# Patient Record
Sex: MALE | Race: WHITE | NOT HISPANIC OR LATINO | Employment: OTHER | URBAN - METROPOLITAN AREA
[De-identification: names, ages, dates, MRNs, and addresses within clinical notes are randomized per-mention and may not be internally consistent; named-entity substitution may affect disease eponyms.]

---

## 2017-11-17 RX ORDER — GABAPENTIN 600 MG/1
600 TABLET ORAL DAILY
Status: ON HOLD | COMMUNITY
End: 2017-11-20

## 2017-11-17 RX ORDER — DIGOXIN 125 MCG
125 TABLET ORAL
COMMUNITY
End: 2023-01-23 | Stop reason: SDUPTHER

## 2017-11-17 RX ORDER — ATORVASTATIN CALCIUM 20 MG/1
20 TABLET, FILM COATED ORAL DAILY
COMMUNITY
End: 2023-01-23 | Stop reason: SDUPTHER

## 2017-11-17 RX ORDER — CARVEDILOL 3.12 MG/1
3.12 TABLET ORAL 2 TIMES DAILY WITH MEALS
COMMUNITY

## 2017-11-17 RX ORDER — CHOLECALCIFEROL (VITAMIN D3) 125 MCG
1 CAPSULE ORAL DAILY
COMMUNITY

## 2017-11-17 RX ORDER — OMEPRAZOLE 20 MG/1
20 CAPSULE, DELAYED RELEASE ORAL DAILY
COMMUNITY

## 2017-11-17 RX ORDER — LEVOTHYROXINE SODIUM 0.07 MG/1
75 TABLET ORAL DAILY
COMMUNITY

## 2017-11-20 ENCOUNTER — HOSPITAL ENCOUNTER (OUTPATIENT)
Facility: HOSPITAL | Age: 67
Discharge: HOME | End: 2017-11-21
Attending: INTERNAL MEDICINE | Admitting: INTERNAL MEDICINE

## 2017-11-20 ENCOUNTER — APPOINTMENT (OUTPATIENT)
Dept: GENERAL RADIOLOGY | Facility: HOSPITAL | Age: 67
End: 2017-11-20
Attending: INTERNAL MEDICINE

## 2017-11-20 DIAGNOSIS — I42.8 NON-ISCHEMIC CARDIOMYOPATHY (HCC): ICD-10-CM

## 2017-11-20 DIAGNOSIS — I44.7 LBBB (LEFT BUNDLE BRANCH BLOCK): ICD-10-CM

## 2017-11-20 LAB
ANION GAP SERPL CALCULATED.3IONS-SCNC: 8.8 MMOL/L
BUN BLD-MCNC: 14 MG/DL (ref 8–23)
BUN/CREAT SERPL: 16.5 (ref 7–25)
CALCIUM SPEC-SCNC: 9.3 MG/DL (ref 8.6–10.5)
CHLORIDE SERPL-SCNC: 103 MMOL/L (ref 98–107)
CO2 SERPL-SCNC: 27.2 MMOL/L (ref 22–29)
CREAT BLD-MCNC: 0.85 MG/DL (ref 0.76–1.27)
DEPRECATED RDW RBC AUTO: 49.5 FL (ref 37–54)
ERYTHROCYTE [DISTWIDTH] IN BLOOD BY AUTOMATED COUNT: 13.9 % (ref 11.5–14.5)
GFR SERPL CREATININE-BSD FRML MDRD: 90 ML/MIN/1.73
GLUCOSE BLD-MCNC: 140 MG/DL (ref 65–99)
GLUCOSE BLDC GLUCOMTR-MCNC: 137 MG/DL (ref 70–130)
HCT VFR BLD AUTO: 42.3 % (ref 40.4–52.2)
HGB BLD-MCNC: 14 G/DL (ref 13.7–17.6)
MCH RBC QN AUTO: 32.6 PG (ref 27–32.7)
MCHC RBC AUTO-ENTMCNC: 33.1 G/DL (ref 32.6–36.4)
MCV RBC AUTO: 98.6 FL (ref 79.8–96.2)
PLATELET # BLD AUTO: 134 10*3/MM3 (ref 140–500)
PMV BLD AUTO: 10.9 FL (ref 6–12)
POTASSIUM BLD-SCNC: 4.7 MMOL/L (ref 3.5–5.2)
RBC # BLD AUTO: 4.29 10*6/MM3 (ref 4.6–6)
SODIUM BLD-SCNC: 139 MMOL/L (ref 136–145)
WBC NRBC COR # BLD: 8.75 10*3/MM3 (ref 4.5–10.7)

## 2017-11-20 PROCEDURE — 0 IOPAMIDOL PER 1 ML: Performed by: INTERNAL MEDICINE

## 2017-11-20 PROCEDURE — 71010 HC CHEST PA OR AP: CPT

## 2017-11-20 PROCEDURE — C1769 GUIDE WIRE: HCPCS | Performed by: INTERNAL MEDICINE

## 2017-11-20 PROCEDURE — C1894 INTRO/SHEATH, NON-LASER: HCPCS | Performed by: INTERNAL MEDICINE

## 2017-11-20 PROCEDURE — 82962 GLUCOSE BLOOD TEST: CPT

## 2017-11-20 PROCEDURE — C1882 AICD, OTHER THAN SING/DUAL: HCPCS | Performed by: INTERNAL MEDICINE

## 2017-11-20 PROCEDURE — 99153 MOD SED SAME PHYS/QHP EA: CPT | Performed by: INTERNAL MEDICINE

## 2017-11-20 PROCEDURE — 33241 REMOVE PULSE GENERATOR: CPT

## 2017-11-20 PROCEDURE — G0378 HOSPITAL OBSERVATION PER HR: HCPCS

## 2017-11-20 PROCEDURE — 99152 MOD SED SAME PHYS/QHP 5/>YRS: CPT | Performed by: INTERNAL MEDICINE

## 2017-11-20 PROCEDURE — 80048 BASIC METABOLIC PNL TOTAL CA: CPT | Performed by: INTERNAL MEDICINE

## 2017-11-20 PROCEDURE — C1900 LEAD, CORONARY VENOUS: HCPCS | Performed by: INTERNAL MEDICINE

## 2017-11-20 PROCEDURE — 33264 RMVL & RPLCMT DFB GEN MLT LD: CPT | Performed by: INTERNAL MEDICINE

## 2017-11-20 PROCEDURE — 85027 COMPLETE CBC AUTOMATED: CPT | Performed by: INTERNAL MEDICINE

## 2017-11-20 PROCEDURE — C1898 LEAD, PMKR, OTHER THAN TRANS: HCPCS | Performed by: INTERNAL MEDICINE

## 2017-11-20 PROCEDURE — 33249 INSJ/RPLCMT DEFIB W/LEAD(S): CPT

## 2017-11-20 PROCEDURE — C1730 CATH, EP, 19 OR FEW ELECT: HCPCS | Performed by: INTERNAL MEDICINE

## 2017-11-20 PROCEDURE — C1887 CATHETER, GUIDING: HCPCS | Performed by: INTERNAL MEDICINE

## 2017-11-20 PROCEDURE — 25010000002 CEFAZOLIN PER 500 MG: Performed by: INTERNAL MEDICINE

## 2017-11-20 PROCEDURE — C1892 INTRO/SHEATH,FIXED,PEEL-AWAY: HCPCS

## 2017-11-20 PROCEDURE — 33225 L VENTRIC PACING LEAD ADD-ON: CPT | Performed by: INTERNAL MEDICINE

## 2017-11-20 PROCEDURE — 25010000002 FENTANYL CITRATE (PF) 100 MCG/2ML SOLUTION: Performed by: INTERNAL MEDICINE

## 2017-11-20 PROCEDURE — 25010000002 MIDAZOLAM PER 1 MG: Performed by: INTERNAL MEDICINE

## 2017-11-20 DEVICE — IMPLANTABLE DEVICE: Type: IMPLANTABLE DEVICE | Status: FUNCTIONAL

## 2017-11-20 DEVICE — LD PM CAPSUREFIX NOVUS5076 52CM: Type: IMPLANTABLE DEVICE | Status: FUNCTIONAL

## 2017-11-20 DEVICE — LD ATTAIN PERFORMA S SHAPE LV 4598 88CM: Type: IMPLANTABLE DEVICE | Status: FUNCTIONAL

## 2017-11-20 RX ORDER — PANTOPRAZOLE SODIUM 40 MG/1
40 TABLET, DELAYED RELEASE ORAL EVERY MORNING
Status: DISCONTINUED | OUTPATIENT
Start: 2017-11-21 | End: 2017-11-21 | Stop reason: HOSPADM

## 2017-11-20 RX ORDER — ZOLPIDEM TARTRATE 5 MG/1
5 TABLET ORAL NIGHTLY PRN
Status: DISCONTINUED | OUTPATIENT
Start: 2017-11-20 | End: 2017-11-21 | Stop reason: HOSPADM

## 2017-11-20 RX ORDER — HYDROCODONE BITARTRATE AND ACETAMINOPHEN 5; 325 MG/1; MG/1
1 TABLET ORAL EVERY 4 HOURS PRN
Status: DISCONTINUED | OUTPATIENT
Start: 2017-11-20 | End: 2017-11-21 | Stop reason: HOSPADM

## 2017-11-20 RX ORDER — MIDAZOLAM HYDROCHLORIDE 1 MG/ML
INJECTION INTRAMUSCULAR; INTRAVENOUS AS NEEDED
Status: DISCONTINUED | OUTPATIENT
Start: 2017-11-20 | End: 2017-11-20 | Stop reason: HOSPADM

## 2017-11-20 RX ORDER — FENTANYL CITRATE 50 UG/ML
INJECTION, SOLUTION INTRAMUSCULAR; INTRAVENOUS AS NEEDED
Status: DISCONTINUED | OUTPATIENT
Start: 2017-11-20 | End: 2017-11-20 | Stop reason: HOSPADM

## 2017-11-20 RX ORDER — SODIUM CHLORIDE 9 MG/ML
75 INJECTION, SOLUTION INTRAVENOUS CONTINUOUS
Status: DISCONTINUED | OUTPATIENT
Start: 2017-11-20 | End: 2017-11-20

## 2017-11-20 RX ORDER — LIDOCAINE HYDROCHLORIDE 10 MG/ML
0.1 INJECTION, SOLUTION EPIDURAL; INFILTRATION; INTRACAUDAL; PERINEURAL ONCE AS NEEDED
Status: DISCONTINUED | OUTPATIENT
Start: 2017-11-20 | End: 2017-11-20 | Stop reason: HOSPADM

## 2017-11-20 RX ORDER — SODIUM CHLORIDE 0.9 % (FLUSH) 0.9 %
1-10 SYRINGE (ML) INJECTION AS NEEDED
Status: DISCONTINUED | OUTPATIENT
Start: 2017-11-20 | End: 2017-11-21 | Stop reason: HOSPADM

## 2017-11-20 RX ORDER — HYDROCODONE BITARTRATE AND ACETAMINOPHEN 7.5; 325 MG/1; MG/1
2 TABLET ORAL EVERY 4 HOURS PRN
Status: DISCONTINUED | OUTPATIENT
Start: 2017-11-20 | End: 2017-11-21 | Stop reason: HOSPADM

## 2017-11-20 RX ORDER — ONDANSETRON 2 MG/ML
4 INJECTION INTRAMUSCULAR; INTRAVENOUS EVERY 6 HOURS PRN
Status: DISCONTINUED | OUTPATIENT
Start: 2017-11-20 | End: 2017-11-21 | Stop reason: HOSPADM

## 2017-11-20 RX ORDER — DIGOXIN 125 MCG
125 TABLET ORAL
Status: DISCONTINUED | OUTPATIENT
Start: 2017-11-21 | End: 2017-11-21 | Stop reason: HOSPADM

## 2017-11-20 RX ORDER — LEVOTHYROXINE SODIUM 0.07 MG/1
75 TABLET ORAL EVERY MORNING
Status: DISCONTINUED | OUTPATIENT
Start: 2017-11-21 | End: 2017-11-21 | Stop reason: HOSPADM

## 2017-11-20 RX ORDER — ACETAMINOPHEN 325 MG/1
650 TABLET ORAL EVERY 4 HOURS PRN
Status: DISCONTINUED | OUTPATIENT
Start: 2017-11-20 | End: 2017-11-21 | Stop reason: HOSPADM

## 2017-11-20 RX ORDER — LIDOCAINE HYDROCHLORIDE 10 MG/ML
INJECTION, SOLUTION INFILTRATION; PERINEURAL AS NEEDED
Status: DISCONTINUED | OUTPATIENT
Start: 2017-11-20 | End: 2017-11-20 | Stop reason: HOSPADM

## 2017-11-20 RX ORDER — SODIUM CHLORIDE 0.9 % (FLUSH) 0.9 %
1-10 SYRINGE (ML) INJECTION AS NEEDED
Status: DISCONTINUED | OUTPATIENT
Start: 2017-11-20 | End: 2017-11-20 | Stop reason: HOSPADM

## 2017-11-20 RX ORDER — CARVEDILOL 3.12 MG/1
3.12 TABLET ORAL 2 TIMES DAILY WITH MEALS
Status: DISCONTINUED | OUTPATIENT
Start: 2017-11-20 | End: 2017-11-21 | Stop reason: HOSPADM

## 2017-11-20 RX ORDER — ATORVASTATIN CALCIUM 20 MG/1
20 TABLET, FILM COATED ORAL DAILY
Status: DISCONTINUED | OUTPATIENT
Start: 2017-11-20 | End: 2017-11-21 | Stop reason: HOSPADM

## 2017-11-20 RX ORDER — FAMOTIDINE 20 MG/1
20 TABLET, FILM COATED ORAL 2 TIMES DAILY
Status: DISCONTINUED | OUTPATIENT
Start: 2017-11-20 | End: 2017-11-21 | Stop reason: HOSPADM

## 2017-11-20 RX ADMIN — CARVEDILOL 3.12 MG: 3.12 TABLET, FILM COATED ORAL at 11:39

## 2017-11-20 RX ADMIN — FAMOTIDINE 20 MG: 20 TABLET, FILM COATED ORAL at 20:43

## 2017-11-20 RX ADMIN — CEFAZOLIN 2 G: 10 INJECTION, POWDER, FOR SOLUTION INTRAVENOUS at 17:44

## 2017-11-20 RX ADMIN — CARVEDILOL 3.12 MG: 3.12 TABLET, FILM COATED ORAL at 20:43

## 2017-11-20 RX ADMIN — SODIUM CHLORIDE 75 ML/HR: 9 INJECTION, SOLUTION INTRAVENOUS at 06:53

## 2017-11-20 RX ADMIN — FAMOTIDINE 20 MG: 20 TABLET, FILM COATED ORAL at 11:39

## 2017-11-20 RX ADMIN — ATORVASTATIN CALCIUM 20 MG: 20 TABLET, FILM COATED ORAL at 20:43

## 2017-11-20 RX ADMIN — METFORMIN HYDROCHLORIDE 1000 MG: 1000 TABLET ORAL at 11:39

## 2017-11-20 NOTE — PLAN OF CARE
Problem: Patient Care Overview (Adult)  Goal: Plan of Care Review  Outcome: Ongoing (interventions implemented as appropriate)    11/20/17 2544   Outcome Evaluation   Outcome Summary/Follow up Plan VSS. Denies pain. Dressing to left chest CDI. Sling to left arm. OOB without complications. Plan to discharge tomorrow.        Goal: Adult Individualization and Mutuality  Outcome: Ongoing (interventions implemented as appropriate)  Goal: Discharge Needs Assessment  Outcome: Ongoing (interventions implemented as appropriate)    Problem: Cardiac Rhythm Management Device (Adult)  Goal: Signs and Symptoms of Listed Potential Problems Will be Absent or Manageable (Cardiac Rhythm Management Device)  Outcome: Ongoing (interventions implemented as appropriate)

## 2017-11-20 NOTE — PROGRESS NOTES
Discharge Planning Assessment  [facility]     Patient Name: Roger D Snellen  MRN: [id number]  Today's Date: 11/20/2017    Admit Date: 11/20/2017          Discharge Needs Assessment       11/20/17 1510    Living Environment    Lives With spouse    Living Arrangements house    Home Accessibility stairs to enter home;stairs within home    Number of Stairs to Enter Home 3    Number of Stairs Within Home 12    Type of Financial/Environmental Concern none    Transportation Available car;family or friend will provide    Living Environment    Provides Primary Care For no one    Quality Of Family Relationships supportive;helpful;involved    Able to Return to Prior Living Arrangements yes    Discharge Needs Assessment    Concerns To Be Addressed no discharge needs identified;denies needs/concerns at this time    Equipment Currently Used at Home none    Discharge Disposition home or self-care    Discharge Planning Comments Home            Discharge Plan       11/20/17 1510    Case Management/Social Work Plan    Plan Home    Patient/Family In Agreement With Plan yes    Additional Comments CCP met with pt and wife to discuss d/c planning. Facesheet verified. CCP role explained. Pt resides with his wife in a two level home with three exterior steps and steps to access basement, which pt denies using. Pt denies DME use, and reports past home health but cannot recall agency name. Pt denies past sub-acute rehab. Pt's pharmacy is [facility] in [place], [place]. Pt denies known d/c needs at this time. CCP to follow to assist should d/c needs arise. Raegan Chacko LCSW        Discharge Placement     No information found                Demographic Summary       11/20/17 1505    Referral Information    Admission Type observation    Referral Source nursing;physician    Reason For Consult discharge planning    Record Reviewed medical record    Primary Care Physician Information    Name John Phelps MD            Functional Status        11/20/17 1509    Functional Status Current    Ambulation 0-->independent    Transferring 0-->independent    Toileting 0-->independent    Bathing 0-->independent    Dressing 0-->independent    Eating 0-->independent    Communication 0-->understands/communicates without difficulty    Swallowing (if score 2 or more for any item, consult Rehab Services) 0-->swallows foods/liquids without difficulty    Functional Status Prior    Ambulation 0-->independent    Transferring 0-->independent    Toileting 0-->independent    Bathing 0-->independent    Dressing 0-->independent    Eating 0-->independent    Communication 0-->understands/communicates without difficulty    Swallowing 0-->swallows foods/liquids without difficulty    Cognitive/Perceptual/Developmental    Current Mental Status/Cognitive Functioning no deficits noted            Psychosocial     None            Abuse/Neglect     None            Legal     None            Substance Abuse     None            Patient Forms     None          Kristina Chacko LCSW

## 2017-11-21 ENCOUNTER — APPOINTMENT (OUTPATIENT)
Dept: GENERAL RADIOLOGY | Facility: HOSPITAL | Age: 67
End: 2017-11-21
Attending: INTERNAL MEDICINE

## 2017-11-21 VITALS
DIASTOLIC BLOOD PRESSURE: 86 MMHG | RESPIRATION RATE: 16 BRPM | SYSTOLIC BLOOD PRESSURE: 141 MMHG | HEIGHT: 78 IN | TEMPERATURE: 97.5 F | HEART RATE: 60 BPM | BODY MASS INDEX: 26.96 KG/M2 | WEIGHT: 233 LBS | OXYGEN SATURATION: 96 %

## 2017-11-21 LAB
ANION GAP SERPL CALCULATED.3IONS-SCNC: 12.6 MMOL/L
BASOPHILS # BLD AUTO: 0.03 10*3/MM3 (ref 0–0.2)
BASOPHILS NFR BLD AUTO: 0.4 % (ref 0–1.5)
BUN BLD-MCNC: 12 MG/DL (ref 8–23)
BUN/CREAT SERPL: 14.5 (ref 7–25)
CALCIUM SPEC-SCNC: 9.4 MG/DL (ref 8.6–10.5)
CHLORIDE SERPL-SCNC: 101 MMOL/L (ref 98–107)
CO2 SERPL-SCNC: 26.4 MMOL/L (ref 22–29)
CREAT BLD-MCNC: 0.83 MG/DL (ref 0.76–1.27)
DEPRECATED RDW RBC AUTO: 50.1 FL (ref 37–54)
EOSINOPHIL # BLD AUTO: 0.23 10*3/MM3 (ref 0–0.7)
EOSINOPHIL NFR BLD AUTO: 2.7 % (ref 0.3–6.2)
ERYTHROCYTE [DISTWIDTH] IN BLOOD BY AUTOMATED COUNT: 13.7 % (ref 11.5–14.5)
GFR SERPL CREATININE-BSD FRML MDRD: 92 ML/MIN/1.73
GLUCOSE BLD-MCNC: 115 MG/DL (ref 65–99)
HCT VFR BLD AUTO: 43.4 % (ref 40.4–52.2)
HGB BLD-MCNC: 13.9 G/DL (ref 13.7–17.6)
IMM GRANULOCYTES # BLD: 0 10*3/MM3 (ref 0–0.03)
IMM GRANULOCYTES NFR BLD: 0 % (ref 0–0.5)
INR PPP: 1.16 (ref 0.9–1.1)
LYMPHOCYTES # BLD AUTO: 1.84 10*3/MM3 (ref 0.9–4.8)
LYMPHOCYTES NFR BLD AUTO: 21.6 % (ref 19.6–45.3)
MCH RBC QN AUTO: 32 PG (ref 27–32.7)
MCHC RBC AUTO-ENTMCNC: 32 G/DL (ref 32.6–36.4)
MCV RBC AUTO: 100 FL (ref 79.8–96.2)
MONOCYTES # BLD AUTO: 0.62 10*3/MM3 (ref 0.2–1.2)
MONOCYTES NFR BLD AUTO: 7.3 % (ref 5–12)
NEUTROPHILS # BLD AUTO: 5.81 10*3/MM3 (ref 1.9–8.1)
NEUTROPHILS NFR BLD AUTO: 68 % (ref 42.7–76)
PLATELET # BLD AUTO: 131 10*3/MM3 (ref 140–500)
PMV BLD AUTO: 11 FL (ref 6–12)
POTASSIUM BLD-SCNC: 4.4 MMOL/L (ref 3.5–5.2)
PROTHROMBIN TIME: 14.4 SECONDS (ref 11.7–14.2)
RBC # BLD AUTO: 4.34 10*6/MM3 (ref 4.6–6)
SODIUM BLD-SCNC: 140 MMOL/L (ref 136–145)
WBC NRBC COR # BLD: 8.53 10*3/MM3 (ref 4.5–10.7)

## 2017-11-21 PROCEDURE — 25010000002 CEFAZOLIN PER 500 MG: Performed by: INTERNAL MEDICINE

## 2017-11-21 PROCEDURE — G0378 HOSPITAL OBSERVATION PER HR: HCPCS

## 2017-11-21 PROCEDURE — 93005 ELECTROCARDIOGRAM TRACING: CPT | Performed by: INTERNAL MEDICINE

## 2017-11-21 PROCEDURE — 93010 ELECTROCARDIOGRAM REPORT: CPT | Performed by: INTERNAL MEDICINE

## 2017-11-21 PROCEDURE — 71020 HC CHEST PA AND LATERAL: CPT

## 2017-11-21 PROCEDURE — 80048 BASIC METABOLIC PNL TOTAL CA: CPT | Performed by: INTERNAL MEDICINE

## 2017-11-21 PROCEDURE — 85025 COMPLETE CBC W/AUTO DIFF WBC: CPT | Performed by: INTERNAL MEDICINE

## 2017-11-21 PROCEDURE — 85610 PROTHROMBIN TIME: CPT | Performed by: INTERNAL MEDICINE

## 2017-11-21 RX ORDER — HYDROCODONE BITARTRATE AND ACETAMINOPHEN 7.5; 325 MG/1; MG/1
2 TABLET ORAL EVERY 4 HOURS PRN
Qty: 30 TABLET | Refills: 0 | Status: SHIPPED | OUTPATIENT
Start: 2017-11-21 | End: 2017-11-30

## 2017-11-21 RX ADMIN — CARVEDILOL 3.12 MG: 3.12 TABLET, FILM COATED ORAL at 08:22

## 2017-11-21 RX ADMIN — HYDROCODONE BITARTRATE AND ACETAMINOPHEN 1 TABLET: 5; 325 TABLET ORAL at 01:13

## 2017-11-21 RX ADMIN — FAMOTIDINE 20 MG: 20 TABLET, FILM COATED ORAL at 08:22

## 2017-11-21 RX ADMIN — CEFAZOLIN 2 G: 10 INJECTION, POWDER, FOR SOLUTION INTRAVENOUS at 01:13

## 2017-11-21 RX ADMIN — ZOLPIDEM TARTRATE 5 MG: 5 TABLET ORAL at 01:13

## 2017-11-21 RX ADMIN — PANTOPRAZOLE SODIUM 40 MG: 40 TABLET, DELAYED RELEASE ORAL at 08:22

## 2017-11-21 RX ADMIN — LEVOTHYROXINE SODIUM 75 MCG: 75 TABLET ORAL at 08:21

## 2017-11-21 RX ADMIN — METFORMIN HYDROCHLORIDE 1000 MG: 1000 TABLET ORAL at 08:22

## 2017-11-21 NOTE — DISCHARGE INSTR - ACTIVITY
No heavy lifting with left arm until follow up appointment. Do not raise arm above elbow level for 1 week. Gradually resume normal activity. You may shower but do not submurge site in tub water until follow up appointment. Do not drive until cleared by MD. Do not drive if taking pain medication.

## 2017-11-21 NOTE — DISCHARGE SUMMARY
Date of Discharge:  11/21/2017    Discharge Diagnosis: esenting Problem/History of Present Illness  Non-ischemic cardiomyopathy [I42.8]  LBBB (left bundle branch block) [I44.7]  Non-ischemic cardiomyopathy [I42.8]     Patient Active Problem List   Diagnosis   • Non-ischemic cardiomyopathy            Hospital Course  Patient is a 67 y.o. male presented with Severe nonischemic cardiomyopathy and left bundle branch block and chronic atrial fibrillation     Procedures Performed  Procedure(s):  Device Upgrade  ICD TO A BIV ICD   medtronic  venogram     Pertinent Test Results:  Consults:   Consults     No orders found for last 30 day(s).              Ejection Fraction  No results found for: EF    Condition on Discharge:  Stable    Physical Exam at Discharge    Vital Signs  Temp:  [97.3 °F (36.3 °C)-98.3 °F (36.8 °C)] 97.5 °F (36.4 °C)  Heart Rate:  [59-90] 60  Resp:  [15-18] 16  BP: (111-150)/(73-94) 141/86  Wt Readings from Last 4 Encounters:   11/20/17 233 lb (106 kg)      General Appearance:    Alert, cooperative, in no acute distress   Head:    Normocephalic, without obvious abnormality, atraumatic   Eyes:            Conjunctivae normal, no icterus   Neck:   No adenopathy, supple, trachea midline, no thyromegaly, no   carotid bruit, no JVD   Lungs:     Clear to auscultation,respirations regular, even and                  unlabored    Heart:    Regular rhythm and normal rate, normal S1 and S2,            No murmur, no gallop, no rub, no click   Chest Wall:    No abnormalities observed   Abdomen:     Normal bowel sounds, no masses, no organomegaly, soft        non-tender, non-distended, no guarding, no rebound                tenderness   Rectal:     Deferred   Extremities:   No edema. Moves all extremities well, no cyanosis, no erythema   Pulses:   Pulses palpable and equal bilaterally   Skin:   No bleeding, bruising or rash   Neurologic:   Speech clear and appropriate, no facial drooping          Discharge  Disposition  Home or Self Care    Discharge Medications   Snellen, Roger D   Home Medication Instructions AUBRIE:[id number]    Printed on:11/21/17 2063   Medication Information                      apixaban (ELIQUIS) 5 MG tablet tablet  Take 5 mg by mouth 2 (Two) Times a Day.             atorvastatin (LIPITOR) 20 MG tablet  Take 20 mg by mouth Daily.             carvedilol (COREG) 3.125 MG tablet  Take 3.125 mg by mouth 2 (Two) Times a Day With Meals.             Cholecalciferol (VITAMIN D3) 2000 units tablet  Take 1 tablet by mouth Daily.             digoxin (LANOXIN) 125 MCG tablet  Take 125 mcg by mouth Daily.             HYDROcodone-acetaminophen (NORCO) 7.5-325 MG per tablet  Take 2 tablets by mouth Every 4 (Four) Hours As Needed for Severe Pain  for up to 9 days.             levothyroxine (SYNTHROID, LEVOTHROID) 75 MCG tablet  Take 75 mcg by mouth Daily.             metFORMIN (GLUCOPHAGE) 1000 MG tablet  Take 1,000 mg by mouth Daily With Breakfast.             omeprazole (priLOSEC) 20 MG capsule  Take 20 mg by mouth Daily.                 Discharge Diet:  regular diet    Activity at Discharge:  is tolerated Follow-up Appointments   with  in one week    Test Results at Discharge  Lab Results   Component Value Date    GLUCOSE 115 (H) 11/21/2017    CALCIUM 9.4 11/21/2017     11/21/2017    K 4.4 11/21/2017    CO2 26.4 11/21/2017     11/21/2017    BUN 12 11/21/2017    CREATININE 0.83 11/21/2017    EGFRIFNONA 92 11/21/2017    BCR 14.5 11/21/2017    ANIONGAP 12.6 11/21/2017        Lab Results   Component Value Date    GLUCOSE 115 (H) 11/21/2017    BUN 12 11/21/2017    CREATININE 0.83 11/21/2017    EGFRIFNONA 92 11/21/2017    BCR 14.5 11/21/2017    CO2 26.4 11/21/2017    CALCIUM 9.4 11/21/2017        No results found for: TROPONINT, MG    Lab Results   Component Value Date    WBC 8.53 11/21/2017    HGB 13.9 11/21/2017    HCT 43.4 11/21/2017    .0 (H) 11/21/2017     (L) 11/21/2017       No results found for: CHOL  No results found for: HDL  No results found for: LDL  No results found for: TRIG  No components found for: CHOLHDL   No results found for: HGBA1C   No results found for: TSH   No results found for: PTT  No components found for: PT/INR     Tashi Walters MD  11/21/17  9:23 AM    Time: 35 minutes

## 2017-11-21 NOTE — PLAN OF CARE
Problem: Patient Care Overview (Adult)  Goal: Plan of Care Review  Outcome: Ongoing (interventions implemented as appropriate)    11/21/17 0458   Outcome Evaluation   Outcome Summary/Follow up Plan Pt c/o pain to incision site, relieved with pain med, drsg CDI, sling to left arm, VSS, plan to d/c today   Coping/Psychosocial Response Interventions   Plan Of Care Reviewed With patient;spouse   Patient Care Overview   Progress progress toward functional goals as expected       Goal: Discharge Needs Assessment  Outcome: Ongoing (interventions implemented as appropriate)    Problem: Cardiac Rhythm Management Device (Adult)  Goal: Signs and Symptoms of Listed Potential Problems Will be Absent or Manageable (Cardiac Rhythm Management Device)  Outcome: Ongoing (interventions implemented as appropriate)    Problem: Pain, Acute (Adult)  Goal: Identify Related Risk Factors and Signs and Symptoms  Outcome: Ongoing (interventions implemented as appropriate)  Goal: Acceptable Pain Control/Comfort Level  Outcome: Ongoing (interventions implemented as appropriate)

## 2022-10-25 ENCOUNTER — HOSPITAL ENCOUNTER (OUTPATIENT)
Dept: GENERAL RADIOLOGY | Facility: HOSPITAL | Age: 72
Discharge: HOME | End: 2022-10-25
Admitting: FAMILY MEDICINE

## 2022-10-25 ENCOUNTER — TRANSCRIBE ORDERS (OUTPATIENT)
Dept: ADMINISTRATIVE | Facility: HOSPITAL | Age: 72
End: 2022-10-25

## 2022-10-25 DIAGNOSIS — R05.9 COUGH, UNSPECIFIED TYPE: Primary | ICD-10-CM

## 2022-10-25 DIAGNOSIS — R05.9 COUGH, UNSPECIFIED TYPE: ICD-10-CM

## 2022-10-25 PROCEDURE — 71046 X-RAY EXAM CHEST 2 VIEWS: CPT

## 2022-10-26 ENCOUNTER — TRANSCRIBE ORDERS (OUTPATIENT)
Dept: ADMINISTRATIVE | Facility: HOSPITAL | Age: 72
End: 2022-10-26

## 2022-10-26 DIAGNOSIS — R91.1 LUNG NODULE: Primary | ICD-10-CM

## 2022-11-02 ENCOUNTER — HOSPITAL ENCOUNTER (OUTPATIENT)
Dept: CT IMAGING | Facility: HOSPITAL | Age: 72
Discharge: HOME | End: 2022-11-02
Admitting: FAMILY MEDICINE

## 2022-11-02 DIAGNOSIS — R91.1 LUNG NODULE: ICD-10-CM

## 2022-11-02 PROCEDURE — 71250 CT THORAX DX C-: CPT

## 2023-01-23 ENCOUNTER — OFFICE VISIT (OUTPATIENT)
Dept: PULMONOLOGY | Facility: CLINIC | Age: 73
End: 2023-01-23
Payer: MEDICARE

## 2023-01-23 VITALS
BODY MASS INDEX: 28 KG/M2 | DIASTOLIC BLOOD PRESSURE: 70 MMHG | HEART RATE: 81 BPM | OXYGEN SATURATION: 95 % | TEMPERATURE: 98 F | HEIGHT: 78 IN | RESPIRATION RATE: 18 BRPM | WEIGHT: 242 LBS | SYSTOLIC BLOOD PRESSURE: 110 MMHG

## 2023-01-23 DIAGNOSIS — I50.9 CHRONIC CONGESTIVE HEART FAILURE, UNSPECIFIED HEART FAILURE TYPE: ICD-10-CM

## 2023-01-23 DIAGNOSIS — R06.09 DYSPNEA ON EXERTION: ICD-10-CM

## 2023-01-23 DIAGNOSIS — J84.10 PULMONARY FIBROSIS: ICD-10-CM

## 2023-01-23 DIAGNOSIS — R91.1 LUNG NODULE: Primary | ICD-10-CM

## 2023-01-23 PROCEDURE — 99203 OFFICE O/P NEW LOW 30 MIN: CPT | Performed by: INTERNAL MEDICINE

## 2023-01-23 RX ORDER — ALBUTEROL SULFATE 90 UG/1
2 AEROSOL, METERED RESPIRATORY (INHALATION) EVERY 4 HOURS PRN
COMMUNITY
Start: 2022-11-28

## 2023-01-23 RX ORDER — ONDANSETRON 8 MG/1
TABLET, ORALLY DISINTEGRATING ORAL
COMMUNITY
Start: 2023-01-09

## 2023-01-23 RX ORDER — METHYLPREDNISOLONE 4 MG/1
TABLET ORAL
COMMUNITY
Start: 2022-11-28 | End: 2023-01-23

## 2023-01-23 RX ORDER — ATORVASTATIN CALCIUM 20 MG/1
1 TABLET, FILM COATED ORAL DAILY
COMMUNITY

## 2023-01-23 RX ORDER — PROMETHAZINE HYDROCHLORIDE AND CODEINE PHOSPHATE 6.25; 1 MG/5ML; MG/5ML
SYRUP ORAL
COMMUNITY
Start: 2022-11-28

## 2023-01-23 RX ORDER — LEVOFLOXACIN 500 MG/1
TABLET, FILM COATED ORAL
COMMUNITY
Start: 2022-11-28 | End: 2023-01-23

## 2023-01-23 RX ORDER — IPRATROPIUM BROMIDE 42 UG/1
SPRAY, METERED NASAL
COMMUNITY
Start: 2023-01-14

## 2023-01-23 RX ORDER — SPIRONOLACTONE 25 MG/1
TABLET ORAL
COMMUNITY
Start: 2023-01-08

## 2023-01-23 RX ORDER — OMEPRAZOLE 40 MG/1
CAPSULE, DELAYED RELEASE ORAL
COMMUNITY
Start: 2022-12-08

## 2023-01-23 RX ORDER — DIGOXIN 125 MCG
1 TABLET ORAL EVERY OTHER DAY
COMMUNITY
Start: 2022-07-29

## 2023-01-23 RX ORDER — CETIRIZINE HYDROCHLORIDE 10 MG/1
TABLET ORAL
COMMUNITY
Start: 2022-12-12 | End: 2023-02-21 | Stop reason: SDUPTHER

## 2023-01-23 RX ORDER — TADALAFIL 5 MG/1
5 TABLET ORAL
COMMUNITY

## 2023-01-23 RX ORDER — CEFDINIR 300 MG/1
CAPSULE ORAL
COMMUNITY
Start: 2022-10-26 | End: 2023-01-23

## 2023-01-23 RX ORDER — AZITHROMYCIN 250 MG/1
TABLET, FILM COATED ORAL
COMMUNITY
Start: 2022-10-25 | End: 2023-01-23

## 2023-01-23 NOTE — PROGRESS NOTES
Pulmonary Consultation    Sourav Nair MD,    Thank you for asking me to see Roger D Snellen for   Chief Complaint   Patient presents with   • Establish Care     New Patient- Interstitial Lung Disease   • Shortness of Breath   • Cough   .      History of Present Illness  Roger D Snellen is a 72 y.o. male with a PMH significant for cardiomyopathy CHF and atrial fibrillation presents for evaluation of abnormal CT chest along with dyspnea on exertion over the past 3 to 4 years patient denies any significant cough or expectoration and has quit smoking in the remote past he denies any weight loss skin rash joint swelling or discoloration of the fingers  Patient has a smoking history of about 15 to 20 years but quit about 20 years earlier he used to work as a  and also in the railroad industry      Tobacco use history:  Former smoker      Review of Systems: History obtained from chart review and the patient.  Review of Systems   Respiratory: Positive for shortness of breath.    All other systems reviewed and are negative.    As described in the HPI. Otherwise, remainder of ROS (14 systems) were negative.    Patient Active Problem List   Diagnosis   • Non-ischemic cardiomyopathy (HCC)         Current Outpatient Medications:   •  albuterol sulfate  (90 Base) MCG/ACT inhaler, 2 puffs Every 4 (Four) Hours As Needed., Disp: , Rfl:   •  apixaban (ELIQUIS) 5 MG tablet tablet, Take 5 mg by mouth 2 (Two) Times a Day., Disp: , Rfl:   •  atorvastatin (LIPITOR) 20 MG tablet, Take 1 tablet by mouth Daily., Disp: , Rfl:   •  carvedilol (COREG) 3.125 MG tablet, Take 3.125 mg by mouth 2 (Two) Times a Day With Meals., Disp: , Rfl:   •  cetirizine (zyrTEC) 10 MG tablet, TAKE 1 TABLET BY MOUTH AT BEDTIME FOR ALLERGIES, Disp: , Rfl:   •  Cholecalciferol (VITAMIN D3) 2000 units tablet, Take 1 tablet by mouth Daily., Disp: , Rfl:   •  digoxin (LANOXIN) 125 MCG tablet, Take 1 tablet by mouth Every Other Day., Disp: , Rfl:    •  ipratropium (ATROVENT) 0.06 % nasal spray, , Disp: , Rfl:   •  levothyroxine (SYNTHROID, LEVOTHROID) 75 MCG tablet, Take 75 mcg by mouth Daily., Disp: , Rfl:   •  metFORMIN (GLUCOPHAGE) 1000 MG tablet, Take 1 tablet by mouth Daily., Disp: , Rfl:   •  omeprazole (priLOSEC) 20 MG capsule, Take 20 mg by mouth Daily., Disp: , Rfl:   •  ondansetron ODT (ZOFRAN-ODT) 8 MG disintegrating tablet, , Disp: , Rfl:   •  spironolactone (ALDACTONE) 25 MG tablet, , Disp: , Rfl:   •  methylPREDNISolone (MEDROL) 4 MG dose pack, Take as instructed on inner packaging. Take with food., Disp: , Rfl:   •  omeprazole (priLOSEC) 40 MG capsule, , Disp: , Rfl:   •  promethazine-codeine (PHENERGAN with CODEINE) 6.25-10 MG/5ML syrup, take 1/2 to 1 teaspoonful (2.5-5ml) BY MOUTH EVERY 4 hours AS NEEDED FOR cough, pain, nausea, and/or diarrhea, Disp: , Rfl:   •  tadalafil (CIALIS) 5 MG tablet, Take 5 mg by mouth., Disp: , Rfl:     Allergies   Allergen Reactions   • Celecoxib Itching   • Lisinopril Cough   • Rofecoxib Unknown - Low Severity   • Valdecoxib Unknown - Low Severity   • Xarelto [Rivaroxaban]        Past Medical History:   Diagnosis Date   • Arrhythmia     ATRIAL FIB, PACE TERMINATED VT   • Atrial fibrillation (HCC)    • BBB (bundle branch block)     L BBB   • CHF (congestive heart failure) (HCC)    • Diabetes mellitus (HCC)    • Disease of thyroid gland    • GERD (gastroesophageal reflux disease)    • Hyperlipidemia    • Hypertension    • Non-ischemic cardiomyopathy (HCC)     DILATED CM EF 20-25% 11/2017   • Osteoarthritis      Past Surgical History:   Procedure Laterality Date   • CARDIAC ELECTROPHYSIOLOGY PROCEDURE N/A 11/20/2017    Procedure: Device Upgrade  ICD TO A BIV ICD   medtronic;  Surgeon: Chris Phillips MD;  Location: [facility] INVASIVE LOCATION;  Service:    • CHOLECYSTECTOMY     • INSERT / REPLACE / REMOVE PACEMAKER     • INTERNAL CARDIAC DEFIBRILLATOR INSERTION      MEDTRONIC   • SHOULDER ARTHROSCOPY     •  "TOTAL KNEE ARTHROPLASTY       Social History     Socioeconomic History   • Marital status:    Tobacco Use   • Smoking status: Former     Packs/day: 0.50     Years: 15.00     Pack years: 7.50     Types: Cigarettes   • Smokeless tobacco: Never   • Tobacco comments:     QUIT 2014/ QUIT SMOKING CIGARETTES 1980s   Vaping Use   • Vaping Use: Never used   Substance and Sexual Activity   • Alcohol use: No   • Drug use: No   • Sexual activity: Defer     History reviewed. No pertinent family history.    CT Chest Without Contrast Diagnostic    Result Date: 11/2/2022    CT scan of the chest without IV contrast demonstrating peripheral reticulation with honeycomb change at the lung bases consistent with UIP.  1 cm noncalcified nodule in the posterior right lower lobe.  Short-term follow-up CT scan of the chest is recommended in 3 months per Fleischner guidelines.     ESEQUIEL KIMBROUGH MD       Electronically Signed and Approved By: ESEQUIEL KIMBROUGH MD on 11/02/2022 at 15:34             XR Chest PA & Lateral    Result Date: 10/25/2022     1. Cardiomegaly with mild pulmonary vascular congestion.  There is perihilar interstitial and alveolar haziness as well as in the bases which may be due to pulmonary edema or atypical infection pattern.  Correlate clinically.  2. 1.6 cm right basilar nodular density on the frontal view.  Recommend correlation with chest CT to rule out neoplasm.  3. Question trace right pleural effusion.      RAH THOMAS MD       Electronically Signed and Approved By: RAH THOMAS MD on 10/25/2022 at 12:08                   Objective     Blood pressure 110/70, pulse 81, temperature 98 °F (36.7 °C), temperature source Tympanic, resp. rate 18, height 200.7 cm (79\"), weight 110 kg (242 lb), SpO2 95 %.  Physical Exam  Vitals and nursing note reviewed.   Constitutional:       Appearance: Normal appearance.   HENT:      Head: Normocephalic and atraumatic.      Nose: Nose normal.      Mouth/Throat:      Mouth: Mucous " membranes are moist.      Pharynx: Oropharynx is clear.   Eyes:      Conjunctiva/sclera: Conjunctivae normal.      Pupils: Pupils are equal, round, and reactive to light.   Cardiovascular:      Rate and Rhythm: Normal rate and regular rhythm.      Pulses: Normal pulses.      Heart sounds: Normal heart sounds.   Pulmonary:      Effort: Pulmonary effort is normal.      Breath sounds: Rales present.      Comments: Bibasal crackles  Abdominal:      General: Abdomen is flat. Bowel sounds are normal.      Palpations: Abdomen is soft.   Musculoskeletal:         General: Normal range of motion.      Cervical back: Normal range of motion and neck supple.   Skin:     General: Skin is warm.      Capillary Refill: Capillary refill takes less than 2 seconds.   Neurological:      General: No focal deficit present.      Mental Status: He is alert and oriented to person, place, and time.   Psychiatric:         Mood and Affect: Mood normal.         Behavior: Behavior normal.       Immunization History   Administered Date(s) Administered   • Fluad Quad 65+ 11/08/2022   • Fluzone High-Dose 65+yrs 10/21/2021            Assessment & Plan     Diagnoses and all orders for this visit:    1. Lung nodule (Primary)  -     Full Pulmonary Function Test With Bronchodilator & ABG; Future  -     6 Minute Walk Test; Future  -     CT Chest Hi Resolution; Future    2. Pulmonary fibrosis (HCC)  -     Full Pulmonary Function Test With Bronchodilator & ABG; Future  -     6 Minute Walk Test; Future  -     CT Chest Hi Resolution; Future    3. Chronic congestive heart failure, unspecified heart failure type (HCC)    4. Dyspnea on exertion  -     Full Pulmonary Function Test With Bronchodilator & ABG; Future  -     6 Minute Walk Test; Future  -     CT Chest Hi Resolution; Future         Discussion/ Recommendations:   Chest x-ray and CT scan were reviewed  Will order high-resolution CT chest along with PFT blood gas and 6-minute walk  Patient has been  prescribed albuterol inhaler along with Trelegy by another physician  Patient is advised to continue his Lasix and Aldactone for his CHF as he was noted also to have some pulmonary congestion  Discussed vaccination and recommended    BMI is >= 25 and <30. (Overweight) The following options were offered after discussion;: exercise counseling/recommendations           Return in about 4 weeks (around 2/20/2023).      Thank you for allowing me to participate in the care of Roger D Snellen. Please do not hesitate to contact me with any questions.         This document has been electronically signed by Danny Galvan MD on January 23, 2023 10:34 EST

## 2023-02-03 ENCOUNTER — HOSPITAL ENCOUNTER (OUTPATIENT)
Dept: CT IMAGING | Facility: HOSPITAL | Age: 73
Discharge: HOME | End: 2023-02-03
Payer: MEDICARE

## 2023-02-03 ENCOUNTER — APPOINTMENT (OUTPATIENT)
Dept: CARDIAC REHAB | Facility: HOSPITAL | Age: 73
End: 2023-02-03
Payer: MEDICARE

## 2023-02-03 ENCOUNTER — HOSPITAL ENCOUNTER (OUTPATIENT)
Dept: RESPIRATORY THERAPY | Facility: HOSPITAL | Age: 73
Discharge: HOME | End: 2023-02-03
Payer: MEDICARE

## 2023-02-03 DIAGNOSIS — J84.10 PULMONARY FIBROSIS: ICD-10-CM

## 2023-02-03 DIAGNOSIS — R91.1 LUNG NODULE: ICD-10-CM

## 2023-02-03 DIAGNOSIS — R06.09 DYSPNEA ON EXERTION: ICD-10-CM

## 2023-02-03 LAB
ARTERIAL PATENCY WRIST A: POSITIVE
BASE EXCESS BLDA CALC-SCNC: -0.4 MMOL/L (ref -2–2)
BDY SITE: NORMAL
COHGB MFR BLD: 0.3 % (ref 0–1.5)
FHHB: 3.6 % (ref 0–5)
HCO3 BLDA-SCNC: 24.4 MMOL/L (ref 22–26)
HGB BLDA-MCNC: 15.2 G/DL (ref 13.8–16.4)
LACTATE BLDA-SCNC: NORMAL MMOL/L
METHGB BLD QL: 0.2 % (ref 0–1.5)
MODALITY: NORMAL
OXYHGB MFR BLDV: 95.9 % (ref 94–99)
PCO2 BLDA: 40.7 MM HG (ref 35–45)
PH BLDA: 7.4 PH UNITS (ref 7.35–7.45)
PO2 BLDA: 93.6 MM HG (ref 80–100)
SAO2 % BLDCOA: 96.4 % (ref 95–99)

## 2023-02-03 PROCEDURE — 94010 BREATHING CAPACITY TEST: CPT

## 2023-02-03 PROCEDURE — 94729 DIFFUSING CAPACITY: CPT | Performed by: INTERNAL MEDICINE

## 2023-02-03 PROCEDURE — 94010 BREATHING CAPACITY TEST: CPT | Performed by: INTERNAL MEDICINE

## 2023-02-03 PROCEDURE — 82375 ASSAY CARBOXYHB QUANT: CPT | Performed by: INTERNAL MEDICINE

## 2023-02-03 PROCEDURE — 94729 DIFFUSING CAPACITY: CPT

## 2023-02-03 PROCEDURE — 94726 PLETHYSMOGRAPHY LUNG VOLUMES: CPT | Performed by: INTERNAL MEDICINE

## 2023-02-03 PROCEDURE — 71250 CT THORAX DX C-: CPT

## 2023-02-03 PROCEDURE — 94726 PLETHYSMOGRAPHY LUNG VOLUMES: CPT

## 2023-02-03 PROCEDURE — 82805 BLOOD GASES W/O2 SATURATION: CPT | Performed by: INTERNAL MEDICINE

## 2023-02-03 PROCEDURE — 83050 HGB METHEMOGLOBIN QUAN: CPT | Performed by: INTERNAL MEDICINE

## 2023-02-03 PROCEDURE — 36600 WITHDRAWAL OF ARTERIAL BLOOD: CPT | Performed by: INTERNAL MEDICINE

## 2023-02-03 RX ORDER — ALBUTEROL SULFATE 2.5 MG/3ML
2.5 SOLUTION RESPIRATORY (INHALATION) ONCE
Status: COMPLETED | OUTPATIENT
Start: 2023-02-03 | End: 2023-02-03

## 2023-02-03 RX ADMIN — ALBUTEROL SULFATE 2.5 MG: 2.5 SOLUTION RESPIRATORY (INHALATION) at 11:00

## 2023-02-21 ENCOUNTER — OFFICE VISIT (OUTPATIENT)
Dept: PULMONOLOGY | Facility: CLINIC | Age: 73
End: 2023-02-21
Payer: MEDICARE

## 2023-02-21 VITALS
HEART RATE: 76 BPM | BODY MASS INDEX: 28.51 KG/M2 | RESPIRATION RATE: 18 BRPM | TEMPERATURE: 98.2 F | HEIGHT: 78 IN | OXYGEN SATURATION: 95 % | WEIGHT: 246.4 LBS | DIASTOLIC BLOOD PRESSURE: 85 MMHG | SYSTOLIC BLOOD PRESSURE: 137 MMHG

## 2023-02-21 DIAGNOSIS — I42.8 NON-ISCHEMIC CARDIOMYOPATHY: ICD-10-CM

## 2023-02-21 DIAGNOSIS — M05.10 RHEUMATOID LUNG DISEASE WITH RHEUMATOID ARTHRITIS: ICD-10-CM

## 2023-02-21 DIAGNOSIS — J84.10 PULMONARY FIBROSIS: Primary | ICD-10-CM

## 2023-02-21 PROCEDURE — 99214 OFFICE O/P EST MOD 30 MIN: CPT | Performed by: INTERNAL MEDICINE

## 2023-02-21 RX ORDER — FLUTICASONE FUROATE, UMECLIDINIUM BROMIDE AND VILANTEROL TRIFENATATE 100; 62.5; 25 UG/1; UG/1; UG/1
POWDER RESPIRATORY (INHALATION)
COMMUNITY

## 2023-02-21 RX ORDER — CETIRIZINE HYDROCHLORIDE 10 MG/1
10 TABLET ORAL DAILY
COMMUNITY

## 2023-02-21 NOTE — PROGRESS NOTES
Pulmonary Office Follow-up    Subjective     Roger D Snellen is seen today at the office for   Chief Complaint   Patient presents with   • Results     PFT, ABGs, and CT   • Follow-up   • Shortness of Breath         HPI  Roger D Snellen is a 73 y.o. male with a PMH significant for cardiomyopathy atrial fibrillation COPD and pulmonary fibrosis with advanced rheumatoid arthritis presents for follow-up patient complains of dyspnea on exertion but denies any significant cough or expectoration patient denies any chest pain fever or hemoptysis      Tobacco use history:  Former smoker      Patient Active Problem List   Diagnosis   • Non-ischemic cardiomyopathy (HCC)       Review of Systems  Review of Systems   Respiratory: Positive for shortness of breath.    Musculoskeletal: Positive for arthralgias and joint swelling.   All other systems reviewed and are negative.    As described in the HPI. Otherwise, remainder of ROS (14 systems) were negative.    Medications, Allergies, Social, and Family Histories reviewed as per EMR.    Objective     Vitals:    02/21/23 1057   BP: 137/85   Pulse: 76   Resp: 18   Temp: 98.2 °F (36.8 °C)   SpO2: 95%         02/21/23  1057   Weight: 112 kg (246 lb 6.4 oz)       Physical Exam  Vitals and nursing note reviewed.   Constitutional:       Appearance: Normal appearance.   HENT:      Head: Normocephalic and atraumatic.      Nose: Nose normal.      Mouth/Throat:      Mouth: Mucous membranes are moist.   Eyes:      Conjunctiva/sclera: Conjunctivae normal.      Pupils: Pupils are equal, round, and reactive to light.   Cardiovascular:      Rate and Rhythm: Normal rate and regular rhythm.      Pulses: Normal pulses.      Heart sounds: Normal heart sounds.   Pulmonary:      Effort: Pulmonary effort is normal.      Breath sounds: Rales present.   Abdominal:      General: Abdomen is flat. Bowel sounds are normal.      Palpations: Abdomen is soft.   Musculoskeletal:         General: Swelling,  tenderness and deformity present.      Cervical back: Normal range of motion and neck supple.   Skin:     General: Skin is warm.      Capillary Refill: Capillary refill takes less than 2 seconds.   Neurological:      General: No focal deficit present.      Mental Status: He is alert and oriented to person, place, and time.   Psychiatric:         Mood and Affect: Mood normal.         Behavior: Behavior normal.         CT Chest Hi Resolution Diagnostic    Result Date: 2/3/2023    1. Imaging findings likely representing UIP with basilar honeycombing, bronchiectasis, and peripheral reticulation. 2. Cardiomegaly with mild dilation of the ascending thoracic aorta measuring up to 4.4 cm.     RAYMOND WOLF MD       Electronically Signed and Approved By: RAYMOND WOLF MD on 2/03/2023 at 14:56                Assessment & Plan     Diagnoses and all orders for this visit:    1. Pulmonary fibrosis (HCC) (Primary)    2. 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 Non-ischemic cardiomyopathy (HCC)         Discussion/ Recommendations:   CT scan reviewed shows evidence of usual interstitial pneumonitis with peripheral reticulation and honeycombing in the lung bases  Blood gas shows preserved oxygenation  PFTs show restrictive ventilatory defect moderate with reduced diffusion capacity  Findings were reviewed and discussed with the patient presently because of his coexisting morbidities he has elected to withhold antifibrotic's for his pulmonary fibrosis  We will keep a close watch and follow-up in 2 months  Continue his Trelegy  Patient has already quit smoking  Vaccinations discussed and recommended    BMI is >= 25 and <30. (Overweight) The following options were offered after discussion;: exercise counseling/recommendations        Return in about 2 months (around 4/21/2023).          This document has been electronically signed by Danny Galvan MD on February 21, 2023 11:13 EST

## 2023-04-25 ENCOUNTER — OFFICE VISIT (OUTPATIENT)
Dept: PULMONOLOGY | Facility: CLINIC | Age: 73
End: 2023-04-25
Payer: MEDICARE

## 2023-04-25 VITALS
DIASTOLIC BLOOD PRESSURE: 85 MMHG | RESPIRATION RATE: 18 BRPM | WEIGHT: 239.8 LBS | HEIGHT: 78 IN | HEART RATE: 72 BPM | TEMPERATURE: 98 F | SYSTOLIC BLOOD PRESSURE: 127 MMHG | OXYGEN SATURATION: 96 % | BODY MASS INDEX: 27.74 KG/M2

## 2023-04-25 DIAGNOSIS — M05.10 RHEUMATOID LUNG DISEASE WITH RHEUMATOID ARTHRITIS: ICD-10-CM

## 2023-04-25 DIAGNOSIS — J84.10 PULMONARY FIBROSIS: Primary | ICD-10-CM

## 2023-04-25 DIAGNOSIS — I42.8 NON-ISCHEMIC CARDIOMYOPATHY: ICD-10-CM

## 2023-04-25 PROCEDURE — 1159F MED LIST DOCD IN RCRD: CPT | Performed by: INTERNAL MEDICINE

## 2023-04-25 PROCEDURE — 99213 OFFICE O/P EST LOW 20 MIN: CPT | Performed by: INTERNAL MEDICINE

## 2023-04-25 PROCEDURE — 1160F RVW MEDS BY RX/DR IN RCRD: CPT | Performed by: INTERNAL MEDICINE

## 2023-04-25 RX ORDER — METHYLPREDNISOLONE 4 MG/1
TABLET ORAL SEE ADMIN INSTRUCTIONS
COMMUNITY
Start: 2023-03-08

## 2023-04-25 RX ORDER — METHYLPREDNISOLONE 4 MG/1
TABLET ORAL
COMMUNITY
Start: 2023-03-08

## 2023-04-25 NOTE — PROGRESS NOTES
Pulmonary Office Follow-up    Subjective     Roger D Snellen is seen today at the office for   Chief Complaint   Patient presents with   • Pulmonary Fibrosis   • Shortness of Breath   • Follow-up         HPI  Roger D Snellen is a 73 y.o. male with a PMH significant for COPD and pulmonary fibrosis presents for follow-up patient has been doing well with his medications and denies any significant cough or expectoration at this time he does complain of dyspnea on exertion unchanged from before patient has also been having joint pains and swellings in his hands mostly      Tobacco use history:  Former smoker      Patient Active Problem List   Diagnosis   • Non-ischemic cardiomyopathy       Review of Systems  Review of Systems   Respiratory: Positive for shortness of breath.    All other systems reviewed and are negative.    As described in the HPI. Otherwise, remainder of ROS (14 systems) were negative.    Medications, Allergies, Social, and Family Histories reviewed as per EMR.    Objective     Vitals:    04/25/23 1105   BP: 127/85   Pulse: 72   Resp: 18   Temp: 98 °F (36.7 °C)   SpO2: 96%         04/25/23  1105   Weight: 109 kg (239 lb 12.8 oz)       Physical Exam  Vitals and nursing note reviewed.   Constitutional:       Appearance: Normal appearance.   HENT:      Head: Normocephalic and atraumatic.      Nose: Nose normal.      Mouth/Throat:      Mouth: Mucous membranes are moist.   Eyes:      Conjunctiva/sclera: Conjunctivae normal.      Pupils: Pupils are equal, round, and reactive to light.   Cardiovascular:      Rate and Rhythm: Normal rate and regular rhythm.      Pulses: Normal pulses.      Heart sounds: Normal heart sounds.   Pulmonary:      Effort: Pulmonary effort is normal.      Breath sounds: Rales present.   Abdominal:      General: Abdomen is flat. Bowel sounds are normal.      Palpations: Abdomen is soft.   Musculoskeletal:         General: Swelling, tenderness and deformity present. Normal range of  motion.      Cervical back: Normal range of motion and neck supple.   Skin:     General: Skin is warm.      Capillary Refill: Capillary refill takes less than 2 seconds.   Neurological:      General: No focal deficit present.      Mental Status: He is alert and oriented to person, place, and time.   Psychiatric:         Mood and Affect: Mood normal.         Behavior: Behavior normal.         CT Chest Hi Resolution Diagnostic    Result Date: 2/3/2023    1. Imaging findings likely representing UIP with basilar honeycombing, bronchiectasis, and peripheral reticulation. 2. Cardiomegaly with mild dilation of the ascending thoracic aorta measuring up to 4.4 cm.     RAYMOND WOLF MD       Electronically Signed and Approved By: RAYMOND WOLF MD on 2/03/2023 at 14:56                Assessment & Plan     Diagnoses and all orders for this visit:    1. 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 Non-ischemic cardiomyopathy    Other orders  -     Fluticasone-Umeclidin-Vilant 200-62.5-25 MCG/ACT aerosol powder ; Inhale 1 puff Daily.  Dispense: 1 each; Refill: 5         Discussion/ Recommendations:   Patient is advised to continue his present medication including Trelegy once daily  Advise regular exercise  Advised to reduce weight his BMI is 27.01  Vaccinations discussed and recommended    BMI is >= 25 and <30. (Overweight) The following options were offered after discussion;: exercise counseling/recommendations        Return in about 3 months (around 7/25/2023).          This document has been electronically signed by Danny Galvan MD on April 25, 2023 11:15 EDT

## 2023-07-26 ENCOUNTER — OFFICE VISIT (OUTPATIENT)
Dept: PULMONOLOGY | Facility: CLINIC | Age: 73
End: 2023-07-26
Payer: MEDICARE

## 2023-07-26 VITALS
BODY MASS INDEX: 28.9 KG/M2 | SYSTOLIC BLOOD PRESSURE: 107 MMHG | RESPIRATION RATE: 16 BRPM | HEIGHT: 78 IN | OXYGEN SATURATION: 96 % | TEMPERATURE: 97.6 F | WEIGHT: 249.8 LBS | DIASTOLIC BLOOD PRESSURE: 70 MMHG | HEART RATE: 75 BPM

## 2023-07-26 DIAGNOSIS — J84.10 PULMONARY FIBROSIS: Primary | ICD-10-CM

## 2023-07-26 DIAGNOSIS — I42.8 NON-ISCHEMIC CARDIOMYOPATHY: ICD-10-CM

## 2023-07-26 DIAGNOSIS — M05.10 RHEUMATOID LUNG DISEASE WITH RHEUMATOID ARTHRITIS: ICD-10-CM

## 2023-07-26 PROCEDURE — 99214 OFFICE O/P EST MOD 30 MIN: CPT | Performed by: INTERNAL MEDICINE

## 2023-07-26 PROCEDURE — 1160F RVW MEDS BY RX/DR IN RCRD: CPT | Performed by: INTERNAL MEDICINE

## 2023-07-26 PROCEDURE — 1159F MED LIST DOCD IN RCRD: CPT | Performed by: INTERNAL MEDICINE

## 2023-07-26 NOTE — PROGRESS NOTES
Pulmonary Office Follow-up    Subjective     Roger D Snellen is seen today at the office for   Chief Complaint   Patient presents with    Follow-up    Shortness of Breath    lung noduile    pulmonary fibrosis         HPI  Roger D Snellen is a 73 y.o. male with a PMH significant for rheumatoid arthritis with rheumatoid pulmonary fibrosis and COPD presents for follow-up patient is out of his Trelegy and his wife has noticed that it was helping him he plans to restart patient complains of mild cough along with mucoid sputum along with dyspnea on exertion he does complain of severe joint pain secondary to his rheumatoid arthritis      Tobacco use history:  Former smoker      Patient Active Problem List   Diagnosis    Non-ischemic cardiomyopathy       Review of Systems  Review of Systems   Respiratory:  Positive for cough and shortness of breath.    All other systems reviewed and are negative.  As described in the HPI. Otherwise, remainder of ROS (14 systems) were negative.    Medications, Allergies, Social, and Family Histories reviewed as per EMR.    Objective     Vitals:    07/26/23 1115   BP: 107/70   Pulse: 75   Resp: 16   Temp: 97.6 °F (36.4 °C)   SpO2: 96%         07/26/23  1115   Weight: 113 kg (249 lb 12.8 oz)       Physical Exam  Vitals and nursing note reviewed.   Constitutional:       Appearance: Normal appearance.   HENT:      Head: Normocephalic and atraumatic.      Nose: Nose normal.      Mouth/Throat:      Mouth: Mucous membranes are moist.   Eyes:      Extraocular Movements: Extraocular movements intact.      Pupils: Pupils are equal, round, and reactive to light.   Cardiovascular:      Rate and Rhythm: Normal rate and regular rhythm.      Pulses: Normal pulses.      Heart sounds: Normal heart sounds.   Pulmonary:      Effort: Pulmonary effort is normal.      Breath sounds: Rales present.   Abdominal:      General: Abdomen is flat. Bowel sounds are normal.      Palpations: Abdomen is soft.    Musculoskeletal:         General: Normal range of motion.      Cervical back: Normal range of motion and neck supple.   Skin:     General: Skin is warm.      Capillary Refill: Capillary refill takes less than 2 seconds.   Neurological:      General: No focal deficit present.      Mental Status: He is alert.   Psychiatric:         Mood and Affect: Mood normal.       XR hip 2 views right    Result Date: 5/31/2023  XR Right Hip 2 views Findings: Advanced osteoarthritis right hip with bone-on-bone contact osteophyte formation and sclerosis. No fracture or dislocation is identified. There is no soft tissue swelling or joint effusion. Impression: Severe osteoarthritis right hip Personally read, reviewed and interpreted Gabriele Olvera MD       Assessment & Plan     Diagnoses and all orders for this visit:    1. 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 Non-ischemic cardiomyopathy         Discussion/ Recommendations:   Patient is advised to continue his present medications  I have discussed possibility of antifibrotic's in the form of Ofev with them but he wants to wait as mostly his dyspnea has been stable  Advise regular exercise and weight reduction his BMI is 28.14  Vaccinations discussed and recommended             Return in about 3 months (around 10/26/2023).          This document has been electronically signed by Danny Galvan MD on July 26, 2023 11:26 EDT

## 2023-08-03 ENCOUNTER — TELEPHONE (OUTPATIENT)
Dept: PULMONOLOGY | Facility: CLINIC | Age: 73
End: 2023-08-03
Payer: MEDICARE

## 2023-10-25 ENCOUNTER — HOSPITAL ENCOUNTER (OUTPATIENT)
Dept: GENERAL RADIOLOGY | Facility: HOSPITAL | Age: 73
Discharge: HOME | End: 2023-10-25
Admitting: INTERNAL MEDICINE
Payer: MEDICARE

## 2023-10-25 ENCOUNTER — OFFICE VISIT (OUTPATIENT)
Dept: PULMONOLOGY | Facility: CLINIC | Age: 73
End: 2023-10-25
Payer: MEDICARE

## 2023-10-25 VITALS
HEIGHT: 78 IN | SYSTOLIC BLOOD PRESSURE: 139 MMHG | OXYGEN SATURATION: 97 % | DIASTOLIC BLOOD PRESSURE: 91 MMHG | TEMPERATURE: 97.1 F | HEART RATE: 75 BPM | WEIGHT: 249.4 LBS | RESPIRATION RATE: 16 BRPM | BODY MASS INDEX: 28.86 KG/M2

## 2023-10-25 DIAGNOSIS — R06.09 DYSPNEA ON EXERTION: ICD-10-CM

## 2023-10-25 DIAGNOSIS — J84.10 PULMONARY FIBROSIS: ICD-10-CM

## 2023-10-25 DIAGNOSIS — M05.10 RHEUMATOID LUNG DISEASE WITH RHEUMATOID ARTHRITIS: ICD-10-CM

## 2023-10-25 DIAGNOSIS — J84.10 PULMONARY FIBROSIS: Primary | ICD-10-CM

## 2023-10-25 PROCEDURE — 1159F MED LIST DOCD IN RCRD: CPT | Performed by: INTERNAL MEDICINE

## 2023-10-25 PROCEDURE — 1160F RVW MEDS BY RX/DR IN RCRD: CPT | Performed by: INTERNAL MEDICINE

## 2023-10-25 PROCEDURE — 71046 X-RAY EXAM CHEST 2 VIEWS: CPT

## 2023-10-25 PROCEDURE — 99214 OFFICE O/P EST MOD 30 MIN: CPT | Performed by: INTERNAL MEDICINE

## 2023-10-25 NOTE — PROGRESS NOTES
Pulmonary Office Follow-up    Subjective     Roger D Snellen is seen today at the office for   Chief Complaint   Patient presents with    Follow-up    Shortness of Breath    Cough    PULMONARY FIBROSISI         HPI  Roger D Snellen is a 73 y.o. male with a PMH significant for pulmonary fibrosis secondary to rheumatoid arthritis and CHF with atrial fibrillation and cardiomyopathy presents for follow-up patient does complain of dyspnea on exertion but it has remained unchanged he denies any significant cough or expectoration at this time patient denies any wheezing and has been staying active at home      Tobacco use history:  Former smoker      Patient Active Problem List   Diagnosis    Non-ischemic cardiomyopathy       Review of Systems  Review of Systems   Respiratory:  Positive for shortness of breath.    All other systems reviewed and are negative.    As described in the HPI. Otherwise, remainder of ROS (14 systems) were negative.    Medications, Allergies, Social, and Family Histories reviewed as per EMR.    Objective     Vitals:    10/25/23 1017   BP: 139/91   Pulse: 75   Resp: 16   Temp: 97.1 °F (36.2 °C)   SpO2: 97%         10/25/23  1017   Weight: 113 kg (249 lb 6.4 oz)       Physical Exam  Vitals and nursing note reviewed.   Constitutional:       Appearance: Normal appearance.   HENT:      Head: Normocephalic and atraumatic.      Nose: Nose normal.      Mouth/Throat:      Mouth: Mucous membranes are moist.      Pharynx: Oropharynx is clear.   Eyes:      Extraocular Movements: Extraocular movements intact.      Conjunctiva/sclera: Conjunctivae normal.      Pupils: Pupils are equal, round, and reactive to light.   Cardiovascular:      Rate and Rhythm: Normal rate and regular rhythm.      Pulses: Normal pulses.      Heart sounds: Normal heart sounds.   Pulmonary:      Effort: Pulmonary effort is normal.      Breath sounds: Rales present.   Abdominal:      General: Abdomen is flat. Bowel sounds are normal.       Palpations: Abdomen is soft.   Musculoskeletal:         General: Deformity present.      Cervical back: Normal range of motion and neck supple.   Skin:     General: Skin is warm.      Capillary Refill: Capillary refill takes 2 to 3 seconds.   Neurological:      General: No focal deficit present.      Mental Status: He is alert and oriented to person, place, and time.   Psychiatric:         Mood and Affect: Mood normal.         Behavior: Behavior normal.         No radiology results for the last 90 days.     Assessment & Plan     Diagnoses and all orders for this visit:    1. 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 Dyspnea on exertion  -     XR Chest 2 View; Future         Discussion/ Recommendations:   Patient is stable  We will continue current medications  Continue Trelegy daily  Will repeat chest x-ray  Patient has already quit smoking  Vaccinations discussed and recommended             Return in about 4 months (around 2/25/2024).          This document has been electronically signed by Danny Galvan MD on October 25, 2023 10:26 EDT

## 2024-02-26 ENCOUNTER — OFFICE VISIT (OUTPATIENT)
Dept: PULMONOLOGY | Facility: CLINIC | Age: 74
End: 2024-02-26
Payer: MEDICARE

## 2024-02-26 VITALS
OXYGEN SATURATION: 96 % | DIASTOLIC BLOOD PRESSURE: 84 MMHG | WEIGHT: 240.8 LBS | SYSTOLIC BLOOD PRESSURE: 124 MMHG | TEMPERATURE: 97.5 F | BODY MASS INDEX: 27.86 KG/M2 | RESPIRATION RATE: 16 BRPM | HEART RATE: 78 BPM | HEIGHT: 78 IN

## 2024-02-26 DIAGNOSIS — M05.10 RHEUMATOID LUNG DISEASE WITH RHEUMATOID ARTHRITIS: Primary | ICD-10-CM

## 2024-02-26 DIAGNOSIS — J44.1 COPD WITH ACUTE EXACERBATION: ICD-10-CM

## 2024-02-26 PROCEDURE — 99214 OFFICE O/P EST MOD 30 MIN: CPT | Performed by: INTERNAL MEDICINE

## 2024-02-26 PROCEDURE — 1160F RVW MEDS BY RX/DR IN RCRD: CPT | Performed by: INTERNAL MEDICINE

## 2024-02-26 PROCEDURE — 1159F MED LIST DOCD IN RCRD: CPT | Performed by: INTERNAL MEDICINE

## 2024-02-26 RX ORDER — PREDNISONE 10 MG/1
TABLET ORAL
Qty: 31 TABLET | Refills: 0 | Status: SHIPPED | OUTPATIENT
Start: 2024-02-26

## 2024-02-26 RX ORDER — AMOXICILLIN 500 MG/1
500 CAPSULE ORAL 3 TIMES DAILY
Qty: 21 CAPSULE | Refills: 0 | Status: SHIPPED | OUTPATIENT
Start: 2024-02-26 | End: 2024-03-04

## 2024-02-26 RX ORDER — TRIAMCINOLONE ACETONIDE 5 MG/G
1 CREAM TOPICAL
COMMUNITY
Start: 2024-02-01

## 2024-02-26 NOTE — PROGRESS NOTES
Pulmonary Office Follow-up    Subjective     Roger D Snellen is seen today at the office for   Chief Complaint   Patient presents with    Follow-up    Shortness of Breath    PULMONARY FIBROSIS    Cough         HPI  Roger D Snellen is a 74 y.o. male with a PMH significant for COPD and rheumatoid lung pulmonary fibrosis presents for follow-up patient complains of cough with wheeze and mucopurulent expectoration along with nasal congestion and drainage for the past couple of weeks he denies any chest pain fever or hemoptysis      Tobacco use history:  Former smoker      Patient Active Problem List   Diagnosis    Non-ischemic cardiomyopathy       Review of Systems  Review of Systems   Respiratory:  Positive for cough and shortness of breath.    All other systems reviewed and are negative.    As described in the HPI. Otherwise, remainder of ROS (14 systems) were negative.    Medications, Allergies, Social, and Family Histories reviewed as per EMR.    Result Review :            Objective     Vitals:    02/26/24 1123   BP: 124/84   Pulse: 78   Resp: 16   Temp: 97.5 °F (36.4 °C)   SpO2: 96%         02/26/24  1123   Weight: 109 kg (240 lb 12.8 oz)       Physical Exam  Vitals and nursing note reviewed.   Constitutional:       Appearance: Normal appearance.   HENT:      Head: Normocephalic and atraumatic.      Nose: Nose normal.      Mouth/Throat:      Mouth: Mucous membranes are moist.      Pharynx: Oropharynx is clear.   Eyes:      Extraocular Movements: Extraocular movements intact.      Conjunctiva/sclera: Conjunctivae normal.      Pupils: Pupils are equal, round, and reactive to light.   Cardiovascular:      Rate and Rhythm: Normal rate and regular rhythm.      Pulses: Normal pulses.      Heart sounds: Normal heart sounds.   Pulmonary:      Effort: Pulmonary effort is normal.      Breath sounds: Rhonchi and rales present.   Abdominal:      General: Abdomen is flat. Bowel sounds are normal.      Palpations: Abdomen is  soft.   Musculoskeletal:         General: Swelling and tenderness present.      Cervical back: Normal range of motion and neck supple.   Skin:     General: Skin is warm.      Capillary Refill: Capillary refill takes 2 to 3 seconds.   Neurological:      General: No focal deficit present.      Mental Status: He is alert and oriented to person, place, and time.   Psychiatric:         Mood and Affect: Mood normal.         Behavior: Behavior normal.         No radiology results for the last 90 days.     Assessment & Plan     Diagnoses and all orders for this visit:    1. Rheumatoid lung disease with rheumatoid arthritis (Primary)    2. COPD with acute exacerbation    Other orders  -     amoxicillin (AMOXIL) 500 MG capsule; Take 1 capsule by mouth 3 (Three) Times a Day for 7 days.  Dispense: 21 capsule; Refill: 0  -     predniSONE (DELTASONE) 10 MG tablet; Take 4 tabs daily x 3 days, then take 3 tabs daily x 3 days, then take 2 tabs daily x 3 days, then take 1 tab daily x 3 days  Dispense: 31 tablet; Refill: 0         Discussion/ Recommendations:   Will start him on Amoxil and prednisone for bronchitis with COPD exacerbation  Continue Trelegy daily  Patient has already quit smoking  Advise regular exercise  Vaccinations discussed and recommended             Return in about 3 months (around 5/26/2024).          This document has been electronically signed by Danny Galvan MD on February 26, 2024 11:31 EST

## 2024-06-03 ENCOUNTER — OFFICE VISIT (OUTPATIENT)
Dept: PULMONOLOGY | Facility: CLINIC | Age: 74
End: 2024-06-03
Payer: MEDICARE

## 2024-06-03 VITALS
RESPIRATION RATE: 16 BRPM | SYSTOLIC BLOOD PRESSURE: 106 MMHG | DIASTOLIC BLOOD PRESSURE: 68 MMHG | BODY MASS INDEX: 26.59 KG/M2 | OXYGEN SATURATION: 98 % | HEIGHT: 78 IN | HEART RATE: 62 BPM | WEIGHT: 229.8 LBS

## 2024-06-03 DIAGNOSIS — M05.10 RHEUMATOID LUNG DISEASE WITH RHEUMATOID ARTHRITIS: Primary | ICD-10-CM

## 2024-06-03 DIAGNOSIS — J41.8 MIXED SIMPLE AND MUCOPURULENT CHRONIC BRONCHITIS: ICD-10-CM

## 2024-06-03 PROCEDURE — 1160F RVW MEDS BY RX/DR IN RCRD: CPT | Performed by: INTERNAL MEDICINE

## 2024-06-03 PROCEDURE — 99214 OFFICE O/P EST MOD 30 MIN: CPT | Performed by: INTERNAL MEDICINE

## 2024-06-03 PROCEDURE — 1159F MED LIST DOCD IN RCRD: CPT | Performed by: INTERNAL MEDICINE

## 2024-06-03 RX ORDER — FUROSEMIDE 20 MG/1
20 TABLET ORAL DAILY
COMMUNITY
Start: 2024-05-02

## 2024-06-03 RX ORDER — CEPHALEXIN 500 MG/1
500 CAPSULE ORAL 3 TIMES DAILY
COMMUNITY
Start: 2024-05-30

## 2024-06-03 RX ORDER — AMIODARONE HYDROCHLORIDE 200 MG/1
200 TABLET ORAL
COMMUNITY
Start: 2024-05-24

## 2024-06-03 RX ORDER — ALBUTEROL SULFATE 90 UG/1
2 AEROSOL, METERED RESPIRATORY (INHALATION) EVERY 6 HOURS PRN
Qty: 1 G | Refills: 3 | Status: SHIPPED | OUTPATIENT
Start: 2024-06-03 | End: 2024-06-03

## 2024-06-03 RX ORDER — EMPAGLIFLOZIN 10 MG/1
10 TABLET, FILM COATED ORAL DAILY
COMMUNITY
Start: 2024-05-15

## 2024-06-03 RX ORDER — ALBUTEROL SULFATE 90 UG/1
2 AEROSOL, METERED RESPIRATORY (INHALATION) EVERY 6 HOURS PRN
Qty: 1 G | Refills: 3 | Status: SHIPPED | OUTPATIENT
Start: 2024-06-03

## 2024-06-03 RX ORDER — HYDROCODONE BITARTRATE AND ACETAMINOPHEN 5; 325 MG/1; MG/1
TABLET ORAL SEE ADMIN INSTRUCTIONS
COMMUNITY
Start: 2024-05-24

## 2024-06-03 NOTE — PROGRESS NOTES
Pulmonary Office Follow-up    Subjective     Roger D Snellen is seen today at the office for   Chief Complaint   Patient presents with    Follow-up     3 month    COPD     Pulmonary fibrosis  Rheumatoid lung disease    Shortness of Breath    Cough         HPI  Roger D Snellen is a 74 y.o. male with a PMH significant for COPD CHF atrial fibrillation and rheumatoid lung disease presents for follow-up patient was recently hospitalized for atrial fibrillation and has been placed on amiodarone he denies any palpitations or shortness of breath at this time he does complain of some nasal congestion with drainage from allergies      Tobacco use history:  Former smoker      Patient Active Problem List   Diagnosis    Non-ischemic cardiomyopathy       Review of Systems  Review of Systems   Respiratory:  Positive for shortness of breath.    Cardiovascular:  Positive for palpitations.   All other systems reviewed and are negative.    As described in the HPI. Otherwise, remainder of ROS (14 systems) were negative.    Medications, Allergies, Social, and Family Histories reviewed as per EMR.    Result Review :            Objective     Vitals:    06/03/24 1113   BP: 106/68   Pulse: 62   Resp: 16   SpO2: 98%         06/03/24  1113   Weight: 104 kg (229 lb 12.8 oz)       Physical Exam  Vitals and nursing note reviewed.   Constitutional:       Appearance: Normal appearance.   HENT:      Head: Normocephalic and atraumatic.      Nose: Nose normal.      Mouth/Throat:      Mouth: Mucous membranes are moist.      Pharynx: Oropharynx is clear.   Eyes:      Extraocular Movements: Extraocular movements intact.      Conjunctiva/sclera: Conjunctivae normal.      Pupils: Pupils are equal, round, and reactive to light.   Cardiovascular:      Rate and Rhythm: Normal rate. Rhythm irregular.      Pulses: Normal pulses.      Heart sounds: Normal heart sounds.   Pulmonary:      Effort: Pulmonary effort is normal.      Breath sounds: Rales present.    Abdominal:      General: Abdomen is flat. Bowel sounds are normal.      Palpations: Abdomen is soft.   Musculoskeletal:         General: Normal range of motion.      Cervical back: Normal range of motion and neck supple.   Skin:     General: Skin is warm.      Capillary Refill: Capillary refill takes 2 to 3 seconds.   Neurological:      General: No focal deficit present.      Mental Status: He is alert and oriented to person, place, and time.   Psychiatric:         Mood and Affect: Mood normal.         Behavior: Behavior normal.         XR Chest 1 View    Result Date: 5/26/2024  Findings are concerning for congestive heart failure. Images personally reviewed, interpreted and dictated by KAMRON Campoverde.            Assessment & Plan     Diagnoses and all orders for this visit:    1. Rheumatoid lung disease with rheumatoid arthritis (Primary)    2. Mixed simple and mucopurulent chronic bronchitis    Other orders  -     Discontinue: Fluticasone-Umeclidin-Vilant (TRELEGY ELLIPTA) 200-62.5-25 MCG/ACT inhaler; Inhale 1 puff Daily.  Dispense: 3 each; Refill: 5  -     Discontinue: albuterol sulfate  (90 Base) MCG/ACT inhaler; Inhale 2 puffs Every 6 (Six) Hours As Needed for Shortness of Air or Wheezing.  Dispense: 1 g; Refill: 3  -     albuterol sulfate  (90 Base) MCG/ACT inhaler; Inhale 2 puffs Every 6 (Six) Hours As Needed for Shortness of Air or Wheezing.  Dispense: 1 g; Refill: 3  -     Fluticasone-Umeclidin-Vilant (TRELEGY ELLIPTA) 200-62.5-25 MCG/ACT inhaler; Inhale 1 puff Daily.  Dispense: 3 each; Refill: 5         Discussion/ Recommendations:   Chest x-ray showed evidence of pulmonary vascular congestion and CHF  Patient is advised to continue his home medication including amiodarone and Eliquis  Will refill his Trelegy  Albuterol inhaler as needed  Vaccinations discussed and recommended    BMI is >= 25 and <30. (Overweight) The following options were offered after discussion;: exercise  counseling/recommendations        Return in about 4 months (around 10/3/2024).          This document has been electronically signed by Danny Galvan MD on Deloris 3, 2024 11:22 EDT

## 2024-08-26 RX ORDER — ALBUTEROL SULFATE 90 UG/1
AEROSOL, METERED RESPIRATORY (INHALATION)
Qty: 34 G | Refills: 3 | OUTPATIENT
Start: 2024-08-26

## 2024-10-10 ENCOUNTER — TELEPHONE (OUTPATIENT)
Dept: PULMONOLOGY | Facility: CLINIC | Age: 74
End: 2024-10-10
Payer: MEDICARE

## 2024-10-10 NOTE — TELEPHONE ENCOUNTER
Patient is needing 90 day supply refills of trelegy sent to [facility] home delivery. Patient is running low. Please advise, thank you.

## 2024-10-10 NOTE — TELEPHONE ENCOUNTER
LVM for patient to return call. Florecita was sent in 06/2024 as a 90 day supply with 5 refills to [facility]. Patient needs to contact [facility].

## 2024-11-07 ENCOUNTER — TELEPHONE (OUTPATIENT)
Dept: CARDIOLOGY | Facility: CLINIC | Age: 74
End: 2024-11-07
Payer: MEDICARE

## 2024-11-07 NOTE — TELEPHONE ENCOUNTER
[facility] has requested surgical clearance for pt to have Prostate Biopsy with local Anesthesia, pt is not our pt and see's [facility] [place] faxed over forms to them successfully

## 2024-12-19 ENCOUNTER — OFFICE VISIT (OUTPATIENT)
Dept: PULMONOLOGY | Facility: CLINIC | Age: 74
End: 2024-12-19
Payer: MEDICARE

## 2024-12-19 ENCOUNTER — HOSPITAL ENCOUNTER (OUTPATIENT)
Dept: GENERAL RADIOLOGY | Facility: HOSPITAL | Age: 74
Discharge: HOME | End: 2024-12-19
Admitting: INTERNAL MEDICINE
Payer: MEDICARE

## 2024-12-19 VITALS
HEART RATE: 88 BPM | TEMPERATURE: 98.8 F | OXYGEN SATURATION: 96 % | DIASTOLIC BLOOD PRESSURE: 74 MMHG | SYSTOLIC BLOOD PRESSURE: 109 MMHG | RESPIRATION RATE: 14 BRPM | HEIGHT: 78 IN | BODY MASS INDEX: 24.97 KG/M2 | WEIGHT: 215.8 LBS

## 2024-12-19 DIAGNOSIS — M05.10 RHEUMATOID LUNG DISEASE WITH RHEUMATOID ARTHRITIS: ICD-10-CM

## 2024-12-19 DIAGNOSIS — J44.1 COPD WITH ACUTE EXACERBATION: ICD-10-CM

## 2024-12-19 DIAGNOSIS — J44.1 COPD WITH ACUTE EXACERBATION: Primary | ICD-10-CM

## 2024-12-19 PROCEDURE — 71046 X-RAY EXAM CHEST 2 VIEWS: CPT

## 2024-12-19 RX ORDER — CYANOCOBALAMIN 1000 UG/ML
100 INJECTION, SOLUTION INTRAMUSCULAR; SUBCUTANEOUS
COMMUNITY
Start: 2024-11-04

## 2024-12-19 RX ORDER — ALBUTEROL SULFATE 90 UG/1
2 INHALANT RESPIRATORY (INHALATION) EVERY 6 HOURS PRN
Qty: 1 G | Refills: 3 | Status: SHIPPED | OUTPATIENT
Start: 2024-12-19

## 2024-12-19 RX ORDER — METOPROLOL SUCCINATE 25 MG/1
25 TABLET, EXTENDED RELEASE ORAL DAILY
COMMUNITY
Start: 2024-11-24

## 2024-12-19 RX ORDER — MEXILETINE HYDROCHLORIDE 150 MG/1
150 CAPSULE ORAL 3 TIMES DAILY
COMMUNITY
Start: 2024-11-08

## 2024-12-19 RX ORDER — FLUOROURACIL 50 MG/G
1 CREAM TOPICAL
COMMUNITY
Start: 2024-09-24

## 2024-12-19 RX ORDER — LEVOTHYROXINE SODIUM 100 UG/1
100 TABLET ORAL DAILY
COMMUNITY
Start: 2024-10-13

## 2024-12-19 RX ORDER — AMOXICILLIN 500 MG/1
500 CAPSULE ORAL 3 TIMES DAILY
Qty: 21 CAPSULE | Refills: 0 | Status: SHIPPED | OUTPATIENT
Start: 2024-12-19 | End: 2024-12-26

## 2024-12-19 NOTE — PROGRESS NOTES
Pulmonary Office Follow-up    Subjective     Roger D Snellen is seen today at the office for   Chief Complaint   Patient presents with    Follow-up     4 month    COPD    Shortness of Breath    Cough    Wheezing         HPI  Roger D Snellen is a 74 y.o. male with a PMH significant for COPD CHF and rheumatoid lung disease presents for follow-up patient has been having cough with mucoid expectoration along with some hoarseness of voice he complains of dyspnea on activity with poor energy      Tobacco use history:  Former smoker      Patient Active Problem List   Diagnosis    Non-ischemic cardiomyopathy       Review of Systems  Review of Systems   HENT:  Positive for voice change.    Respiratory:  Positive for cough and shortness of breath.    All other systems reviewed and are negative.    As described in the HPI. Otherwise, remainder of ROS (14 systems) were negative.    Medications, Allergies, Social, and Family Histories reviewed as per EMR.    Result Review :            Objective     Vitals:    12/19/24 1135   BP: 109/74   Pulse: 88   Resp: 14   Temp: 98.8 °F (37.1 °C)   SpO2: 96%         12/19/24  1135   Weight: 97.9 kg (215 lb 12.8 oz)       Physical Exam  Vitals and nursing note reviewed.   Constitutional:       Appearance: He is ill-appearing.   HENT:      Head: Normocephalic and atraumatic.      Nose: Nose normal.      Mouth/Throat:      Pharynx: Oropharynx is clear.   Eyes:      Extraocular Movements: Extraocular movements intact.      Conjunctiva/sclera: Conjunctivae normal.      Pupils: Pupils are equal, round, and reactive to light.   Cardiovascular:      Rate and Rhythm: Normal rate and regular rhythm.      Pulses: Normal pulses.      Heart sounds: Normal heart sounds.   Pulmonary:      Effort: Pulmonary effort is normal.      Breath sounds: Rales present.   Musculoskeletal:         General: Normal range of motion.      Cervical back: Normal range of motion and neck supple.   Skin:     General: Skin  is warm.      Capillary Refill: Capillary refill takes 2 to 3 seconds.   Neurological:      Mental Status: He is alert and oriented to person, place, and time.   Psychiatric:         Mood and Affect: Mood normal.         Behavior: Behavior normal.         No radiology results for the last 90 days.     Assessment & Plan     Diagnoses and all orders for this visit:    1. COPD with acute exacerbation (Primary)  -     XR Chest 2 View; Future    2. Rheumatoid lung disease with rheumatoid arthritis  -     XR Chest 2 View; Future    Other orders  -     albuterol sulfate  (90 Base) MCG/ACT inhaler; Inhale 2 puffs Every 6 (Six) Hours As Needed for Shortness of Air or Wheezing.  Dispense: 1 g; Refill: 3  -     Fluticasone-Umeclidin-Vilant (TRELEGY ELLIPTA) 200-62.5-25 MCG/ACT inhaler; Inhale 1 puff Daily.  Dispense: 3 each; Refill: 5  -     amoxicillin (AMOXIL) 500 MG capsule; Take 1 capsule by mouth 3 (Three) Times a Day for 7 days.  Dispense: 21 capsule; Refill: 0         Discussion/ Recommendations:   Will order Amoxil for 1 week  Will refill his Trelegy and albuterol inhaler  Advised to continue his heart medications  Will order chest x-ray  Vaccinations discussed and recommended    BMI is within normal parameters. No other follow-up for BMI required.        Return in about 4 months (around 4/19/2025).          This document has been electronically signed by Danny Galvan MD on December 19, 2024 11:46 EST

## 2025-01-01 ENCOUNTER — APPOINTMENT (OUTPATIENT)
Dept: GENERAL RADIOLOGY | Facility: HOSPITAL | Age: 75
DRG: 163 | End: 2025-01-01
Payer: MEDICARE

## 2025-01-01 ENCOUNTER — APPOINTMENT (OUTPATIENT)
Dept: ULTRASOUND IMAGING | Facility: HOSPITAL | Age: 75
DRG: 163 | End: 2025-01-01
Payer: MEDICARE

## 2025-01-01 ENCOUNTER — APPOINTMENT (OUTPATIENT)
Dept: CT IMAGING | Facility: HOSPITAL | Age: 75
DRG: 163 | End: 2025-01-01
Payer: MEDICARE

## 2025-01-01 ENCOUNTER — APPOINTMENT (OUTPATIENT)
Dept: CARDIOLOGY | Facility: HOSPITAL | Age: 75
DRG: 163 | End: 2025-01-01
Payer: MEDICARE

## 2025-01-01 ENCOUNTER — HOSPITAL ENCOUNTER (INPATIENT)
Facility: HOSPITAL | Age: 75
LOS: 20 days | DRG: 163 | End: 2025-05-25
Attending: EMERGENCY MEDICINE | Admitting: FAMILY MEDICINE
Payer: MEDICARE

## 2025-01-01 VITALS
BODY MASS INDEX: 21.6 KG/M2 | WEIGHT: 186.73 LBS | RESPIRATION RATE: 22 BRPM | OXYGEN SATURATION: 90 % | HEIGHT: 78 IN | SYSTOLIC BLOOD PRESSURE: 86 MMHG | DIASTOLIC BLOOD PRESSURE: 55 MMHG | HEART RATE: 65 BPM | TEMPERATURE: 97.5 F

## 2025-01-01 DIAGNOSIS — J96.01 ACUTE HYPOXEMIC RESPIRATORY FAILURE: Primary | ICD-10-CM

## 2025-01-01 DIAGNOSIS — Z78.9 IMPAIRED MOBILITY AND ADLS: ICD-10-CM

## 2025-01-01 DIAGNOSIS — R26.2 DIFFICULTY WALKING: ICD-10-CM

## 2025-01-01 DIAGNOSIS — Z74.09 IMPAIRED MOBILITY AND ADLS: ICD-10-CM

## 2025-01-01 DIAGNOSIS — J81.0 ACUTE PULMONARY EDEMA: ICD-10-CM

## 2025-01-01 DIAGNOSIS — J96.01 ACUTE HYPOXIC RESPIRATORY FAILURE: ICD-10-CM

## 2025-01-01 DIAGNOSIS — I50.23 ACUTE ON CHRONIC SYSTOLIC CONGESTIVE HEART FAILURE: ICD-10-CM

## 2025-01-01 LAB
A FUMIGATUS IGG SER QL: NEGATIVE
A PULLULANS IGG SER QL: NEGATIVE
ACB CMPLX DNA BAL NAA+NON-PRB-NCNCRNG: NOT DETECTED
ALBUMIN SERPL-MCNC: 2.5 G/DL (ref 3.5–5.2)
ALBUMIN SERPL-MCNC: 2.6 G/DL (ref 3.5–5.2)
ALBUMIN SERPL-MCNC: 2.7 G/DL (ref 3.5–5.2)
ALBUMIN SERPL-MCNC: 2.8 G/DL (ref 3.5–5.2)
ALBUMIN SERPL-MCNC: 2.9 G/DL (ref 3.5–5.2)
ALBUMIN SERPL-MCNC: 2.9 G/DL (ref 3.5–5.2)
ALBUMIN SERPL-MCNC: 3 G/DL (ref 3.5–5.2)
ALBUMIN/GLOB SERPL: 0.5 G/DL
ALBUMIN/GLOB SERPL: 0.5 G/DL
ALBUMIN/GLOB SERPL: 0.6 G/DL
ALBUMIN/GLOB SERPL: 0.7 G/DL
ALBUMIN/GLOB SERPL: 0.7 G/DL
ALBUMIN/GLOB SERPL: 0.8 G/DL
ALBUMIN/GLOB SERPL: 0.8 G/DL
ALBUMIN/GLOB SERPL: 0.9 G/DL
ALDOLASE SERPL-CCNC: 12.1 U/L (ref 3.3–10.3)
ALP SERPL-CCNC: 65 U/L (ref 39–117)
ALP SERPL-CCNC: 67 U/L (ref 39–117)
ALP SERPL-CCNC: 68 U/L (ref 39–117)
ALP SERPL-CCNC: 71 U/L (ref 39–117)
ALP SERPL-CCNC: 73 U/L (ref 39–117)
ALP SERPL-CCNC: 75 U/L (ref 39–117)
ALP SERPL-CCNC: 75 U/L (ref 39–117)
ALP SERPL-CCNC: 76 U/L (ref 39–117)
ALP SERPL-CCNC: 78 U/L (ref 39–117)
ALP SERPL-CCNC: 79 U/L (ref 39–117)
ALP SERPL-CCNC: 79 U/L (ref 39–117)
ALP SERPL-CCNC: 80 U/L (ref 39–117)
ALP SERPL-CCNC: 90 U/L (ref 39–117)
ALT SERPL W P-5'-P-CCNC: 111 U/L (ref 1–41)
ALT SERPL W P-5'-P-CCNC: 121 U/L (ref 1–41)
ALT SERPL W P-5'-P-CCNC: 169 U/L (ref 1–41)
ALT SERPL W P-5'-P-CCNC: 170 U/L (ref 1–41)
ALT SERPL W P-5'-P-CCNC: 228 U/L (ref 1–41)
ALT SERPL W P-5'-P-CCNC: 274 U/L (ref 1–41)
ALT SERPL W P-5'-P-CCNC: 286 U/L (ref 1–41)
ALT SERPL W P-5'-P-CCNC: 77 U/L (ref 1–41)
ALT SERPL W P-5'-P-CCNC: 86 U/L (ref 1–41)
ALT SERPL W P-5'-P-CCNC: 89 U/L (ref 1–41)
ALT SERPL W P-5'-P-CCNC: 93 U/L (ref 1–41)
ALT SERPL W P-5'-P-CCNC: 96 U/L (ref 1–41)
ALT SERPL W P-5'-P-CCNC: 97 U/L (ref 1–41)
ANA SER QL: NEGATIVE
ANION GAP SERPL CALCULATED.3IONS-SCNC: 10.3 MMOL/L (ref 5–15)
ANION GAP SERPL CALCULATED.3IONS-SCNC: 10.4 MMOL/L (ref 5–15)
ANION GAP SERPL CALCULATED.3IONS-SCNC: 10.5 MMOL/L (ref 5–15)
ANION GAP SERPL CALCULATED.3IONS-SCNC: 10.5 MMOL/L (ref 5–15)
ANION GAP SERPL CALCULATED.3IONS-SCNC: 11 MMOL/L (ref 5–15)
ANION GAP SERPL CALCULATED.3IONS-SCNC: 11.1 MMOL/L (ref 5–15)
ANION GAP SERPL CALCULATED.3IONS-SCNC: 11.1 MMOL/L (ref 5–15)
ANION GAP SERPL CALCULATED.3IONS-SCNC: 11.2 MMOL/L (ref 5–15)
ANION GAP SERPL CALCULATED.3IONS-SCNC: 12.5 MMOL/L (ref 5–15)
ANION GAP SERPL CALCULATED.3IONS-SCNC: 12.6 MMOL/L (ref 5–15)
ANION GAP SERPL CALCULATED.3IONS-SCNC: 15.6 MMOL/L (ref 5–15)
ANION GAP SERPL CALCULATED.3IONS-SCNC: 17.2 MMOL/L (ref 5–15)
ANION GAP SERPL CALCULATED.3IONS-SCNC: 7.6 MMOL/L (ref 5–15)
ANION GAP SERPL CALCULATED.3IONS-SCNC: 8.3 MMOL/L (ref 5–15)
ANION GAP SERPL CALCULATED.3IONS-SCNC: 8.6 MMOL/L (ref 5–15)
ANION GAP SERPL CALCULATED.3IONS-SCNC: 8.8 MMOL/L (ref 5–15)
ANION GAP SERPL CALCULATED.3IONS-SCNC: 9.4 MMOL/L (ref 5–15)
ANION GAP SERPL CALCULATED.3IONS-SCNC: 9.4 MMOL/L (ref 5–15)
ANION GAP SERPL CALCULATED.3IONS-SCNC: 9.5 MMOL/L (ref 5–15)
ANION GAP SERPL CALCULATED.3IONS-SCNC: 9.7 MMOL/L (ref 5–15)
AORTIC DIMENSIONLESS INDEX: 0.69 (DI)
ARTERIAL PATENCY WRIST A: POSITIVE
AST SERPL-CCNC: 114 U/L (ref 1–40)
AST SERPL-CCNC: 119 U/L (ref 1–40)
AST SERPL-CCNC: 160 U/L (ref 1–40)
AST SERPL-CCNC: 176 U/L (ref 1–40)
AST SERPL-CCNC: 234 U/L (ref 1–40)
AST SERPL-CCNC: 30 U/L (ref 1–40)
AST SERPL-CCNC: 31 U/L (ref 1–40)
AST SERPL-CCNC: 32 U/L (ref 1–40)
AST SERPL-CCNC: 33 U/L (ref 1–40)
AST SERPL-CCNC: 359 U/L (ref 1–40)
AST SERPL-CCNC: 36 U/L (ref 1–40)
AST SERPL-CCNC: 71 U/L (ref 1–40)
AST SERPL-CCNC: 77 U/L (ref 1–40)
ATMOSPHERIC PRESS: 736.4 MMHG
ATMOSPHERIC PRESS: 736.4 MMHG
ATMOSPHERIC PRESS: 740.6 MMHG
AV MEAN PRESS GRAD SYS DOP V1V2: 4 MMHG
AV VMAX SYS DOP: 140 CM/SEC
B PARAPERT DNA SPEC QL NAA+PROBE: NOT DETECTED
B PERT DNA SPEC QL NAA+PROBE: NOT DETECTED
BACTERIA SPEC AEROBE CULT: NORMAL
BACTERIA SPEC RESP CULT: NORMAL
BASE EXCESS BLDA CALC-SCNC: -0.8 MMOL/L (ref -2–2)
BASE EXCESS BLDA CALC-SCNC: 7.1 MMOL/L (ref -2–2)
BASE EXCESS BLDA CALC-SCNC: 7.6 MMOL/L (ref -2–2)
BASOPHILS # BLD AUTO: 0.01 10*3/MM3 (ref 0–0.2)
BASOPHILS # BLD AUTO: 0.01 10*3/MM3 (ref 0–0.2)
BASOPHILS # BLD AUTO: 0.02 10*3/MM3 (ref 0–0.2)
BASOPHILS # BLD AUTO: 0.03 10*3/MM3 (ref 0–0.2)
BASOPHILS # BLD AUTO: 0.03 10*3/MM3 (ref 0–0.2)
BASOPHILS # BLD AUTO: 0.04 10*3/MM3 (ref 0–0.2)
BASOPHILS # BLD AUTO: 0.07 10*3/MM3 (ref 0–0.2)
BASOPHILS NFR BLD AUTO: 0.1 % (ref 0–1.5)
BASOPHILS NFR BLD AUTO: 0.1 % (ref 0–1.5)
BASOPHILS NFR BLD AUTO: 0.2 % (ref 0–1.5)
BASOPHILS NFR BLD AUTO: 0.7 % (ref 0–1.5)
BDY SITE: ABNORMAL
BH CV ECHO MEAS - AO MAX PG: 7.8 MMHG
BH CV ECHO MEAS - AO ROOT DIAM: 4.1 CM
BH CV ECHO MEAS - AO V2 VTI: 25.4 CM
BH CV ECHO MEAS - AVA(I,D): 2.15 CM2
BH CV ECHO MEAS - EDV(CUBED): 110.6 ML
BH CV ECHO MEAS - EDV(MOD-SP2): 158 ML
BH CV ECHO MEAS - EDV(MOD-SP4): 212 ML
BH CV ECHO MEAS - EF(MOD-SP2): 41.6 %
BH CV ECHO MEAS - EF(MOD-SP4): 30.2 %
BH CV ECHO MEAS - ESV(CUBED): 64 ML
BH CV ECHO MEAS - ESV(MOD-SP2): 92.3 ML
BH CV ECHO MEAS - ESV(MOD-SP4): 148 ML
BH CV ECHO MEAS - FS: 16.7 %
BH CV ECHO MEAS - IVS/LVPW: 1.08 CM
BH CV ECHO MEAS - IVSD: 1.3 CM
BH CV ECHO MEAS - LA DIMENSION: 5.1 CM
BH CV ECHO MEAS - LAT PEAK E' VEL: 5 CM/SEC
BH CV ECHO MEAS - LV DIASTOLIC VOL/BSA (35-75): 86.9 CM2
BH CV ECHO MEAS - LV MASS(C)D: 232.2 GRAMS
BH CV ECHO MEAS - LV MAX PG: 4 MMHG
BH CV ECHO MEAS - LV MEAN PG: 2 MMHG
BH CV ECHO MEAS - LV SYSTOLIC VOL/BSA (12-30): 60.7 CM2
BH CV ECHO MEAS - LV V1 MAX: 99.6 CM/SEC
BH CV ECHO MEAS - LV V1 VTI: 17.4 CM
BH CV ECHO MEAS - LVIDD: 4.8 CM
BH CV ECHO MEAS - LVIDS: 4 CM
BH CV ECHO MEAS - LVOT AREA: 3.1 CM2
BH CV ECHO MEAS - LVOT DIAM: 2 CM
BH CV ECHO MEAS - LVPWD: 1.2 CM
BH CV ECHO MEAS - MED PEAK E' VEL: 6.7 CM/SEC
BH CV ECHO MEAS - MV A MAX VEL: 36.7 CM/SEC
BH CV ECHO MEAS - MV DEC SLOPE: 535 CM/SEC2
BH CV ECHO MEAS - MV DEC TIME: 0.22 SEC
BH CV ECHO MEAS - MV E MAX VEL: 117 CM/SEC
BH CV ECHO MEAS - MV E/A: 3.2
BH CV ECHO MEAS - MV MEAN PG: 2 MMHG
BH CV ECHO MEAS - MV V2 VTI: 32.6 CM
BH CV ECHO MEAS - MVA(VTI): 1.68 CM2
BH CV ECHO MEAS - RVDD: 4.2 CM
BH CV ECHO MEAS - SV(LVOT): 54.7 ML
BH CV ECHO MEAS - SV(MOD-SP2): 65.7 ML
BH CV ECHO MEAS - SV(MOD-SP4): 64 ML
BH CV ECHO MEAS - SVI(LVOT): 22.4 ML/M2
BH CV ECHO MEAS - SVI(MOD-SP2): 26.9 ML/M2
BH CV ECHO MEAS - SVI(MOD-SP4): 26.2 ML/M2
BH CV ECHO MEAS - TAPSE (>1.6): 1.83 CM
BH CV ECHO MEAS - TR MAX PG: 33.4 MMHG
BH CV ECHO MEAS - TR MAX VEL: 289 CM/SEC
BH CV ECHO MEASUREMENTS AVERAGE E/E' RATIO: 20
BH CV XLRA - TDI S': 10.3 CM/SEC
BILIRUB CONJ SERPL-MCNC: 0.5 MG/DL (ref 0–0.3)
BILIRUB INDIRECT SERPL-MCNC: 0.3 MG/DL
BILIRUB SERPL-MCNC: 0.5 MG/DL (ref 0–1.2)
BILIRUB SERPL-MCNC: 0.5 MG/DL (ref 0–1.2)
BILIRUB SERPL-MCNC: 0.6 MG/DL (ref 0–1.2)
BILIRUB SERPL-MCNC: 0.7 MG/DL (ref 0–1.2)
BILIRUB SERPL-MCNC: 0.7 MG/DL (ref 0–1.2)
BILIRUB SERPL-MCNC: 0.8 MG/DL (ref 0–1.2)
BILIRUB SERPL-MCNC: 1.1 MG/DL (ref 0–1.2)
BILIRUB UR QL STRIP: NEGATIVE
BLACTX-M ISLT/SPM QL: NORMAL
BLAIMP ISLT/SPM QL: NORMAL
BLAKPC ISLT/SPM QL: NORMAL
BLAOXA-48-LIKE ISLT/SPM QL: NORMAL
BLAVIM ISLT/SPM QL: NORMAL
BUN SERPL-MCNC: 17 MG/DL (ref 8–23)
BUN SERPL-MCNC: 18 MG/DL (ref 8–23)
BUN SERPL-MCNC: 19 MG/DL (ref 8–23)
BUN SERPL-MCNC: 20 MG/DL (ref 8–23)
BUN SERPL-MCNC: 22 MG/DL (ref 8–23)
BUN SERPL-MCNC: 23 MG/DL (ref 8–23)
BUN SERPL-MCNC: 24 MG/DL (ref 8–23)
BUN SERPL-MCNC: 25 MG/DL (ref 8–23)
BUN SERPL-MCNC: 31 MG/DL (ref 8–23)
BUN SERPL-MCNC: 31 MG/DL (ref 8–23)
BUN SERPL-MCNC: 33 MG/DL (ref 8–23)
BUN SERPL-MCNC: 35 MG/DL (ref 8–23)
BUN SERPL-MCNC: 37 MG/DL (ref 8–23)
BUN SERPL-MCNC: 38 MG/DL (ref 8–23)
BUN SERPL-MCNC: 39 MG/DL (ref 8–23)
BUN SERPL-MCNC: 40 MG/DL (ref 8–23)
BUN SERPL-MCNC: 43 MG/DL (ref 8–23)
BUN SERPL-MCNC: 45 MG/DL (ref 8–23)
BUN/CREAT SERPL: 22.4 (ref 7–25)
BUN/CREAT SERPL: 23.3 (ref 7–25)
BUN/CREAT SERPL: 27.8 (ref 7–25)
BUN/CREAT SERPL: 29.5 (ref 7–25)
BUN/CREAT SERPL: 31 (ref 7–25)
BUN/CREAT SERPL: 35.3 (ref 7–25)
BUN/CREAT SERPL: 35.4 (ref 7–25)
BUN/CREAT SERPL: 37.7 (ref 7–25)
BUN/CREAT SERPL: 39.7 (ref 7–25)
BUN/CREAT SERPL: 39.7 (ref 7–25)
BUN/CREAT SERPL: 41.8 (ref 7–25)
BUN/CREAT SERPL: 43.4 (ref 7–25)
BUN/CREAT SERPL: 47 (ref 7–25)
BUN/CREAT SERPL: 47.5 (ref 7–25)
BUN/CREAT SERPL: 48.7 (ref 7–25)
BUN/CREAT SERPL: 53 (ref 7–25)
BUN/CREAT SERPL: 57.3 (ref 7–25)
BUN/CREAT SERPL: 57.4 (ref 7–25)
BUN/CREAT SERPL: 58.4 (ref 7–25)
BUN/CREAT SERPL: 60.6 (ref 7–25)
C AURIS DNA SPEC QL NAA+NON-PROBE: NOT DETECTED
C PNEUM DNA NPH QL NAA+NON-PROBE: NOT DETECTED
C PNEUM DNA NPH QL NAA+NON-PROBE: NOT DETECTED
C-ANCA TITR SER IF: NORMAL TITER
CA-I BLDA-SCNC: 1.14 MMOL/L (ref 1.13–1.32)
CA-I BLDA-SCNC: 1.19 MMOL/L (ref 1.13–1.32)
CALCIUM SPEC-SCNC: 8.7 MG/DL (ref 8.6–10.5)
CALCIUM SPEC-SCNC: 8.7 MG/DL (ref 8.6–10.5)
CALCIUM SPEC-SCNC: 8.8 MG/DL (ref 8.6–10.5)
CALCIUM SPEC-SCNC: 8.9 MG/DL (ref 8.6–10.5)
CALCIUM SPEC-SCNC: 8.9 MG/DL (ref 8.6–10.5)
CALCIUM SPEC-SCNC: 9 MG/DL (ref 8.6–10.5)
CALCIUM SPEC-SCNC: 9.1 MG/DL (ref 8.6–10.5)
CALCIUM SPEC-SCNC: 9.1 MG/DL (ref 8.6–10.5)
CALCIUM SPEC-SCNC: 9.2 MG/DL (ref 8.6–10.5)
CALCIUM SPEC-SCNC: 9.3 MG/DL (ref 8.6–10.5)
CALCIUM SPEC-SCNC: 9.4 MG/DL (ref 8.6–10.5)
CCP IGA+IGG SERPL IA-ACNC: 9 UNITS (ref 0–19)
CCP IGA+IGG SERPL IA-ACNC: 9 UNITS (ref 0–19)
CHLORIDE BLDA-SCNC: 100 MMOL/L (ref 98–107)
CHLORIDE BLDA-SCNC: 98 MMOL/L (ref 98–107)
CHLORIDE SERPL-SCNC: 100 MMOL/L (ref 98–107)
CHLORIDE SERPL-SCNC: 93 MMOL/L (ref 98–107)
CHLORIDE SERPL-SCNC: 94 MMOL/L (ref 98–107)
CHLORIDE SERPL-SCNC: 96 MMOL/L (ref 98–107)
CHLORIDE SERPL-SCNC: 97 MMOL/L (ref 98–107)
CHLORIDE SERPL-SCNC: 98 MMOL/L (ref 98–107)
CHLORIDE SERPL-SCNC: 99 MMOL/L (ref 98–107)
CHROMATIN AB SERPL-ACNC: 25 IU/ML (ref 0–14)
CILIATED BAL QL: 5 %
CK SERPL-CCNC: 34 U/L (ref 20–200)
CLARITY UR: CLEAR
CO2 SERPL-SCNC: 19.4 MMOL/L (ref 22–29)
CO2 SERPL-SCNC: 20.8 MMOL/L (ref 22–29)
CO2 SERPL-SCNC: 25.5 MMOL/L (ref 22–29)
CO2 SERPL-SCNC: 25.6 MMOL/L (ref 22–29)
CO2 SERPL-SCNC: 26.4 MMOL/L (ref 22–29)
CO2 SERPL-SCNC: 26.4 MMOL/L (ref 22–29)
CO2 SERPL-SCNC: 26.5 MMOL/L (ref 22–29)
CO2 SERPL-SCNC: 26.6 MMOL/L (ref 22–29)
CO2 SERPL-SCNC: 26.8 MMOL/L (ref 22–29)
CO2 SERPL-SCNC: 27 MMOL/L (ref 22–29)
CO2 SERPL-SCNC: 27.3 MMOL/L (ref 22–29)
CO2 SERPL-SCNC: 27.4 MMOL/L (ref 22–29)
CO2 SERPL-SCNC: 27.5 MMOL/L (ref 22–29)
CO2 SERPL-SCNC: 28.2 MMOL/L (ref 22–29)
CO2 SERPL-SCNC: 28.5 MMOL/L (ref 22–29)
CO2 SERPL-SCNC: 28.7 MMOL/L (ref 22–29)
CO2 SERPL-SCNC: 28.9 MMOL/L (ref 22–29)
CO2 SERPL-SCNC: 28.9 MMOL/L (ref 22–29)
CO2 SERPL-SCNC: 30.7 MMOL/L (ref 22–29)
CO2 SERPL-SCNC: 31.6 MMOL/L (ref 22–29)
COLOR UR: YELLOW
CREAT SERPL-MCNC: 0.55 MG/DL (ref 0.76–1.27)
CREAT SERPL-MCNC: 0.61 MG/DL (ref 0.76–1.27)
CREAT SERPL-MCNC: 0.61 MG/DL (ref 0.76–1.27)
CREAT SERPL-MCNC: 0.63 MG/DL (ref 0.76–1.27)
CREAT SERPL-MCNC: 0.65 MG/DL (ref 0.76–1.27)
CREAT SERPL-MCNC: 0.66 MG/DL (ref 0.76–1.27)
CREAT SERPL-MCNC: 0.68 MG/DL (ref 0.76–1.27)
CREAT SERPL-MCNC: 0.68 MG/DL (ref 0.76–1.27)
CREAT SERPL-MCNC: 0.71 MG/DL (ref 0.76–1.27)
CREAT SERPL-MCNC: 0.72 MG/DL (ref 0.76–1.27)
CREAT SERPL-MCNC: 0.73 MG/DL (ref 0.76–1.27)
CREAT SERPL-MCNC: 0.75 MG/DL (ref 0.76–1.27)
CREAT SERPL-MCNC: 0.76 MG/DL (ref 0.76–1.27)
CREAT SERPL-MCNC: 0.76 MG/DL (ref 0.76–1.27)
CREAT SERPL-MCNC: 0.77 MG/DL (ref 0.76–1.27)
CREAT SERPL-MCNC: 0.78 MG/DL (ref 0.76–1.27)
CREAT SERPL-MCNC: 0.8 MG/DL (ref 0.76–1.27)
CREAT SERPL-MCNC: 0.85 MG/DL (ref 0.76–1.27)
CRP SERPL-MCNC: 1.08 MG/DL (ref 0–0.5)
CRP SERPL-MCNC: 28.88 MG/DL (ref 0–0.5)
CYTO UR: NORMAL
D-LACTATE SERPL-SCNC: 0.9 MMOL/L
D-LACTATE SERPL-SCNC: 1.4 MMOL/L
D-LACTATE SERPL-SCNC: 1.9 MMOL/L (ref 0.5–2)
DEPRECATED RDW RBC AUTO: 54.4 FL (ref 37–54)
DEPRECATED RDW RBC AUTO: 54.9 FL (ref 37–54)
DEPRECATED RDW RBC AUTO: 55.8 FL (ref 37–54)
DEPRECATED RDW RBC AUTO: 56.5 FL (ref 37–54)
DEPRECATED RDW RBC AUTO: 56.6 FL (ref 37–54)
DEPRECATED RDW RBC AUTO: 56.7 FL (ref 37–54)
DEPRECATED RDW RBC AUTO: 56.9 FL (ref 37–54)
DEPRECATED RDW RBC AUTO: 57.2 FL (ref 37–54)
DEPRECATED RDW RBC AUTO: 57.6 FL (ref 37–54)
DEPRECATED RDW RBC AUTO: 57.6 FL (ref 37–54)
DEVICE COMMENT: ABNORMAL
DIGITOXIN SERPL-MCNC: <5 NG/ML (ref 10–25)
DIGOXIN SERPL-MCNC: 0.63 NG/ML (ref 0.6–1.2)
DIGOXIN SERPL-MCNC: 0.7 NG/ML (ref 0.6–1.2)
DSDNA AB SER-ACNC: <1 IU/ML (ref 0–9)
E CLOAC COMP DNA BAL NAA+NON-PRB-NCNCRNG: NOT DETECTED
E COLI DNA BAL NAA+NON-PRB-NCNCRNG: NOT DETECTED
EGFRCR SERPLBLD CKD-EPI 2021: 102.7 ML/MIN/1.73
EGFRCR SERPLBLD CKD-EPI 2021: 90.1 ML/MIN/1.73
EGFRCR SERPLBLD CKD-EPI 2021: 91.7 ML/MIN/1.73
EGFRCR SERPLBLD CKD-EPI 2021: 92.4 ML/MIN/1.73
EGFRCR SERPLBLD CKD-EPI 2021: 92.8 ML/MIN/1.73
EGFRCR SERPLBLD CKD-EPI 2021: 93.2 ML/MIN/1.73
EGFRCR SERPLBLD CKD-EPI 2021: 93.2 ML/MIN/1.73
EGFRCR SERPLBLD CKD-EPI 2021: 93.5 ML/MIN/1.73
EGFRCR SERPLBLD CKD-EPI 2021: 94.3 ML/MIN/1.73
EGFRCR SERPLBLD CKD-EPI 2021: 94.7 ML/MIN/1.73
EGFRCR SERPLBLD CKD-EPI 2021: 95.1 ML/MIN/1.73
EGFRCR SERPLBLD CKD-EPI 2021: 96.3 ML/MIN/1.73
EGFRCR SERPLBLD CKD-EPI 2021: 96.3 ML/MIN/1.73
EGFRCR SERPLBLD CKD-EPI 2021: 97.2 ML/MIN/1.73
EGFRCR SERPLBLD CKD-EPI 2021: 97.7 ML/MIN/1.73
EGFRCR SERPLBLD CKD-EPI 2021: 98.6 ML/MIN/1.73
EGFRCR SERPLBLD CKD-EPI 2021: 99.5 ML/MIN/1.73
EGFRCR SERPLBLD CKD-EPI 2021: 99.5 ML/MIN/1.73
ENA JO1 AB SER-ACNC: <0.2 AI (ref 0–0.9)
ENA SCL70 AB SER-ACNC: <0.2 AI (ref 0–0.9)
EOSINOPHIL # BLD AUTO: 0 10*3/MM3 (ref 0–0.4)
EOSINOPHIL # BLD AUTO: 0.01 10*3/MM3 (ref 0–0.4)
EOSINOPHIL # BLD AUTO: 0.16 10*3/MM3 (ref 0–0.4)
EOSINOPHIL NFR BLD AUTO: 0 % (ref 0.3–6.2)
EOSINOPHIL NFR BLD AUTO: 0.1 % (ref 0.3–6.2)
EOSINOPHIL NFR BLD AUTO: 1.6 % (ref 0.3–6.2)
ERYTHROCYTE [DISTWIDTH] IN BLOOD BY AUTOMATED COUNT: 16.2 % (ref 12.3–15.4)
ERYTHROCYTE [DISTWIDTH] IN BLOOD BY AUTOMATED COUNT: 16.4 % (ref 12.3–15.4)
ERYTHROCYTE [DISTWIDTH] IN BLOOD BY AUTOMATED COUNT: 16.5 % (ref 12.3–15.4)
ERYTHROCYTE [DISTWIDTH] IN BLOOD BY AUTOMATED COUNT: 16.6 % (ref 12.3–15.4)
ERYTHROCYTE [DISTWIDTH] IN BLOOD BY AUTOMATED COUNT: 16.6 % (ref 12.3–15.4)
ERYTHROCYTE [DISTWIDTH] IN BLOOD BY AUTOMATED COUNT: 16.7 % (ref 12.3–15.4)
ERYTHROCYTE [SEDIMENTATION RATE] IN BLOOD: 56 MM/HR (ref 0–20)
ERYTHROCYTE [SEDIMENTATION RATE] IN BLOOD: 94 MM/HR (ref 0–20)
FLUAV RNA RESP QL NAA+PROBE: NOT DETECTED
FLUAV SUBTYP SPEC NAA+PROBE: NOT DETECTED
FLUAV SUBTYP SPEC NAA+PROBE: NOT DETECTED
FLUBV RNA ISLT QL NAA+PROBE: NOT DETECTED
FLUBV RNA ISLT QL NAA+PROBE: NOT DETECTED
FLUBV RNA RESP QL NAA+PROBE: NOT DETECTED
FUNGUS WND CULT: ABNORMAL
FUNGUS WND CULT: ABNORMAL
GAS FLOW AIRWAY: 10 LPM
GAS FLOW AIRWAY: 60 LPM
GEN 5 1HR TROPONIN T REFLEX: 31 NG/L
GLOBULIN UR ELPH-MCNC: 3.1 GM/DL
GLOBULIN UR ELPH-MCNC: 3.3 GM/DL
GLOBULIN UR ELPH-MCNC: 3.4 GM/DL
GLOBULIN UR ELPH-MCNC: 3.6 GM/DL
GLOBULIN UR ELPH-MCNC: 3.7 GM/DL
GLOBULIN UR ELPH-MCNC: 3.9 GM/DL
GLOBULIN UR ELPH-MCNC: 4.2 GM/DL
GLOBULIN UR ELPH-MCNC: 4.4 GM/DL
GLOBULIN UR ELPH-MCNC: 4.5 GM/DL
GLOBULIN UR ELPH-MCNC: 4.5 GM/DL
GLOBULIN UR ELPH-MCNC: 5 GM/DL
GLOBULIN UR ELPH-MCNC: 5.1 GM/DL
GLUCOSE BLDC GLUCOMTR-MCNC: 109 MG/DL (ref 70–99)
GLUCOSE BLDC GLUCOMTR-MCNC: 120 MG/DL (ref 70–99)
GLUCOSE BLDC GLUCOMTR-MCNC: 121 MG/DL (ref 70–99)
GLUCOSE BLDC GLUCOMTR-MCNC: 126 MG/DL (ref 70–99)
GLUCOSE BLDC GLUCOMTR-MCNC: 134 MG/DL (ref 70–99)
GLUCOSE BLDC GLUCOMTR-MCNC: 137 MG/DL (ref 70–99)
GLUCOSE BLDC GLUCOMTR-MCNC: 138 MG/DL (ref 70–99)
GLUCOSE BLDC GLUCOMTR-MCNC: 139 MG/DL (ref 70–99)
GLUCOSE BLDC GLUCOMTR-MCNC: 145 MG/DL (ref 70–99)
GLUCOSE BLDC GLUCOMTR-MCNC: 154 MG/DL (ref 70–99)
GLUCOSE BLDC GLUCOMTR-MCNC: 158 MG/DL (ref 70–99)
GLUCOSE BLDC GLUCOMTR-MCNC: 165 MG/DL (ref 70–99)
GLUCOSE BLDC GLUCOMTR-MCNC: 168 MG/DL (ref 70–99)
GLUCOSE BLDC GLUCOMTR-MCNC: 169 MG/DL (ref 70–99)
GLUCOSE BLDC GLUCOMTR-MCNC: 170 MG/DL (ref 70–99)
GLUCOSE BLDC GLUCOMTR-MCNC: 171 MG/DL (ref 70–99)
GLUCOSE BLDC GLUCOMTR-MCNC: 174 MG/DL (ref 70–99)
GLUCOSE BLDC GLUCOMTR-MCNC: 176 MG/DL (ref 70–99)
GLUCOSE BLDC GLUCOMTR-MCNC: 188 MG/DL (ref 70–99)
GLUCOSE BLDC GLUCOMTR-MCNC: 188 MG/DL (ref 70–99)
GLUCOSE BLDC GLUCOMTR-MCNC: 189 MG/DL (ref 70–99)
GLUCOSE BLDC GLUCOMTR-MCNC: 190 MG/DL (ref 70–99)
GLUCOSE BLDC GLUCOMTR-MCNC: 190 MG/DL (ref 70–99)
GLUCOSE BLDC GLUCOMTR-MCNC: 192 MG/DL (ref 70–99)
GLUCOSE BLDC GLUCOMTR-MCNC: 194 MG/DL (ref 70–99)
GLUCOSE BLDC GLUCOMTR-MCNC: 195 MG/DL (ref 70–99)
GLUCOSE BLDC GLUCOMTR-MCNC: 196 MG/DL (ref 70–99)
GLUCOSE BLDC GLUCOMTR-MCNC: 197 MG/DL (ref 70–99)
GLUCOSE BLDC GLUCOMTR-MCNC: 198 MG/DL (ref 70–99)
GLUCOSE BLDC GLUCOMTR-MCNC: 203 MG/DL (ref 70–99)
GLUCOSE BLDC GLUCOMTR-MCNC: 207 MG/DL (ref 70–99)
GLUCOSE BLDC GLUCOMTR-MCNC: 208 MG/DL (ref 70–99)
GLUCOSE BLDC GLUCOMTR-MCNC: 209 MG/DL (ref 70–99)
GLUCOSE BLDC GLUCOMTR-MCNC: 211 MG/DL (ref 70–99)
GLUCOSE BLDC GLUCOMTR-MCNC: 213 MG/DL (ref 70–99)
GLUCOSE BLDC GLUCOMTR-MCNC: 215 MG/DL (ref 70–99)
GLUCOSE BLDC GLUCOMTR-MCNC: 215 MG/DL (ref 70–99)
GLUCOSE BLDC GLUCOMTR-MCNC: 219 MG/DL (ref 70–99)
GLUCOSE BLDC GLUCOMTR-MCNC: 223 MG/DL (ref 70–99)
GLUCOSE BLDC GLUCOMTR-MCNC: 223 MG/DL (ref 70–99)
GLUCOSE BLDC GLUCOMTR-MCNC: 224 MG/DL (ref 70–99)
GLUCOSE BLDC GLUCOMTR-MCNC: 228 MG/DL (ref 70–99)
GLUCOSE BLDC GLUCOMTR-MCNC: 230 MG/DL (ref 70–99)
GLUCOSE BLDC GLUCOMTR-MCNC: 230 MG/DL (ref 70–99)
GLUCOSE BLDC GLUCOMTR-MCNC: 232 MG/DL (ref 70–99)
GLUCOSE BLDC GLUCOMTR-MCNC: 234 MG/DL (ref 70–99)
GLUCOSE BLDC GLUCOMTR-MCNC: 242 MG/DL (ref 70–99)
GLUCOSE BLDC GLUCOMTR-MCNC: 245 MG/DL (ref 70–99)
GLUCOSE BLDC GLUCOMTR-MCNC: 250 MG/DL (ref 70–99)
GLUCOSE BLDC GLUCOMTR-MCNC: 251 MG/DL (ref 70–99)
GLUCOSE BLDC GLUCOMTR-MCNC: 258 MG/DL (ref 70–99)
GLUCOSE BLDC GLUCOMTR-MCNC: 258 MG/DL (ref 70–99)
GLUCOSE BLDC GLUCOMTR-MCNC: 260 MG/DL (ref 70–99)
GLUCOSE BLDC GLUCOMTR-MCNC: 264 MG/DL (ref 70–99)
GLUCOSE BLDC GLUCOMTR-MCNC: 269 MG/DL (ref 70–99)
GLUCOSE BLDC GLUCOMTR-MCNC: 270 MG/DL (ref 70–99)
GLUCOSE BLDC GLUCOMTR-MCNC: 274 MG/DL (ref 70–99)
GLUCOSE BLDC GLUCOMTR-MCNC: 293 MG/DL (ref 70–99)
GLUCOSE BLDC GLUCOMTR-MCNC: 295 MG/DL (ref 70–99)
GLUCOSE BLDC GLUCOMTR-MCNC: 297 MG/DL (ref 70–99)
GLUCOSE BLDC GLUCOMTR-MCNC: 306 MG/DL (ref 70–99)
GLUCOSE BLDC GLUCOMTR-MCNC: 326 MG/DL (ref 70–99)
GLUCOSE BLDC GLUCOMTR-MCNC: 327 MG/DL (ref 70–99)
GLUCOSE BLDC GLUCOMTR-MCNC: 331 MG/DL (ref 70–99)
GLUCOSE BLDC GLUCOMTR-MCNC: 345 MG/DL (ref 70–99)
GLUCOSE BLDC GLUCOMTR-MCNC: 348 MG/DL (ref 70–99)
GLUCOSE BLDC GLUCOMTR-MCNC: 371 MG/DL (ref 70–99)
GLUCOSE SERPL-MCNC: 115 MG/DL (ref 65–99)
GLUCOSE SERPL-MCNC: 117 MG/DL (ref 65–99)
GLUCOSE SERPL-MCNC: 123 MG/DL (ref 65–99)
GLUCOSE SERPL-MCNC: 145 MG/DL (ref 65–99)
GLUCOSE SERPL-MCNC: 147 MG/DL (ref 65–99)
GLUCOSE SERPL-MCNC: 151 MG/DL (ref 65–99)
GLUCOSE SERPL-MCNC: 155 MG/DL (ref 65–99)
GLUCOSE SERPL-MCNC: 155 MG/DL (ref 65–99)
GLUCOSE SERPL-MCNC: 159 MG/DL (ref 65–99)
GLUCOSE SERPL-MCNC: 168 MG/DL (ref 65–99)
GLUCOSE SERPL-MCNC: 170 MG/DL (ref 65–99)
GLUCOSE SERPL-MCNC: 188 MG/DL (ref 65–99)
GLUCOSE SERPL-MCNC: 199 MG/DL (ref 65–99)
GLUCOSE SERPL-MCNC: 204 MG/DL (ref 65–99)
GLUCOSE SERPL-MCNC: 209 MG/DL (ref 65–99)
GLUCOSE SERPL-MCNC: 220 MG/DL (ref 65–99)
GLUCOSE SERPL-MCNC: 220 MG/DL (ref 65–99)
GLUCOSE SERPL-MCNC: 236 MG/DL (ref 65–99)
GLUCOSE SERPL-MCNC: 245 MG/DL (ref 65–99)
GLUCOSE SERPL-MCNC: 262 MG/DL (ref 65–99)
GLUCOSE UR STRIP-MCNC: ABNORMAL MG/DL
GP B STREP DNA BAL NAA+NON-PRB-NCNCRNG: NOT DETECTED
GRAM STN SPEC: NORMAL
HADV DNA SPEC NAA+PROBE: NOT DETECTED
HADV DNA SPEC NAA+PROBE: NOT DETECTED
HAEM INFLU DNA BAL NAA+NON-PRB-NCNCRNG: NOT DETECTED
HAV IGM SERPL QL IA: NORMAL
HBA1C MFR BLD: 7.4 % (ref 4.8–5.6)
HBV CORE IGM SERPL QL IA: NORMAL
HBV SURFACE AG SERPL QL IA: NORMAL
HCO3 BLDA-SCNC: 21.4 MMOL/L (ref 22–26)
HCO3 BLDA-SCNC: 31.2 MMOL/L (ref 22–26)
HCO3 BLDA-SCNC: 31.9 MMOL/L (ref 22–26)
HCOV 229E RNA SPEC QL NAA+PROBE: NOT DETECTED
HCOV HKU1 RNA SPEC QL NAA+PROBE: NOT DETECTED
HCOV NL63 RNA SPEC QL NAA+PROBE: NOT DETECTED
HCOV OC43 RNA SPEC QL NAA+PROBE: NOT DETECTED
HCOV RNA LOWER RESP QL NAA+NON-PROBE: NOT DETECTED
HCT VFR BLD AUTO: 34.6 % (ref 37.5–51)
HCT VFR BLD AUTO: 36.4 % (ref 37.5–51)
HCT VFR BLD AUTO: 37.2 % (ref 37.5–51)
HCT VFR BLD AUTO: 37.5 % (ref 37.5–51)
HCT VFR BLD AUTO: 43.6 % (ref 37.5–51)
HCT VFR BLD AUTO: 44.7 % (ref 37.5–51)
HCT VFR BLD AUTO: 45.2 % (ref 37.5–51)
HCT VFR BLD AUTO: 45.7 % (ref 37.5–51)
HCT VFR BLD AUTO: 46 % (ref 37.5–51)
HCT VFR BLD AUTO: 46.7 % (ref 37.5–51)
HCT VFR BLD CALC: 35 % (ref 38–51)
HCT VFR BLD CALC: 41 % (ref 38–51)
HCT VFR BLD CALC: 46 % (ref 38–51)
HCV AB SER QL: NORMAL
HEMODILUTION: NO
HGB BLD-MCNC: 11.3 G/DL (ref 13–17.7)
HGB BLD-MCNC: 12 G/DL (ref 13–17.7)
HGB BLD-MCNC: 12.1 G/DL (ref 13–17.7)
HGB BLD-MCNC: 12.2 G/DL (ref 13–17.7)
HGB BLD-MCNC: 14.1 G/DL (ref 13–17.7)
HGB BLD-MCNC: 14.6 G/DL (ref 13–17.7)
HGB BLD-MCNC: 14.8 G/DL (ref 13–17.7)
HGB BLD-MCNC: 14.8 G/DL (ref 13–17.7)
HGB BLD-MCNC: 14.9 G/DL (ref 13–17.7)
HGB BLD-MCNC: 15.2 G/DL (ref 13–17.7)
HGB BLDA-MCNC: 11.8 G/DL (ref 12–18)
HGB BLDA-MCNC: 13.8 G/DL (ref 12–18)
HGB BLDA-MCNC: 15.7 G/DL (ref 12–18)
HGB UR QL STRIP.AUTO: NEGATIVE
HMPV RNA NPH QL NAA+NON-PROBE: NOT DETECTED
HMPV RNA NPH QL NAA+NON-PROBE: NOT DETECTED
HOLD SPECIMEN: NORMAL
HPIV RNA LOWER RESP QL NAA+NON-PROBE: NOT DETECTED
HPIV1 RNA ISLT QL NAA+PROBE: NOT DETECTED
HPIV2 RNA SPEC QL NAA+PROBE: NOT DETECTED
HPIV3 RNA NPH QL NAA+PROBE: NOT DETECTED
HPIV4 P GENE NPH QL NAA+PROBE: NOT DETECTED
IMM GRANULOCYTES # BLD AUTO: 0.1 10*3/MM3 (ref 0–0.05)
IMM GRANULOCYTES # BLD AUTO: 0.13 10*3/MM3 (ref 0–0.05)
IMM GRANULOCYTES # BLD AUTO: 0.16 10*3/MM3 (ref 0–0.05)
IMM GRANULOCYTES # BLD AUTO: 0.17 10*3/MM3 (ref 0–0.05)
IMM GRANULOCYTES # BLD AUTO: 0.17 10*3/MM3 (ref 0–0.05)
IMM GRANULOCYTES # BLD AUTO: 0.19 10*3/MM3 (ref 0–0.05)
IMM GRANULOCYTES # BLD AUTO: 0.45 10*3/MM3 (ref 0–0.05)
IMM GRANULOCYTES NFR BLD AUTO: 0.9 % (ref 0–0.5)
IMM GRANULOCYTES NFR BLD AUTO: 1 % (ref 0–0.5)
IMM GRANULOCYTES NFR BLD AUTO: 1 % (ref 0–0.5)
IMM GRANULOCYTES NFR BLD AUTO: 1.2 % (ref 0–0.5)
IMM GRANULOCYTES NFR BLD AUTO: 4.4 % (ref 0–0.5)
INHALED O2 CONCENTRATION: 100 %
INHALED O2 CONCENTRATION: 100 %
INTERPRETATION: NEGATIVE
K AEROGENES DNA BAL NAA+NON-PRB-NCNCRNG: NOT DETECTED
K OXYTOCA DNA BAL NAA+NON-PRB-NCNCRNG: NOT DETECTED
K PNEU GRP DNA BAL NAA+NON-PRB-NCNCRNG: NOT DETECTED
KETONES UR QL STRIP: NEGATIVE
L PNEUMO DNA LOWER RESP QL NAA+NON-PROBE: NOT DETECTED
L PNEUMO1 AG UR QL IA: NEGATIVE
LAB AP CASE REPORT: NORMAL
LAB AP CLINICAL INFORMATION: NORMAL
LAB AP SPECIAL STAINS: NORMAL
LACEYELLA SACCHARI AB SER QL ID: NEGATIVE
LEFT ATRIUM VOLUME INDEX: 41.8 ML/M2
LEUKOCYTE ESTERASE UR QL STRIP.AUTO: NEGATIVE
LV EF BIPLANE MOD: 38.3 %
LYMPHOCYTES # BLD AUTO: 0.62 10*3/MM3 (ref 0.7–3.1)
LYMPHOCYTES # BLD AUTO: 0.67 10*3/MM3 (ref 0.7–3.1)
LYMPHOCYTES # BLD AUTO: 0.71 10*3/MM3 (ref 0.7–3.1)
LYMPHOCYTES # BLD AUTO: 0.73 10*3/MM3 (ref 0.7–3.1)
LYMPHOCYTES # BLD AUTO: 0.74 10*3/MM3 (ref 0.7–3.1)
LYMPHOCYTES # BLD AUTO: 0.78 10*3/MM3 (ref 0.7–3.1)
LYMPHOCYTES # BLD AUTO: 1.38 10*3/MM3 (ref 0.7–3.1)
LYMPHOCYTES NFR BLD AUTO: 13.5 % (ref 19.6–45.3)
LYMPHOCYTES NFR BLD AUTO: 4 % (ref 19.6–45.3)
LYMPHOCYTES NFR BLD AUTO: 4.1 % (ref 19.6–45.3)
LYMPHOCYTES NFR BLD AUTO: 4.2 % (ref 19.6–45.3)
LYMPHOCYTES NFR BLD AUTO: 4.2 % (ref 19.6–45.3)
LYMPHOCYTES NFR BLD AUTO: 5.5 % (ref 19.6–45.3)
LYMPHOCYTES NFR BLD AUTO: 6.4 % (ref 19.6–45.3)
LYMPHOCYTES NFR FLD MANUAL: 5 %
M CATARRHALIS DNA BAL NAA+NON-PRB-NCNCRNG: NOT DETECTED
M PNEUMO IGG SER IA-ACNC: NOT DETECTED
M PNEUMO IGG SER IA-ACNC: NOT DETECTED
MACROPHAGE FLUID %: 6 %
MAGNESIUM SERPL-MCNC: 1.8 MG/DL (ref 1.6–2.4)
MAGNESIUM SERPL-MCNC: 1.9 MG/DL (ref 1.6–2.4)
MAGNESIUM SERPL-MCNC: 2 MG/DL (ref 1.6–2.4)
MAGNESIUM SERPL-MCNC: 2.1 MG/DL (ref 1.6–2.4)
MAGNESIUM SERPL-MCNC: 2.5 MG/DL (ref 1.6–2.4)
MAGNESIUM SERPL-MCNC: 2.6 MG/DL (ref 1.6–2.4)
MCH RBC QN AUTO: 30.6 PG (ref 26.6–33)
MCH RBC QN AUTO: 30.7 PG (ref 26.6–33)
MCH RBC QN AUTO: 30.8 PG (ref 26.6–33)
MCH RBC QN AUTO: 30.8 PG (ref 26.6–33)
MCH RBC QN AUTO: 30.9 PG (ref 26.6–33)
MCH RBC QN AUTO: 31 PG (ref 26.6–33)
MCH RBC QN AUTO: 31 PG (ref 26.6–33)
MCH RBC QN AUTO: 31.4 PG (ref 26.6–33)
MCH RBC QN AUTO: 31.5 PG (ref 26.6–33)
MCH RBC QN AUTO: 31.6 PG (ref 26.6–33)
MCHC RBC AUTO-ENTMCNC: 32 G/DL (ref 31.5–35.7)
MCHC RBC AUTO-ENTMCNC: 32.3 G/DL (ref 31.5–35.7)
MCHC RBC AUTO-ENTMCNC: 32.3 G/DL (ref 31.5–35.7)
MCHC RBC AUTO-ENTMCNC: 32.4 G/DL (ref 31.5–35.7)
MCHC RBC AUTO-ENTMCNC: 32.4 G/DL (ref 31.5–35.7)
MCHC RBC AUTO-ENTMCNC: 32.5 G/DL (ref 31.5–35.7)
MCHC RBC AUTO-ENTMCNC: 32.5 G/DL (ref 31.5–35.7)
MCHC RBC AUTO-ENTMCNC: 32.7 G/DL (ref 31.5–35.7)
MCHC RBC AUTO-ENTMCNC: 33.1 G/DL (ref 31.5–35.7)
MCHC RBC AUTO-ENTMCNC: 33.5 G/DL (ref 31.5–35.7)
MCV RBC AUTO: 94.1 FL (ref 79–97)
MCV RBC AUTO: 94.6 FL (ref 79–97)
MCV RBC AUTO: 95 FL (ref 79–97)
MCV RBC AUTO: 95 FL (ref 79–97)
MCV RBC AUTO: 95.1 FL (ref 79–97)
MCV RBC AUTO: 95.3 FL (ref 79–97)
MCV RBC AUTO: 95.4 FL (ref 79–97)
MCV RBC AUTO: 95.6 FL (ref 79–97)
MCV RBC AUTO: 96.1 FL (ref 79–97)
MCV RBC AUTO: 96.4 FL (ref 79–97)
MECA+MECC ISLT/SPM QL: NORMAL
MODALITY: ABNORMAL
MONOCYTES # BLD AUTO: 0.38 10*3/MM3 (ref 0.1–0.9)
MONOCYTES # BLD AUTO: 0.45 10*3/MM3 (ref 0.1–0.9)
MONOCYTES # BLD AUTO: 0.46 10*3/MM3 (ref 0.1–0.9)
MONOCYTES # BLD AUTO: 0.52 10*3/MM3 (ref 0.1–0.9)
MONOCYTES # BLD AUTO: 0.57 10*3/MM3 (ref 0.1–0.9)
MONOCYTES # BLD AUTO: 0.59 10*3/MM3 (ref 0.1–0.9)
MONOCYTES # BLD AUTO: 0.7 10*3/MM3 (ref 0.1–0.9)
MONOCYTES NFR BLD AUTO: 2.4 % (ref 5–12)
MONOCYTES NFR BLD AUTO: 2.6 % (ref 5–12)
MONOCYTES NFR BLD AUTO: 2.9 % (ref 5–12)
MONOCYTES NFR BLD AUTO: 3.2 % (ref 5–12)
MONOCYTES NFR BLD AUTO: 4.4 % (ref 5–12)
MONOCYTES NFR BLD AUTO: 5.5 % (ref 5–12)
MONOCYTES NFR BLD AUTO: 5.6 % (ref 5–12)
MRSA DNA SPEC QL NAA+PROBE: NORMAL
NDM GENE: NORMAL
NEUTROPHILS NFR BLD AUTO: 11.27 10*3/MM3 (ref 1.7–7)
NEUTROPHILS NFR BLD AUTO: 14.46 10*3/MM3 (ref 1.7–7)
NEUTROPHILS NFR BLD AUTO: 15.98 10*3/MM3 (ref 1.7–7)
NEUTROPHILS NFR BLD AUTO: 16.46 10*3/MM3 (ref 1.7–7)
NEUTROPHILS NFR BLD AUTO: 17.09 10*3/MM3 (ref 1.7–7)
NEUTROPHILS NFR BLD AUTO: 7.62 10*3/MM3 (ref 1.7–7)
NEUTROPHILS NFR BLD AUTO: 74.2 % (ref 42.7–76)
NEUTROPHILS NFR BLD AUTO: 87.8 % (ref 42.7–76)
NEUTROPHILS NFR BLD AUTO: 88 % (ref 42.7–76)
NEUTROPHILS NFR BLD AUTO: 9.22 10*3/MM3 (ref 1.7–7)
NEUTROPHILS NFR BLD AUTO: 91.5 % (ref 42.7–76)
NEUTROPHILS NFR BLD AUTO: 91.9 % (ref 42.7–76)
NEUTROPHILS NFR BLD AUTO: 92.1 % (ref 42.7–76)
NEUTROPHILS NFR BLD AUTO: 92.3 % (ref 42.7–76)
NEUTROPHILS NFR FLD MANUAL: 84 %
NITRITE UR QL STRIP: NEGATIVE
NRBC BLD AUTO-RTO: 0 /100 WBC (ref 0–0.2)
NT-PROBNP SERPL-MCNC: 1974 PG/ML (ref 0–1800)
NT-PROBNP SERPL-MCNC: 2565 PG/ML (ref 0–1800)
NT-PROBNP SERPL-MCNC: 6386 PG/ML (ref 0–1800)
P AERUGINOSA DNA BAL NAA+NON-PRB-NCNCRNG: NOT DETECTED
P-ANCA ATYPICAL TITR SER IF: NORMAL TITER
P-ANCA TITR SER IF: NORMAL TITER
PATH REPORT.FINAL DX SPEC: NORMAL
PATH REPORT.GROSS SPEC: NORMAL
PCO2 BLDA: 27.5 MM HG (ref 35–45)
PCO2 BLDA: 41.3 MM HG (ref 35–45)
PCO2 BLDA: 42.1 MM HG (ref 35–45)
PH BLDA: 7.49 PH UNITS (ref 7.35–7.45)
PH BLDA: 7.49 PH UNITS (ref 7.35–7.45)
PH BLDA: 7.5 PH UNITS (ref 7.35–7.45)
PH UR STRIP.AUTO: <=5 [PH] (ref 5–8)
PHOSPHATE SERPL-MCNC: 2.5 MG/DL (ref 2.5–4.5)
PHOSPHATE SERPL-MCNC: 2.7 MG/DL (ref 2.5–4.5)
PHOSPHATE SERPL-MCNC: 2.8 MG/DL (ref 2.5–4.5)
PHOSPHATE SERPL-MCNC: 2.9 MG/DL (ref 2.5–4.5)
PHOSPHATE SERPL-MCNC: 3.3 MG/DL (ref 2.5–4.5)
PHOSPHATE SERPL-MCNC: 3.3 MG/DL (ref 2.5–4.5)
PHOSPHATE SERPL-MCNC: 3.4 MG/DL (ref 2.5–4.5)
PHOSPHATE SERPL-MCNC: 3.5 MG/DL (ref 2.5–4.5)
PHOSPHATE SERPL-MCNC: 4.7 MG/DL (ref 2.5–4.5)
PIGEON SERUM IGG QL: NEGATIVE
PLATELET # BLD AUTO: 158 10*3/MM3 (ref 140–450)
PLATELET # BLD AUTO: 174 10*3/MM3 (ref 140–450)
PLATELET # BLD AUTO: 187 10*3/MM3 (ref 140–450)
PLATELET # BLD AUTO: 206 10*3/MM3 (ref 140–450)
PLATELET # BLD AUTO: 222 10*3/MM3 (ref 140–450)
PLATELET # BLD AUTO: 232 10*3/MM3 (ref 140–450)
PLATELET # BLD AUTO: 239 10*3/MM3 (ref 140–450)
PLATELET # BLD AUTO: 333 10*3/MM3 (ref 140–450)
PLATELET # BLD AUTO: 374 10*3/MM3 (ref 140–450)
PLATELET # BLD AUTO: 384 10*3/MM3 (ref 140–450)
PMV BLD AUTO: 10.2 FL (ref 6–12)
PMV BLD AUTO: 10.5 FL (ref 6–12)
PMV BLD AUTO: 10.5 FL (ref 6–12)
PMV BLD AUTO: 10.7 FL (ref 6–12)
PMV BLD AUTO: 10.9 FL (ref 6–12)
PMV BLD AUTO: 11.1 FL (ref 6–12)
PMV BLD AUTO: 9.2 FL (ref 6–12)
PMV BLD AUTO: 9.4 FL (ref 6–12)
PO2 BLD: 111 MM[HG] (ref 0–500)
PO2 BLD: 67 MM[HG] (ref 0–500)
PO2 BLDA: 110.7 MM HG (ref 80–100)
PO2 BLDA: 65.8 MM HG (ref 80–100)
PO2 BLDA: 66.5 MM HG (ref 80–100)
POTASSIUM BLDA-SCNC: 3.6 MMOL/L (ref 3.5–5)
POTASSIUM BLDA-SCNC: 4.2 MMOL/L (ref 3.5–5)
POTASSIUM SERPL-SCNC: 3 MMOL/L (ref 3.5–5.2)
POTASSIUM SERPL-SCNC: 3.1 MMOL/L (ref 3.5–5.2)
POTASSIUM SERPL-SCNC: 3.3 MMOL/L (ref 3.5–5.2)
POTASSIUM SERPL-SCNC: 3.4 MMOL/L (ref 3.5–5.2)
POTASSIUM SERPL-SCNC: 3.4 MMOL/L (ref 3.5–5.2)
POTASSIUM SERPL-SCNC: 3.5 MMOL/L (ref 3.5–5.2)
POTASSIUM SERPL-SCNC: 3.6 MMOL/L (ref 3.5–5.2)
POTASSIUM SERPL-SCNC: 3.7 MMOL/L (ref 3.5–5.2)
POTASSIUM SERPL-SCNC: 3.8 MMOL/L (ref 3.5–5.2)
POTASSIUM SERPL-SCNC: 3.9 MMOL/L (ref 3.5–5.2)
POTASSIUM SERPL-SCNC: 4 MMOL/L (ref 3.5–5.2)
POTASSIUM SERPL-SCNC: 4.1 MMOL/L (ref 3.5–5.2)
POTASSIUM SERPL-SCNC: 4.3 MMOL/L (ref 3.5–5.2)
POTASSIUM SERPL-SCNC: 4.4 MMOL/L (ref 3.5–5.2)
POTASSIUM SERPL-SCNC: 4.6 MMOL/L (ref 3.5–5.2)
POTASSIUM SERPL-SCNC: 4.8 MMOL/L (ref 3.5–5.2)
PROCALCITONIN SERPL-MCNC: 0.06 NG/ML (ref 0–0.25)
PROCALCITONIN SERPL-MCNC: 0.38 NG/ML (ref 0–0.25)
PROCALCITONIN SERPL-MCNC: 0.41 NG/ML (ref 0–0.25)
PROT SERPL-MCNC: 5.9 G/DL (ref 6–8.5)
PROT SERPL-MCNC: 6.3 G/DL (ref 6–8.5)
PROT SERPL-MCNC: 6.3 G/DL (ref 6–8.5)
PROT SERPL-MCNC: 6.4 G/DL (ref 6–8.5)
PROT SERPL-MCNC: 6.7 G/DL (ref 6–8.5)
PROT SERPL-MCNC: 6.7 G/DL (ref 6–8.5)
PROT SERPL-MCNC: 7 G/DL (ref 6–8.5)
PROT SERPL-MCNC: 7.2 G/DL (ref 6–8.5)
PROT SERPL-MCNC: 7.3 G/DL (ref 6–8.5)
PROT SERPL-MCNC: 7.5 G/DL (ref 6–8.5)
PROT SERPL-MCNC: 7.7 G/DL (ref 6–8.5)
PROT UR QL STRIP: NEGATIVE
PROTEUS SP DNA BAL NAA+NON-PRB-NCNCRNG: NOT DETECTED
QT INTERVAL: 516 MS
QTC INTERVAL: 563 MS
RBC # BLD AUTO: 3.6 10*6/MM3 (ref 4.14–5.8)
RBC # BLD AUTO: 3.87 10*6/MM3 (ref 4.14–5.8)
RBC # BLD AUTO: 3.89 10*6/MM3 (ref 4.14–5.8)
RBC # BLD AUTO: 3.9 10*6/MM3 (ref 4.14–5.8)
RBC # BLD AUTO: 4.56 10*6/MM3 (ref 4.14–5.8)
RBC # BLD AUTO: 4.69 10*6/MM3 (ref 4.14–5.8)
RBC # BLD AUTO: 4.76 10*6/MM3 (ref 4.14–5.8)
RBC # BLD AUTO: 4.83 10*6/MM3 (ref 4.14–5.8)
RBC # BLD AUTO: 4.84 10*6/MM3 (ref 4.14–5.8)
RBC # BLD AUTO: 4.91 10*6/MM3 (ref 4.14–5.8)
RESULT: NORMAL NG/ML
RHINOVIRUS RNA SPEC NAA+PROBE: NOT DETECTED
RHINOVIRUS RNA SPEC NAA+PROBE: NOT DETECTED
RSV RNA NPH QL NAA+NON-PROBE: NOT DETECTED
RSV RNA NPH QL NAA+NON-PROBE: NOT DETECTED
RSV RNA RESP QL NAA+PROBE: NOT DETECTED
S AUREUS DNA BAL NAA+NON-PRB-NCNCRNG: NOT DETECTED
S MARCESCENS DNA BAL NAA+NON-PRB-NCNCRNG: NOT DETECTED
S PNEUM AG SPEC QL LA: NEGATIVE
S PNEUM DNA BAL NAA+NON-PRB-NCNCRNG: NOT DETECTED
S PYO DNA BAL NAA+NON-PRB-NCNCRNG: NOT DETECTED
S RECTIVIRGULA IGG SER QL ID: NEGATIVE
SAO2 % BLDCOA: 94.2 % (ref 95–99)
SAO2 % BLDCOA: 94.7 % (ref 95–99)
SAO2 % BLDCOA: 98.7 % (ref 95–99)
SARS-COV-2 RNA RESP QL NAA+PROBE: NOT DETECTED
SARS-COV-2 RNA RESP QL NAA+PROBE: NOT DETECTED
SODIUM BLD-SCNC: 134 MMOL/L (ref 131–143)
SODIUM BLD-SCNC: 139 MMOL/L (ref 131–143)
SODIUM SERPL-SCNC: 131 MMOL/L (ref 136–145)
SODIUM SERPL-SCNC: 131 MMOL/L (ref 136–145)
SODIUM SERPL-SCNC: 133 MMOL/L (ref 136–145)
SODIUM SERPL-SCNC: 134 MMOL/L (ref 136–145)
SODIUM SERPL-SCNC: 135 MMOL/L (ref 136–145)
SODIUM SERPL-SCNC: 136 MMOL/L (ref 136–145)
SODIUM SERPL-SCNC: 137 MMOL/L (ref 136–145)
SODIUM SERPL-SCNC: 138 MMOL/L (ref 136–145)
SODIUM SERPL-SCNC: 138 MMOL/L (ref 136–145)
SODIUM SERPL-SCNC: 139 MMOL/L (ref 136–145)
SODIUM SERPL-SCNC: 139 MMOL/L (ref 136–145)
SODIUM SERPL-SCNC: 140 MMOL/L (ref 136–145)
SP GR UR STRIP: 1.02 (ref 1–1.03)
SPECIMEN SOURCE: NORMAL
T VULGARIS IGG SER QL: NEGATIVE
TROPONIN T % DELTA: -3
TROPONIN T NUMERIC DELTA: -1 NG/L
TROPONIN T SERPL HS-MCNC: 32 NG/L
UROBILINOGEN UR QL STRIP: ABNORMAL
VANCOMYCIN SERPL-MCNC: 12.1 MCG/ML (ref 5–40)
VANCOMYCIN SERPL-MCNC: 23.12 MCG/ML (ref 5–40)
VANCOMYCIN SERPL-MCNC: 23.54 MCG/ML (ref 5–40)
VANCOMYCIN TROUGH SERPL-MCNC: 18.94 MCG/ML (ref 5–20)
VISUAL PRESENCE OF BLOOD: NORMAL
WBC NRBC COR # BLD AUTO: 10.25 10*3/MM3 (ref 3.4–10.8)
WBC NRBC COR # BLD AUTO: 10.47 10*3/MM3 (ref 3.4–10.8)
WBC NRBC COR # BLD AUTO: 12.11 10*3/MM3 (ref 3.4–10.8)
WBC NRBC COR # BLD AUTO: 12.83 10*3/MM3 (ref 3.4–10.8)
WBC NRBC COR # BLD AUTO: 15.09 10*3/MM3 (ref 3.4–10.8)
WBC NRBC COR # BLD AUTO: 15.66 10*3/MM3 (ref 3.4–10.8)
WBC NRBC COR # BLD AUTO: 17.13 10*3/MM3 (ref 3.4–10.8)
WBC NRBC COR # BLD AUTO: 17.35 10*3/MM3 (ref 3.4–10.8)
WBC NRBC COR # BLD AUTO: 17.93 10*3/MM3 (ref 3.4–10.8)
WBC NRBC COR # BLD AUTO: 18.66 10*3/MM3 (ref 3.4–10.8)
WHOLE BLOOD HOLD COAG: NORMAL
WHOLE BLOOD HOLD SPECIMEN: NORMAL

## 2025-01-01 PROCEDURE — 94799 UNLISTED PULMONARY SVC/PX: CPT

## 2025-01-01 PROCEDURE — 25010000002 FLUCONAZOLE PER 200 MG: Performed by: NURSE PRACTITIONER

## 2025-01-01 PROCEDURE — 86140 C-REACTIVE PROTEIN: CPT | Performed by: PHYSICIAN ASSISTANT

## 2025-01-01 PROCEDURE — 83735 ASSAY OF MAGNESIUM: CPT | Performed by: INTERNAL MEDICINE

## 2025-01-01 PROCEDURE — 0B9F8ZX DRAINAGE OF RIGHT LOWER LUNG LOBE, VIA NATURAL OR ARTIFICIAL OPENING ENDOSCOPIC, DIAGNOSTIC: ICD-10-PCS | Performed by: INTERNAL MEDICINE

## 2025-01-01 PROCEDURE — 94664 DEMO&/EVAL PT USE INHALER: CPT

## 2025-01-01 PROCEDURE — 25010000002 CEFTRIAXONE PER 250 MG: Performed by: INTERNAL MEDICINE

## 2025-01-01 PROCEDURE — 99233 SBSQ HOSP IP/OBS HIGH 50: CPT | Performed by: INTERNAL MEDICINE

## 2025-01-01 PROCEDURE — 86037 ANCA TITER EACH ANTIBODY: CPT | Performed by: PHYSICIAN ASSISTANT

## 2025-01-01 PROCEDURE — 84100 ASSAY OF PHOSPHORUS: CPT | Performed by: INTERNAL MEDICINE

## 2025-01-01 PROCEDURE — 81003 URINALYSIS AUTO W/O SCOPE: CPT | Performed by: INTERNAL MEDICINE

## 2025-01-01 PROCEDURE — 31645 BRNCHSC W/THER ASPIR 1ST: CPT | Performed by: INTERNAL MEDICINE

## 2025-01-01 PROCEDURE — 25010000002 VANCOMYCIN 5 G RECONSTITUTED SOLUTION: Performed by: STUDENT IN AN ORGANIZED HEALTH CARE EDUCATION/TRAINING PROGRAM

## 2025-01-01 PROCEDURE — 99232 SBSQ HOSP IP/OBS MODERATE 35: CPT | Performed by: INTERNAL MEDICINE

## 2025-01-01 PROCEDURE — 71045 X-RAY EXAM CHEST 1 VIEW: CPT

## 2025-01-01 PROCEDURE — 82948 REAGENT STRIP/BLOOD GLUCOSE: CPT

## 2025-01-01 PROCEDURE — 25810000003 SODIUM CHLORIDE 0.9 % SOLUTION: Performed by: INTERNAL MEDICINE

## 2025-01-01 PROCEDURE — 25010000002 DIAZEPAM PER 5 MG: Performed by: FAMILY MEDICINE

## 2025-01-01 PROCEDURE — 82803 BLOOD GASES ANY COMBINATION: CPT

## 2025-01-01 PROCEDURE — 80053 COMPREHEN METABOLIC PANEL: CPT | Performed by: INTERNAL MEDICINE

## 2025-01-01 PROCEDURE — 94761 N-INVAS EAR/PLS OXIMETRY MLT: CPT

## 2025-01-01 PROCEDURE — 83036 HEMOGLOBIN GLYCOSYLATED A1C: CPT | Performed by: FAMILY MEDICINE

## 2025-01-01 PROCEDURE — 63710000001 INSULIN LISPRO (HUMAN) PER 5 UNITS: Performed by: INTERNAL MEDICINE

## 2025-01-01 PROCEDURE — 82085 ASSAY OF ALDOLASE: CPT | Performed by: PHYSICIAN ASSISTANT

## 2025-01-01 PROCEDURE — 87040 BLOOD CULTURE FOR BACTERIA: CPT | Performed by: EMERGENCY MEDICINE

## 2025-01-01 PROCEDURE — 99223 1ST HOSP IP/OBS HIGH 75: CPT | Performed by: INTERNAL MEDICINE

## 2025-01-01 PROCEDURE — 83880 ASSAY OF NATRIURETIC PEPTIDE: CPT | Performed by: INTERNAL MEDICINE

## 2025-01-01 PROCEDURE — 25010000002 CEFEPIME PER 500 MG: Performed by: INTERNAL MEDICINE

## 2025-01-01 PROCEDURE — 76705 ECHO EXAM OF ABDOMEN: CPT

## 2025-01-01 PROCEDURE — 80053 COMPREHEN METABOLIC PANEL: CPT | Performed by: PHYSICIAN ASSISTANT

## 2025-01-01 PROCEDURE — 86235 NUCLEAR ANTIGEN ANTIBODY: CPT | Performed by: PHYSICIAN ASSISTANT

## 2025-01-01 PROCEDURE — 25810000003 SODIUM CHLORIDE 0.9 % SOLUTION 250 ML FLEX CONT: Performed by: INTERNAL MEDICINE

## 2025-01-01 PROCEDURE — 82948 REAGENT STRIP/BLOOD GLUCOSE: CPT | Performed by: INTERNAL MEDICINE

## 2025-01-01 PROCEDURE — 25010000002 METHYLPREDNISOLONE PER 125 MG: Performed by: FAMILY MEDICINE

## 2025-01-01 PROCEDURE — 87481 CANDIDA DNA AMP PROBE: CPT | Performed by: INTERNAL MEDICINE

## 2025-01-01 PROCEDURE — 80051 ELECTROLYTE PANEL: CPT

## 2025-01-01 PROCEDURE — 71250 CT THORAX DX C-: CPT

## 2025-01-01 PROCEDURE — 99233 SBSQ HOSP IP/OBS HIGH 50: CPT | Performed by: FAMILY MEDICINE

## 2025-01-01 PROCEDURE — 25010000002 FUROSEMIDE PER 20 MG: Performed by: STUDENT IN AN ORGANIZED HEALTH CARE EDUCATION/TRAINING PROGRAM

## 2025-01-01 PROCEDURE — 80202 ASSAY OF VANCOMYCIN: CPT | Performed by: STUDENT IN AN ORGANIZED HEALTH CARE EDUCATION/TRAINING PROGRAM

## 2025-01-01 PROCEDURE — 25010000002 MORPHINE PER 10 MG: Performed by: FAMILY MEDICINE

## 2025-01-01 PROCEDURE — 97110 THERAPEUTIC EXERCISES: CPT

## 2025-01-01 PROCEDURE — 87070 CULTURE OTHR SPECIMN AEROBIC: CPT | Performed by: INTERNAL MEDICINE

## 2025-01-01 PROCEDURE — 87449 NOS EACH ORGANISM AG IA: CPT | Performed by: STUDENT IN AN ORGANIZED HEALTH CARE EDUCATION/TRAINING PROGRAM

## 2025-01-01 PROCEDURE — 63710000001 PREDNISONE PER 1 MG: Performed by: INTERNAL MEDICINE

## 2025-01-01 PROCEDURE — 86140 C-REACTIVE PROTEIN: CPT | Performed by: INTERNAL MEDICINE

## 2025-01-01 PROCEDURE — 99291 CRITICAL CARE FIRST HOUR: CPT | Performed by: STUDENT IN AN ORGANIZED HEALTH CARE EDUCATION/TRAINING PROGRAM

## 2025-01-01 PROCEDURE — 25010000002 MAGNESIUM SULFATE 4 GM/100ML SOLUTION: Performed by: INTERNAL MEDICINE

## 2025-01-01 PROCEDURE — 25010000002 METHYLPREDNISOLONE PER 125 MG: Performed by: INTERNAL MEDICINE

## 2025-01-01 PROCEDURE — 25010000002 METHYLPREDNISOLONE PER 125 MG: Performed by: NURSE PRACTITIONER

## 2025-01-01 PROCEDURE — 82948 REAGENT STRIP/BLOOD GLUCOSE: CPT | Performed by: FAMILY MEDICINE

## 2025-01-01 PROCEDURE — 25010000002 FUROSEMIDE PER 20 MG: Performed by: NURSE PRACTITIONER

## 2025-01-01 PROCEDURE — 86225 DNA ANTIBODY NATIVE: CPT | Performed by: PHYSICIAN ASSISTANT

## 2025-01-01 PROCEDURE — 87071 CULTURE AEROBIC QUANT OTHER: CPT | Performed by: INTERNAL MEDICINE

## 2025-01-01 PROCEDURE — 80162 ASSAY OF DIGOXIN TOTAL: CPT | Performed by: SPECIALIST

## 2025-01-01 PROCEDURE — 63710000001 INSULIN GLARGINE PER 5 UNITS: Performed by: FAMILY MEDICINE

## 2025-01-01 PROCEDURE — 25010000002 FUROSEMIDE PER 20 MG: Performed by: EMERGENCY MEDICINE

## 2025-01-01 PROCEDURE — 85025 COMPLETE CBC W/AUTO DIFF WBC: CPT | Performed by: INTERNAL MEDICINE

## 2025-01-01 PROCEDURE — 25810000003 SODIUM CHLORIDE 0.9 % SOLUTION: Performed by: STUDENT IN AN ORGANIZED HEALTH CARE EDUCATION/TRAINING PROGRAM

## 2025-01-01 PROCEDURE — 36600 WITHDRAWAL OF ARTERIAL BLOOD: CPT

## 2025-01-01 PROCEDURE — 25010000002 HALOPERIDOL LACTATE PER 5 MG: Performed by: FAMILY MEDICINE

## 2025-01-01 PROCEDURE — 0B978ZX DRAINAGE OF LEFT MAIN BRONCHUS, VIA NATURAL OR ARTIFICIAL OPENING ENDOSCOPIC, DIAGNOSTIC: ICD-10-PCS | Performed by: INTERNAL MEDICINE

## 2025-01-01 PROCEDURE — 84484 ASSAY OF TROPONIN QUANT: CPT | Performed by: EMERGENCY MEDICINE

## 2025-01-01 PROCEDURE — 97168 OT RE-EVAL EST PLAN CARE: CPT

## 2025-01-01 PROCEDURE — 85025 COMPLETE CBC W/AUTO DIFF WBC: CPT | Performed by: STUDENT IN AN ORGANIZED HEALTH CARE EDUCATION/TRAINING PROGRAM

## 2025-01-01 PROCEDURE — 84132 ASSAY OF SERUM POTASSIUM: CPT | Performed by: INTERNAL MEDICINE

## 2025-01-01 PROCEDURE — 80162 ASSAY OF DIGOXIN TOTAL: CPT | Performed by: EMERGENCY MEDICINE

## 2025-01-01 PROCEDURE — 82330 ASSAY OF CALCIUM: CPT

## 2025-01-01 PROCEDURE — 25010000002 PIPERACILLIN SOD-TAZOBACTAM PER 1 G: Performed by: PHYSICIAN ASSISTANT

## 2025-01-01 PROCEDURE — 84145 PROCALCITONIN (PCT): CPT | Performed by: INTERNAL MEDICINE

## 2025-01-01 PROCEDURE — 25010000002 FUROSEMIDE PER 20 MG: Performed by: INTERNAL MEDICINE

## 2025-01-01 PROCEDURE — 25010000002 FUROSEMIDE PER 20 MG: Performed by: FAMILY MEDICINE

## 2025-01-01 PROCEDURE — 85652 RBC SED RATE AUTOMATED: CPT | Performed by: PHYSICIAN ASSISTANT

## 2025-01-01 PROCEDURE — 25810000003 SODIUM CHLORIDE 0.9 % SOLUTION 250 ML FLEX CONT: Performed by: STUDENT IN AN ORGANIZED HEALTH CARE EDUCATION/TRAINING PROGRAM

## 2025-01-01 PROCEDURE — 94760 N-INVAS EAR/PLS OXIMETRY 1: CPT

## 2025-01-01 PROCEDURE — 25010000002 LIDOCAINE HCL (PF) 4 % SOLUTION: Performed by: INTERNAL MEDICINE

## 2025-01-01 PROCEDURE — 97165 OT EVAL LOW COMPLEX 30 MIN: CPT

## 2025-01-01 PROCEDURE — 80053 COMPREHEN METABOLIC PANEL: CPT | Performed by: STUDENT IN AN ORGANIZED HEALTH CARE EDUCATION/TRAINING PROGRAM

## 2025-01-01 PROCEDURE — 80299 QUANTITATIVE ASSAY DRUG: CPT | Performed by: INTERNAL MEDICINE

## 2025-01-01 PROCEDURE — 99291 CRITICAL CARE FIRST HOUR: CPT | Performed by: INTERNAL MEDICINE

## 2025-01-01 PROCEDURE — 25010000002 FUROSEMIDE PER 20 MG

## 2025-01-01 PROCEDURE — 87205 SMEAR GRAM STAIN: CPT | Performed by: INTERNAL MEDICINE

## 2025-01-01 PROCEDURE — 84132 ASSAY OF SERUM POTASSIUM: CPT | Performed by: STUDENT IN AN ORGANIZED HEALTH CARE EDUCATION/TRAINING PROGRAM

## 2025-01-01 PROCEDURE — 87116 MYCOBACTERIA CULTURE: CPT | Performed by: INTERNAL MEDICINE

## 2025-01-01 PROCEDURE — 0202U NFCT DS 22 TRGT SARS-COV-2: CPT | Performed by: STUDENT IN AN ORGANIZED HEALTH CARE EDUCATION/TRAINING PROGRAM

## 2025-01-01 PROCEDURE — 25010000002 SULFUR HEXAFLUORIDE MICROSPH 60.7-25 MG RECONSTITUTED SUSPENSION: Performed by: INTERNAL MEDICINE

## 2025-01-01 PROCEDURE — 94660 CPAP INITIATION&MGMT: CPT

## 2025-01-01 PROCEDURE — 63710000001 INSULIN REGULAR HUMAN PER 5 UNITS: Performed by: INTERNAL MEDICINE

## 2025-01-01 PROCEDURE — 25010000002 METHYLPREDNISOLONE PER 125 MG: Performed by: EMERGENCY MEDICINE

## 2025-01-01 PROCEDURE — 80053 COMPREHEN METABOLIC PANEL: CPT | Performed by: EMERGENCY MEDICINE

## 2025-01-01 PROCEDURE — 80074 ACUTE HEPATITIS PANEL: CPT | Performed by: INTERNAL MEDICINE

## 2025-01-01 PROCEDURE — 99231 SBSQ HOSP IP/OBS SF/LOW 25: CPT | Performed by: INTERNAL MEDICINE

## 2025-01-01 PROCEDURE — 97161 PT EVAL LOW COMPLEX 20 MIN: CPT

## 2025-01-01 PROCEDURE — 87633 RESP VIRUS 12-25 TARGETS: CPT | Performed by: INTERNAL MEDICINE

## 2025-01-01 PROCEDURE — 85027 COMPLETE CBC AUTOMATED: CPT | Performed by: FAMILY MEDICINE

## 2025-01-01 PROCEDURE — 99291 CRITICAL CARE FIRST HOUR: CPT

## 2025-01-01 PROCEDURE — 86200 CCP ANTIBODY: CPT | Performed by: PHYSICIAN ASSISTANT

## 2025-01-01 PROCEDURE — 71275 CT ANGIOGRAPHY CHEST: CPT

## 2025-01-01 PROCEDURE — 85652 RBC SED RATE AUTOMATED: CPT | Performed by: INTERNAL MEDICINE

## 2025-01-01 PROCEDURE — 80053 COMPREHEN METABOLIC PANEL: CPT | Performed by: FAMILY MEDICINE

## 2025-01-01 PROCEDURE — 25010000002 MORPHINE PER 10 MG: Performed by: STUDENT IN AN ORGANIZED HEALTH CARE EDUCATION/TRAINING PROGRAM

## 2025-01-01 PROCEDURE — 25010000002 VANCOMYCIN 5 G RECONSTITUTED SOLUTION: Performed by: EMERGENCY MEDICINE

## 2025-01-01 PROCEDURE — 36415 COLL VENOUS BLD VENIPUNCTURE: CPT

## 2025-01-01 PROCEDURE — 25510000001 IOPAMIDOL PER 1 ML: Performed by: EMERGENCY MEDICINE

## 2025-01-01 PROCEDURE — 97530 THERAPEUTIC ACTIVITIES: CPT

## 2025-01-01 PROCEDURE — 84145 PROCALCITONIN (PCT): CPT | Performed by: EMERGENCY MEDICINE

## 2025-01-01 PROCEDURE — 80048 BASIC METABOLIC PNL TOTAL CA: CPT | Performed by: STUDENT IN AN ORGANIZED HEALTH CARE EDUCATION/TRAINING PROGRAM

## 2025-01-01 PROCEDURE — 97164 PT RE-EVAL EST PLAN CARE: CPT

## 2025-01-01 PROCEDURE — 5A09357 ASSISTANCE WITH RESPIRATORY VENTILATION, LESS THAN 24 CONSECUTIVE HOURS, CONTINUOUS POSITIVE AIRWAY PRESSURE: ICD-10-PCS | Performed by: EMERGENCY MEDICINE

## 2025-01-01 PROCEDURE — 25010000002 VANCOMYCIN 1 G RECONSTITUTED SOLUTION 1 EACH VIAL: Performed by: INTERNAL MEDICINE

## 2025-01-01 PROCEDURE — 83605 ASSAY OF LACTIC ACID: CPT

## 2025-01-01 PROCEDURE — 88312 SPECIAL STAINS GROUP 1: CPT | Performed by: INTERNAL MEDICINE

## 2025-01-01 PROCEDURE — 99233 SBSQ HOSP IP/OBS HIGH 50: CPT | Performed by: STUDENT IN AN ORGANIZED HEALTH CARE EDUCATION/TRAINING PROGRAM

## 2025-01-01 PROCEDURE — 93005 ELECTROCARDIOGRAM TRACING: CPT | Performed by: EMERGENCY MEDICINE

## 2025-01-01 PROCEDURE — 80202 ASSAY OF VANCOMYCIN: CPT | Performed by: INTERNAL MEDICINE

## 2025-01-01 PROCEDURE — 86671 FUNGUS NES ANTIBODY: CPT | Performed by: PHYSICIAN ASSISTANT

## 2025-01-01 PROCEDURE — 85025 COMPLETE CBC W/AUTO DIFF WBC: CPT | Performed by: EMERGENCY MEDICINE

## 2025-01-01 PROCEDURE — 86038 ANTINUCLEAR ANTIBODIES: CPT | Performed by: PHYSICIAN ASSISTANT

## 2025-01-01 PROCEDURE — 85027 COMPLETE CBC AUTOMATED: CPT | Performed by: INTERNAL MEDICINE

## 2025-01-01 PROCEDURE — 88108 CYTOPATH CONCENTRATE TECH: CPT | Performed by: INTERNAL MEDICINE

## 2025-01-01 PROCEDURE — 25010000002 METHYLPREDNISOLONE PER 125 MG

## 2025-01-01 PROCEDURE — 25010000002 VANCOMYCIN 5 G RECONSTITUTED SOLUTION: Performed by: INTERNAL MEDICINE

## 2025-01-01 PROCEDURE — 87102 FUNGUS ISOLATION CULTURE: CPT | Performed by: INTERNAL MEDICINE

## 2025-01-01 PROCEDURE — 31624 DX BRONCHOSCOPE/LAVAGE: CPT | Performed by: INTERNAL MEDICINE

## 2025-01-01 PROCEDURE — 83880 ASSAY OF NATRIURETIC PEPTIDE: CPT | Performed by: EMERGENCY MEDICINE

## 2025-01-01 PROCEDURE — 25010000002 VANCOMYCIN 1 G RECONSTITUTED SOLUTION 1 EACH VIAL: Performed by: STUDENT IN AN ORGANIZED HEALTH CARE EDUCATION/TRAINING PROGRAM

## 2025-01-01 PROCEDURE — 25010000002 FAMOTIDINE 10 MG/ML SOLUTION: Performed by: PHYSICIAN ASSISTANT

## 2025-01-01 PROCEDURE — 93306 TTE W/DOPPLER COMPLETE: CPT

## 2025-01-01 PROCEDURE — 83605 ASSAY OF LACTIC ACID: CPT | Performed by: EMERGENCY MEDICINE

## 2025-01-01 PROCEDURE — 94640 AIRWAY INHALATION TREATMENT: CPT

## 2025-01-01 PROCEDURE — 25810000003 SODIUM CHLORIDE 0.9 % SOLUTION: Performed by: EMERGENCY MEDICINE

## 2025-01-01 PROCEDURE — 86602 ANTINOMYCES ANTIBODY: CPT | Performed by: PHYSICIAN ASSISTANT

## 2025-01-01 PROCEDURE — 25010000002 FUROSEMIDE PER 20 MG: Performed by: SPECIALIST

## 2025-01-01 PROCEDURE — 86200 CCP ANTIBODY: CPT | Performed by: INTERNAL MEDICINE

## 2025-01-01 PROCEDURE — 84145 PROCALCITONIN (PCT): CPT | Performed by: FAMILY MEDICINE

## 2025-01-01 PROCEDURE — 87385 HISTOPLASMA CAPSUL AG IA: CPT | Performed by: INTERNAL MEDICINE

## 2025-01-01 PROCEDURE — 80069 RENAL FUNCTION PANEL: CPT | Performed by: INTERNAL MEDICINE

## 2025-01-01 PROCEDURE — 0BCF8ZZ EXTIRPATION OF MATTER FROM RIGHT LOWER LUNG LOBE, VIA NATURAL OR ARTIFICIAL OPENING ENDOSCOPIC: ICD-10-PCS | Performed by: INTERNAL MEDICINE

## 2025-01-01 PROCEDURE — 93308 TTE F-UP OR LMTD: CPT

## 2025-01-01 PROCEDURE — 86609 BACTERIUM ANTIBODY: CPT | Performed by: PHYSICIAN ASSISTANT

## 2025-01-01 PROCEDURE — 87637 SARSCOV2&INF A&B&RSV AMP PRB: CPT | Performed by: EMERGENCY MEDICINE

## 2025-01-01 PROCEDURE — 99232 SBSQ HOSP IP/OBS MODERATE 35: CPT | Performed by: FAMILY MEDICINE

## 2025-01-01 PROCEDURE — 87641 MR-STAPH DNA AMP PROBE: CPT | Performed by: PHYSICIAN ASSISTANT

## 2025-01-01 PROCEDURE — 25010000002 POTASSIUM CHLORIDE 10 MEQ/100ML SOLUTION

## 2025-01-01 PROCEDURE — 86331 IMMUNODIFFUSION OUCHTERLONY: CPT | Performed by: PHYSICIAN ASSISTANT

## 2025-01-01 PROCEDURE — 93005 ELECTROCARDIOGRAM TRACING: CPT

## 2025-01-01 PROCEDURE — 25010000002 HEPARIN (PORCINE) PER 1000 UNITS: Performed by: PHYSICIAN ASSISTANT

## 2025-01-01 PROCEDURE — 82248 BILIRUBIN DIRECT: CPT | Performed by: PHYSICIAN ASSISTANT

## 2025-01-01 PROCEDURE — 89051 BODY FLUID CELL COUNT: CPT | Performed by: INTERNAL MEDICINE

## 2025-01-01 PROCEDURE — 82550 ASSAY OF CK (CPK): CPT | Performed by: PHYSICIAN ASSISTANT

## 2025-01-01 PROCEDURE — 80069 RENAL FUNCTION PANEL: CPT | Performed by: FAMILY MEDICINE

## 2025-01-01 PROCEDURE — 25010000002 CEFEPIME PER 500 MG: Performed by: EMERGENCY MEDICINE

## 2025-01-01 PROCEDURE — 86431 RHEUMATOID FACTOR QUANT: CPT | Performed by: PHYSICIAN ASSISTANT

## 2025-01-01 PROCEDURE — 87206 SMEAR FLUORESCENT/ACID STAI: CPT | Performed by: INTERNAL MEDICINE

## 2025-01-01 PROCEDURE — 86606 ASPERGILLUS ANTIBODY: CPT | Performed by: PHYSICIAN ASSISTANT

## 2025-01-01 RX ORDER — FUROSEMIDE 10 MG/ML
20 INJECTION INTRAMUSCULAR; INTRAVENOUS EVERY 12 HOURS
Status: DISCONTINUED | OUTPATIENT
Start: 2025-01-01 | End: 2025-01-01

## 2025-01-01 RX ORDER — SODIUM CHLORIDE, SODIUM LACTATE, POTASSIUM CHLORIDE, CALCIUM CHLORIDE 600; 310; 30; 20 MG/100ML; MG/100ML; MG/100ML; MG/100ML
9 INJECTION, SOLUTION INTRAVENOUS CONTINUOUS PRN
Status: DISCONTINUED | OUTPATIENT
Start: 2025-01-01 | End: 2025-01-01 | Stop reason: HOSPADM

## 2025-01-01 RX ORDER — SODIUM CHLORIDE 0.9 % (FLUSH) 0.9 %
10 SYRINGE (ML) INJECTION AS NEEDED
Status: DISCONTINUED | OUTPATIENT
Start: 2025-01-01 | End: 2025-01-01 | Stop reason: HOSPADM

## 2025-01-01 RX ORDER — IPRATROPIUM BROMIDE AND ALBUTEROL SULFATE 2.5; .5 MG/3ML; MG/3ML
3 SOLUTION RESPIRATORY (INHALATION) EVERY 4 HOURS PRN
Status: DISCONTINUED | OUTPATIENT
Start: 2025-01-01 | End: 2025-01-01 | Stop reason: HOSPADM

## 2025-01-01 RX ORDER — LACTULOSE 10 G/15ML
30 SOLUTION ORAL DAILY
Status: DISCONTINUED | OUTPATIENT
Start: 2025-01-01 | End: 2025-01-01 | Stop reason: HOSPADM

## 2025-01-01 RX ORDER — PREDNISONE 20 MG/1
40 TABLET ORAL
Status: DISCONTINUED | OUTPATIENT
Start: 2025-01-01 | End: 2025-01-01

## 2025-01-01 RX ORDER — LORAZEPAM 2 MG/ML
0.5 CONCENTRATE ORAL
Status: DISCONTINUED | OUTPATIENT
Start: 2025-01-01 | End: 2025-01-01

## 2025-01-01 RX ORDER — IBUPROFEN 600 MG/1
1 TABLET ORAL
Status: DISCONTINUED | OUTPATIENT
Start: 2025-01-01 | End: 2025-01-01 | Stop reason: HOSPADM

## 2025-01-01 RX ORDER — POLYETHYLENE GLYCOL 3350 17 G/17G
17 POWDER, FOR SOLUTION ORAL DAILY
COMMUNITY

## 2025-01-01 RX ORDER — BISACODYL 5 MG/1
5 TABLET, DELAYED RELEASE ORAL DAILY PRN
Status: DISCONTINUED | OUTPATIENT
Start: 2025-01-01 | End: 2025-01-01 | Stop reason: HOSPADM

## 2025-01-01 RX ORDER — DEXTROSE MONOHYDRATE 25 G/50ML
25 INJECTION, SOLUTION INTRAVENOUS
Status: DISCONTINUED | OUTPATIENT
Start: 2025-01-01 | End: 2025-01-01 | Stop reason: HOSPADM

## 2025-01-01 RX ORDER — METOPROLOL SUCCINATE 25 MG/1
25 TABLET, EXTENDED RELEASE ORAL DAILY
Status: DISCONTINUED | OUTPATIENT
Start: 2025-01-01 | End: 2025-01-01

## 2025-01-01 RX ORDER — AMOXICILLIN 250 MG
2 CAPSULE ORAL 2 TIMES DAILY PRN
Status: DISCONTINUED | OUTPATIENT
Start: 2025-01-01 | End: 2025-01-01 | Stop reason: HOSPADM

## 2025-01-01 RX ORDER — POTASSIUM CHLORIDE 1.5 G/1.58G
40 POWDER, FOR SOLUTION ORAL EVERY 4 HOURS
Status: COMPLETED | OUTPATIENT
Start: 2025-01-01 | End: 2025-01-01

## 2025-01-01 RX ORDER — POTASSIUM CHLORIDE 750 MG/1
40 CAPSULE, EXTENDED RELEASE ORAL ONCE
Status: COMPLETED | OUTPATIENT
Start: 2025-01-01 | End: 2025-01-01

## 2025-01-01 RX ORDER — PANTOPRAZOLE SODIUM 40 MG/1
40 TABLET, DELAYED RELEASE ORAL DAILY
COMMUNITY

## 2025-01-01 RX ORDER — LEVOTHYROXINE SODIUM 100 UG/1
100 TABLET ORAL
Status: DISCONTINUED | OUTPATIENT
Start: 2025-01-01 | End: 2025-01-01

## 2025-01-01 RX ORDER — OXYMETAZOLINE HYDROCHLORIDE 0.05 G/100ML
2 SPRAY NASAL 2 TIMES DAILY
Status: COMPLETED | OUTPATIENT
Start: 2025-01-01 | End: 2025-01-01

## 2025-01-01 RX ORDER — SODIUM CHLORIDE 0.9 % (FLUSH) 0.9 %
10 SYRINGE (ML) INJECTION EVERY 12 HOURS SCHEDULED
Status: DISCONTINUED | OUTPATIENT
Start: 2025-01-01 | End: 2025-01-01 | Stop reason: HOSPADM

## 2025-01-01 RX ORDER — MINERAL OIL/HYDROPHIL PETROLAT
1 OINTMENT (GRAM) TOPICAL DAILY
Status: DISCONTINUED | OUTPATIENT
Start: 2025-01-01 | End: 2025-01-01 | Stop reason: HOSPADM

## 2025-01-01 RX ORDER — POTASSIUM CHLORIDE 1500 MG/1
40 TABLET, EXTENDED RELEASE ORAL EVERY 4 HOURS
Status: COMPLETED | OUTPATIENT
Start: 2025-01-01 | End: 2025-01-01

## 2025-01-01 RX ORDER — INSULIN LISPRO 100 [IU]/ML
INJECTION, SOLUTION INTRAVENOUS; SUBCUTANEOUS
COMMUNITY

## 2025-01-01 RX ORDER — DIGOXIN 125 MCG
125 TABLET ORAL EVERY OTHER DAY
Status: DISCONTINUED | OUTPATIENT
Start: 2025-01-01 | End: 2025-01-01

## 2025-01-01 RX ORDER — HALOPERIDOL 5 MG/ML
0.5 INJECTION INTRAMUSCULAR
Status: DISCONTINUED | OUTPATIENT
Start: 2025-01-01 | End: 2025-01-01

## 2025-01-01 RX ORDER — FUROSEMIDE 10 MG/ML
60 INJECTION INTRAMUSCULAR; INTRAVENOUS ONCE
Status: COMPLETED | OUTPATIENT
Start: 2025-01-01 | End: 2025-01-01

## 2025-01-01 RX ORDER — IBUPROFEN 600 MG/1
1 TABLET ORAL
Status: DISCONTINUED | OUTPATIENT
Start: 2025-01-01 | End: 2025-01-01

## 2025-01-01 RX ORDER — POTASSIUM CHLORIDE 750 MG/1
10 CAPSULE, EXTENDED RELEASE ORAL
Status: DISCONTINUED | OUTPATIENT
Start: 2025-01-01 | End: 2025-01-01

## 2025-01-01 RX ORDER — ECHINACEA PURPUREA EXTRACT 125 MG
2 TABLET ORAL
Status: DISCONTINUED | OUTPATIENT
Start: 2025-01-01 | End: 2025-01-01 | Stop reason: HOSPADM

## 2025-01-01 RX ORDER — FUROSEMIDE 10 MG/ML
40 INJECTION INTRAMUSCULAR; INTRAVENOUS ONCE
Status: COMPLETED | OUTPATIENT
Start: 2025-01-01 | End: 2025-01-01

## 2025-01-01 RX ORDER — ATROPINE SULFATE 10 MG/ML
2 SOLUTION/ DROPS OPHTHALMIC
Status: DISCONTINUED | OUTPATIENT
Start: 2025-01-01 | End: 2025-01-01 | Stop reason: HOSPADM

## 2025-01-01 RX ORDER — HEPARIN SODIUM 5000 [USP'U]/ML
5000 INJECTION, SOLUTION INTRAVENOUS; SUBCUTANEOUS EVERY 8 HOURS SCHEDULED
Status: DISCONTINUED | OUTPATIENT
Start: 2025-01-01 | End: 2025-01-01

## 2025-01-01 RX ORDER — IPRATROPIUM BROMIDE AND ALBUTEROL SULFATE 2.5; .5 MG/3ML; MG/3ML
3 SOLUTION RESPIRATORY (INHALATION) ONCE
Status: COMPLETED | OUTPATIENT
Start: 2025-01-01 | End: 2025-01-01

## 2025-01-01 RX ORDER — NITROGLYCERIN 0.4 MG/1
0.4 TABLET SUBLINGUAL
Status: DISCONTINUED | OUTPATIENT
Start: 2025-01-01 | End: 2025-01-01 | Stop reason: HOSPADM

## 2025-01-01 RX ORDER — FAMOTIDINE 10 MG/ML
20 INJECTION, SOLUTION INTRAVENOUS EVERY 12 HOURS SCHEDULED
Status: DISCONTINUED | OUTPATIENT
Start: 2025-01-01 | End: 2025-01-01

## 2025-01-01 RX ORDER — TAMSULOSIN HYDROCHLORIDE 0.4 MG/1
0.8 CAPSULE ORAL DAILY
Status: DISCONTINUED | OUTPATIENT
Start: 2025-01-01 | End: 2025-01-01

## 2025-01-01 RX ORDER — POTASSIUM CHLORIDE 7.45 MG/ML
10 INJECTION INTRAVENOUS
Status: COMPLETED | OUTPATIENT
Start: 2025-01-01 | End: 2025-01-01

## 2025-01-01 RX ORDER — POLYETHYLENE GLYCOL 3350 17 G/17G
17 POWDER, FOR SOLUTION ORAL DAILY PRN
Status: DISCONTINUED | OUTPATIENT
Start: 2025-01-01 | End: 2025-01-01 | Stop reason: HOSPADM

## 2025-01-01 RX ORDER — MORPHINE SULFATE 20 MG/ML
10 SOLUTION ORAL
Status: DISCONTINUED | OUTPATIENT
Start: 2025-01-01 | End: 2025-01-01 | Stop reason: HOSPADM

## 2025-01-01 RX ORDER — ACETAMINOPHEN 325 MG/1
650 TABLET ORAL EVERY 6 HOURS PRN
Status: DISCONTINUED | OUTPATIENT
Start: 2025-01-01 | End: 2025-01-01 | Stop reason: HOSPADM

## 2025-01-01 RX ORDER — FUROSEMIDE 10 MG/ML
20 INJECTION INTRAMUSCULAR; INTRAVENOUS ONCE
Status: COMPLETED | OUTPATIENT
Start: 2025-01-01 | End: 2025-01-01

## 2025-01-01 RX ORDER — PROMETHAZINE HYDROCHLORIDE 25 MG/1
25 TABLET ORAL ONCE AS NEEDED
Status: DISCONTINUED | OUTPATIENT
Start: 2025-01-01 | End: 2025-01-01 | Stop reason: HOSPADM

## 2025-01-01 RX ORDER — AMIODARONE HYDROCHLORIDE 200 MG/1
100 TABLET ORAL
Status: DISCONTINUED | OUTPATIENT
Start: 2025-01-01 | End: 2025-01-01

## 2025-01-01 RX ORDER — OXYCODONE HYDROCHLORIDE 5 MG/1
5 TABLET ORAL
Status: DISCONTINUED | OUTPATIENT
Start: 2025-01-01 | End: 2025-01-01 | Stop reason: HOSPADM

## 2025-01-01 RX ORDER — MORPHINE SULFATE 2 MG/ML
2 INJECTION, SOLUTION INTRAMUSCULAR; INTRAVENOUS
Status: DISCONTINUED | OUTPATIENT
Start: 2025-01-01 | End: 2025-01-01 | Stop reason: HOSPADM

## 2025-01-01 RX ORDER — FUROSEMIDE 10 MG/ML
40 INJECTION INTRAMUSCULAR; INTRAVENOUS
Status: DISCONTINUED | OUTPATIENT
Start: 2025-01-01 | End: 2025-01-01

## 2025-01-01 RX ORDER — HALOPERIDOL 2 MG/ML
0.5 SOLUTION ORAL
Status: DISCONTINUED | OUTPATIENT
Start: 2025-01-01 | End: 2025-01-01

## 2025-01-01 RX ORDER — HYDROXYZINE HYDROCHLORIDE 25 MG/1
25 TABLET, FILM COATED ORAL ONCE AS NEEDED
Status: COMPLETED | OUTPATIENT
Start: 2025-01-01 | End: 2025-01-01

## 2025-01-01 RX ORDER — HYDROCODONE BITARTRATE AND ACETAMINOPHEN 7.5; 325 MG/1; MG/1
1 TABLET ORAL EVERY 6 HOURS PRN
COMMUNITY

## 2025-01-01 RX ORDER — MAGNESIUM SULFATE HEPTAHYDRATE 40 MG/ML
4 INJECTION, SOLUTION INTRAVENOUS ONCE
Status: COMPLETED | OUTPATIENT
Start: 2025-01-01 | End: 2025-01-01

## 2025-01-01 RX ORDER — HALOPERIDOL 0.5 MG/1
0.5 TABLET ORAL
Status: DISCONTINUED | OUTPATIENT
Start: 2025-01-01 | End: 2025-01-01

## 2025-01-01 RX ORDER — HYDROCODONE BITARTRATE AND ACETAMINOPHEN 7.5; 325 MG/1; MG/1
1 TABLET ORAL EVERY 6 HOURS PRN
Refills: 0 | Status: DISCONTINUED | OUTPATIENT
Start: 2025-01-01 | End: 2025-01-01

## 2025-01-01 RX ORDER — NICOTINE POLACRILEX 4 MG
15 LOZENGE BUCCAL
Status: DISCONTINUED | OUTPATIENT
Start: 2025-01-01 | End: 2025-01-01 | Stop reason: HOSPADM

## 2025-01-01 RX ORDER — AMIODARONE HYDROCHLORIDE 200 MG/1
200 TABLET ORAL
Status: DISCONTINUED | OUTPATIENT
Start: 2025-01-01 | End: 2025-01-01

## 2025-01-01 RX ORDER — HALOPERIDOL 5 MG/ML
1 INJECTION INTRAMUSCULAR
Status: DISCONTINUED | OUTPATIENT
Start: 2025-01-01 | End: 2025-01-01 | Stop reason: HOSPADM

## 2025-01-01 RX ORDER — MAGNESIUM OXIDE 400 MG/1
400 TABLET ORAL 2 TIMES DAILY
COMMUNITY
Start: 2025-01-01

## 2025-01-01 RX ORDER — POTASSIUM CHLORIDE 750 MG/1
20 CAPSULE, EXTENDED RELEASE ORAL
Status: DISCONTINUED | OUTPATIENT
Start: 2025-01-01 | End: 2025-01-01

## 2025-01-01 RX ORDER — LIDOCAINE HYDROCHLORIDE 40 MG/ML
INJECTION, SOLUTION RETROBULBAR AS NEEDED
Status: DISCONTINUED | OUTPATIENT
Start: 2025-01-01 | End: 2025-01-01 | Stop reason: HOSPADM

## 2025-01-01 RX ORDER — ARFORMOTEROL TARTRATE 15 UG/2ML
15 SOLUTION RESPIRATORY (INHALATION)
Status: DISCONTINUED | OUTPATIENT
Start: 2025-01-01 | End: 2025-01-01

## 2025-01-01 RX ORDER — OXYCODONE HYDROCHLORIDE 5 MG/1
5 TABLET ORAL EVERY 6 HOURS PRN
Refills: 0 | Status: DISCONTINUED | OUTPATIENT
Start: 2025-01-01 | End: 2025-01-01 | Stop reason: HOSPADM

## 2025-01-01 RX ORDER — METOLAZONE 2.5 MG/1
2.5 TABLET ORAL ONCE
Status: COMPLETED | OUTPATIENT
Start: 2025-01-01 | End: 2025-01-01

## 2025-01-01 RX ORDER — SODIUM CHLORIDE 9 MG/ML
40 INJECTION, SOLUTION INTRAVENOUS AS NEEDED
Status: DISCONTINUED | OUTPATIENT
Start: 2025-01-01 | End: 2025-01-01 | Stop reason: HOSPADM

## 2025-01-01 RX ORDER — BISACODYL 10 MG
10 SUPPOSITORY, RECTAL RECTAL DAILY PRN
Status: DISCONTINUED | OUTPATIENT
Start: 2025-01-01 | End: 2025-01-01 | Stop reason: HOSPADM

## 2025-01-01 RX ORDER — DAPTOMYCIN 50 MG/ML
500 INJECTION, POWDER, LYOPHILIZED, FOR SOLUTION INTRAVENOUS DAILY
COMMUNITY
Start: 2025-01-01 | End: 2025-01-01

## 2025-01-01 RX ORDER — OXYCODONE HYDROCHLORIDE 5 MG/1
5 TABLET ORAL EVERY 6 HOURS PRN
Refills: 0 | Status: DISPENSED | OUTPATIENT
Start: 2025-01-01 | End: 2025-01-01

## 2025-01-01 RX ORDER — MORPHINE SULFATE 2 MG/ML
2 INJECTION, SOLUTION INTRAMUSCULAR; INTRAVENOUS EVERY 4 HOURS PRN
Status: DISCONTINUED | OUTPATIENT
Start: 2025-01-01 | End: 2025-01-01

## 2025-01-01 RX ORDER — LORAZEPAM 2 MG/ML
0.5 CONCENTRATE ORAL
Status: DISCONTINUED | OUTPATIENT
Start: 2025-01-01 | End: 2025-01-01 | Stop reason: HOSPADM

## 2025-01-01 RX ORDER — ONDANSETRON 2 MG/ML
4 INJECTION INTRAMUSCULAR; INTRAVENOUS ONCE AS NEEDED
Status: DISCONTINUED | OUTPATIENT
Start: 2025-01-01 | End: 2025-01-01 | Stop reason: HOSPADM

## 2025-01-01 RX ORDER — LORAZEPAM 0.5 MG/1
0.5 TABLET ORAL
Status: DISCONTINUED | OUTPATIENT
Start: 2025-01-01 | End: 2025-01-01

## 2025-01-01 RX ORDER — CHOLECALCIFEROL (VITAMIN D3) 25 MCG
2000 TABLET ORAL DAILY
Status: DISCONTINUED | OUTPATIENT
Start: 2025-01-01 | End: 2025-01-01

## 2025-01-01 RX ORDER — SPIRONOLACTONE 25 MG/1
25 TABLET ORAL DAILY
Status: DISCONTINUED | OUTPATIENT
Start: 2025-01-01 | End: 2025-01-01

## 2025-01-01 RX ORDER — ALBUTEROL SULFATE 0.83 MG/ML
2.5 SOLUTION RESPIRATORY (INHALATION)
Status: DISCONTINUED | OUTPATIENT
Start: 2025-01-01 | End: 2025-01-01 | Stop reason: HOSPADM

## 2025-01-01 RX ORDER — DIAZEPAM 10 MG/2ML
5 INJECTION, SOLUTION INTRAMUSCULAR; INTRAVENOUS
Status: DISCONTINUED | OUTPATIENT
Start: 2025-01-01 | End: 2025-01-01 | Stop reason: HOSPADM

## 2025-01-01 RX ORDER — HALOPERIDOL 2 MG/ML
0.5 SOLUTION ORAL
Status: DISCONTINUED | OUTPATIENT
Start: 2025-01-01 | End: 2025-01-01 | Stop reason: HOSPADM

## 2025-01-01 RX ORDER — FUROSEMIDE 10 MG/ML
40 INJECTION INTRAMUSCULAR; INTRAVENOUS 2 TIMES DAILY
Status: DISCONTINUED | OUTPATIENT
Start: 2025-01-01 | End: 2025-01-01

## 2025-01-01 RX ORDER — BUDESONIDE 0.5 MG/2ML
0.5 INHALANT ORAL
Status: DISCONTINUED | OUTPATIENT
Start: 2025-01-01 | End: 2025-01-01

## 2025-01-01 RX ORDER — IOPAMIDOL 755 MG/ML
100 INJECTION, SOLUTION INTRAVASCULAR
Status: COMPLETED | OUTPATIENT
Start: 2025-01-01 | End: 2025-01-01

## 2025-01-01 RX ORDER — ONDANSETRON 2 MG/ML
4 INJECTION INTRAMUSCULAR; INTRAVENOUS EVERY 6 HOURS PRN
Status: DISCONTINUED | OUTPATIENT
Start: 2025-01-01 | End: 2025-01-01 | Stop reason: HOSPADM

## 2025-01-01 RX ORDER — ONDANSETRON 4 MG/1
4 TABLET, ORALLY DISINTEGRATING ORAL EVERY 6 HOURS PRN
Status: DISCONTINUED | OUTPATIENT
Start: 2025-01-01 | End: 2025-01-01 | Stop reason: HOSPADM

## 2025-01-01 RX ORDER — PANTOPRAZOLE SODIUM 40 MG/1
40 TABLET, DELAYED RELEASE ORAL EVERY MORNING
Status: DISCONTINUED | OUTPATIENT
Start: 2025-01-01 | End: 2025-01-01

## 2025-01-01 RX ORDER — LORAZEPAM 0.5 MG/1
0.5 TABLET ORAL
Status: DISCONTINUED | OUTPATIENT
Start: 2025-01-01 | End: 2025-01-01 | Stop reason: HOSPADM

## 2025-01-01 RX ORDER — PROMETHAZINE HYDROCHLORIDE 25 MG/1
25 SUPPOSITORY RECTAL ONCE AS NEEDED
Status: DISCONTINUED | OUTPATIENT
Start: 2025-01-01 | End: 2025-01-01 | Stop reason: HOSPADM

## 2025-01-01 RX ORDER — DIAZEPAM 10 MG/2ML
2.5 INJECTION, SOLUTION INTRAMUSCULAR; INTRAVENOUS
Status: DISCONTINUED | OUTPATIENT
Start: 2025-01-01 | End: 2025-01-01

## 2025-01-01 RX ORDER — MORPHINE SULFATE 20 MG/ML
5 SOLUTION ORAL
Refills: 0 | Status: DISCONTINUED | OUTPATIENT
Start: 2025-01-01 | End: 2025-01-01

## 2025-01-01 RX ORDER — SIMETHICONE 80 MG
80 TABLET,CHEWABLE ORAL 4 TIMES DAILY PRN
Status: DISCONTINUED | OUTPATIENT
Start: 2025-01-01 | End: 2025-01-01 | Stop reason: HOSPADM

## 2025-01-01 RX ORDER — FUROSEMIDE 10 MG/ML
40 INJECTION INTRAMUSCULAR; INTRAVENOUS DAILY
Status: DISCONTINUED | OUTPATIENT
Start: 2025-01-01 | End: 2025-01-01

## 2025-01-01 RX ORDER — MIDODRINE HYDROCHLORIDE 10 MG/1
10 TABLET ORAL
Status: DISCONTINUED | OUTPATIENT
Start: 2025-01-01 | End: 2025-01-01

## 2025-01-01 RX ORDER — FLUCONAZOLE 2 MG/ML
200 INJECTION, SOLUTION INTRAVENOUS EVERY 24 HOURS
Status: DISCONTINUED | OUTPATIENT
Start: 2025-01-01 | End: 2025-01-01

## 2025-01-01 RX ORDER — DAPTOMYCIN 50 MG/ML
500 INJECTION, POWDER, LYOPHILIZED, FOR SOLUTION INTRAVENOUS DAILY
Status: DISCONTINUED | OUTPATIENT
Start: 2025-01-01 | End: 2025-01-01

## 2025-01-01 RX ORDER — MORPHINE SULFATE 2 MG/ML
2 INJECTION, SOLUTION INTRAMUSCULAR; INTRAVENOUS
Status: DISCONTINUED | OUTPATIENT
Start: 2025-01-01 | End: 2025-01-01

## 2025-01-01 RX ORDER — FUROSEMIDE 10 MG/ML
40 INJECTION INTRAMUSCULAR; INTRAVENOUS EVERY 12 HOURS
Status: DISCONTINUED | OUTPATIENT
Start: 2025-01-01 | End: 2025-01-01

## 2025-01-01 RX ORDER — FUROSEMIDE 10 MG/ML
40 INJECTION INTRAMUSCULAR; INTRAVENOUS 3 TIMES DAILY
Status: DISCONTINUED | OUTPATIENT
Start: 2025-01-01 | End: 2025-01-01

## 2025-01-01 RX ORDER — AMIODARONE HYDROCHLORIDE 200 MG/1
200 TABLET ORAL 2 TIMES DAILY
Status: DISCONTINUED | OUTPATIENT
Start: 2025-01-01 | End: 2025-01-01

## 2025-01-01 RX ORDER — DEXTROSE MONOHYDRATE 25 G/50ML
25 INJECTION, SOLUTION INTRAVENOUS
Status: DISCONTINUED | OUTPATIENT
Start: 2025-01-01 | End: 2025-01-01

## 2025-01-01 RX ORDER — HALOPERIDOL 0.5 MG/1
0.5 TABLET ORAL
Status: DISCONTINUED | OUTPATIENT
Start: 2025-01-01 | End: 2025-01-01 | Stop reason: HOSPADM

## 2025-01-01 RX ORDER — NICOTINE POLACRILEX 4 MG
15 LOZENGE BUCCAL
Status: DISCONTINUED | OUTPATIENT
Start: 2025-01-01 | End: 2025-01-01

## 2025-01-01 RX ORDER — FUROSEMIDE 10 MG/ML
40 INJECTION INTRAMUSCULAR; INTRAVENOUS EVERY 8 HOURS
Status: DISCONTINUED | OUTPATIENT
Start: 2025-01-01 | End: 2025-01-01

## 2025-01-01 RX ORDER — SODIUM CHLORIDE FOR INHALATION 3 %
4 VIAL, NEBULIZER (ML) INHALATION
Status: DISCONTINUED | OUTPATIENT
Start: 2025-01-01 | End: 2025-01-01 | Stop reason: HOSPADM

## 2025-01-01 RX ORDER — INSULIN LISPRO 100 [IU]/ML
2-7 INJECTION, SOLUTION INTRAVENOUS; SUBCUTANEOUS
Status: DISCONTINUED | OUTPATIENT
Start: 2025-01-01 | End: 2025-01-01

## 2025-01-01 RX ORDER — CALCIUM CARBONATE 500 MG/1
1 TABLET, CHEWABLE ORAL 2 TIMES DAILY PRN
Status: DISCONTINUED | OUTPATIENT
Start: 2025-01-01 | End: 2025-01-01 | Stop reason: HOSPADM

## 2025-01-01 RX ORDER — MIDODRINE HYDROCHLORIDE 5 MG/1
5 TABLET ORAL
Status: DISCONTINUED | OUTPATIENT
Start: 2025-01-01 | End: 2025-01-01

## 2025-01-01 RX ADMIN — POTASSIUM CHLORIDE 10 MEQ: 7.46 INJECTION, SOLUTION INTRAVENOUS at 10:37

## 2025-01-01 RX ADMIN — Medication 12.5 MG: at 08:29

## 2025-01-01 RX ADMIN — EMPAGLIFLOZIN 10 MG: 10 TABLET, FILM COATED ORAL at 16:18

## 2025-01-01 RX ADMIN — PIPERACILLIN AND TAZOBACTAM 3.38 G: 3; .375 INJECTION, POWDER, FOR SOLUTION INTRAVENOUS at 06:03

## 2025-01-01 RX ADMIN — Medication 10 ML: at 22:52

## 2025-01-01 RX ADMIN — MUPIROCIN 1 APPLICATION: 20 OINTMENT TOPICAL at 09:16

## 2025-01-01 RX ADMIN — Medication 400 MG: at 20:40

## 2025-01-01 RX ADMIN — PANTOPRAZOLE SODIUM 40 MG: 40 TABLET, DELAYED RELEASE ORAL at 05:32

## 2025-01-01 RX ADMIN — SACUBITRIL AND VALSARTAN 1 TABLET: 24; 26 TABLET, FILM COATED ORAL at 19:52

## 2025-01-01 RX ADMIN — PANTOPRAZOLE SODIUM 40 MG: 40 TABLET, DELAYED RELEASE ORAL at 08:39

## 2025-01-01 RX ADMIN — INSULIN LISPRO 4 UNITS: 100 INJECTION, SOLUTION INTRAVENOUS; SUBCUTANEOUS at 21:40

## 2025-01-01 RX ADMIN — OXYCODONE 5 MG: 5 TABLET ORAL at 22:23

## 2025-01-01 RX ADMIN — INSULIN LISPRO 4 UNITS: 100 INJECTION, SOLUTION INTRAVENOUS; SUBCUTANEOUS at 22:34

## 2025-01-01 RX ADMIN — METHYLPREDNISOLONE SODIUM SUCCINATE 40 MG: 125 INJECTION, POWDER, LYOPHILIZED, FOR SOLUTION INTRAMUSCULAR; INTRAVENOUS at 11:14

## 2025-01-01 RX ADMIN — METOPROLOL SUCCINATE 25 MG: 25 TABLET, EXTENDED RELEASE ORAL at 09:14

## 2025-01-01 RX ADMIN — MORPHINE SULFATE 2 MG: 2 INJECTION, SOLUTION INTRAMUSCULAR; INTRAVENOUS at 12:07

## 2025-01-01 RX ADMIN — BISACODYL 5 MG: 5 TABLET, COATED ORAL at 17:10

## 2025-01-01 RX ADMIN — PANTOPRAZOLE SODIUM 40 MG: 40 TABLET, DELAYED RELEASE ORAL at 05:04

## 2025-01-01 RX ADMIN — Medication 10 ML: at 22:22

## 2025-01-01 RX ADMIN — LEVOTHYROXINE SODIUM 100 MCG: 0.05 TABLET ORAL at 05:32

## 2025-01-01 RX ADMIN — DIAZEPAM 2.5 MG: 5 INJECTION INTRAMUSCULAR; INTRAVENOUS at 03:47

## 2025-01-01 RX ADMIN — MIDODRINE HYDROCHLORIDE 10 MG: 10 TABLET ORAL at 12:15

## 2025-01-01 RX ADMIN — MIDODRINE HYDROCHLORIDE 10 MG: 10 TABLET ORAL at 11:13

## 2025-01-01 RX ADMIN — PIPERACILLIN AND TAZOBACTAM 3.38 G: 3; .375 INJECTION, POWDER, FOR SOLUTION INTRAVENOUS at 13:09

## 2025-01-01 RX ADMIN — INSULIN LISPRO 5 UNITS: 100 INJECTION, SOLUTION INTRAVENOUS; SUBCUTANEOUS at 12:09

## 2025-01-01 RX ADMIN — MIDODRINE HYDROCHLORIDE 10 MG: 10 TABLET ORAL at 16:34

## 2025-01-01 RX ADMIN — Medication 12.5 MG: at 08:31

## 2025-01-01 RX ADMIN — PANTOPRAZOLE SODIUM 40 MG: 40 TABLET, DELAYED RELEASE ORAL at 05:08

## 2025-01-01 RX ADMIN — SODIUM CHLORIDE 1250 MG: 9 INJECTION, SOLUTION INTRAVENOUS at 02:24

## 2025-01-01 RX ADMIN — CEFEPIME 2000 MG: 2 INJECTION, POWDER, FOR SOLUTION INTRAVENOUS at 11:03

## 2025-01-01 RX ADMIN — MORPHINE SULFATE 2 MG: 2 INJECTION, SOLUTION INTRAMUSCULAR; INTRAVENOUS at 10:41

## 2025-01-01 RX ADMIN — Medication 400 MG: at 21:22

## 2025-01-01 RX ADMIN — APIXABAN 5 MG: 5 TABLET, FILM COATED ORAL at 08:39

## 2025-01-01 RX ADMIN — INSULIN LISPRO 3 UNITS: 100 INJECTION, SOLUTION INTRAVENOUS; SUBCUTANEOUS at 11:53

## 2025-01-01 RX ADMIN — HYDROCODONE BITARTRATE AND ACETAMINOPHEN 1 TABLET: 7.5; 325 TABLET ORAL at 10:43

## 2025-01-01 RX ADMIN — BUDESONIDE 0.5 MG: 0.5 SUSPENSION RESPIRATORY (INHALATION) at 18:25

## 2025-01-01 RX ADMIN — SODIUM CHLORIDE 1250 MG: 9 INJECTION, SOLUTION INTRAVENOUS at 11:46

## 2025-01-01 RX ADMIN — BUDESONIDE 0.5 MG: 0.5 SUSPENSION RESPIRATORY (INHALATION) at 06:58

## 2025-01-01 RX ADMIN — LEVOTHYROXINE SODIUM 100 MCG: 0.05 TABLET ORAL at 06:13

## 2025-01-01 RX ADMIN — FUROSEMIDE 40 MG: 10 INJECTION, SOLUTION INTRAMUSCULAR; INTRAVENOUS at 00:15

## 2025-01-01 RX ADMIN — HYDROCODONE BITARTRATE AND ACETAMINOPHEN 1 TABLET: 7.5; 325 TABLET ORAL at 20:30

## 2025-01-01 RX ADMIN — FUROSEMIDE 40 MG: 10 INJECTION, SOLUTION INTRAMUSCULAR; INTRAVENOUS at 22:02

## 2025-01-01 RX ADMIN — DIAZEPAM 2.5 MG: 5 INJECTION INTRAMUSCULAR; INTRAVENOUS at 00:34

## 2025-01-01 RX ADMIN — ARFORMOTEROL TARTRATE 15 MCG: 15 SOLUTION RESPIRATORY (INHALATION) at 09:14

## 2025-01-01 RX ADMIN — POTASSIUM CHLORIDE 40 MEQ: 750 CAPSULE, EXTENDED RELEASE ORAL at 08:16

## 2025-01-01 RX ADMIN — SACUBITRIL AND VALSARTAN 1 TABLET: 24; 26 TABLET, FILM COATED ORAL at 10:37

## 2025-01-01 RX ADMIN — IPRATROPIUM BROMIDE AND ALBUTEROL SULFATE 3 ML: .5; 3 SOLUTION RESPIRATORY (INHALATION) at 07:36

## 2025-01-01 RX ADMIN — WHITE PETROLATUM 1 APPLICATION: 1.75 OINTMENT TOPICAL at 09:11

## 2025-01-01 RX ADMIN — INSULIN LISPRO 2 UNITS: 100 INJECTION, SOLUTION INTRAVENOUS; SUBCUTANEOUS at 17:23

## 2025-01-01 RX ADMIN — Medication 4 ML: at 19:59

## 2025-01-01 RX ADMIN — POTASSIUM CHLORIDE 40 MEQ: 1500 TABLET, EXTENDED RELEASE ORAL at 11:38

## 2025-01-01 RX ADMIN — INSULIN HUMAN 4 UNITS: 100 INJECTION, SOLUTION PARENTERAL at 12:16

## 2025-01-01 RX ADMIN — DIGOXIN 125 MCG: 125 TABLET ORAL at 14:44

## 2025-01-01 RX ADMIN — SACUBITRIL AND VALSARTAN 1 TABLET: 24; 26 TABLET, FILM COATED ORAL at 09:30

## 2025-01-01 RX ADMIN — OXYCODONE HYDROCHLORIDE 5 MG: 5 TABLET ORAL at 19:52

## 2025-01-01 RX ADMIN — FUROSEMIDE 40 MG: 10 INJECTION, SOLUTION INTRAMUSCULAR; INTRAVENOUS at 09:15

## 2025-01-01 RX ADMIN — MORPHINE SULFATE 2 MG: 2 INJECTION, SOLUTION INTRAMUSCULAR; INTRAVENOUS at 18:02

## 2025-01-01 RX ADMIN — METHYLPREDNISOLONE SODIUM SUCCINATE 40 MG: 125 INJECTION, POWDER, LYOPHILIZED, FOR SOLUTION INTRAMUSCULAR; INTRAVENOUS at 22:54

## 2025-01-01 RX ADMIN — METHYLPREDNISOLONE SODIUM SUCCINATE 40 MG: 125 INJECTION, POWDER, LYOPHILIZED, FOR SOLUTION INTRAMUSCULAR; INTRAVENOUS at 23:15

## 2025-01-01 RX ADMIN — LEVOTHYROXINE SODIUM 100 MCG: 0.05 TABLET ORAL at 05:04

## 2025-01-01 RX ADMIN — Medication 400 MG: at 09:15

## 2025-01-01 RX ADMIN — FUROSEMIDE 40 MG: 10 INJECTION, SOLUTION INTRAMUSCULAR; INTRAVENOUS at 14:04

## 2025-01-01 RX ADMIN — MORPHINE SULFATE 2 MG: 2 INJECTION, SOLUTION INTRAMUSCULAR; INTRAVENOUS at 01:45

## 2025-01-01 RX ADMIN — SODIUM CHLORIDE 1000 MG: 9 INJECTION, SOLUTION INTRAVENOUS at 14:42

## 2025-01-01 RX ADMIN — INSULIN LISPRO 2 UNITS: 100 INJECTION, SOLUTION INTRAVENOUS; SUBCUTANEOUS at 16:50

## 2025-01-01 RX ADMIN — SACUBITRIL AND VALSARTAN 1 TABLET: 24; 26 TABLET, FILM COATED ORAL at 08:29

## 2025-01-01 RX ADMIN — MORPHINE SULFATE 2 MG: 2 INJECTION, SOLUTION INTRAMUSCULAR; INTRAVENOUS at 15:16

## 2025-01-01 RX ADMIN — POTASSIUM CHLORIDE 40 MEQ: 1500 TABLET, EXTENDED RELEASE ORAL at 08:14

## 2025-01-01 RX ADMIN — FUROSEMIDE 40 MG: 10 INJECTION, SOLUTION INTRAMUSCULAR; INTRAVENOUS at 18:01

## 2025-01-01 RX ADMIN — HALOPERIDOL LACTATE 1 MG: 5 INJECTION, SOLUTION INTRAMUSCULAR at 11:07

## 2025-01-01 RX ADMIN — Medication 2000 UNITS: at 08:14

## 2025-01-01 RX ADMIN — MORPHINE SULFATE 2 MG: 2 INJECTION, SOLUTION INTRAMUSCULAR; INTRAVENOUS at 00:11

## 2025-01-01 RX ADMIN — Medication 2 SPRAY: at 22:58

## 2025-01-01 RX ADMIN — DIGOXIN 125 MCG: 125 TABLET ORAL at 08:17

## 2025-01-01 RX ADMIN — SACUBITRIL AND VALSARTAN 1 TABLET: 24; 26 TABLET, FILM COATED ORAL at 08:51

## 2025-01-01 RX ADMIN — BUDESONIDE 0.5 MG: 0.5 SUSPENSION RESPIRATORY (INHALATION) at 06:34

## 2025-01-01 RX ADMIN — FUROSEMIDE 40 MG: 10 INJECTION, SOLUTION INTRAMUSCULAR; INTRAVENOUS at 08:34

## 2025-01-01 RX ADMIN — METHYLPREDNISOLONE SODIUM SUCCINATE 40 MG: 125 INJECTION, POWDER, LYOPHILIZED, FOR SOLUTION INTRAMUSCULAR; INTRAVENOUS at 17:08

## 2025-01-01 RX ADMIN — Medication 10 ML: at 08:11

## 2025-01-01 RX ADMIN — METHYLPREDNISOLONE SODIUM SUCCINATE 125 MG: 125 INJECTION, POWDER, LYOPHILIZED, FOR SOLUTION INTRAMUSCULAR; INTRAVENOUS at 22:18

## 2025-01-01 RX ADMIN — POLYETHYLENE GLYCOL 3350 17 G: 17 POWDER, FOR SOLUTION ORAL at 08:12

## 2025-01-01 RX ADMIN — LEVOTHYROXINE SODIUM 100 MCG: 0.05 TABLET ORAL at 08:17

## 2025-01-01 RX ADMIN — FUROSEMIDE 40 MG: 10 INJECTION, SOLUTION INTRAMUSCULAR; INTRAVENOUS at 16:59

## 2025-01-01 RX ADMIN — MORPHINE SULFATE 2 MG: 2 INJECTION, SOLUTION INTRAMUSCULAR; INTRAVENOUS at 03:16

## 2025-01-01 RX ADMIN — MORPHINE SULFATE 2 MG: 2 INJECTION, SOLUTION INTRAMUSCULAR; INTRAVENOUS at 04:20

## 2025-01-01 RX ADMIN — BUDESONIDE 0.5 MG: 0.5 SUSPENSION RESPIRATORY (INHALATION) at 08:03

## 2025-01-01 RX ADMIN — ARFORMOTEROL TARTRATE 15 MCG: 15 SOLUTION RESPIRATORY (INHALATION) at 19:52

## 2025-01-01 RX ADMIN — SACUBITRIL AND VALSARTAN 1 TABLET: 24; 26 TABLET, FILM COATED ORAL at 08:30

## 2025-01-01 RX ADMIN — OXYCODONE HYDROCHLORIDE 5 MG: 5 TABLET ORAL at 11:13

## 2025-01-01 RX ADMIN — OXYCODONE 5 MG: 5 TABLET ORAL at 22:53

## 2025-01-01 RX ADMIN — BUDESONIDE 0.5 MG: 0.5 SUSPENSION RESPIRATORY (INHALATION) at 20:37

## 2025-01-01 RX ADMIN — METHYLPREDNISOLONE SODIUM SUCCINATE 125 MG: 125 INJECTION, POWDER, LYOPHILIZED, FOR SOLUTION INTRAMUSCULAR; INTRAVENOUS at 05:52

## 2025-01-01 RX ADMIN — EMPAGLIFLOZIN 10 MG: 10 TABLET, FILM COATED ORAL at 08:14

## 2025-01-01 RX ADMIN — METHYLPREDNISOLONE SODIUM SUCCINATE 40 MG: 125 INJECTION, POWDER, LYOPHILIZED, FOR SOLUTION INTRAMUSCULAR; INTRAVENOUS at 22:01

## 2025-01-01 RX ADMIN — BUDESONIDE 0.5 MG: 0.5 SUSPENSION RESPIRATORY (INHALATION) at 19:59

## 2025-01-01 RX ADMIN — AMIODARONE HYDROCHLORIDE 200 MG: 200 TABLET ORAL at 08:16

## 2025-01-01 RX ADMIN — MUPIROCIN 1 APPLICATION: 20 OINTMENT TOPICAL at 20:26

## 2025-01-01 RX ADMIN — WHITE PETROLATUM 1 APPLICATION: 1.75 OINTMENT TOPICAL at 08:53

## 2025-01-01 RX ADMIN — FUROSEMIDE 40 MG: 10 INJECTION, SOLUTION INTRAMUSCULAR; INTRAVENOUS at 17:07

## 2025-01-01 RX ADMIN — FLUCONAZOLE 200 MG: 2 INJECTION, SOLUTION INTRAVENOUS at 15:13

## 2025-01-01 RX ADMIN — INSULIN LISPRO 3 UNITS: 100 INJECTION, SOLUTION INTRAVENOUS; SUBCUTANEOUS at 17:30

## 2025-01-01 RX ADMIN — BUDESONIDE 0.5 MG: 0.5 SUSPENSION RESPIRATORY (INHALATION) at 19:52

## 2025-01-01 RX ADMIN — Medication 10 ML: at 21:53

## 2025-01-01 RX ADMIN — PREDNISONE 40 MG: 20 TABLET ORAL at 09:29

## 2025-01-01 RX ADMIN — APIXABAN 5 MG: 5 TABLET, FILM COATED ORAL at 09:29

## 2025-01-01 RX ADMIN — BUDESONIDE 0.5 MG: 0.5 SUSPENSION RESPIRATORY (INHALATION) at 06:52

## 2025-01-01 RX ADMIN — DIAZEPAM 2.5 MG: 5 INJECTION INTRAMUSCULAR; INTRAVENOUS at 04:20

## 2025-01-01 RX ADMIN — MIDODRINE HYDROCHLORIDE 10 MG: 10 TABLET ORAL at 08:39

## 2025-01-01 RX ADMIN — Medication 400 MG: at 22:57

## 2025-01-01 RX ADMIN — METHYLPREDNISOLONE SODIUM SUCCINATE 40 MG: 125 INJECTION, POWDER, LYOPHILIZED, FOR SOLUTION INTRAMUSCULAR; INTRAVENOUS at 11:12

## 2025-01-01 RX ADMIN — BUDESONIDE 0.5 MG: 0.5 SUSPENSION RESPIRATORY (INHALATION) at 07:13

## 2025-01-01 RX ADMIN — SACUBITRIL AND VALSARTAN 1 TABLET: 24; 26 TABLET, FILM COATED ORAL at 08:25

## 2025-01-01 RX ADMIN — Medication 12.5 MG: at 08:43

## 2025-01-01 RX ADMIN — AMIODARONE HYDROCHLORIDE 200 MG: 200 TABLET ORAL at 21:41

## 2025-01-01 RX ADMIN — Medication 12.5 MG: at 14:49

## 2025-01-01 RX ADMIN — MORPHINE SULFATE 2 MG: 2 INJECTION, SOLUTION INTRAMUSCULAR; INTRAVENOUS at 20:00

## 2025-01-01 RX ADMIN — INSULIN LISPRO 4 UNITS: 100 INJECTION, SOLUTION INTRAVENOUS; SUBCUTANEOUS at 08:30

## 2025-01-01 RX ADMIN — WHITE PETROLATUM 1 APPLICATION: 1.75 OINTMENT TOPICAL at 10:52

## 2025-01-01 RX ADMIN — FUROSEMIDE 40 MG: 10 INJECTION, SOLUTION INTRAMUSCULAR; INTRAVENOUS at 17:23

## 2025-01-01 RX ADMIN — METHYLPREDNISOLONE SODIUM SUCCINATE 40 MG: 125 INJECTION, POWDER, LYOPHILIZED, FOR SOLUTION INTRAMUSCULAR; INTRAVENOUS at 17:09

## 2025-01-01 RX ADMIN — Medication 10 ML: at 22:12

## 2025-01-01 RX ADMIN — INSULIN LISPRO 2 UNITS: 100 INJECTION, SOLUTION INTRAVENOUS; SUBCUTANEOUS at 08:13

## 2025-01-01 RX ADMIN — Medication 400 MG: at 08:29

## 2025-01-01 RX ADMIN — FUROSEMIDE 40 MG: 10 INJECTION, SOLUTION INTRAMUSCULAR; INTRAVENOUS at 08:15

## 2025-01-01 RX ADMIN — APIXABAN 5 MG: 5 TABLET, FILM COATED ORAL at 08:12

## 2025-01-01 RX ADMIN — SACUBITRIL AND VALSARTAN 1 TABLET: 24; 26 TABLET, FILM COATED ORAL at 08:39

## 2025-01-01 RX ADMIN — Medication 10 ML: at 09:24

## 2025-01-01 RX ADMIN — ARFORMOTEROL TARTRATE 15 MCG: 15 SOLUTION RESPIRATORY (INHALATION) at 18:25

## 2025-01-01 RX ADMIN — SODIUM CHLORIDE 2000 MG: 9 INJECTION INTRAMUSCULAR; INTRAVENOUS; SUBCUTANEOUS at 09:29

## 2025-01-01 RX ADMIN — MIDODRINE HYDROCHLORIDE 10 MG: 10 TABLET ORAL at 17:45

## 2025-01-01 RX ADMIN — POTASSIUM CHLORIDE 40 MEQ: 1.5 POWDER, FOR SOLUTION ORAL at 12:06

## 2025-01-01 RX ADMIN — DIAZEPAM 2.5 MG: 5 INJECTION INTRAMUSCULAR; INTRAVENOUS at 20:01

## 2025-01-01 RX ADMIN — Medication 4 ML: at 19:17

## 2025-01-01 RX ADMIN — SODIUM CHLORIDE 1250 MG: 9 INJECTION, SOLUTION INTRAVENOUS at 12:43

## 2025-01-01 RX ADMIN — SODIUM CHLORIDE 1250 MG: 9 INJECTION, SOLUTION INTRAVENOUS at 00:47

## 2025-01-01 RX ADMIN — INSULIN LISPRO 4 UNITS: 100 INJECTION, SOLUTION INTRAVENOUS; SUBCUTANEOUS at 11:38

## 2025-01-01 RX ADMIN — SODIUM CHLORIDE 1250 MG: 9 INJECTION, SOLUTION INTRAVENOUS at 12:16

## 2025-01-01 RX ADMIN — PANTOPRAZOLE SODIUM 40 MG: 40 TABLET, DELAYED RELEASE ORAL at 07:13

## 2025-01-01 RX ADMIN — Medication 4 ML: at 07:40

## 2025-01-01 RX ADMIN — ARFORMOTEROL TARTRATE 15 MCG: 15 SOLUTION RESPIRATORY (INHALATION) at 07:22

## 2025-01-01 RX ADMIN — APIXABAN 5 MG: 5 TABLET, FILM COATED ORAL at 08:14

## 2025-01-01 RX ADMIN — AMIODARONE HYDROCHLORIDE 100 MG: 200 TABLET ORAL at 08:29

## 2025-01-01 RX ADMIN — BUDESONIDE 0.5 MG: 0.5 SUSPENSION RESPIRATORY (INHALATION) at 19:38

## 2025-01-01 RX ADMIN — EMPAGLIFLOZIN 10 MG: 10 TABLET, FILM COATED ORAL at 08:43

## 2025-01-01 RX ADMIN — MUPIROCIN 1 APPLICATION: 20 OINTMENT TOPICAL at 21:42

## 2025-01-01 RX ADMIN — APIXABAN 5 MG: 5 TABLET, FILM COATED ORAL at 08:52

## 2025-01-01 RX ADMIN — FUROSEMIDE 20 MG: 10 INJECTION, SOLUTION INTRAMUSCULAR; INTRAVENOUS at 08:50

## 2025-01-01 RX ADMIN — FAMOTIDINE 20 MG: 10 INJECTION INTRAVENOUS at 21:53

## 2025-01-01 RX ADMIN — Medication 10 ML: at 21:42

## 2025-01-01 RX ADMIN — METOPROLOL SUCCINATE 25 MG: 25 TABLET, EXTENDED RELEASE ORAL at 08:16

## 2025-01-01 RX ADMIN — OXYCODONE HYDROCHLORIDE 5 MG: 5 TABLET ORAL at 01:34

## 2025-01-01 RX ADMIN — OXYCODONE 5 MG: 5 TABLET ORAL at 21:34

## 2025-01-01 RX ADMIN — SACUBITRIL AND VALSARTAN 1 TABLET: 24; 26 TABLET, FILM COATED ORAL at 22:57

## 2025-01-01 RX ADMIN — PANTOPRAZOLE SODIUM 40 MG: 40 TABLET, DELAYED RELEASE ORAL at 05:52

## 2025-01-01 RX ADMIN — HEPARIN SODIUM 5000 UNITS: 5000 INJECTION INTRAVENOUS; SUBCUTANEOUS at 16:18

## 2025-01-01 RX ADMIN — MORPHINE SULFATE 2 MG: 2 INJECTION, SOLUTION INTRAMUSCULAR; INTRAVENOUS at 00:38

## 2025-01-01 RX ADMIN — MIDODRINE HYDROCHLORIDE 5 MG: 5 TABLET ORAL at 09:30

## 2025-01-01 RX ADMIN — PIPERACILLIN AND TAZOBACTAM 3.38 G: 3; .375 INJECTION, POWDER, FOR SOLUTION INTRAVENOUS at 14:00

## 2025-01-01 RX ADMIN — ARFORMOTEROL TARTRATE 15 MCG: 15 SOLUTION RESPIRATORY (INHALATION) at 07:13

## 2025-01-01 RX ADMIN — MIDODRINE HYDROCHLORIDE 10 MG: 10 TABLET ORAL at 11:45

## 2025-01-01 RX ADMIN — SODIUM CHLORIDE 1000 MG: 9 INJECTION, SOLUTION INTRAVENOUS at 01:33

## 2025-01-01 RX ADMIN — LEVOTHYROXINE SODIUM 100 MCG: 0.05 TABLET ORAL at 04:43

## 2025-01-01 RX ADMIN — AMIODARONE HYDROCHLORIDE 200 MG: 200 TABLET ORAL at 20:26

## 2025-01-01 RX ADMIN — ARFORMOTEROL TARTRATE 15 MCG: 15 SOLUTION RESPIRATORY (INHALATION) at 07:37

## 2025-01-01 RX ADMIN — Medication 10 ML: at 20:01

## 2025-01-01 RX ADMIN — DIAZEPAM 2.5 MG: 5 INJECTION INTRAMUSCULAR; INTRAVENOUS at 10:41

## 2025-01-01 RX ADMIN — FUROSEMIDE 40 MG: 10 INJECTION, SOLUTION INTRAMUSCULAR; INTRAVENOUS at 08:57

## 2025-01-01 RX ADMIN — Medication 10 ML: at 21:03

## 2025-01-01 RX ADMIN — OXYCODONE 5 MG: 5 TABLET ORAL at 21:40

## 2025-01-01 RX ADMIN — LEVOTHYROXINE SODIUM 100 MCG: 0.05 TABLET ORAL at 05:20

## 2025-01-01 RX ADMIN — Medication 2000 UNITS: at 08:51

## 2025-01-01 RX ADMIN — EMPAGLIFLOZIN 10 MG: 10 TABLET, FILM COATED ORAL at 09:13

## 2025-01-01 RX ADMIN — BUDESONIDE 0.5 MG: 0.5 SUSPENSION RESPIRATORY (INHALATION) at 07:08

## 2025-01-01 RX ADMIN — POTASSIUM CHLORIDE 40 MEQ: 1500 TABLET, EXTENDED RELEASE ORAL at 08:58

## 2025-01-01 RX ADMIN — POTASSIUM CHLORIDE 10 MEQ: 7.46 INJECTION, SOLUTION INTRAVENOUS at 11:37

## 2025-01-01 RX ADMIN — Medication 400 MG: at 08:16

## 2025-01-01 RX ADMIN — METHYLPREDNISOLONE SODIUM SUCCINATE 40 MG: 125 INJECTION, POWDER, LYOPHILIZED, FOR SOLUTION INTRAMUSCULAR; INTRAVENOUS at 17:45

## 2025-01-01 RX ADMIN — DIAZEPAM 2.5 MG: 5 INJECTION INTRAMUSCULAR; INTRAVENOUS at 15:51

## 2025-01-01 RX ADMIN — METHYLPREDNISOLONE SODIUM SUCCINATE 60 MG: 125 INJECTION, POWDER, LYOPHILIZED, FOR SOLUTION INTRAMUSCULAR; INTRAVENOUS at 03:13

## 2025-01-01 RX ADMIN — Medication 12.5 MG: at 08:14

## 2025-01-01 RX ADMIN — SODIUM CHLORIDE 1000 MG: 9 INJECTION, SOLUTION INTRAVENOUS at 14:22

## 2025-01-01 RX ADMIN — ARFORMOTEROL TARTRATE 15 MCG: 15 SOLUTION RESPIRATORY (INHALATION) at 06:58

## 2025-01-01 RX ADMIN — Medication 400 MG: at 22:24

## 2025-01-01 RX ADMIN — MIDODRINE HYDROCHLORIDE 10 MG: 10 TABLET ORAL at 12:52

## 2025-01-01 RX ADMIN — INSULIN LISPRO 3 UNITS: 100 INJECTION, SOLUTION INTRAVENOUS; SUBCUTANEOUS at 21:34

## 2025-01-01 RX ADMIN — INSULIN LISPRO 5 UNITS: 100 INJECTION, SOLUTION INTRAVENOUS; SUBCUTANEOUS at 17:09

## 2025-01-01 RX ADMIN — CALCIUM CARBONATE 1 TABLET: 500 TABLET, CHEWABLE ORAL at 11:00

## 2025-01-01 RX ADMIN — Medication 10 ML: at 08:14

## 2025-01-01 RX ADMIN — MIDODRINE HYDROCHLORIDE 10 MG: 10 TABLET ORAL at 07:29

## 2025-01-01 RX ADMIN — EMPAGLIFLOZIN 10 MG: 10 TABLET, FILM COATED ORAL at 08:50

## 2025-01-01 RX ADMIN — Medication 10 ML: at 21:41

## 2025-01-01 RX ADMIN — SACUBITRIL AND VALSARTAN 1 TABLET: 24; 26 TABLET, FILM COATED ORAL at 21:22

## 2025-01-01 RX ADMIN — INSULIN LISPRO 4 UNITS: 100 INJECTION, SOLUTION INTRAVENOUS; SUBCUTANEOUS at 20:40

## 2025-01-01 RX ADMIN — Medication 2 SPRAY: at 22:25

## 2025-01-01 RX ADMIN — INSULIN LISPRO 2 UNITS: 100 INJECTION, SOLUTION INTRAVENOUS; SUBCUTANEOUS at 17:20

## 2025-01-01 RX ADMIN — POTASSIUM CHLORIDE 40 MEQ: 750 CAPSULE, EXTENDED RELEASE ORAL at 20:40

## 2025-01-01 RX ADMIN — MIDODRINE HYDROCHLORIDE 10 MG: 10 TABLET ORAL at 18:01

## 2025-01-01 RX ADMIN — MORPHINE SULFATE 10 MG: 100 SOLUTION ORAL at 08:12

## 2025-01-01 RX ADMIN — DIAZEPAM 5 MG: 5 INJECTION INTRAMUSCULAR; INTRAVENOUS at 08:00

## 2025-01-01 RX ADMIN — EMPAGLIFLOZIN 10 MG: 10 TABLET, FILM COATED ORAL at 08:40

## 2025-01-01 RX ADMIN — BUDESONIDE 0.5 MG: 0.5 SUSPENSION RESPIRATORY (INHALATION) at 07:22

## 2025-01-01 RX ADMIN — POTASSIUM CHLORIDE 10 MEQ: 750 CAPSULE, EXTENDED RELEASE ORAL at 11:33

## 2025-01-01 RX ADMIN — MORPHINE SULFATE 2 MG: 2 INJECTION, SOLUTION INTRAMUSCULAR; INTRAVENOUS at 03:46

## 2025-01-01 RX ADMIN — ARFORMOTEROL TARTRATE 15 MCG: 15 SOLUTION RESPIRATORY (INHALATION) at 18:22

## 2025-01-01 RX ADMIN — HALOPERIDOL LACTATE 1 MG: 5 INJECTION, SOLUTION INTRAMUSCULAR at 10:13

## 2025-01-01 RX ADMIN — MORPHINE SULFATE 2 MG: 2 INJECTION, SOLUTION INTRAMUSCULAR; INTRAVENOUS at 22:00

## 2025-01-01 RX ADMIN — AMIODARONE HYDROCHLORIDE 200 MG: 200 TABLET ORAL at 09:30

## 2025-01-01 RX ADMIN — MIDODRINE HYDROCHLORIDE 10 MG: 10 TABLET ORAL at 11:55

## 2025-01-01 RX ADMIN — HYDROXYZINE HYDROCHLORIDE 25 MG: 25 TABLET, FILM COATED ORAL at 01:34

## 2025-01-01 RX ADMIN — DIAZEPAM 2.5 MG: 5 INJECTION INTRAMUSCULAR; INTRAVENOUS at 09:24

## 2025-01-01 RX ADMIN — DIAZEPAM 2.5 MG: 5 INJECTION INTRAMUSCULAR; INTRAVENOUS at 06:47

## 2025-01-01 RX ADMIN — SALINE NASAL SPRAY 2 SPRAY: 1.5 SOLUTION NASAL at 11:03

## 2025-01-01 RX ADMIN — INSULIN LISPRO 2 UNITS: 100 INJECTION, SOLUTION INTRAVENOUS; SUBCUTANEOUS at 16:34

## 2025-01-01 RX ADMIN — ARFORMOTEROL TARTRATE 15 MCG: 15 SOLUTION RESPIRATORY (INHALATION) at 20:37

## 2025-01-01 RX ADMIN — METHYLPREDNISOLONE SODIUM SUCCINATE 40 MG: 125 INJECTION, POWDER, LYOPHILIZED, FOR SOLUTION INTRAMUSCULAR; INTRAVENOUS at 10:41

## 2025-01-01 RX ADMIN — PIPERACILLIN AND TAZOBACTAM 3.38 G: 3; .375 INJECTION, POWDER, FOR SOLUTION INTRAVENOUS at 05:01

## 2025-01-01 RX ADMIN — AMIODARONE HYDROCHLORIDE 200 MG: 200 TABLET ORAL at 21:02

## 2025-01-01 RX ADMIN — HALOPERIDOL LACTATE 0.5 MG: 5 INJECTION, SOLUTION INTRAMUSCULAR at 23:47

## 2025-01-01 RX ADMIN — METHYLPREDNISOLONE SODIUM SUCCINATE 40 MG: 125 INJECTION, POWDER, LYOPHILIZED, FOR SOLUTION INTRAMUSCULAR; INTRAVENOUS at 22:56

## 2025-01-01 RX ADMIN — SODIUM CHLORIDE 1250 MG: 9 INJECTION, SOLUTION INTRAVENOUS at 02:17

## 2025-01-01 RX ADMIN — MIDODRINE HYDROCHLORIDE 5 MG: 5 TABLET ORAL at 16:50

## 2025-01-01 RX ADMIN — FUROSEMIDE 40 MG: 10 INJECTION, SOLUTION INTRAMUSCULAR; INTRAVENOUS at 09:05

## 2025-01-01 RX ADMIN — WHITE PETROLATUM 1 APPLICATION: 1.75 OINTMENT TOPICAL at 08:31

## 2025-01-01 RX ADMIN — IPRATROPIUM BROMIDE AND ALBUTEROL SULFATE 3 ML: .5; 3 SOLUTION RESPIRATORY (INHALATION) at 08:53

## 2025-01-01 RX ADMIN — Medication 400 MG: at 08:50

## 2025-01-01 RX ADMIN — Medication 4 ML: at 07:29

## 2025-01-01 RX ADMIN — WHITE PETROLATUM 1 APPLICATION: 1.75 OINTMENT TOPICAL at 12:57

## 2025-01-01 RX ADMIN — Medication 2 SPRAY: at 08:44

## 2025-01-01 RX ADMIN — AMIODARONE HYDROCHLORIDE 200 MG: 200 TABLET ORAL at 08:14

## 2025-01-01 RX ADMIN — PIPERACILLIN AND TAZOBACTAM 3.38 G: 3; .375 INJECTION, POWDER, FOR SOLUTION INTRAVENOUS at 05:23

## 2025-01-01 RX ADMIN — METHYLPREDNISOLONE SODIUM SUCCINATE 40 MG: 125 INJECTION, POWDER, LYOPHILIZED, FOR SOLUTION INTRAMUSCULAR; INTRAVENOUS at 22:42

## 2025-01-01 RX ADMIN — FUROSEMIDE 40 MG: 10 INJECTION, SOLUTION INTRAMUSCULAR; INTRAVENOUS at 00:20

## 2025-01-01 RX ADMIN — MORPHINE SULFATE 2 MG: 2 INJECTION, SOLUTION INTRAMUSCULAR; INTRAVENOUS at 09:24

## 2025-01-01 RX ADMIN — MIDODRINE HYDROCHLORIDE 15 MG: 5 TABLET ORAL at 07:27

## 2025-01-01 RX ADMIN — Medication 4 ML: at 08:03

## 2025-01-01 RX ADMIN — FUROSEMIDE 40 MG: 10 INJECTION, SOLUTION INTRAMUSCULAR; INTRAVENOUS at 02:48

## 2025-01-01 RX ADMIN — FUROSEMIDE 40 MG: 10 INJECTION, SOLUTION INTRAMUSCULAR; INTRAVENOUS at 11:55

## 2025-01-01 RX ADMIN — METHYLPREDNISOLONE SODIUM SUCCINATE 40 MG: 125 INJECTION, POWDER, LYOPHILIZED, FOR SOLUTION INTRAMUSCULAR; INTRAVENOUS at 10:31

## 2025-01-01 RX ADMIN — METOLAZONE 2.5 MG: 2.5 TABLET ORAL at 12:17

## 2025-01-01 RX ADMIN — BUDESONIDE 0.5 MG: 0.5 SUSPENSION RESPIRATORY (INHALATION) at 19:48

## 2025-01-01 RX ADMIN — PANTOPRAZOLE SODIUM 40 MG: 40 TABLET, DELAYED RELEASE ORAL at 08:31

## 2025-01-01 RX ADMIN — Medication 10 ML: at 09:27

## 2025-01-01 RX ADMIN — FUROSEMIDE 40 MG: 10 INJECTION, SOLUTION INTRAMUSCULAR; INTRAVENOUS at 08:31

## 2025-01-01 RX ADMIN — SENNOSIDES AND DOCUSATE SODIUM 2 TABLET: 50; 8.6 TABLET ORAL at 22:55

## 2025-01-01 RX ADMIN — INSULIN LISPRO 2 UNITS: 100 INJECTION, SOLUTION INTRAVENOUS; SUBCUTANEOUS at 21:43

## 2025-01-01 RX ADMIN — Medication 400 MG: at 21:02

## 2025-01-01 RX ADMIN — SODIUM CHLORIDE 1250 MG: 9 INJECTION, SOLUTION INTRAVENOUS at 01:50

## 2025-01-01 RX ADMIN — ARFORMOTEROL TARTRATE 15 MCG: 15 SOLUTION RESPIRATORY (INHALATION) at 20:12

## 2025-01-01 RX ADMIN — FUROSEMIDE 40 MG: 10 INJECTION, SOLUTION INTRAMUSCULAR; INTRAVENOUS at 08:50

## 2025-01-01 RX ADMIN — METHYLPREDNISOLONE SODIUM SUCCINATE 125 MG: 125 INJECTION, POWDER, LYOPHILIZED, FOR SOLUTION INTRAMUSCULAR; INTRAVENOUS at 06:03

## 2025-01-01 RX ADMIN — Medication 12.5 MG: at 08:39

## 2025-01-01 RX ADMIN — PANTOPRAZOLE SODIUM 40 MG: 40 TABLET, DELAYED RELEASE ORAL at 05:20

## 2025-01-01 RX ADMIN — INSULIN LISPRO 3 UNITS: 100 INJECTION, SOLUTION INTRAVENOUS; SUBCUTANEOUS at 07:39

## 2025-01-01 RX ADMIN — MORPHINE SULFATE 2 MG: 2 INJECTION, SOLUTION INTRAMUSCULAR; INTRAVENOUS at 13:12

## 2025-01-01 RX ADMIN — APIXABAN 5 MG: 5 TABLET, FILM COATED ORAL at 08:30

## 2025-01-01 RX ADMIN — PIPERACILLIN AND TAZOBACTAM 3.38 G: 3; .375 INJECTION, POWDER, FOR SOLUTION INTRAVENOUS at 00:36

## 2025-01-01 RX ADMIN — PIPERACILLIN AND TAZOBACTAM 3.38 G: 3; .375 INJECTION, POWDER, FOR SOLUTION INTRAVENOUS at 05:44

## 2025-01-01 RX ADMIN — DIAZEPAM 2.5 MG: 5 INJECTION INTRAMUSCULAR; INTRAVENOUS at 06:23

## 2025-01-01 RX ADMIN — CALCIUM CARBONATE 1 TABLET: 500 TABLET, CHEWABLE ORAL at 20:30

## 2025-01-01 RX ADMIN — WHITE PETROLATUM 1 APPLICATION: 1.75 OINTMENT TOPICAL at 08:44

## 2025-01-01 RX ADMIN — OXYCODONE HYDROCHLORIDE 5 MG: 5 TABLET ORAL at 00:16

## 2025-01-01 RX ADMIN — FUROSEMIDE 40 MG: 10 INJECTION, SOLUTION INTRAMUSCULAR; INTRAVENOUS at 08:11

## 2025-01-01 RX ADMIN — METHYLPREDNISOLONE SODIUM SUCCINATE 125 MG: 125 INJECTION INTRAMUSCULAR; INTRAVENOUS at 22:33

## 2025-01-01 RX ADMIN — SENNOSIDES AND DOCUSATE SODIUM 2 TABLET: 50; 8.6 TABLET ORAL at 08:11

## 2025-01-01 RX ADMIN — METHYLPREDNISOLONE SODIUM SUCCINATE 40 MG: 125 INJECTION, POWDER, LYOPHILIZED, FOR SOLUTION INTRAMUSCULAR; INTRAVENOUS at 12:03

## 2025-01-01 RX ADMIN — POTASSIUM CHLORIDE 10 MEQ: 7.46 INJECTION, SOLUTION INTRAVENOUS at 08:16

## 2025-01-01 RX ADMIN — Medication 2000 UNITS: at 08:58

## 2025-01-01 RX ADMIN — EMPAGLIFLOZIN 10 MG: 10 TABLET, FILM COATED ORAL at 08:58

## 2025-01-01 RX ADMIN — AMIODARONE HYDROCHLORIDE 200 MG: 200 TABLET ORAL at 14:43

## 2025-01-01 RX ADMIN — WHITE PETROLATUM 1 APPLICATION: 1.75 OINTMENT TOPICAL at 09:00

## 2025-01-01 RX ADMIN — SACUBITRIL AND VALSARTAN 1 TABLET: 24; 26 TABLET, FILM COATED ORAL at 00:15

## 2025-01-01 RX ADMIN — Medication 10 ML: at 21:38

## 2025-01-01 RX ADMIN — METHYLPREDNISOLONE SODIUM SUCCINATE 40 MG: 125 INJECTION, POWDER, LYOPHILIZED, FOR SOLUTION INTRAMUSCULAR; INTRAVENOUS at 11:45

## 2025-01-01 RX ADMIN — INSULIN LISPRO 4 UNITS: 100 INJECTION, SOLUTION INTRAVENOUS; SUBCUTANEOUS at 11:16

## 2025-01-01 RX ADMIN — BUDESONIDE 0.5 MG: 0.5 SUSPENSION RESPIRATORY (INHALATION) at 20:12

## 2025-01-01 RX ADMIN — Medication 400 MG: at 21:34

## 2025-01-01 RX ADMIN — Medication 2000 UNITS: at 14:43

## 2025-01-01 RX ADMIN — MORPHINE SULFATE 2 MG: 2 INJECTION, SOLUTION INTRAMUSCULAR; INTRAVENOUS at 10:59

## 2025-01-01 RX ADMIN — PIPERACILLIN AND TAZOBACTAM 3.38 G: 3; .375 INJECTION, POWDER, FOR SOLUTION INTRAVENOUS at 14:35

## 2025-01-01 RX ADMIN — METHYLPREDNISOLONE SODIUM SUCCINATE 40 MG: 125 INJECTION, POWDER, LYOPHILIZED, FOR SOLUTION INTRAMUSCULAR; INTRAVENOUS at 12:08

## 2025-01-01 RX ADMIN — Medication 400 MG: at 22:53

## 2025-01-01 RX ADMIN — ARFORMOTEROL TARTRATE 15 MCG: 15 SOLUTION RESPIRATORY (INHALATION) at 07:53

## 2025-01-01 RX ADMIN — INSULIN LISPRO 3 UNITS: 100 INJECTION, SOLUTION INTRAVENOUS; SUBCUTANEOUS at 09:15

## 2025-01-01 RX ADMIN — ARFORMOTEROL TARTRATE 15 MCG: 15 SOLUTION RESPIRATORY (INHALATION) at 19:59

## 2025-01-01 RX ADMIN — INSULIN LISPRO 3 UNITS: 100 INJECTION, SOLUTION INTRAVENOUS; SUBCUTANEOUS at 17:01

## 2025-01-01 RX ADMIN — MORPHINE SULFATE 5 MG: 100 SOLUTION ORAL at 18:18

## 2025-01-01 RX ADMIN — DIAZEPAM 2.5 MG: 5 INJECTION INTRAMUSCULAR; INTRAVENOUS at 03:20

## 2025-01-01 RX ADMIN — BUDESONIDE 0.5 MG: 0.5 SUSPENSION RESPIRATORY (INHALATION) at 07:37

## 2025-01-01 RX ADMIN — VANCOMYCIN HYDROCHLORIDE 2250 MG: 5 INJECTION, POWDER, LYOPHILIZED, FOR SOLUTION INTRAVENOUS at 12:24

## 2025-01-01 RX ADMIN — POTASSIUM CHLORIDE 20 MEQ: 750 CAPSULE, EXTENDED RELEASE ORAL at 12:04

## 2025-01-01 RX ADMIN — DIAZEPAM 2.5 MG: 5 INJECTION INTRAMUSCULAR; INTRAVENOUS at 14:15

## 2025-01-01 RX ADMIN — INSULIN LISPRO 2 UNITS: 100 INJECTION, SOLUTION INTRAVENOUS; SUBCUTANEOUS at 18:01

## 2025-01-01 RX ADMIN — INSULIN LISPRO 6 UNITS: 100 INJECTION, SOLUTION INTRAVENOUS; SUBCUTANEOUS at 12:04

## 2025-01-01 RX ADMIN — ARFORMOTEROL TARTRATE 15 MCG: 15 SOLUTION RESPIRATORY (INHALATION) at 19:17

## 2025-01-01 RX ADMIN — DIAZEPAM 2.5 MG: 5 INJECTION INTRAMUSCULAR; INTRAVENOUS at 18:02

## 2025-01-01 RX ADMIN — DIAZEPAM 2.5 MG: 5 INJECTION INTRAMUSCULAR; INTRAVENOUS at 15:43

## 2025-01-01 RX ADMIN — Medication 10 ML: at 20:41

## 2025-01-01 RX ADMIN — SODIUM CHLORIDE 2000 MG: 9 INJECTION INTRAMUSCULAR; INTRAVENOUS; SUBCUTANEOUS at 08:50

## 2025-01-01 RX ADMIN — INSULIN LISPRO 3 UNITS: 100 INJECTION, SOLUTION INTRAVENOUS; SUBCUTANEOUS at 20:36

## 2025-01-01 RX ADMIN — MORPHINE SULFATE 2 MG: 2 INJECTION, SOLUTION INTRAMUSCULAR; INTRAVENOUS at 06:23

## 2025-01-01 RX ADMIN — INSULIN LISPRO 3 UNITS: 100 INJECTION, SOLUTION INTRAVENOUS; SUBCUTANEOUS at 17:07

## 2025-01-01 RX ADMIN — Medication 10 ML: at 21:52

## 2025-01-01 RX ADMIN — WHITE PETROLATUM 1 APPLICATION: 1.75 OINTMENT TOPICAL at 14:45

## 2025-01-01 RX ADMIN — MIDODRINE HYDROCHLORIDE 15 MG: 5 TABLET ORAL at 11:41

## 2025-01-01 RX ADMIN — TAMSULOSIN HYDROCHLORIDE 0.8 MG: 0.4 CAPSULE ORAL at 13:12

## 2025-01-01 RX ADMIN — Medication 4 ML: at 18:25

## 2025-01-01 RX ADMIN — FUROSEMIDE 40 MG: 10 INJECTION, SOLUTION INTRAMUSCULAR; INTRAVENOUS at 22:55

## 2025-01-01 RX ADMIN — PREDNISONE 40 MG: 20 TABLET ORAL at 08:51

## 2025-01-01 RX ADMIN — Medication 10 ML: at 22:56

## 2025-01-01 RX ADMIN — INSULIN LISPRO 3 UNITS: 100 INJECTION, SOLUTION INTRAVENOUS; SUBCUTANEOUS at 08:31

## 2025-01-01 RX ADMIN — SENNOSIDES AND DOCUSATE SODIUM 2 TABLET: 50; 8.6 TABLET ORAL at 22:12

## 2025-01-01 RX ADMIN — BUDESONIDE 0.5 MG: 0.5 SUSPENSION RESPIRATORY (INHALATION) at 07:28

## 2025-01-01 RX ADMIN — Medication 400 MG: at 08:25

## 2025-01-01 RX ADMIN — LEVOTHYROXINE SODIUM 100 MCG: 0.05 TABLET ORAL at 05:44

## 2025-01-01 RX ADMIN — MORPHINE SULFATE 2 MG: 2 INJECTION, SOLUTION INTRAMUSCULAR; INTRAVENOUS at 03:20

## 2025-01-01 RX ADMIN — INSULIN LISPRO 3 UNITS: 100 INJECTION, SOLUTION INTRAVENOUS; SUBCUTANEOUS at 21:02

## 2025-01-01 RX ADMIN — OXYCODONE 5 MG: 5 TABLET ORAL at 22:32

## 2025-01-01 RX ADMIN — Medication 400 MG: at 21:40

## 2025-01-01 RX ADMIN — DIAZEPAM 2.5 MG: 5 INJECTION INTRAMUSCULAR; INTRAVENOUS at 00:11

## 2025-01-01 RX ADMIN — Medication 2000 UNITS: at 08:39

## 2025-01-01 RX ADMIN — SACUBITRIL AND VALSARTAN 1 TABLET: 24; 26 TABLET, FILM COATED ORAL at 08:59

## 2025-01-01 RX ADMIN — MIDODRINE HYDROCHLORIDE 10 MG: 10 TABLET ORAL at 08:51

## 2025-01-01 RX ADMIN — IPRATROPIUM BROMIDE AND ALBUTEROL SULFATE 3 ML: .5; 3 SOLUTION RESPIRATORY (INHALATION) at 12:19

## 2025-01-01 RX ADMIN — Medication 10 ML: at 09:30

## 2025-01-01 RX ADMIN — MORPHINE SULFATE 10 MG: 100 SOLUTION ORAL at 10:13

## 2025-01-01 RX ADMIN — MORPHINE SULFATE 2 MG: 2 INJECTION, SOLUTION INTRAMUSCULAR; INTRAVENOUS at 21:53

## 2025-01-01 RX ADMIN — INSULIN LISPRO 4 UNITS: 100 INJECTION, SOLUTION INTRAVENOUS; SUBCUTANEOUS at 17:09

## 2025-01-01 RX ADMIN — Medication 10 ML: at 08:51

## 2025-01-01 RX ADMIN — SODIUM CHLORIDE 1250 MG: 9 INJECTION, SOLUTION INTRAVENOUS at 11:54

## 2025-01-01 RX ADMIN — PANTOPRAZOLE SODIUM 40 MG: 40 TABLET, DELAYED RELEASE ORAL at 06:13

## 2025-01-01 RX ADMIN — Medication 4 ML: at 20:04

## 2025-01-01 RX ADMIN — Medication 2 SPRAY: at 08:40

## 2025-01-01 RX ADMIN — METHYLPREDNISOLONE SODIUM SUCCINATE 40 MG: 125 INJECTION, POWDER, LYOPHILIZED, FOR SOLUTION INTRAMUSCULAR; INTRAVENOUS at 12:52

## 2025-01-01 RX ADMIN — Medication 2000 UNITS: at 08:31

## 2025-01-01 RX ADMIN — Medication 10 ML: at 08:39

## 2025-01-01 RX ADMIN — Medication 400 MG: at 08:31

## 2025-01-01 RX ADMIN — LACTULOSE SOLUTION USP, 10 G/15 ML 30 G: 10 SOLUTION ORAL; RECTAL at 09:23

## 2025-01-01 RX ADMIN — OXYCODONE HYDROCHLORIDE 5 MG: 5 TABLET ORAL at 09:54

## 2025-01-01 RX ADMIN — MIDODRINE HYDROCHLORIDE 10 MG: 10 TABLET ORAL at 17:08

## 2025-01-01 RX ADMIN — WHITE PETROLATUM 1 APPLICATION: 1.75 OINTMENT TOPICAL at 08:55

## 2025-01-01 RX ADMIN — OXYCODONE HYDROCHLORIDE 5 MG: 5 TABLET ORAL at 22:55

## 2025-01-01 RX ADMIN — POTASSIUM CHLORIDE 40 MEQ: 1500 TABLET, EXTENDED RELEASE ORAL at 08:31

## 2025-01-01 RX ADMIN — MIDODRINE HYDROCHLORIDE 10 MG: 10 TABLET ORAL at 17:30

## 2025-01-01 RX ADMIN — INSULIN LISPRO 2 UNITS: 100 INJECTION, SOLUTION INTRAVENOUS; SUBCUTANEOUS at 08:38

## 2025-01-01 RX ADMIN — CEFEPIME 2000 MG: 2 INJECTION, POWDER, FOR SOLUTION INTRAVENOUS at 03:14

## 2025-01-01 RX ADMIN — Medication 4 ML: at 06:52

## 2025-01-01 RX ADMIN — INSULIN HUMAN 2 UNITS: 100 INJECTION, SOLUTION PARENTERAL at 00:47

## 2025-01-01 RX ADMIN — DIAZEPAM 2.5 MG: 5 INJECTION INTRAMUSCULAR; INTRAVENOUS at 21:52

## 2025-01-01 RX ADMIN — Medication 400 MG: at 08:43

## 2025-01-01 RX ADMIN — SACUBITRIL AND VALSARTAN 1 TABLET: 24; 26 TABLET, FILM COATED ORAL at 14:43

## 2025-01-01 RX ADMIN — DIGOXIN 125 MCG: 125 TABLET ORAL at 08:30

## 2025-01-01 RX ADMIN — SODIUM CHLORIDE 1000 MG: 9 INJECTION, SOLUTION INTRAVENOUS at 02:27

## 2025-01-01 RX ADMIN — METHYLPREDNISOLONE SODIUM SUCCINATE 40 MG: 125 INJECTION, POWDER, LYOPHILIZED, FOR SOLUTION INTRAMUSCULAR; INTRAVENOUS at 03:16

## 2025-01-01 RX ADMIN — METHYLPREDNISOLONE SODIUM SUCCINATE 40 MG: 125 INJECTION, POWDER, LYOPHILIZED, FOR SOLUTION INTRAMUSCULAR; INTRAVENOUS at 22:23

## 2025-01-01 RX ADMIN — INSULIN LISPRO 2 UNITS: 100 INJECTION, SOLUTION INTRAVENOUS; SUBCUTANEOUS at 08:51

## 2025-01-01 RX ADMIN — INSULIN LISPRO 5 UNITS: 100 INJECTION, SOLUTION INTRAVENOUS; SUBCUTANEOUS at 20:00

## 2025-01-01 RX ADMIN — Medication 4 ML: at 18:39

## 2025-01-01 RX ADMIN — DIAZEPAM 5 MG: 5 INJECTION INTRAMUSCULAR; INTRAVENOUS at 11:07

## 2025-01-01 RX ADMIN — SACUBITRIL AND VALSARTAN 1 TABLET: 24; 26 TABLET, FILM COATED ORAL at 21:40

## 2025-01-01 RX ADMIN — AMIODARONE HYDROCHLORIDE 200 MG: 200 TABLET ORAL at 09:14

## 2025-01-01 RX ADMIN — Medication 4 ML: at 07:22

## 2025-01-01 RX ADMIN — MIDODRINE HYDROCHLORIDE 10 MG: 10 TABLET ORAL at 08:42

## 2025-01-01 RX ADMIN — INSULIN LISPRO 3 UNITS: 100 INJECTION, SOLUTION INTRAVENOUS; SUBCUTANEOUS at 16:59

## 2025-01-01 RX ADMIN — LEVOTHYROXINE SODIUM 100 MCG: 0.05 TABLET ORAL at 03:58

## 2025-01-01 RX ADMIN — MORPHINE SULFATE 2 MG: 2 INJECTION, SOLUTION INTRAMUSCULAR; INTRAVENOUS at 14:16

## 2025-01-01 RX ADMIN — PANTOPRAZOLE SODIUM 40 MG: 40 TABLET, DELAYED RELEASE ORAL at 06:20

## 2025-01-01 RX ADMIN — METHYLPREDNISOLONE SODIUM SUCCINATE 40 MG: 125 INJECTION, POWDER, LYOPHILIZED, FOR SOLUTION INTRAMUSCULAR; INTRAVENOUS at 04:58

## 2025-01-01 RX ADMIN — SODIUM CHLORIDE 2000 MG: 9 INJECTION INTRAMUSCULAR; INTRAVENOUS; SUBCUTANEOUS at 09:15

## 2025-01-01 RX ADMIN — AMIODARONE HYDROCHLORIDE 200 MG: 200 TABLET ORAL at 08:52

## 2025-01-01 RX ADMIN — AMIODARONE HYDROCHLORIDE 200 MG: 200 TABLET ORAL at 20:54

## 2025-01-01 RX ADMIN — ARFORMOTEROL TARTRATE 15 MCG: 15 SOLUTION RESPIRATORY (INHALATION) at 18:35

## 2025-01-01 RX ADMIN — DIAZEPAM 2.5 MG: 5 INJECTION INTRAMUSCULAR; INTRAVENOUS at 12:07

## 2025-01-01 RX ADMIN — EMPAGLIFLOZIN 10 MG: 10 TABLET, FILM COATED ORAL at 08:26

## 2025-01-01 RX ADMIN — MIDODRINE HYDROCHLORIDE 15 MG: 5 TABLET ORAL at 17:08

## 2025-01-01 RX ADMIN — HYDROCODONE BITARTRATE AND ACETAMINOPHEN 1 TABLET: 7.5; 325 TABLET ORAL at 21:41

## 2025-01-01 RX ADMIN — SODIUM CHLORIDE 1250 MG: 9 INJECTION, SOLUTION INTRAVENOUS at 12:12

## 2025-01-01 RX ADMIN — APIXABAN 5 MG: 5 TABLET, FILM COATED ORAL at 20:40

## 2025-01-01 RX ADMIN — MORPHINE SULFATE 2 MG: 2 INJECTION, SOLUTION INTRAMUSCULAR; INTRAVENOUS at 17:04

## 2025-01-01 RX ADMIN — APIXABAN 5 MG: 5 TABLET, FILM COATED ORAL at 21:02

## 2025-01-01 RX ADMIN — MORPHINE SULFATE 2 MG: 2 INJECTION, SOLUTION INTRAMUSCULAR; INTRAVENOUS at 15:51

## 2025-01-01 RX ADMIN — DIAZEPAM 2.5 MG: 5 INJECTION INTRAMUSCULAR; INTRAVENOUS at 05:20

## 2025-01-01 RX ADMIN — APIXABAN 5 MG: 5 TABLET, FILM COATED ORAL at 08:17

## 2025-01-01 RX ADMIN — METHYLPREDNISOLONE SODIUM SUCCINATE 40 MG: 125 INJECTION, POWDER, LYOPHILIZED, FOR SOLUTION INTRAMUSCULAR; INTRAVENOUS at 23:52

## 2025-01-01 RX ADMIN — PANTOPRAZOLE SODIUM 40 MG: 40 TABLET, DELAYED RELEASE ORAL at 06:38

## 2025-01-01 RX ADMIN — BUDESONIDE 0.5 MG: 0.5 SUSPENSION RESPIRATORY (INHALATION) at 20:04

## 2025-01-01 RX ADMIN — ARFORMOTEROL TARTRATE 15 MCG: 15 SOLUTION RESPIRATORY (INHALATION) at 08:03

## 2025-01-01 RX ADMIN — POLYETHYLENE GLYCOL 3350 17 G: 17 POWDER, FOR SOLUTION ORAL at 21:37

## 2025-01-01 RX ADMIN — APIXABAN 5 MG: 5 TABLET, FILM COATED ORAL at 09:14

## 2025-01-01 RX ADMIN — METHYLPREDNISOLONE SODIUM SUCCINATE 40 MG: 125 INJECTION, POWDER, LYOPHILIZED, FOR SOLUTION INTRAMUSCULAR; INTRAVENOUS at 23:07

## 2025-01-01 RX ADMIN — PIPERACILLIN AND TAZOBACTAM 3.38 G: 3; .375 INJECTION, POWDER, FOR SOLUTION INTRAVENOUS at 05:33

## 2025-01-01 RX ADMIN — METHYLPREDNISOLONE SODIUM SUCCINATE 40 MG: 125 INJECTION, POWDER, LYOPHILIZED, FOR SOLUTION INTRAMUSCULAR; INTRAVENOUS at 17:00

## 2025-01-01 RX ADMIN — PANTOPRAZOLE SODIUM 40 MG: 40 TABLET, DELAYED RELEASE ORAL at 06:02

## 2025-01-01 RX ADMIN — OXYCODONE HYDROCHLORIDE 5 MG: 5 TABLET ORAL at 21:38

## 2025-01-01 RX ADMIN — Medication 10 ML: at 08:53

## 2025-01-01 RX ADMIN — Medication 10 ML: at 08:44

## 2025-01-01 RX ADMIN — DIGOXIN 125 MCG: 125 TABLET ORAL at 08:14

## 2025-01-01 RX ADMIN — INSULIN LISPRO 3 UNITS: 100 INJECTION, SOLUTION INTRAVENOUS; SUBCUTANEOUS at 08:57

## 2025-01-01 RX ADMIN — PREDNISONE 40 MG: 20 TABLET ORAL at 14:43

## 2025-01-01 RX ADMIN — FUROSEMIDE 40 MG: 10 INJECTION, SOLUTION INTRAMUSCULAR; INTRAVENOUS at 08:29

## 2025-01-01 RX ADMIN — FUROSEMIDE 40 MG: 10 INJECTION, SOLUTION INTRAMUSCULAR; INTRAVENOUS at 17:08

## 2025-01-01 RX ADMIN — ARFORMOTEROL TARTRATE 15 MCG: 15 SOLUTION RESPIRATORY (INHALATION) at 07:28

## 2025-01-01 RX ADMIN — BUDESONIDE 0.5 MG: 0.5 SUSPENSION RESPIRATORY (INHALATION) at 18:23

## 2025-01-01 RX ADMIN — METHYLPREDNISOLONE SODIUM SUCCINATE 40 MG: 125 INJECTION, POWDER, LYOPHILIZED, FOR SOLUTION INTRAMUSCULAR; INTRAVENOUS at 06:38

## 2025-01-01 RX ADMIN — PIPERACILLIN AND TAZOBACTAM 3.38 G: 3; .375 INJECTION, POWDER, FOR SOLUTION INTRAVENOUS at 00:14

## 2025-01-01 RX ADMIN — MIDODRINE HYDROCHLORIDE 10 MG: 10 TABLET ORAL at 11:11

## 2025-01-01 RX ADMIN — MORPHINE SULFATE 2 MG: 2 INJECTION, SOLUTION INTRAMUSCULAR; INTRAVENOUS at 05:20

## 2025-01-01 RX ADMIN — DIAZEPAM 2.5 MG: 5 INJECTION INTRAMUSCULAR; INTRAVENOUS at 13:12

## 2025-01-01 RX ADMIN — FUROSEMIDE 40 MG: 10 INJECTION, SOLUTION INTRAMUSCULAR; INTRAVENOUS at 08:17

## 2025-01-01 RX ADMIN — APIXABAN 5 MG: 5 TABLET, FILM COATED ORAL at 08:58

## 2025-01-01 RX ADMIN — INSULIN HUMAN 2 UNITS: 100 INJECTION, SOLUTION PARENTERAL at 06:28

## 2025-01-01 RX ADMIN — FUROSEMIDE 40 MG: 10 INJECTION, SOLUTION INTRAMUSCULAR; INTRAVENOUS at 00:36

## 2025-01-01 RX ADMIN — MIDODRINE HYDROCHLORIDE 10 MG: 10 TABLET ORAL at 08:59

## 2025-01-01 RX ADMIN — LEVOTHYROXINE SODIUM 100 MCG: 0.05 TABLET ORAL at 06:38

## 2025-01-01 RX ADMIN — PANTOPRAZOLE SODIUM 40 MG: 40 TABLET, DELAYED RELEASE ORAL at 09:29

## 2025-01-01 RX ADMIN — Medication 10 ML: at 14:45

## 2025-01-01 RX ADMIN — OXYCODONE 5 MG: 5 TABLET ORAL at 20:40

## 2025-01-01 RX ADMIN — APIXABAN 5 MG: 5 TABLET, FILM COATED ORAL at 08:43

## 2025-01-01 RX ADMIN — INSULIN LISPRO 3 UNITS: 100 INJECTION, SOLUTION INTRAVENOUS; SUBCUTANEOUS at 17:45

## 2025-01-01 RX ADMIN — Medication 10 ML: at 22:33

## 2025-01-01 RX ADMIN — PANTOPRAZOLE SODIUM 40 MG: 40 TABLET, DELAYED RELEASE ORAL at 07:42

## 2025-01-01 RX ADMIN — EMPAGLIFLOZIN 10 MG: 10 TABLET, FILM COATED ORAL at 08:29

## 2025-01-01 RX ADMIN — Medication 10 ML: at 20:26

## 2025-01-01 RX ADMIN — SODIUM CHLORIDE 1250 MG: 9 INJECTION, SOLUTION INTRAVENOUS at 23:52

## 2025-01-01 RX ADMIN — LEVOTHYROXINE SODIUM 100 MCG: 0.05 TABLET ORAL at 06:20

## 2025-01-01 RX ADMIN — APIXABAN 5 MG: 5 TABLET, FILM COATED ORAL at 21:41

## 2025-01-01 RX ADMIN — MORPHINE SULFATE 2 MG: 2 INJECTION, SOLUTION INTRAMUSCULAR; INTRAVENOUS at 07:42

## 2025-01-01 RX ADMIN — Medication 400 MG: at 08:58

## 2025-01-01 RX ADMIN — Medication 2000 UNITS: at 09:40

## 2025-01-01 RX ADMIN — Medication 10 ML: at 08:59

## 2025-01-01 RX ADMIN — FUROSEMIDE 40 MG: 10 INJECTION, SOLUTION INTRAMUSCULAR; INTRAVENOUS at 01:25

## 2025-01-01 RX ADMIN — METHYLPREDNISOLONE SODIUM SUCCINATE 40 MG: 125 INJECTION, POWDER, LYOPHILIZED, FOR SOLUTION INTRAMUSCULAR; INTRAVENOUS at 11:00

## 2025-01-01 RX ADMIN — METHYLPREDNISOLONE SODIUM SUCCINATE 40 MG: 125 INJECTION, POWDER, LYOPHILIZED, FOR SOLUTION INTRAMUSCULAR; INTRAVENOUS at 05:45

## 2025-01-01 RX ADMIN — APIXABAN 5 MG: 5 TABLET, FILM COATED ORAL at 20:54

## 2025-01-01 RX ADMIN — MORPHINE SULFATE 2 MG: 2 INJECTION, SOLUTION INTRAMUSCULAR; INTRAVENOUS at 14:33

## 2025-01-01 RX ADMIN — METHYLPREDNISOLONE SODIUM SUCCINATE 40 MG: 125 INJECTION, POWDER, LYOPHILIZED, FOR SOLUTION INTRAMUSCULAR; INTRAVENOUS at 16:58

## 2025-01-01 RX ADMIN — SACUBITRIL AND VALSARTAN 1 TABLET: 24; 26 TABLET, FILM COATED ORAL at 08:31

## 2025-01-01 RX ADMIN — Medication 400 MG: at 08:52

## 2025-01-01 RX ADMIN — EMPAGLIFLOZIN 10 MG: 10 TABLET, FILM COATED ORAL at 08:39

## 2025-01-01 RX ADMIN — SACUBITRIL AND VALSARTAN 1 TABLET: 24; 26 TABLET, FILM COATED ORAL at 22:23

## 2025-01-01 RX ADMIN — DIAZEPAM 2.5 MG: 5 INJECTION INTRAMUSCULAR; INTRAVENOUS at 23:09

## 2025-01-01 RX ADMIN — LEVOTHYROXINE SODIUM 100 MCG: 0.05 TABLET ORAL at 06:02

## 2025-01-01 RX ADMIN — PANTOPRAZOLE SODIUM 40 MG: 40 TABLET, DELAYED RELEASE ORAL at 08:16

## 2025-01-01 RX ADMIN — FUROSEMIDE 40 MG: 10 INJECTION, SOLUTION INTRAMUSCULAR; INTRAVENOUS at 17:31

## 2025-01-01 RX ADMIN — LEVOTHYROXINE SODIUM 100 MCG: 0.05 TABLET ORAL at 05:08

## 2025-01-01 RX ADMIN — WHITE PETROLATUM 1 APPLICATION: 1.75 OINTMENT TOPICAL at 13:41

## 2025-01-01 RX ADMIN — FUROSEMIDE 40 MG: 10 INJECTION, SOLUTION INTRAMUSCULAR; INTRAVENOUS at 08:43

## 2025-01-01 RX ADMIN — HALOPERIDOL 0.5 MG: 2 SOLUTION ORAL at 04:24

## 2025-01-01 RX ADMIN — Medication 400 MG: at 20:25

## 2025-01-01 RX ADMIN — INSULIN GLARGINE 8 UNITS: 100 INJECTION, SOLUTION SUBCUTANEOUS at 10:31

## 2025-01-01 RX ADMIN — Medication 2 SPRAY: at 08:55

## 2025-01-01 RX ADMIN — CEFEPIME 2000 MG: 2 INJECTION, POWDER, FOR SOLUTION INTRAVENOUS at 18:14

## 2025-01-01 RX ADMIN — AMIODARONE HYDROCHLORIDE 100 MG: 200 TABLET ORAL at 08:40

## 2025-01-01 RX ADMIN — INSULIN LISPRO 4 UNITS: 100 INJECTION, SOLUTION INTRAVENOUS; SUBCUTANEOUS at 17:05

## 2025-01-01 RX ADMIN — METHYLPREDNISOLONE SODIUM SUCCINATE 40 MG: 125 INJECTION, POWDER, LYOPHILIZED, FOR SOLUTION INTRAMUSCULAR; INTRAVENOUS at 05:23

## 2025-01-01 RX ADMIN — SACUBITRIL AND VALSARTAN 1 TABLET: 24; 26 TABLET, FILM COATED ORAL at 22:32

## 2025-01-01 RX ADMIN — Medication 400 MG: at 22:32

## 2025-01-01 RX ADMIN — MORPHINE SULFATE 2 MG: 2 INJECTION, SOLUTION INTRAMUSCULAR; INTRAVENOUS at 15:55

## 2025-01-01 RX ADMIN — LEVOTHYROXINE SODIUM 100 MCG: 0.05 TABLET ORAL at 05:23

## 2025-01-01 RX ADMIN — MAGNESIUM SULFATE HEPTAHYDRATE 4 G: 40 INJECTION, SOLUTION INTRAVENOUS at 06:00

## 2025-01-01 RX ADMIN — Medication 2000 UNITS: at 08:42

## 2025-01-01 RX ADMIN — Medication 4 ML: at 06:34

## 2025-01-01 RX ADMIN — INSULIN LISPRO 2 UNITS: 100 INJECTION, SOLUTION INTRAVENOUS; SUBCUTANEOUS at 16:53

## 2025-01-01 RX ADMIN — METHYLPREDNISOLONE SODIUM SUCCINATE 40 MG: 125 INJECTION, POWDER, LYOPHILIZED, FOR SOLUTION INTRAMUSCULAR; INTRAVENOUS at 00:14

## 2025-01-01 RX ADMIN — APIXABAN 5 MG: 5 TABLET, FILM COATED ORAL at 20:26

## 2025-01-01 RX ADMIN — ARFORMOTEROL TARTRATE 15 MCG: 15 SOLUTION RESPIRATORY (INHALATION) at 07:19

## 2025-01-01 RX ADMIN — FLUCONAZOLE 200 MG: 2 INJECTION, SOLUTION INTRAVENOUS at 15:50

## 2025-01-01 RX ADMIN — MORPHINE SULFATE 2 MG: 2 INJECTION, SOLUTION INTRAMUSCULAR; INTRAVENOUS at 00:34

## 2025-01-01 RX ADMIN — BUDESONIDE 0.5 MG: 0.5 SUSPENSION RESPIRATORY (INHALATION) at 07:11

## 2025-01-01 RX ADMIN — INSULIN LISPRO 4 UNITS: 100 INJECTION, SOLUTION INTRAVENOUS; SUBCUTANEOUS at 22:52

## 2025-01-01 RX ADMIN — DIAZEPAM 2.5 MG: 5 INJECTION INTRAMUSCULAR; INTRAVENOUS at 09:30

## 2025-01-01 RX ADMIN — DIAZEPAM 2.5 MG: 5 INJECTION INTRAMUSCULAR; INTRAVENOUS at 17:43

## 2025-01-01 RX ADMIN — PIPERACILLIN AND TAZOBACTAM 3.38 G: 3; .375 INJECTION, POWDER, FOR SOLUTION INTRAVENOUS at 21:41

## 2025-01-01 RX ADMIN — APIXABAN 5 MG: 5 TABLET, FILM COATED ORAL at 00:16

## 2025-01-01 RX ADMIN — INSULIN HUMAN 2 UNITS: 100 INJECTION, SOLUTION PARENTERAL at 17:24

## 2025-01-01 RX ADMIN — SODIUM CHLORIDE 2000 MG: 9 INJECTION INTRAMUSCULAR; INTRAVENOUS; SUBCUTANEOUS at 08:43

## 2025-01-01 RX ADMIN — BUDESONIDE 0.5 MG: 0.5 SUSPENSION RESPIRATORY (INHALATION) at 18:24

## 2025-01-01 RX ADMIN — INSULIN LISPRO 2 UNITS: 100 INJECTION, SOLUTION INTRAVENOUS; SUBCUTANEOUS at 17:09

## 2025-01-01 RX ADMIN — Medication 400 MG: at 08:39

## 2025-01-01 RX ADMIN — Medication 400 MG: at 08:11

## 2025-01-01 RX ADMIN — Medication 10 ML: at 20:55

## 2025-01-01 RX ADMIN — INSULIN LISPRO 5 UNITS: 100 INJECTION, SOLUTION INTRAVENOUS; SUBCUTANEOUS at 21:21

## 2025-01-01 RX ADMIN — MIDODRINE HYDROCHLORIDE 15 MG: 5 TABLET ORAL at 08:11

## 2025-01-01 RX ADMIN — SACUBITRIL AND VALSARTAN 1 TABLET: 24; 26 TABLET, FILM COATED ORAL at 08:40

## 2025-01-01 RX ADMIN — APIXABAN 5 MG: 5 TABLET, FILM COATED ORAL at 21:40

## 2025-01-01 RX ADMIN — MIDODRINE HYDROCHLORIDE 10 MG: 10 TABLET ORAL at 08:30

## 2025-01-01 RX ADMIN — WHITE PETROLATUM 1 APPLICATION: 1.75 OINTMENT TOPICAL at 09:33

## 2025-01-01 RX ADMIN — INSULIN GLARGINE 8 UNITS: 100 INJECTION, SOLUTION SUBCUTANEOUS at 08:12

## 2025-01-01 RX ADMIN — DIAZEPAM 5 MG: 5 INJECTION INTRAMUSCULAR; INTRAVENOUS at 09:16

## 2025-01-01 RX ADMIN — METHYLPREDNISOLONE SODIUM SUCCINATE 40 MG: 125 INJECTION, POWDER, LYOPHILIZED, FOR SOLUTION INTRAMUSCULAR; INTRAVENOUS at 11:11

## 2025-01-01 RX ADMIN — MORPHINE SULFATE 2 MG: 2 INJECTION, SOLUTION INTRAMUSCULAR; INTRAVENOUS at 06:47

## 2025-01-01 RX ADMIN — ARFORMOTEROL TARTRATE 15 MCG: 15 SOLUTION RESPIRATORY (INHALATION) at 07:11

## 2025-01-01 RX ADMIN — LEVOTHYROXINE SODIUM 100 MCG: 0.05 TABLET ORAL at 06:01

## 2025-01-01 RX ADMIN — METHYLPREDNISOLONE SODIUM SUCCINATE 125 MG: 125 INJECTION INTRAMUSCULAR; INTRAVENOUS at 09:47

## 2025-01-01 RX ADMIN — SODIUM CHLORIDE 1250 MG: 9 INJECTION, SOLUTION INTRAVENOUS at 12:36

## 2025-01-01 RX ADMIN — Medication 400 MG: at 21:36

## 2025-01-01 RX ADMIN — EMPAGLIFLOZIN 10 MG: 10 TABLET, FILM COATED ORAL at 08:31

## 2025-01-01 RX ADMIN — ARFORMOTEROL TARTRATE 15 MCG: 15 SOLUTION RESPIRATORY (INHALATION) at 20:49

## 2025-01-01 RX ADMIN — Medication 400 MG: at 19:52

## 2025-01-01 RX ADMIN — INSULIN LISPRO 3 UNITS: 100 INJECTION, SOLUTION INTRAVENOUS; SUBCUTANEOUS at 11:21

## 2025-01-01 RX ADMIN — ARFORMOTEROL TARTRATE 15 MCG: 15 SOLUTION RESPIRATORY (INHALATION) at 18:39

## 2025-01-01 RX ADMIN — Medication 4 ML: at 19:48

## 2025-01-01 RX ADMIN — INSULIN LISPRO 5 UNITS: 100 INJECTION, SOLUTION INTRAVENOUS; SUBCUTANEOUS at 11:34

## 2025-01-01 RX ADMIN — FLUCONAZOLE 200 MG: 2 INJECTION, SOLUTION INTRAVENOUS at 15:46

## 2025-01-01 RX ADMIN — BUDESONIDE 0.5 MG: 0.5 SUSPENSION RESPIRATORY (INHALATION) at 07:19

## 2025-01-01 RX ADMIN — INSULIN LISPRO 3 UNITS: 100 INJECTION, SOLUTION INTRAVENOUS; SUBCUTANEOUS at 21:51

## 2025-01-01 RX ADMIN — MUPIROCIN 1 APPLICATION: 20 OINTMENT TOPICAL at 06:28

## 2025-01-01 RX ADMIN — PIPERACILLIN AND TAZOBACTAM 3.38 G: 3; .375 INJECTION, POWDER, FOR SOLUTION INTRAVENOUS at 14:46

## 2025-01-01 RX ADMIN — DIAZEPAM 2.5 MG: 5 INJECTION INTRAMUSCULAR; INTRAVENOUS at 01:45

## 2025-01-01 RX ADMIN — Medication 2000 UNITS: at 08:29

## 2025-01-01 RX ADMIN — ARFORMOTEROL TARTRATE 15 MCG: 15 SOLUTION RESPIRATORY (INHALATION) at 18:41

## 2025-01-01 RX ADMIN — Medication 4 ML: at 19:52

## 2025-01-01 RX ADMIN — FUROSEMIDE 20 MG: 10 INJECTION, SOLUTION INTRAMUSCULAR; INTRAVENOUS at 17:25

## 2025-01-01 RX ADMIN — INSULIN LISPRO 5 UNITS: 100 INJECTION, SOLUTION INTRAVENOUS; SUBCUTANEOUS at 11:43

## 2025-01-01 RX ADMIN — IPRATROPIUM BROMIDE AND ALBUTEROL SULFATE 3 ML: .5; 3 SOLUTION RESPIRATORY (INHALATION) at 09:38

## 2025-01-01 RX ADMIN — MIDODRINE HYDROCHLORIDE 10 MG: 10 TABLET ORAL at 11:53

## 2025-01-01 RX ADMIN — PREDNISONE 40 MG: 20 TABLET ORAL at 12:57

## 2025-01-01 RX ADMIN — Medication 400 MG: at 14:43

## 2025-01-01 RX ADMIN — FUROSEMIDE 40 MG: 10 INJECTION, SOLUTION INTRAMUSCULAR; INTRAVENOUS at 17:02

## 2025-01-01 RX ADMIN — INSULIN LISPRO 2 UNITS: 100 INJECTION, SOLUTION INTRAVENOUS; SUBCUTANEOUS at 17:00

## 2025-01-01 RX ADMIN — MIDODRINE HYDROCHLORIDE 10 MG: 10 TABLET ORAL at 17:00

## 2025-01-01 RX ADMIN — ARFORMOTEROL TARTRATE 15 MCG: 15 SOLUTION RESPIRATORY (INHALATION) at 20:04

## 2025-01-01 RX ADMIN — Medication 400 MG: at 09:29

## 2025-01-01 RX ADMIN — METHYLPREDNISOLONE SODIUM SUCCINATE 40 MG: 125 INJECTION INTRAMUSCULAR; INTRAVENOUS at 08:29

## 2025-01-01 RX ADMIN — SODIUM CHLORIDE 2000 MG: 9 INJECTION INTRAMUSCULAR; INTRAVENOUS; SUBCUTANEOUS at 14:44

## 2025-01-01 RX ADMIN — ARFORMOTEROL TARTRATE 15 MCG: 15 SOLUTION RESPIRATORY (INHALATION) at 07:08

## 2025-01-01 RX ADMIN — INSULIN LISPRO 3 UNITS: 100 INJECTION, SOLUTION INTRAVENOUS; SUBCUTANEOUS at 12:06

## 2025-01-01 RX ADMIN — WHITE PETROLATUM 1 APPLICATION: 1.75 OINTMENT TOPICAL at 08:33

## 2025-01-01 RX ADMIN — WHITE PETROLATUM 1 APPLICATION: 1.75 OINTMENT TOPICAL at 08:35

## 2025-01-01 RX ADMIN — MIDODRINE HYDROCHLORIDE 15 MG: 5 TABLET ORAL at 16:58

## 2025-01-01 RX ADMIN — APIXABAN 5 MG: 5 TABLET, FILM COATED ORAL at 21:22

## 2025-01-01 RX ADMIN — FUROSEMIDE 40 MG: 10 INJECTION, SOLUTION INTRAMUSCULAR; INTRAVENOUS at 08:39

## 2025-01-01 RX ADMIN — Medication 10 ML: at 08:15

## 2025-01-01 RX ADMIN — MORPHINE SULFATE 2 MG: 2 INJECTION, SOLUTION INTRAMUSCULAR; INTRAVENOUS at 15:43

## 2025-01-01 RX ADMIN — INSULIN LISPRO 3 UNITS: 100 INJECTION, SOLUTION INTRAVENOUS; SUBCUTANEOUS at 22:56

## 2025-01-01 RX ADMIN — POTASSIUM CHLORIDE 10 MEQ: 750 CAPSULE, EXTENDED RELEASE ORAL at 08:26

## 2025-01-01 RX ADMIN — Medication 10 ML: at 08:26

## 2025-01-01 RX ADMIN — Medication 10 ML: at 08:17

## 2025-01-01 RX ADMIN — ARFORMOTEROL TARTRATE 15 MCG: 15 SOLUTION RESPIRATORY (INHALATION) at 19:48

## 2025-01-01 RX ADMIN — SENNOSIDES AND DOCUSATE SODIUM 2 TABLET: 50; 8.6 TABLET ORAL at 11:53

## 2025-01-01 RX ADMIN — Medication 400 MG: at 08:14

## 2025-01-01 RX ADMIN — HYDROCODONE BITARTRATE AND ACETAMINOPHEN 1 TABLET: 7.5; 325 TABLET ORAL at 03:58

## 2025-01-01 RX ADMIN — LEVOTHYROXINE SODIUM 100 MCG: 0.05 TABLET ORAL at 07:42

## 2025-01-01 RX ADMIN — FUROSEMIDE 40 MG: 10 INJECTION, SOLUTION INTRAMUSCULAR; INTRAVENOUS at 08:24

## 2025-01-01 RX ADMIN — APIXABAN 5 MG: 5 TABLET, FILM COATED ORAL at 22:55

## 2025-01-01 RX ADMIN — Medication 10 ML: at 09:33

## 2025-01-01 RX ADMIN — SACUBITRIL AND VALSARTAN 1 TABLET: 24; 26 TABLET, FILM COATED ORAL at 21:42

## 2025-01-01 RX ADMIN — Medication 2 SPRAY: at 21:41

## 2025-01-01 RX ADMIN — ARFORMOTEROL TARTRATE 15 MCG: 15 SOLUTION RESPIRATORY (INHALATION) at 19:32

## 2025-01-01 RX ADMIN — SODIUM CHLORIDE 2000 MG: 9 INJECTION INTRAMUSCULAR; INTRAVENOUS; SUBCUTANEOUS at 08:15

## 2025-01-01 RX ADMIN — DIAZEPAM 2.5 MG: 5 INJECTION INTRAMUSCULAR; INTRAVENOUS at 03:15

## 2025-01-01 RX ADMIN — SODIUM CHLORIDE 1250 MG: 9 INJECTION, SOLUTION INTRAVENOUS at 11:55

## 2025-01-01 RX ADMIN — APIXABAN 5 MG: 5 TABLET, FILM COATED ORAL at 08:50

## 2025-01-01 RX ADMIN — MORPHINE SULFATE 10 MG: 100 SOLUTION ORAL at 11:07

## 2025-01-01 RX ADMIN — Medication 10 ML: at 08:32

## 2025-01-01 RX ADMIN — FUROSEMIDE 40 MG: 10 INJECTION, SOLUTION INTRAMUSCULAR; INTRAVENOUS at 17:06

## 2025-01-01 RX ADMIN — POTASSIUM CHLORIDE 40 MEQ: 750 CAPSULE, EXTENDED RELEASE ORAL at 21:41

## 2025-01-01 RX ADMIN — Medication 10 ML: at 08:31

## 2025-01-01 RX ADMIN — POTASSIUM CHLORIDE 10 MEQ: 750 CAPSULE, EXTENDED RELEASE ORAL at 08:11

## 2025-01-01 RX ADMIN — HALOPERIDOL LACTATE 1 MG: 5 INJECTION, SOLUTION INTRAMUSCULAR at 08:11

## 2025-01-01 RX ADMIN — BUDESONIDE 0.5 MG: 0.5 SUSPENSION RESPIRATORY (INHALATION) at 19:17

## 2025-01-01 RX ADMIN — METHYLPREDNISOLONE SODIUM SUCCINATE 40 MG: 125 INJECTION, POWDER, LYOPHILIZED, FOR SOLUTION INTRAMUSCULAR; INTRAVENOUS at 05:32

## 2025-01-01 RX ADMIN — BUDESONIDE 0.5 MG: 0.5 SUSPENSION RESPIRATORY (INHALATION) at 18:35

## 2025-01-01 RX ADMIN — MIDODRINE HYDROCHLORIDE 10 MG: 10 TABLET ORAL at 17:05

## 2025-01-01 RX ADMIN — Medication 12.5 MG: at 09:30

## 2025-01-01 RX ADMIN — LEVOTHYROXINE SODIUM 100 MCG: 0.05 TABLET ORAL at 05:52

## 2025-01-01 RX ADMIN — SODIUM CHLORIDE 1250 MG: 9 INJECTION, SOLUTION INTRAVENOUS at 23:22

## 2025-01-01 RX ADMIN — SODIUM CHLORIDE 1000 MG: 9 INJECTION, SOLUTION INTRAVENOUS at 01:56

## 2025-01-01 RX ADMIN — Medication 400 MG: at 00:16

## 2025-01-01 RX ADMIN — FUROSEMIDE 20 MG: 10 INJECTION, SOLUTION INTRAMUSCULAR; INTRAVENOUS at 04:44

## 2025-01-01 RX ADMIN — Medication 10 ML: at 00:14

## 2025-01-01 RX ADMIN — INSULIN LISPRO 2 UNITS: 100 INJECTION, SOLUTION INTRAVENOUS; SUBCUTANEOUS at 12:15

## 2025-01-01 RX ADMIN — INSULIN LISPRO 3 UNITS: 100 INJECTION, SOLUTION INTRAVENOUS; SUBCUTANEOUS at 21:37

## 2025-01-01 RX ADMIN — METHYLPREDNISOLONE SODIUM SUCCINATE 40 MG: 125 INJECTION, POWDER, LYOPHILIZED, FOR SOLUTION INTRAMUSCULAR; INTRAVENOUS at 17:05

## 2025-01-01 RX ADMIN — WHITE PETROLATUM 1 APPLICATION: 1.75 OINTMENT TOPICAL at 08:16

## 2025-01-01 RX ADMIN — FUROSEMIDE 40 MG: 10 INJECTION, SOLUTION INTRAMUSCULAR; INTRAVENOUS at 17:45

## 2025-01-01 RX ADMIN — MIDODRINE HYDROCHLORIDE 15 MG: 5 TABLET ORAL at 08:25

## 2025-01-01 RX ADMIN — POTASSIUM CHLORIDE 10 MEQ: 750 CAPSULE, EXTENDED RELEASE ORAL at 17:10

## 2025-01-01 RX ADMIN — APIXABAN 5 MG: 5 TABLET, FILM COATED ORAL at 08:29

## 2025-01-01 RX ADMIN — BUDESONIDE 0.5 MG: 0.5 SUSPENSION RESPIRATORY (INHALATION) at 07:40

## 2025-01-01 RX ADMIN — INSULIN LISPRO 2 UNITS: 100 INJECTION, SOLUTION INTRAVENOUS; SUBCUTANEOUS at 11:53

## 2025-01-01 RX ADMIN — POTASSIUM CHLORIDE 40 MEQ: 1.5 POWDER, FOR SOLUTION ORAL at 08:39

## 2025-01-01 RX ADMIN — POTASSIUM CHLORIDE 10 MEQ: 750 CAPSULE, EXTENDED RELEASE ORAL at 17:08

## 2025-01-01 RX ADMIN — APIXABAN 5 MG: 5 TABLET, FILM COATED ORAL at 21:34

## 2025-01-01 RX ADMIN — MORPHINE SULFATE 10 MG: 100 SOLUTION ORAL at 12:11

## 2025-01-01 RX ADMIN — INSULIN LISPRO 2 UNITS: 100 INJECTION, SOLUTION INTRAVENOUS; SUBCUTANEOUS at 11:54

## 2025-01-01 RX ADMIN — EMPAGLIFLOZIN 10 MG: 10 TABLET, FILM COATED ORAL at 14:43

## 2025-01-01 RX ADMIN — DIAZEPAM 5 MG: 5 INJECTION INTRAMUSCULAR; INTRAVENOUS at 10:13

## 2025-01-01 RX ADMIN — SACUBITRIL AND VALSARTAN 1 TABLET: 24; 26 TABLET, FILM COATED ORAL at 22:53

## 2025-01-01 RX ADMIN — PANTOPRAZOLE SODIUM 40 MG: 40 TABLET, DELAYED RELEASE ORAL at 05:31

## 2025-01-01 RX ADMIN — SACUBITRIL AND VALSARTAN 1 TABLET: 24; 26 TABLET, FILM COATED ORAL at 08:14

## 2025-01-01 RX ADMIN — PIPERACILLIN AND TAZOBACTAM 3.38 G: 3; .375 INJECTION, POWDER, FOR SOLUTION INTRAVENOUS at 22:17

## 2025-01-01 RX ADMIN — OXYCODONE 5 MG: 5 TABLET ORAL at 21:43

## 2025-01-01 RX ADMIN — PREDNISONE 40 MG: 20 TABLET ORAL at 08:29

## 2025-01-01 RX ADMIN — METHYLPREDNISOLONE SODIUM SUCCINATE 40 MG: 125 INJECTION, POWDER, LYOPHILIZED, FOR SOLUTION INTRAMUSCULAR; INTRAVENOUS at 06:13

## 2025-01-01 RX ADMIN — FLUCONAZOLE 200 MG: 2 INJECTION, SOLUTION INTRAVENOUS at 14:46

## 2025-01-01 RX ADMIN — SULFUR HEXAFLUORIDE 5 ML: KIT at 15:59

## 2025-01-01 RX ADMIN — LORAZEPAM 0.5 MG: 2 SOLUTION, CONCENTRATE ORAL at 18:18

## 2025-01-01 RX ADMIN — ARFORMOTEROL TARTRATE 15 MCG: 15 SOLUTION RESPIRATORY (INHALATION) at 19:39

## 2025-01-01 RX ADMIN — FUROSEMIDE 40 MG: 10 INJECTION, SOLUTION INTRAMUSCULAR; INTRAVENOUS at 02:24

## 2025-01-01 RX ADMIN — INSULIN LISPRO 2 UNITS: 100 INJECTION, SOLUTION INTRAVENOUS; SUBCUTANEOUS at 12:25

## 2025-01-01 RX ADMIN — IOPAMIDOL 100 ML: 755 INJECTION, SOLUTION INTRAVENOUS at 12:05

## 2025-01-01 RX ADMIN — EMPAGLIFLOZIN 10 MG: 10 TABLET, FILM COATED ORAL at 08:52

## 2025-01-01 RX ADMIN — Medication 2000 UNITS: at 09:30

## 2025-01-01 RX ADMIN — DIAZEPAM 5 MG: 5 INJECTION INTRAMUSCULAR; INTRAVENOUS at 12:11

## 2025-01-01 RX ADMIN — POTASSIUM CHLORIDE 40 MEQ: 1500 TABLET, EXTENDED RELEASE ORAL at 12:11

## 2025-01-01 RX ADMIN — BISACODYL 5 MG: 5 TABLET, COATED ORAL at 20:35

## 2025-01-01 RX ADMIN — PIPERACILLIN AND TAZOBACTAM 3.38 G: 3; .375 INJECTION, POWDER, FOR SOLUTION INTRAVENOUS at 21:21

## 2025-01-01 RX ADMIN — ARFORMOTEROL TARTRATE 15 MCG: 15 SOLUTION RESPIRATORY (INHALATION) at 06:34

## 2025-01-01 RX ADMIN — DIAZEPAM 2.5 MG: 5 INJECTION INTRAMUSCULAR; INTRAVENOUS at 14:37

## 2025-01-01 RX ADMIN — Medication 2000 UNITS: at 09:15

## 2025-01-01 RX ADMIN — WHITE PETROLATUM 1 APPLICATION: 1.75 OINTMENT TOPICAL at 11:54

## 2025-01-01 RX ADMIN — INSULIN GLARGINE 14 UNITS: 100 INJECTION, SOLUTION SUBCUTANEOUS at 08:26

## 2025-01-01 RX ADMIN — MIDODRINE HYDROCHLORIDE 10 MG: 10 TABLET ORAL at 17:01

## 2025-01-01 RX ADMIN — INSULIN LISPRO 4 UNITS: 100 INJECTION, SOLUTION INTRAVENOUS; SUBCUTANEOUS at 22:22

## 2025-01-01 RX ADMIN — Medication 400 MG: at 22:12

## 2025-01-01 RX ADMIN — APIXABAN 5 MG: 5 TABLET, FILM COATED ORAL at 21:37

## 2025-01-01 RX ADMIN — LEVOTHYROXINE SODIUM 100 MCG: 0.05 TABLET ORAL at 05:31

## 2025-01-01 RX ADMIN — WHITE PETROLATUM 1 APPLICATION: 1.75 OINTMENT TOPICAL at 10:57

## 2025-01-01 RX ADMIN — METHYLPREDNISOLONE SODIUM SUCCINATE 40 MG: 125 INJECTION, POWDER, LYOPHILIZED, FOR SOLUTION INTRAMUSCULAR; INTRAVENOUS at 10:37

## 2025-01-01 RX ADMIN — ARFORMOTEROL TARTRATE 15 MCG: 15 SOLUTION RESPIRATORY (INHALATION) at 07:40

## 2025-01-01 RX ADMIN — DIAZEPAM 2.5 MG: 5 INJECTION INTRAMUSCULAR; INTRAVENOUS at 23:16

## 2025-01-01 RX ADMIN — Medication 400 MG: at 20:54

## 2025-01-01 RX ADMIN — LACTULOSE SOLUTION USP, 10 G/15 ML 30 G: 10 SOLUTION ORAL; RECTAL at 14:04

## 2025-01-01 RX ADMIN — PREDNISONE 40 MG: 20 TABLET ORAL at 08:14

## 2025-01-01 RX ADMIN — SODIUM CHLORIDE 1000 MG: 9 INJECTION, SOLUTION INTRAVENOUS at 13:36

## 2025-01-01 RX ADMIN — METHYLPREDNISOLONE SODIUM SUCCINATE 40 MG: 125 INJECTION, POWDER, LYOPHILIZED, FOR SOLUTION INTRAMUSCULAR; INTRAVENOUS at 22:24

## 2025-01-01 RX ADMIN — APIXABAN 5 MG: 5 TABLET, FILM COATED ORAL at 08:31

## 2025-01-01 RX ADMIN — LEVOTHYROXINE SODIUM 100 MCG: 0.05 TABLET ORAL at 05:49

## 2025-01-01 RX ADMIN — FUROSEMIDE 60 MG: 10 INJECTION, SOLUTION INTRAMUSCULAR; INTRAVENOUS at 10:58

## 2025-01-01 RX ADMIN — APIXABAN 5 MG: 5 TABLET, FILM COATED ORAL at 22:32

## 2025-01-01 RX ADMIN — APIXABAN 5 MG: 5 TABLET, FILM COATED ORAL at 08:25

## 2025-01-01 RX ADMIN — MIDODRINE HYDROCHLORIDE 15 MG: 5 TABLET ORAL at 12:08

## 2025-01-01 RX ADMIN — BUDESONIDE 0.5 MG: 0.5 SUSPENSION RESPIRATORY (INHALATION) at 07:52

## 2025-01-01 RX ADMIN — INSULIN LISPRO 4 UNITS: 100 INJECTION, SOLUTION INTRAVENOUS; SUBCUTANEOUS at 07:27

## 2025-01-01 RX ADMIN — BUDESONIDE 0.5 MG: 0.5 SUSPENSION RESPIRATORY (INHALATION) at 20:49

## 2025-01-01 RX ADMIN — BUDESONIDE 0.5 MG: 0.5 SUSPENSION RESPIRATORY (INHALATION) at 09:14

## 2025-01-01 RX ADMIN — MIDODRINE HYDROCHLORIDE 10 MG: 10 TABLET ORAL at 12:25

## 2025-01-01 RX ADMIN — SACUBITRIL AND VALSARTAN 1 TABLET: 24; 26 TABLET, FILM COATED ORAL at 09:14

## 2025-01-01 RX ADMIN — SPIRONOLACTONE 25 MG: 25 TABLET ORAL at 09:14

## 2025-01-01 RX ADMIN — MORPHINE SULFATE 10 MG: 100 SOLUTION ORAL at 09:16

## 2025-01-01 RX ADMIN — POTASSIUM CHLORIDE 10 MEQ: 7.46 INJECTION, SOLUTION INTRAVENOUS at 09:40

## 2025-01-01 RX ADMIN — PANTOPRAZOLE SODIUM 40 MG: 40 TABLET, DELAYED RELEASE ORAL at 06:01

## 2025-01-01 RX ADMIN — INSULIN LISPRO 4 UNITS: 100 INJECTION, SOLUTION INTRAVENOUS; SUBCUTANEOUS at 12:52

## 2025-01-01 RX ADMIN — Medication 400 MG: at 08:40

## 2025-01-01 RX ADMIN — Medication 10 ML: at 22:02

## 2025-01-01 RX ADMIN — BUDESONIDE 0.5 MG: 0.5 SUSPENSION RESPIRATORY (INHALATION) at 19:32

## 2025-01-01 RX ADMIN — WHITE PETROLATUM 1 APPLICATION: 1.75 OINTMENT TOPICAL at 09:55

## 2025-01-01 RX ADMIN — PANTOPRAZOLE SODIUM 40 MG: 40 TABLET, DELAYED RELEASE ORAL at 09:14

## 2025-01-01 RX ADMIN — AMIODARONE HYDROCHLORIDE 200 MG: 200 TABLET ORAL at 08:29

## 2025-01-01 RX ADMIN — APIXABAN 5 MG: 5 TABLET, FILM COATED ORAL at 22:23

## 2025-01-01 RX ADMIN — FUROSEMIDE 40 MG: 10 INJECTION, SOLUTION INTRAMUSCULAR; INTRAVENOUS at 14:46

## 2025-01-01 RX ADMIN — EMPAGLIFLOZIN 10 MG: 10 TABLET, FILM COATED ORAL at 08:13

## 2025-01-01 RX ADMIN — METHYLPREDNISOLONE SODIUM SUCCINATE 60 MG: 125 INJECTION, POWDER, LYOPHILIZED, FOR SOLUTION INTRAMUSCULAR; INTRAVENOUS at 17:23

## 2025-01-01 RX ADMIN — BUDESONIDE 0.5 MG: 0.5 SUSPENSION RESPIRATORY (INHALATION) at 18:39

## 2025-01-01 RX ADMIN — MIDODRINE HYDROCHLORIDE 10 MG: 10 TABLET ORAL at 08:31

## 2025-01-01 RX ADMIN — SACUBITRIL AND VALSARTAN 1 TABLET: 24; 26 TABLET, FILM COATED ORAL at 21:34

## 2025-01-01 RX ADMIN — Medication 12.5 MG: at 08:51

## 2025-01-01 RX ADMIN — Medication 10 ML: at 19:53

## 2025-01-01 RX ADMIN — Medication 2000 UNITS: at 08:50

## 2025-01-01 RX ADMIN — ARFORMOTEROL TARTRATE 15 MCG: 15 SOLUTION RESPIRATORY (INHALATION) at 20:11

## 2025-01-01 RX ADMIN — Medication 2000 UNITS: at 08:12

## 2025-01-01 RX ADMIN — BUDESONIDE 0.5 MG: 0.5 SUSPENSION RESPIRATORY (INHALATION) at 07:35

## 2025-01-01 RX ADMIN — SACUBITRIL AND VALSARTAN 1 TABLET: 24; 26 TABLET, FILM COATED ORAL at 08:43

## 2025-01-01 RX ADMIN — BUDESONIDE 0.5 MG: 0.5 SUSPENSION RESPIRATORY (INHALATION) at 18:41

## 2025-01-01 RX ADMIN — MIDODRINE HYDROCHLORIDE 15 MG: 5 TABLET ORAL at 17:09

## 2025-01-01 RX ADMIN — INSULIN LISPRO 3 UNITS: 100 INJECTION, SOLUTION INTRAVENOUS; SUBCUTANEOUS at 00:16

## 2025-01-01 RX ADMIN — PIPERACILLIN AND TAZOBACTAM 3.38 G: 3; .375 INJECTION, POWDER, FOR SOLUTION INTRAVENOUS at 15:14

## 2025-01-01 RX ADMIN — BISACODYL 10 MG: 10 SUPPOSITORY RECTAL at 09:55

## 2025-01-01 RX ADMIN — MORPHINE SULFATE 2 MG: 2 INJECTION, SOLUTION INTRAMUSCULAR; INTRAVENOUS at 23:15

## 2025-01-01 RX ADMIN — AMIODARONE HYDROCHLORIDE 200 MG: 200 TABLET ORAL at 08:42

## 2025-01-01 RX ADMIN — SPIRONOLACTONE 25 MG: 25 TABLET ORAL at 08:16

## 2025-01-01 RX ADMIN — APIXABAN 5 MG: 5 TABLET, FILM COATED ORAL at 22:11

## 2025-01-01 RX ADMIN — Medication 10 ML: at 21:22

## 2025-01-01 RX ADMIN — ARFORMOTEROL TARTRATE 15 MCG: 15 SOLUTION RESPIRATORY (INHALATION) at 07:35

## 2025-01-01 RX ADMIN — MIDODRINE HYDROCHLORIDE 15 MG: 5 TABLET ORAL at 12:04

## 2025-01-01 RX ADMIN — SODIUM CHLORIDE 2000 MG: 9 INJECTION INTRAMUSCULAR; INTRAVENOUS; SUBCUTANEOUS at 08:29

## 2025-01-01 RX ADMIN — Medication 10 ML: at 08:35

## 2025-01-01 RX ADMIN — INSULIN LISPRO 3 UNITS: 100 INJECTION, SOLUTION INTRAVENOUS; SUBCUTANEOUS at 12:57

## 2025-01-01 RX ADMIN — FUROSEMIDE 40 MG: 10 INJECTION, SOLUTION INTRAMUSCULAR; INTRAVENOUS at 00:46

## 2025-01-01 RX ADMIN — FLUCONAZOLE 200 MG: 2 INJECTION, SOLUTION INTRAVENOUS at 14:35

## 2025-01-01 RX ADMIN — Medication 2000 UNITS: at 08:25

## 2025-01-01 RX ADMIN — METHYLPREDNISOLONE SODIUM SUCCINATE 40 MG: 125 INJECTION, POWDER, LYOPHILIZED, FOR SOLUTION INTRAMUSCULAR; INTRAVENOUS at 17:30

## 2025-01-01 RX ADMIN — ARFORMOTEROL TARTRATE 15 MCG: 15 SOLUTION RESPIRATORY (INHALATION) at 06:52

## 2025-01-01 RX ADMIN — Medication 4 ML: at 18:32

## 2025-01-01 RX ADMIN — Medication 12.5 MG: at 08:57

## 2025-01-01 RX ADMIN — SENNOSIDES AND DOCUSATE SODIUM 2 TABLET: 50; 8.6 TABLET ORAL at 19:53

## 2025-01-01 RX ADMIN — WHITE PETROLATUM 1 APPLICATION: 1.75 OINTMENT TOPICAL at 08:41

## 2025-01-01 RX ADMIN — APIXABAN 5 MG: 5 TABLET, FILM COATED ORAL at 21:53

## 2025-01-01 RX ADMIN — HALOPERIDOL LACTATE 1 MG: 5 INJECTION, SOLUTION INTRAMUSCULAR at 09:16

## 2025-01-01 RX ADMIN — ARFORMOTEROL TARTRATE 15 MCG: 15 SOLUTION RESPIRATORY (INHALATION) at 18:24

## 2025-01-01 RX ADMIN — APIXABAN 5 MG: 5 TABLET, FILM COATED ORAL at 22:53

## 2025-01-01 RX ADMIN — APIXABAN 5 MG: 5 TABLET, FILM COATED ORAL at 19:53

## 2025-01-01 RX ADMIN — PREDNISONE 40 MG: 20 TABLET ORAL at 08:43

## 2025-01-01 RX ADMIN — POTASSIUM CHLORIDE 10 MEQ: 750 CAPSULE, EXTENDED RELEASE ORAL at 12:08

## 2025-01-01 RX ADMIN — HALOPERIDOL LACTATE 1 MG: 5 INJECTION, SOLUTION INTRAMUSCULAR at 12:11

## 2025-01-01 RX ADMIN — Medication 10 ML: at 09:17

## 2025-01-01 RX ADMIN — POTASSIUM CHLORIDE 40 MEQ: 1500 TABLET, EXTENDED RELEASE ORAL at 12:02

## 2025-01-01 RX ADMIN — EMPAGLIFLOZIN 10 MG: 10 TABLET, FILM COATED ORAL at 08:17

## 2025-03-03 RX ORDER — ALBUTEROL SULFATE 90 UG/1
INHALANT RESPIRATORY (INHALATION)
Qty: 54 G | Refills: 3 | Status: SHIPPED | OUTPATIENT
Start: 2025-03-03

## 2025-04-08 ENCOUNTER — OFFICE VISIT (OUTPATIENT)
Dept: PULMONOLOGY | Facility: CLINIC | Age: 75
End: 2025-04-08
Payer: MEDICARE

## 2025-04-08 VITALS
WEIGHT: 224 LBS | RESPIRATION RATE: 18 BRPM | DIASTOLIC BLOOD PRESSURE: 92 MMHG | SYSTOLIC BLOOD PRESSURE: 132 MMHG | TEMPERATURE: 97.7 F | HEIGHT: 78 IN | HEART RATE: 79 BPM | BODY MASS INDEX: 25.92 KG/M2 | OXYGEN SATURATION: 97 %

## 2025-04-08 DIAGNOSIS — J41.8 MIXED SIMPLE AND MUCOPURULENT CHRONIC BRONCHITIS: Primary | ICD-10-CM

## 2025-04-08 DIAGNOSIS — J84.10 PULMONARY FIBROSIS: ICD-10-CM

## 2025-04-08 RX ORDER — GLIMEPIRIDE 1 MG/1
TABLET ORAL
COMMUNITY
Start: 2025-02-27

## 2025-04-08 NOTE — PROGRESS NOTES
Pulmonary Office Follow-up    Subjective     Roger D Snellen is seen today at the office for   Chief Complaint   Patient presents with    Follow-up     4 month    COPD         HPI  Roger D Snellen is a 75 y.o. male with a PMH significant for COPD and pulmonary fibrosis presents for follow-up patient has been doing well he just gets dyspneic on heavy exertion but denies any significant cough or expectoration at this time      Tobacco use history:  Former smoker      Patient Active Problem List   Diagnosis    Non-ischemic cardiomyopathy       Review of Systems  Review of Systems   Respiratory:  Positive for shortness of breath.    All other systems reviewed and are negative.    As described in the HPI. Otherwise, remainder of ROS (14 systems) were negative.    Medications, Allergies, Social, and Family Histories reviewed as per EMR.    Result Review :            Objective     Vitals:    04/08/25 1353   BP: 132/92   Pulse: 79   Resp: 18   Temp: 97.7 °F (36.5 °C)   SpO2: 97%         04/08/25  1353   Weight: 102 kg (224 lb)       Physical Exam  Vitals and nursing note reviewed.   Constitutional:       Appearance: Normal appearance.   HENT:      Head: Normocephalic and atraumatic.      Nose: Nose normal.      Mouth/Throat:      Pharynx: Oropharynx is clear.   Eyes:      Extraocular Movements: Extraocular movements intact.      Conjunctiva/sclera: Conjunctivae normal.      Pupils: Pupils are equal, round, and reactive to light.   Cardiovascular:      Rate and Rhythm: Normal rate and regular rhythm.      Pulses: Normal pulses.      Heart sounds: Normal heart sounds.   Pulmonary:      Effort: Pulmonary effort is normal.      Breath sounds: Rhonchi and rales present.   Musculoskeletal:         General: Normal range of motion.      Cervical back: Normal range of motion and neck supple.   Skin:     General: Skin is warm.      Capillary Refill: Capillary refill takes 2 to 3 seconds.   Neurological:      Mental Status: He is  alert and oriented to person, place, and time.   Psychiatric:         Mood and Affect: Mood normal.         Behavior: Behavior normal.         No radiology results for the last 90 days.     Assessment & Plan     Diagnoses and all orders for this visit:    1. Mixed simple and mucopurulent chronic bronchitis (Primary)  -     Complete PFT - Pre & Post Bronchodilator; Future    2. Pulmonary fibrosis  -     Complete PFT - Pre & Post Bronchodilator; Future         Discussion/ Recommendations:   Patient is advised to continue his Trelegy daily  Advised regular exercise  Continue Lasix and Aldactone  T continue Eliquis and digoxin for atrial fibrillation and CHF  Will order PFTs repeat  Vaccinations discussed and recommended             Return in about 4 months (around 8/8/2025).          This document has been electronically signed by Danny Galvan MD on April 8, 2025 14:14 EDT

## 2025-05-05 PROBLEM — J96.01 ACUTE HYPOXIC RESPIRATORY FAILURE: Status: ACTIVE | Noted: 2025-01-01

## 2025-05-05 NOTE — CONSULTS
[facility]   Consult Note    Patient Name: Roger D Snellen  : 1950  MRN: [id number]  Primary Care Physician: John Phelps Jr., MD  Referring Physician: No ref. provider found  Date of admission: 2025    Subjective   Subjective     Reason for Consult: Congestive heart failure    HPI:  Roger D Snellen is a 75 y.o. male with history of underlying cardiomyopathy according to the wife not ischemic, patient also has history of DVT, atrial fibrillation, hypertension, hyperlipidemia, diabetes, patient had an AICD pacemaker apparently replaced at [facility], patient came to the emergency room short of breath, hypoxia, patient also has underlying COPD stop smoking in , chest x-ray showed congestive failure also the BNP was elevated.  Currently he feels better patient has diuresed some, reported no chest pain.  Patient has history also of pulmonary fibrosis, fortunately his kidneys are working well, patient troponin was borderline elevated but flat, blood sugars also elevated, ABG showed respiratory alkalosis.  Chest x-ray showed pulm infiltrates may represent congestive failure versus pneumonia.  EKG showed paced rhythm and  Is not diagnostic.     Review of Systems  Review of Systems   Constitutional:  Positive for fatigue.   HENT: Negative.     Eyes: Negative.    Respiratory:  Positive for shortness of breath.    Cardiovascular:  Negative for chest pain and palpitations.   Gastrointestinal: Negative.    Genitourinary: Negative.    Musculoskeletal:  Positive for joint swelling.   Skin: Negative.    Neurological: Negative.    Psychiatric/Behavioral: Negative.         Personal History     Past Medical History:   Diagnosis Date    Arrhythmia     ATRIAL FIB, PACE TERMINATED VT    Atrial fibrillation     BBB (bundle branch block)     L BBB    CHF (congestive heart failure)     Diabetes mellitus     Disease of thyroid gland     GERD (gastroesophageal reflux disease)     Hyperlipidemia     Hypertension      Non-ischemic cardiomyopathy     DILATED CM EF 20-25% 11/2017    Osteoarthritis        Past Surgical History:   Procedure Laterality Date    CARDIAC ELECTROPHYSIOLOGY PROCEDURE N/A 11/20/2017    Procedure: Device Upgrade  ICD TO A BIV ICD   medtronic;  Surgeon: Chris Phillips MD;  Location: [facility] INVASIVE LOCATION;  Service:     CHOLECYSTECTOMY      EYE SURGERY Left 02/2025    Retina Repair    INSERT / REPLACE / REMOVE PACEMAKER      INTERNAL CARDIAC DEFIBRILLATOR INSERTION      MEDTRONIC    SHOULDER ARTHROSCOPY      TOTAL KNEE ARTHROPLASTY         Family History: family history is not on file. Otherwise pertinent FHx was reviewed and not pertinent to current issue.    Social History:  reports that he quit smoking about 44 years ago. His smoking use included cigarettes. He started smoking about 56 years ago. He has a 7.5 pack-year smoking history. He has been exposed to tobacco smoke. He has never used smokeless tobacco. He reports that he does not drink alcohol and does not use drugs.    Home Medications:  DAPTOmycin, Fluticasone-Umeclidin-Vilant, HYDROcodone-acetaminophen, Insulin Lispro, Vitamin D3, albuterol sulfate HFA, amiodarone, apixaban, cefTRIAXone Sodium, digoxin, empagliflozin, levothyroxine, magnesium oxide, metFORMIN, metoprolol succinate XL, omeprazole, pantoprazole, polyethylene glycol, and spironolactone    Hospital Medications:    Current Facility-Administered Medications:     arformoterol (BROVANA) nebulizer solution 15 mcg, 15 mcg, Nebulization, BID - RT, Wilmer Maldonado MD    sennosides-docusate (PERICOLACE) 8.6-50 MG per tablet 2 tablet, 2 tablet, Oral, BID PRN **AND** polyethylene glycol (MIRALAX) packet 17 g, 17 g, Oral, Daily PRN **AND** bisacodyl (DULCOLAX) EC tablet 5 mg, 5 mg, Oral, Daily PRN **AND** bisacodyl (DULCOLAX) suppository 10 mg, 10 mg, Rectal, Daily PRN, Wilmer Maldonado MD    budesonide (PULMICORT) nebulizer solution 0.5 mg, 0.5 mg, Nebulization, BID - RT, Wilmer Maldonado  MD    calcium carbonate (TUMS) chewable tablet 500 mg (200 mg elemental), 1 tablet, Oral, BID PRN, Wilmer Maldonado MD    cefepime 2000 mg IVPB in 100 mL NS (VTB), 2,000 mg, Intravenous, Q8H, Wilmer Maldonado MD    dextrose (D50W) (25 g/50 mL) IV injection 25 g, 25 g, Intravenous, Q15 Min PRN, Wilmer Maldonado MD    dextrose (GLUTOSE) oral gel 15 g, 15 g, Oral, Q15 Min PRN, Wilmer Maldonado MD    famotidine (PEPCID) injection 20 mg, 20 mg, Intravenous, Q12H, Naomy Lucia PA    glucagon (GLUCAGEN) injection 1 mg, 1 mg, Intramuscular, Q15 Min PRN, Wilmer Maldonado MD    heparin (porcine) 5000 UNIT/ML injection 5,000 Units, 5,000 Units, Subcutaneous, Q8H, Naomy Lucia PA    insulin regular (humuLIN R,novoLIN R) injection 2-9 Units, 2-9 Units, Subcutaneous, Q6H, Wilmer Maldonado MD    methylPREDNISolone sodium succinate (SOLU-Medrol) 60 mg in sterile water (preservative free) 0.96 mL, 60 mg, Intravenous, Q8H, Wilmer Maldonado MD    nitroglycerin (NITROSTAT) SL tablet 0.4 mg, 0.4 mg, Sublingual, Q5 Min PRN, Wilmer Maldonado MD    ondansetron ODT (ZOFRAN-ODT) disintegrating tablet 4 mg, 4 mg, Oral, Q6H PRN, Wilmer Maldonado MD    Pharmacy to dose vancomycin, , Not Applicable, Continuous PRN, Wilmer Maldonado MD    sodium chloride 0.9 % flush 10 mL, 10 mL, Intravenous, PRN, Manny Blair MD    sodium chloride 0.9 % flush 10 mL, 10 mL, Intravenous, Q12H, Wilmer Maldonado MD    sodium chloride 0.9 % flush 10 mL, 10 mL, Intravenous, PRN, Wilmer Maldonado MD    sodium chloride 0.9 % infusion 40 mL, 40 mL, Intravenous, PRN, Wilmer Maldonado MD    [START ON 5/6/2025] vancomycin 1250 mg/250 mL 0.9% NS IVPB ([facility]), 1,250 mg, Intravenous, Q12H, Wilmer Maldonado MD    Allergies:  Allergies   Allergen Reactions    Celecoxib Itching    Lisinopril Cough    Rofecoxib Unknown - Low Severity    Valdecoxib Unknown - Low Severity    Xarelto [Rivaroxaban]        Objective    Objective   Vitals:  Temp:  [98 °F (36.7 °C)-98.2 °F (36.8 °C)] 98 °F (36.7 °C)  Heart Rate:  [70]  70  Resp:  [28-33] 29  BP: (113-124)/(55-72) 116/64  Flow (L/min) (Oxygen Therapy):  [6-55] 55    Physical Exam:  Physical Exam  Constitutional:       Appearance: He is obese.   HENT:      Head: Normocephalic.      Nose: Nose normal.   Eyes:      Extraocular Movements: Extraocular movements intact.   Cardiovascular:      Rate and Rhythm: Regular rhythm.      Heart sounds: Murmur heard.   Pulmonary:      Breath sounds: No rales.   Abdominal:      Palpations: Abdomen is soft.   Musculoskeletal:      Right lower leg: Edema present.      Left lower leg: Edema present.   Skin:     General: Skin is warm.   Neurological:      General: No focal deficit present.      Mental Status: He is alert.   Psychiatric:      Comments: Difficult to evaluate           Result Review    Result Review:  I have personally reviewed the results from the time of this admission to 05/05/25 3:26 PM EDT and agree with these findings:  [x]  Laboratory  []  Microbiology  []  Radiology  [x]  EKG/Telemetry   []  Cardiology/Vascular   []  Pathology  []  Old records  []  Other:    Most notable findings include: 75-year-old gentleman with history of underlying cardiomyopathy, acute on chronic systolic congestive failure, pneumonia is also a consideration admitted because of hypoxia and respiratory failure currently patient is better and diuresing well.    Assessment & Plan   Assessment / Plan     Active Hospital Problems:  Active Hospital Problems    Diagnosis     **Acute hypoxic respiratory failure        Plan:   I have discussed with the wife multiple medications that can be used for congestive failure and will review that, now continue to diurese as per intensivist and internal medicine will follow him closely patient with you thank you.    Electronically signed by Juliocesar Giang MD, 05/05/25, 3:26 PM EDT.

## 2025-05-05 NOTE — H&P
[facility]  [facility]   Patient Name: Roger D Snellen  : 1950  MRN: [id number]  Primary Care Physician:  John Phelps Jr., MD  Date of admission: 2025    Subjective   Subjective     Chief Complaint:   Shortness of breath    HPI:    Roger D Snellen is a 75 y.o. male A-fib on anticoagulation, heart failure with preserved ejection fraction, diabetes, COPD, hypothyroidism, GERD, hyperlipidemia, hypertension.  Patient most recently discharged from [facility], -.  Patient presented with complaints of left foot pain swelling and erythema.  Met criteria for severe sepsis due to infected left foot.  Also with issues of right knee pain.  Concern for septic right knee arthrocentesis performed, moderate amount of WBCs, few GPC's on cell count and Gram stain, cultures no growth.  Patient discharged on IV antibiotics 500 mg IV daptomycin, 1 g IV ceftriaxone daily with a stop date of 5/15/2025.  Patient was seen by podiatry for left foot wound, did have bedside debridement performed.  Patient was discharged to [facility] rehab.  Since being at [facility] patient endorses slowly worsening shortness of breath.  On morning of presentation, 2025, patient states he felt smothered.  Denies any associated fever chills or night sweats.  Patient presented to the emergency department hypoxic 81% on room air.  Patient with a heart rate of 70, respiratory rate of 28 and a blood pressure 122/72 initially in the emergency department.  Quickly titrated up on high flow nasal cannula eventually transitioned over to BiPAP, however due to intolerance patient transition to Airvo 55 L 60% FiO2 to maintain O2 saturations.  Patient's initial ABG shows a pH of 7.5, PCO2 of 27.5, PO2 of 65.8.  proBNP elevated at 6386.  Troponin 32 then 31, no complaints of chest pain.  On labs patient with a bicarb of 19.4, anion gap of 15.6, creatinine 0.73.  Patient has AST ALT elevations of 114/96  respectively.  Procalcitonin elevated 0.41.  Lactate negative x 2.  White count of 12.8, hemoglobin 11.3 and platelets of 333.  Chest x-ray showed extensive new opacities in right lung and possible new opacities in left perihilar and left basilar region representing multifocal pneumonia or edema.  Patient has a right arm PICC line in place.  CT scan with contrast obtained to rule out pulmonary embolus.  No evidence of PE.  Cardiomegaly with reflux of contrast into the hepatic veins and IVC, seen in right heart failure.  Patchy groundglass opacities throughout both lungs with smooth interlobular septal thickening and trace bilateral pleural effusions.  Representing pulmonary edema over bilateral pneumonia.  Given patient's work of breathing and oxygen demand, admitted to the ICU.      Personal History     Past Medical History:  Past Medical History:   Diagnosis Date    Arrhythmia     ATRIAL FIB, PACE TERMINATED VT    Atrial fibrillation     BBB (bundle branch block)     L BBB    CHF (congestive heart failure)     Diabetes mellitus     Disease of thyroid gland     GERD (gastroesophageal reflux disease)     Hyperlipidemia     Hypertension     Non-ischemic cardiomyopathy     DILATED CM EF 20-25% 11/2017    Osteoarthritis        Past Surgical History:  Past Surgical History:   Procedure Laterality Date    CARDIAC ELECTROPHYSIOLOGY PROCEDURE N/A 11/20/2017    Procedure: Device Upgrade  ICD TO A BIV ICD   medtronic;  Surgeon: Chris Phillips MD;  Location: [facility] INVASIVE LOCATION;  Service:     CHOLECYSTECTOMY      EYE SURGERY Left 02/2025    Retina Repair    INSERT / REPLACE / REMOVE PACEMAKER      INTERNAL CARDIAC DEFIBRILLATOR INSERTION      MEDTRONIC    SHOULDER ARTHROSCOPY      TOTAL KNEE ARTHROPLASTY         Family History:   History reviewed. No pertinent family history.    Social History:   Social History     Socioeconomic History    Marital status:    Tobacco Use    Smoking status: Former      Current packs/day: 0.00     Average packs/day: 0.5 packs/day for 15.0 years (7.5 ttl pk-yrs)     Types: Cigarettes     Start date:      Quit date:      Years since quittin.3     Passive exposure: Past    Smokeless tobacco: Never    Tobacco comments:     QUIT / QUIT SMOKING CIGARETTES 1980s   Vaping Use    Vaping status: Never Used   Substance and Sexual Activity    Alcohol use: No    Drug use: No    Sexual activity: Defer       Home Medications:  DAPTOmycin, Fluticasone-Umeclidin-Vilant, HYDROcodone-acetaminophen, Insulin Lispro, Vitamin D3, albuterol sulfate HFA, amiodarone, apixaban, cefTRIAXone Sodium, digoxin, empagliflozin, levothyroxine, magnesium oxide, metFORMIN, metoprolol succinate XL, omeprazole, pantoprazole, polyethylene glycol, and spironolactone    Allergies:  Allergies   Allergen Reactions    Celecoxib Itching    Lisinopril Cough    Rofecoxib Unknown - Low Severity    Valdecoxib Unknown - Low Severity    Xarelto [Rivaroxaban]        Objective   Objective     Vitals:   Temp:  [98 °F (36.7 °C)-98.2 °F (36.8 °C)] 98 °F (36.7 °C)  Heart Rate:  [70-74] 70  Resp:  [22-33] 22  BP: (101-124)/(55-97) 113/97  Flow (L/min) (Oxygen Therapy):  [6-55] 55    Physical Exam    Constitutional: Awake, alert, increased work of breathing, mild respiratory distress   Eyes: Pupils equal, sclerae anicteric, no conjunctival injection   HENT: NCAT, mucous membranes moist   Neck: Supple, no thyromegaly, no lymphadenopathy, trachea midline   Respiratory: Crackles heard to mid scapula, minimal expiratory wheezing, prolonged expiratory phase   Cardiovascular: RRR, no murmurs, rubs, or gallops, palpable pedal pulses bilaterally   Gastrointestinal: Positive bowel sounds, soft, nontender, nondistended   Musculoskeletal: Right knee swollen, good range of motion and no tenderness on palpation   Neurologic: Oriented x 3, strength symmetric in all extremities, Cranial Nerves grossly intact to confrontation, speech  clear   Skin: Wounds to the left foot    Result Review    Result Review:  I have personally reviewed the results from the time of this admission to 5/5/2025 17:36 EDT and agree with these findings:  [x]  Laboratory  [x]  Microbiology  [x]  Radiology  []  EKG/Telemetry   []  Cardiology/Vascular   []  Pathology  [x]  Old records  []  Other:      Assessment & Plan   Assessment / Plan     Assessment/Plan:   Acute hypoxic respiratory failure  Heart failure in acute exacerbation, preserved ejection fraction  COPD in acute exacerbation  Hypothyroidism  GERD  Hyperlipidemia  Hypertension  Recent diagnosis of septic arthritis, right knee, and antibiotics till 5/15/2025  Recent debridement to left foot by podiatry    Plan:  Patient remains admitted to the hospital for further care management  Admitted to the ICU, critical care team consulted, appreciate assistance  Cardiologist consulted in the emergency department, appreciate assistance  Ordering stat echocardiogram  Started with aggressive IV diuresis, addition of metolazone  CT chest obtained negative for pulmonary embolus but does show extensive bilateral groundglass opacities concerning for infectious versus pulmonary edema  Started on broad-spectrum antibiotics  Patient was on antibiotics as an outpatient for treatment of right knee septic arthritis, no organisms grown per DC summary, was to complete antibiotics on 5/15/2025  Wound care consulted, known wound to left foot  Continuing p.o. given patient's current oxygenation needs  Started on IV steroids, scheduled and as needed breathing treatments  Continues on Airvo at this time, will continue to work on trying to use BiPAP, patient hesitant due to discomfort  Continue home Eliquis, await full medication reconciliation.  Patient appears to be on digoxin and amiodarone  Ordering dig level now    Discussed with intensivist, discussed with ED physician    VTE Prophylaxis:  Pharmacologic VTE prophylaxis orders are  present.        CODE STATUS:    Code Status (Patient has no pulse and is not breathing): No CPR (Do Not Attempt to Resuscitate)  Medical Interventions (Patient has pulse or is breathing): Limited Support  Medical Intervention Limits: No intubation (DNI)      Admission Status:  I believe this patient meets inpatient status.    Electronically signed by Wilmer Maldonado MD, 05/05/25, 1:57 PM EDT.

## 2025-05-05 NOTE — CONSULTS
Pulmonary / Critical Care Consult Note      Patient Name: Roger D Snellen  : 1950  MRN: [id number]  Primary Care Physician:  John Phelps Jr., MD  Referring Physician: No ref. provider found  Date of admission: 2025    Subjective   Subjective     Reason for Consult/ Chief Complaint: shortness of breath    HPI:  Roger D Snellen is a 75 y.o. male past medical history of diabetes, COPD, hx of DVT on Eliquis, and septic arthritis of left lower extremity currently getting treatment while at rehab facility, presented to the emergency room for shortness of breath that has occurred over the past couple days.  Patient denies any fever or chills.  While in the emergency room patient was with O2 sat of 80 to 84% on room air.  Patient does not wear oxygen at home.  ER workup revealed elevated BNP at 6300 elevated WBC at 12.8 abnormal ABG with pH of 7.5 with a bicarb chemistry chest x-ray with extensive new opacities in the right lung and possible new opacities on left.  Patient was admitted to CCU for evaluation and treatment.           Personal History     Past Medical History:   Diagnosis Date    Arrhythmia     ATRIAL FIB, PACE TERMINATED VT    Atrial fibrillation     BBB (bundle branch block)     L BBB    CHF (congestive heart failure)     Diabetes mellitus     Disease of thyroid gland     GERD (gastroesophageal reflux disease)     Hyperlipidemia     Hypertension     Non-ischemic cardiomyopathy     DILATED CM EF 20-25% 2017    Osteoarthritis        Past Surgical History:   Procedure Laterality Date    CARDIAC ELECTROPHYSIOLOGY PROCEDURE N/A 2017    Procedure: Device Upgrade  ICD TO A BIV ICD   medtronic;  Surgeon: Chris Phillips MD;  Location: [facility] INVASIVE LOCATION;  Service:     CHOLECYSTECTOMY      EYE SURGERY Left 2025    Retina Repair    INSERT / REPLACE / REMOVE PACEMAKER      INTERNAL CARDIAC DEFIBRILLATOR INSERTION      MEDTRONIC    SHOULDER ARTHROSCOPY      TOTAL KNEE ARTHROPLASTY          Family History: family history is not on file. Otherwise pertinent FHx was reviewed and not pertinent to current issue.    Social History:  reports that he quit smoking about 44 years ago. His smoking use included cigarettes. He started smoking about 56 years ago. He has a 7.5 pack-year smoking history. He has been exposed to tobacco smoke. He has never used smokeless tobacco. He reports that he does not drink alcohol and does not use drugs.    Home Medications:  DAPTOmycin, Fluticasone-Umeclidin-Vilant, HYDROcodone-acetaminophen, Insulin Lispro, Vitamin D3, albuterol sulfate HFA, amiodarone, apixaban, cefTRIAXone Sodium, digoxin, empagliflozin, levothyroxine, magnesium oxide, metFORMIN, metoprolol succinate XL, omeprazole, pantoprazole, polyethylene glycol, and spironolactone    Allergies:  Allergies   Allergen Reactions    Celecoxib Itching    Lisinopril Cough    Rofecoxib Unknown - Low Severity    Valdecoxib Unknown - Low Severity    Xarelto [Rivaroxaban]        Objective    Objective     Vitals:   Temp:  [98 °F (36.7 °C)-98.2 °F (36.8 °C)] 98 °F (36.7 °C)  Heart Rate:  [70] 70  Resp:  [28-33] 29  BP: (113-124)/(55-72) 116/64  Flow (L/min) (Oxygen Therapy):  [6-55] 55    Physical Exam:  Vital Signs Reviewed   WDWN, Alert, NAD.    HEENT:  PERRL, EOMI.  OP, nares clear, MMM  Chest:  good aeration, clear to auscultation bilaterally, no work of breathing noted  CV: RRR, no MGR, pulses 2+, equal.  Abd:  Soft, NT, ND, + BS, no HSM  EXT:  no clubbing, no cyanosis, no edema, no joint tenderness  Neuro:  A&Ox3, CN grossly intact, no focal deficits.  Skin: No rashes or lesions noted      Result Review    Result Review:  I have personally reviewed the results from the time of this admission to 5/5/2025 14:42 EDT and agree with these findings:  []  Laboratory  []  Microbiology  []  Radiology  []  EKG/Telemetry   []  Cardiology/Vascular   []  Pathology  []  Old records   []  Other:  Most notable findings include:        Lab 05/05/25  0933 05/05/25  0845   WBC  --  12.83*   HEMOGLOBIN  --  11.3*   HEMATOCRIT  --  34.6*   PLATELETS  --  333   SODIUM 131*  --    POTASSIUM 4.3  --    CHLORIDE 96*  --    CO2 19.4*  --    BUN 17  --    CREATININE 0.73*  --    GLUCOSE 115*  --    CALCIUM 9.0  --    TOTAL PROTEIN 7.7  --    ALBUMIN 2.6*  --    GLOBULIN 5.1  --          Assessment & Plan   Assessment / Plan     Active Hospital Problems:  Active Hospital Problems    Diagnosis     **Acute hypoxic respiratory failure        Impression:    Acute hypoxic respiratory failure  Multifocal pneumonia from unknown organism  Acute cardiogenic pulmonary edema  Volume overload      Plan:    Currently on Airvo 55 L / 60% FiO2.  Maintain SpO2 greater than 90%  BiPAP ordered if needed  Chest CT personally reviewed.  Bilateral groundglass opacities noted concerning for infectious process versus pulm edema  Elevated proBNP; will order 2D echo  Continue nebs and bronchopulmonary hygiene  Continue Solu-Medrol  Continue broad-spectrum antibiotics with cefepime and vancomycin  Ideally would like to [facility] patient but currently on Airvo  Check respiratory viral panel, strep pneumo, Legionella  Continue with diuretics as tolerated.  Lasix 60 mg IV x 1 given  Bowel regimen on board  N.p.o. for now  Trend renal function and electrolytes to keep potassium 4, mag 2, Phos 4  Add heparin for DVT prophylaxis  Add H2 blocker for GI prophylaxis    INaomy PA spent 12 minutes in accordance with split shared billing.  Electronically signed by EULA Carlson, 05/05/25, 2:42 PM EDT.    Patient is critically ill with acute hypoxic respiratory failure requiring Airvo/NIPPV. IDr. Amrik, spent 23 minutes of critical care time. Total Critical Care time spent: 35 minutes. This included personally reviewing all pertinent labs, imaging, microbiology and documentation. Also discussing the case with the patient and any available family, the admitting physician and any  available ancillary staff.   Electronically signed by Amrik Garay MD, 05/05/25, 3:00 PM EDT.

## 2025-05-05 NOTE — THERAPY EVALUATION
Respiratory Therapist Broncho-Pulmonary Hygiene Progress Note      Patient Name:  Roger D Snellen  YOB: 1950    Roger D Snellen meets the qualification for Level 2 of the Bronco-Pulmonary Hygiene Protocol. This was based on my daily patient assessment and includes review of chest x-ray results, cough ability and quality, oxygenation, secretions or risk for secretion development and patient mobility.     Broncho-Pulmonary Hygiene Assessment:    Level of Movement: Actively changing positions-requires assistance  Disoriented/Follows Commands    Breath Sounds: Clear to slightly diminished    Cough: Strong, effective    Chest X-Ray: Possible signs of consolidation and/or atelectasis or clear.     Sputum Productions: None or small amount of thin or watery secretions with effective cough    History and Physical: None    SpO2 to Oxygen Need: greater than 90% on  greater than 6L, Vapotherm, oxygen mask greater than 40% or ventilator    Current SpO2 is: 95% on AIRVO    Based on this information, I have completed the following interventions: Aerobika with bronchodialtor medication or TID      Electronically signed by Kristal Bender RRT, 05/05/25, 3:08 PM EDT.

## 2025-05-05 NOTE — ED PROVIDER NOTES
Time: 8:48 AM EDT  Date of encounter:  5/5/2025  Independent Historian/Clinical History and Information was obtained by:   Patient, Nursing Staff, and EMS    History is limited by: N/A    Chief Complaint: Shortness of breath over the past few days      History of Present Illness:  Patient is a 75 y.o. year old diabetic male with history of COPD currently residing at a rehab facility where he is being treated for septic arthritis of the left lower extremity, who presents to the emergency department for evaluation of worsening shortness of breath over the past couple of days.    No fevers or chills or cough with sputum production or recent illness.    No significant chest pain.    Mild wheezing reported.  His oxygen sats were 81 to 84% on room air and he does not routinely wear home oxygen.    He does have previous history of DVT but is anticoagulated on Eliquis twice daily at the rehab facility.      Patient Care Team  Primary Care Provider: John Phelps Jr., MD    Past Medical History:     Allergies   Allergen Reactions    Celecoxib Itching    Lisinopril Cough    Rofecoxib Unknown - Low Severity    Valdecoxib Unknown - Low Severity    Xarelto [Rivaroxaban]      Past Medical History:   Diagnosis Date    Arrhythmia     ATRIAL FIB, PACE TERMINATED VT    Atrial fibrillation     BBB (bundle branch block)     L BBB    CHF (congestive heart failure)     Diabetes mellitus     Disease of thyroid gland     GERD (gastroesophageal reflux disease)     Hyperlipidemia     Hypertension     Non-ischemic cardiomyopathy     DILATED CM EF 20-25% 11/2017    Osteoarthritis      Past Surgical History:   Procedure Laterality Date    CARDIAC ELECTROPHYSIOLOGY PROCEDURE N/A 11/20/2017    Procedure: Device Upgrade  ICD TO A BIV ICD   medtronic;  Surgeon: Chris Phillips MD;  Location: [facility] INVASIVE LOCATION;  Service:     CHOLECYSTECTOMY      EYE SURGERY Left 02/2025    Retina Repair    INSERT / REPLACE / REMOVE PACEMAKER       INTER[facility]      TOTAL KNEE ARTHROPLASTY       History reviewed. No pertinent family history.    Home Medications:  Prior to Admission medications    Medication Sig Start Date End Date Taking? Authorizing Provider   albuterol sulfate  (90 Base) MCG/ACT inhaler USE 2 INHALATIONS EVERY 6 HOURS AS NEEDED FOR SHORTNESS OF AIR OR WHEEZING  Patient not taking: Reported on 4/8/2025 3/3/25   Danny Galvan MD   amiodarone (PACERONE) 200 MG tablet Take 1 tablet by mouth. 5/24/24   Samina Magallon MD   apixaban (ELIQUIS) 5 MG tablet tablet Take 1 tablet by mouth 2 (Two) Times a Day.    Samina Magallon MD   atorvastatin (LIPITOR) 20 MG tablet Take 1 tablet by mouth Daily.    Samina Magallon MD   carvedilol (COREG) 3.125 MG tablet Take 1 tablet by mouth 2 (Two) Times a Day With Meals.  Patient not taking: Reported on 6/3/2024    Samina Magallon MD   cephalexin (KEFLEX) 500 MG capsule Take 1 capsule by mouth 3 (Three) Times a Day.  Patient not taking: Reported on 4/8/2025 5/30/24   Samina Magallon MD   cetirizine (zyrTEC) 10 MG tablet Take 10 mg by mouth Daily.  Patient not taking: Reported on 4/25/2023    Samina Magallon MD   Cholecalciferol (VITAMIN D3) 2000 units tablet Take 1 tablet by mouth Daily.    Samina Magallon MD   cyanocobalamin 1000 MCG/ML injection Inject 0.1 mL into the appropriate muscle as directed by prescriber. 11/4/24   Samina Magallon MD   digoxin (LANOXIN) 125 MCG tablet Take 1 tablet by mouth Every Other Day. 7/29/22   Samina Magallon MD   fluorouracil (EFUDEX) 5 % cream Apply 1 Application topically to the appropriate area as directed.  Patient not taking: Reported on 4/8/2025 9/24/24   Samina Magallon MD   Fluticasone-Umeclidin-Vilant (TRELEGY ELLIPTA) 200-62.5-25 MCG/ACT inhaler Inhale 1 puff Daily. 12/19/24   Danny Galvan MD   furosemide (LASIX) 20 MG tablet Take  1 tablet by mouth Daily.  Patient not taking: Reported on 4/8/2025 5/2/24   Samina Magallon MD   glimepiride (AMARYL) 1 MG tablet 1 (one) time each day before breakfast. 2/27/25   Samina Magallon MD   HYDROcodone-acetaminophen (NORCO) 5-325 MG per tablet Take  by mouth See Admin Instructions. Take 1 tablet by mouth every 4-6 hours as needed for pain  Patient not taking: Reported on 6/3/2024 5/24/24   Samina Magallon MD   ipratropium (ATROVENT) 0.06 % nasal spray  1/14/23   Samina Magallon MD   Jardiance 10 MG tablet tablet Take 1 tablet by mouth Daily. 5/15/24   Samina Magallon MD   levothyroxine (SYNTHROID, LEVOTHROID) 100 MCG tablet Take 1 tablet by mouth Daily. 10/13/24   Samina Magallon MD   levothyroxine (SYNTHROID, LEVOTHROID) 75 MCG tablet Take 1 tablet by mouth Daily.  Patient not taking: Reported on 12/19/2024    Samina Magallon MD   metFORMIN (GLUCOPHAGE) 1000 MG tablet Take 1 tablet by mouth Daily.    Samina Magallon MD   metoprolol succinate XL (TOPROL-XL) 25 MG 24 hr tablet Take 1 tablet by mouth Daily. 11/24/24   Samina Magallon MD   metoprolol tartrate (LOPRESSOR) 25 MG tablet Take 1 tablet by mouth 2 (Two) Times a Day. 5/30/24   Samina Magallon MD   mexiletine (MEXITIL) 150 MG capsule Take 1 capsule by mouth 3 (Three) Times a Day.  Patient not taking: Reported on 4/8/2025 11/8/24   Samina Magallon MD   omeprazole (priLOSEC) 20 MG capsule Take 1 capsule by mouth Daily.  Patient not taking: Reported on 10/25/2023    Samina Magallon MD   omeprazole (priLOSEC) 40 MG capsule  12/8/22   Samina Magallon MD   ondansetron ODT (ZOFRAN-ODT) 8 MG disintegrating tablet  1/9/23   Samina Magallon MD   predniSONE (DELTASONE) 10 MG tablet Take 4 tabs daily x 3 days, then take 3 tabs daily x 3 days, then take 2 tabs daily x 3 days, then take 1 tab daily x 3 days  Patient not taking: Reported on 6/3/2024 2/26/24   Danny Galvan MD  "  promethazine-codeine (PHENERGAN with CODEINE) 6.25-10 MG/5ML syrup  22   Samina Magallon MD   spironolactone (ALDACTONE) 25 MG tablet  23   Samina Maglalon MD   tadalafil (CIALIS) 5 MG tablet Take 1 tablet by mouth.  Patient not taking: Reported on 2025    Samina Magallon MD   triamcinolone (KENALOG) 0.5 % cream Apply 1 Application topically to the appropriate area as directed.  Patient not taking: Reported on 2025   Samina Magallon MD        Social History:   Social History     Tobacco Use    Smoking status: Former     Current packs/day: 0.00     Average packs/day: 0.5 packs/day for 15.0 years (7.5 ttl pk-yrs)     Types: Cigarettes     Start date:      Quit date:      Years since quittin.3     Passive exposure: Past    Smokeless tobacco: Never    Tobacco comments:     QUIT / QUIT SMOKING CIGARETTES    Vaping Use    Vaping status: Never Used   Substance Use Topics    Alcohol use: No    Drug use: No         Review of Systems:  Review of Systems   I performed a 10 point review of systems which was all negative, except for the positives found in the HPI above.  Physical Exam:  /68 (BP Location: Left arm, Patient Position: Lying)   Pulse 70   Temp 98.2 °F (36.8 °C) (Oral)   Resp (!) 30   Ht 198.1 cm (78\")   Wt 109 kg (240 lb 11.9 oz)   SpO2 93%   BMI 27.82 kg/m²     Physical Exam   General: Awake alert and in mild to moderate respiratory distress at rest    HEENT: Head normocephalic atraumatic, eyes PERRLA EOMI, nose normal, oropharynx normal.    Neck: Supple full range of motion, no meningismus, moderate JVD present bilaterally    Heart: Regular rate and rhythm, no murmurs or rubs, 2+ radial pulses bilaterally    Lungs: Mild to moderate respiratory distress with diminished breath sounds and crackles present, mild expiratory wheezes that are scattered.    Abdomen: Soft, nontender, nondistended, no rebound or guarding    Skin: Warm, " dry, no rash    Musculoskeletal: Normal range of motion, no lower extremity edema    Neurologic: Oriented x3, no motor deficits no sensory deficits    Psychiatric: Mood appears stable, no psychosis            Medical Decision Making:      Comorbidities that affect care:    History of septic arthritis, COPD, Diabetes    External Notes reviewed:    Hospital Discharge Summary: I reviewed his recent hospital discharge summary from outside facility last month, where he was admitted with left foot pain and swelling and erythema and found to be septic.  Found to have septic arthritis.      The following orders were placed and all results were independently analyzed by me:  Orders Placed This Encounter   Procedures    COVID-19, FLU A/B, RSV PCR 1 HR TAT - Swab, Nasopharynx    Blood Culture - Blood,    Blood Culture - Blood,    XR Chest 1 View    CT Angiogram Chest Pulmonary Embolism    [facility] Draw    Comprehensive Metabolic Panel    BNP    High Sensitivity Troponin T    CBC Auto Differential    Arterial Blood Gas, Lytes, Lactate    Blood Gas, Arterial -    Lactic Acid, Plasma    High Sensitivity Troponin T 1Hr    Digoxin Level    Procalcitonin    NPO Diet NPO Type: Strict NPO    Undress & Gown    Continuous Pulse Oximetry    Vital Signs    Cardiology (on-call MD unless specified)    Hospitalist (on-call MD unless specified)    Oxygen Therapy- Nasal Cannula; Titrate 1-6 LPM Per SpO2; 90 - 95%    NIPPV (CPAP or BIPAP)    POC Glucose Once    POC Lactate    POC Electrolyte Panel    ECG 12 Lead ED Triage Standing Order; SOA    Insert Peripheral IV    CBC & Differential    Green Top (Gel)    Lavender Top    Gold Top - SST    Light Blue Top    Extra Tubes    Gray Top       Medications Given in the Emergency Department:  Medications   sodium chloride 0.9 % flush 10 mL (has no administration in time range)   vancomycin 2250 mg/500 mL 0.9% NS IVPB ([facility]) (has no administration in time range)   metOLazone (ZAROXOLYN) tablet 2.5 mg  (has no administration in time range)   ipratropium-albuterol (DUO-NEB) nebulizer solution 3 mL (3 mL Nebulization Given 5/5/25 0853)   methylPREDNISolone sodium succinate (SOLU-Medrol) 125 mg in sterile water (preservative free) 2 mL (125 mg Intravenous Given 5/5/25 0947)   furosemide (LASIX) injection 60 mg (60 mg Intravenous Given 5/5/25 1058)   cefepime 2000 mg IVPB in 100 mL NS (VTB) (2,000 mg Intravenous New Bag 5/5/25 1103)        ED Course:    ED Course as of 05/05/25 1145   Mon May 05, 2025   1026 EKG: I interpreted his twelve-lead EKG as ventricular paced rhythm at 71 bpm, normal pacer spikes seen, IVCD noted, no further EKG analysis attempted due to ventricular paced rhythm. [VS]      ED Course User Index  [VS] Manny Blair MD       Labs:    Lab Results (last 24 hours)       Procedure Component Value Units Date/Time    CBC & Differential [[id number]]  (Abnormal) Collected: 05/05/25 0845    Specimen: Blood from Arm, Left Updated: 05/05/25 0854    Narrative:      The following orders were created for panel order CBC & Differential.  Procedure                               Abnormality         Status                     ---------                               -----------         ------                     CBC Auto Differential[[id number]]        Abnormal            Final result                 Please view results for these tests on the individual orders.    BNP [[id number]]  (Abnormal) Collected: 05/05/25 0845    Specimen: Blood from Arm, Left Updated: 05/05/25 0921     proBNP 6,386.0 pg/mL     Narrative:      This assay is used as an aid in the diagnosis of individuals suspected of having heart failure. It can be used as an aid in the diagnosis of acute decompensated heart failure (ADHF) in patients presenting with signs and symptoms of ADHF to the emergency department (ED). In addition, NT-proBNP of <300 pg/mL indicates ADHF is not likely.    Age Range Result Interpretation  NT-proBNP Concentration  (pg/mL:      <50             Positive            >450                   Gray                 300-450                    Negative             <300    50-75           Positive            >900                  Gray                300-900                  Negative            <300      >75             Positive            >1800                  Gray                300-1800                  Negative            <300    High Sensitivity Troponin T [[id number]]  (Abnormal) Collected: 05/05/25 0845    Specimen: Blood from Arm, Left Updated: 05/05/25 0924     HS Troponin T 32 ng/L     Narrative:      High Sensitive Troponin T Reference Range:  <14.0 ng/L- Negative Female for AMI  <22.0 ng/L- Negative Male for AMI  >=14 - Abnormal Female indicating possible myocardial injury.  >=22 - Abnormal Male indicating possible myocardial injury.   Clinicians would have to utilize clinical acumen, EKG, Troponin, and serial changes to determine if it is an Acute Myocardial Infarction or myocardial injury due to an underlying chronic condition.         COVID-19, FLU A/B, RSV PCR 1 HR TAT - Swab, Nasopharynx [[id number]]  (Normal) Collected: 05/05/25 0845    Specimen: Swab from Nasopharynx Updated: 05/05/25 0931     COVID19 Not Detected     Influenza A PCR Not Detected     Influenza B PCR Not Detected     RSV, PCR Not Detected    Narrative:      Fact sheet for providers: https://www.fda.gov/media/226685/download    Fact sheet for patients: https://www.fda.gov/media/398589/download    Test performed by PCR.    CBC Auto Differential [[id number]]  (Abnormal) Collected: 05/05/25 0845    Specimen: Blood from Arm, Left Updated: 05/05/25 0854     WBC 12.83 10*3/mm3      RBC 3.60 10*6/mm3      Hemoglobin 11.3 g/dL      Hematocrit 34.6 %      MCV 96.1 fL      MCH 31.4 pg      MCHC 32.7 g/dL      RDW 16.2 %      RDW-SD 55.8 fl      MPV 9.2 fL      Platelets 333 10*3/mm3      Neutrophil % 87.8 %      Lymphocyte % 5.5 %      Monocyte % 5.5 %      Eosinophil  % 0.0 %      Basophil % 0.2 %      Immature Grans % 1.0 %      Neutrophils, Absolute 11.27 10*3/mm3      Lymphocytes, Absolute 0.71 10*3/mm3      Monocytes, Absolute 0.70 10*3/mm3      Eosinophils, Absolute 0.00 10*3/mm3      Basophils, Absolute 0.02 10*3/mm3      Immature Grans, Absolute 0.13 10*3/mm3      nRBC 0.0 /100 WBC     Lactic Acid, Plasma [[id number]]  (Normal) Collected: 05/05/25 0845    Specimen: Blood from Arm, Left Updated: 05/05/25 0926     Lactate 1.9 mmol/L     POC Glucose Once [[id number]]  (Abnormal) Collected: 05/05/25 0851    Specimen: Arterial Blood Updated: 05/05/25 0854     Glucose 121 mg/dL      Comment: Serial Number: [id number]:  [id number]       Blood Gas, Arterial - [[id number]]  (Abnormal) Collected: 05/05/25 0851    Specimen: Arterial Blood Updated: 05/05/25 0854     Site Right Radial     Dada's Test Positive     pH, Arterial 7.500 pH units      pCO2, Arterial 27.5 mm Hg      pO2, Arterial 65.8 mm Hg      HCO3, Arterial 21.4 mmol/L      Base Excess, Arterial -0.8 mmol/L      Comment: Serial Number: [id number]:  [id number]        O2 Saturation, Arterial 94.7 %      Hemoglobin, Blood Gas 11.8 g/dL      Hematocrit, Blood Gas 35.0 %      Barometric Pressure for Blood Gas 740.6000 mmHg      Modality HFNC     Flow Rate 10.0000 lpm      Hemodilution No    POC Lactate [[id number]]  (Normal) Collected: 05/05/25 0851    Specimen: Arterial Blood Updated: 05/05/25 0854     Lactate 1.4 mmol/L      Comment: Serial Number: [id number]:  [id number]       POC Electrolyte Panel [[id number]]  (Normal) Collected: 05/05/25 0851    Specimen: Arterial Blood Updated: 05/05/25 0854     Sodium 134 mmol/L      POC Potassium 4.2 mmol/L      Chloride 100 mmol/L      Ionized Calcium 1.19 mmol/L      Comment: Serial Number: [id number]:  [id number]       Comprehensive Metabolic Panel [[id number]]  (Abnormal) Collected: 05/05/25 0933    Specimen: Blood from Hand, Right Updated: 05/05/25 1002     Glucose 115 mg/dL      BUN 17  mg/dL      Creatinine 0.73 mg/dL      Sodium 131 mmol/L      Potassium 4.3 mmol/L      Chloride 96 mmol/L      CO2 19.4 mmol/L      Calcium 9.0 mg/dL      Total Protein 7.7 g/dL      Albumin 2.6 g/dL      ALT (SGPT) 96 U/L      AST (SGOT) 114 U/L      Alkaline Phosphatase 76 U/L      Total Bilirubin 1.1 mg/dL      Globulin 5.1 gm/dL      A/G Ratio 0.5 g/dL      BUN/Creatinine Ratio 23.3     Anion Gap 15.6 mmol/L      eGFR 94.3 mL/min/1.73     Narrative:      GFR Categories in Chronic Kidney Disease (CKD)              GFR Category          GFR (mL/min/1.73)    Interpretation  G1                    90 or greater        Normal or high (1)  G2                    60-89                Mild decrease (1)  G3a                   45-59                Mild to moderate decrease  G3b                   30-44                Moderate to severe decrease  G4                    15-29                Severe decrease  G5                    14 or less           Kidney failure    (1)In the absence of evidence of kidney disease, neither GFR category G1 or G2 fulfill the criteria for CKD.    eGFR calculation 2021 CKD-EPI creatinine equation, which does not include race as a factor    Blood Culture - Blood, Hand, Left [[id number]] Collected: 05/05/25 0933    Specimen: Blood from Hand, Left Updated: 05/05/25 0938    Blood Culture - Blood, Hand, Right [[id number]] Collected: 05/05/25 0933    Specimen: Blood from Hand, Right Updated: 05/05/25 0937    High Sensitivity Troponin T 1Hr [[id number]]  (Abnormal) Collected: 05/05/25 0933    Specimen: Blood from Hand, Right Updated: 05/05/25 1012     HS Troponin T 31 ng/L      Troponin T Numeric Delta -1 ng/L      Troponin T % Delta -3    Narrative:      High Sensitive Troponin T Reference Range:  <14.0 ng/L- Negative Female for AMI  <22.0 ng/L- Negative Male for AMI  >=14 - Abnormal Female indicating possible myocardial injury.  >=22 - Abnormal Male indicating possible myocardial injury.   Clinicians would  "have to utilize clinical acumen, EKG, Troponin, and serial changes to determine if it is an Acute Myocardial Infarction or myocardial injury due to an underlying chronic condition.         Digoxin Level [[id number]]  (Normal) Collected: 05/05/25 0933    Specimen: Blood from Hand, Right Updated: 05/05/25 1059     Digoxin 0.63 ng/mL     Procalcitonin [[id number]]  (Abnormal) Collected: 05/05/25 0933    Specimen: Blood from Hand, Right Updated: 05/05/25 1106     Procalcitonin 0.41 ng/mL     Narrative:      As a Marker for Sepsis (Non-Neonates):    1. <0.5 ng/mL represents a low risk of severe sepsis and/or septic shock.  2. >2 ng/mL represents a high risk of severe sepsis and/or septic shock.    As a Marker for Lower Respiratory Tract Infections that require antibiotic therapy:    PCT on Admission    Antibiotic Therapy       6-12 Hrs later    >0.5                Strongly Recommended  >0.25 - <0.5        Recommended  0.1 - 0.25          Discouraged              Remeasure/reassess PCT  <0.1                Strongly Discouraged     Remeasure/reassess PCT    As 28 day mortality risk marker: \"Change in Procalcitonin Result\" (>80% or <=80%) if Day 0 (or Day 1) and Day 4 values are available. Refer to http://www.[employer]-pct-calculator.com    Change in PCT <=80%  A decrease of PCT levels below or equal to 80% defines a positive change in PCT test result representing a higher risk for 28-day all-cause mortality of patients diagnosed with severe sepsis for septic shock.    Change in PCT >80%  A decrease of PCT levels of more than 80% defines a negative change in PCT result representing a lower risk for 28-day all-cause mortality of patients diagnosed with severe sepsis or septic shock.    This test is Prognostic not Diagnostic, if elevated correlate with clinical findings before administering antibiotic treatment.                 Imaging:    XR Chest 1 View  Result Date: 5/5/2025  XR CHEST 1 VW Date of Exam: 5/5/2025 9:23 AM EDT " Indication: SOA Triage Protocol Comparison: 12/19/2024 FINDINGS: There are extensive new opacities in the right lung. There may also be some new opacities in the left perihilar and left basilar regions. There is a right arm catheter with tip projecting in the area of the cavoatrial junction. Pacemaker/ICD is present, as before. The heart appears enlarged, similar to the prior exam. Lung volumes are low. No pneumothorax. No significant pleural effusion on this exam. No definite osseous abnormality is seen on this limited single view.     1.Extensive new opacities in the right lung and possible new opacities in the left perihilar and left basilar regions. Findings could represent multifocal pneumonia or edema. 2.Stable cardiomegaly. 3.Right arm catheter with tip projecting in the area of the cavoatrial junction. Electronically Signed: Jacob Portillo  5/5/2025 9:31 AM EDT  Workstation ID: [id number]        Differential Diagnosis and Discussion:    Dyspnea: Differential diagnosis includes but is not limited to metabolic acidosis, neurological disorders, psychogenic, asthma, pneumothorax, upper airway obstruction, COPD, pneumonia, noncardiogenic pulmonary edema, interstitial lung disease, anemia, congestive heart failure, and pulmonary embolism    PROCEDURES:    Labs were collected in the emergency department and all labs were reviewed and interpreted by me.  X-ray were performed in the emergency department and all X-ray impressions were independently interpreted by me.  An EKG was performed and the EKG was interpreted by me.    ECG 12 Lead ED Triage Standing Order; SOA   Preliminary Result   HEART RATE=71  bpm   RR Brhrvnhr=008  ms   [place] Cevmbvfq=917  ms   P Horizontal Axis=220  deg   P Front Axis=0  deg   QRSD Jcgphagd=742  ms   QT Yisourlt=042  ms   TFtT=197  ms   QRS Axis=-70  deg   T Wave Axis=107  deg   - ABNORMAL ECG -   Ventricular-paced rhythm   No further analysis attempted due to paced rhythm   Date and Time of  Study:2025-05-05 08:37:02          Procedures    MDM     Amount and/or Complexity of Data Reviewed  Clinical lab tests: reviewed  Tests in the radiology section of CPT®: reviewed  Tests in the medicine section of CPT®: reviewed  Decide to obtain previous medical records or to obtain history from someone other than the patient: yes             This patient is a 75-year-old male with COPD and also systolic heart failure with EF of 20 to 25%, now presenting with worsening shortness of breath in the past couple days.    No leg edema was seen but he does have some JVD present on exam and mild crackles on the lung exam and slight wheezing.    We initially gave him a DuoNeb breathing treatment and COPD treatment with Solu-Medrol on arrival.    However once as well as chest x-ray it is apparent that he has significant pulmonary edema most likely bilaterally, versus less likely multifocal pneumonia.    His proBNP is significantly elevated and I will initially treat him for CHF exacerbation including IV Lasix for diuresis and start him on BiPAP for ventilatory support.    Blood pressure is only 113/55 so no nitroglycerin indicated.    I will consult with cardiology to see him in the hospital admitted to a monitored bed for his acute hypoxemic respiratory failure.        I will check a CT of the chest with IV contrast with pulmonary angiogram to rule out acute PE, given his previous history of DVT and acute significant hypoxic respiratory failure.    He was unable to tolerate BiPAP for too long, so we will transition him to high flow oxygen here.      In addition with his mildly elevated white blood cell count of 12 and recent infection in his left lower extremity, I will err on the side of caution and given some IV antibiotics and send off blood cultures in case of pneumonia.          Critical care:    Total Critical Care time of 40 minutes. Total critical care time documented does not include time spent on separately billed  procedures for services of nurses or physician assistants. I personally saw and examined the patient. I have reviewed all diagnostic interpretations and treatment plans as written. I was present for the key portions of any procedures performed and the inclusive time noted in any critical care statement. Critical care time includes patient management by me, time spent at the patients bedside,  time to review lab and imaging results, discussing patient care, documentation in the medical record, and time spent with family or caregiver.          Patient Care Considerations:          Consultants/Shared Management Plan:    Hospitalist: I have discussed the case with the admitting hospitalist who agrees to accept the patient for admission.    Social Determinants of Health:    Patient is independent, reliable, and has access to care.       Disposition and Care Coordination:    Admit:   Through independent evaluation of the patient's history, physical, and imperical data, the patient meets criteria for inpatient admission to the hospital.        Final diagnoses:   Acute hypoxemic respiratory failure   Acute on chronic systolic congestive heart failure   Acute pulmonary edema        ED Disposition       ED Disposition   Decision to Admit    Condition   --    Comment   --               This medical record created using voice recognition software.             Manny Blair MD  05/05/25 1038       Manny Blair MD  05/05/25 3647

## 2025-05-05 NOTE — PLAN OF CARE
Goal Outcome Evaluation:           Progress: improving  Outcome Evaluation: Work of breathing improving with lasix. VSS. Photos taken of wounds and wound care conuslted.

## 2025-05-05 NOTE — PROGRESS NOTES
RT EQUIPMENT DEVICE RELATED - SKIN ASSESSMENT    RT Medical Equipment/Device:     High Flow Nasal Cannula:Airvo    Skin Assessment:      Cheek:  Intact  Ears:  Intact  Nares:  Intact  Nose:  Intact    Device Skin Pressure Protection:  Positioning supports utilized, Pressure points protected, and Skin-to-device areas padded:  None Required    Nurse Notification:  Nessa Watson, RRT

## 2025-05-05 NOTE — PROGRESS NOTES
"[facility] Clinical Pharmacy Services: Vancomycin Pharmacokinetic Initial Consult Note    Roger D Snellen is a 75 y.o. male who is on day 1 of pharmacy to dose vancomycin for Upper Respiratory Tract Infection.    Consult Information  Consulting Provider: Wilmer Maldonado  Planned Duration of Therapy: 14  Was Patient Receiving Prior to Consult?: No  Loading Dose Given: 2250 mg on 25 at 1224  PK/PD Target: -600 mg/L.hr   Relevant ID History: Being treated at rehab facility for septic arthritis of the LLE on 500mg IV daptomycin and 1 g IV ceftriaxone daily with last dose to be 5/15/25. Discharged from [facility] 25.  According to discharge note moderate amount of WBC, few GPC on cell count and gram stain from arthrocentesis. Cultures showed no growth.    Other Antimicrobials: Cefepime    Imaging Reviewed?: Yes    Microbiology Data  MRSA PCR performed: 25; Result: Pending  Culture/Source:    Blood cx: in process    Vitals/Labs  Ht: 198.1 cm (78\"); Wt: 109 kg (240 lb 11.9 oz)  Temp (24hrs), Av.1 °F (36.7 °C), Min:98 °F (36.7 °C), Max:98.2 °F (36.8 °C)   Estimated Creatinine Clearance: 132.7 mL/min (A) (by C-G formula based on SCr of 0.73 mg/dL (L)).       Results from last 7 days   Lab Units 25  0933 25  0845   CREATININE mg/dL 0.73*  --    WBC 10*3/mm3  --  12.83*     Assessment/Plan:    Vancomycin Dose:  1250 mg IV every 12 hours; which provides the following predicted parameters:  AUC24,ss: 508 mg/L.hr  Probability of AUC24 > 400: 74 %  Ctrough,ss: 16.3 mg/L  Probability of Ctrough,ss > 20: 33 %  Probability of nephrotoxicity (Lodise FREDDY ): 12 %  Vanc Random ordered for  at 0600  Patient has order for Basic Metabolic Panel    Pharmacy will follow patient's kidney function and will adjust doses and obtain levels as necessary. Thank you for involving pharmacy in this patient's care. Please contact pharmacy with any questions or concerns.                           Sandra VARMA " VINCE Davison  Clinical Pharmacist

## 2025-05-06 NOTE — PLAN OF CARE
Goal Outcome Evaluation:   Patient tolerating BiPAP with sleep on 14/7, f 4, FiO2 40%. While awake patient is on Airvo at 50 lpm and 60% FiO2. No increased work of breathing noted. Patient denies any shortness of air at this time.     Problem: Noninvasive Ventilation Acute  Goal: Effective Unassisted Ventilation and Oxygenation  Outcome: Progressing  Intervention: Monitor and Manage Noninvasive Ventilation  Flowsheets (Taken 5/6/2025 0041)  Airway/Ventilation Management:   airway patency maintained   calming measures promoted   oxygen therapy provided   position adjusted   positive pressure ventilation provided   pulmonary hygiene promoted  NPPV/CPAP Maintenance:   adjusted   proper fit/secure   skin (device) pressure points assessed

## 2025-05-06 NOTE — PLAN OF CARE
Goal Outcome Evaluation:           Progress: improving  Outcome Evaluation: Patient remained on AIRVO throughout the day yesterday, and wore BIPAP overnight. Patient very much disliked the BIPAP, and we will see if we can move to just prn today and use as needed. Patient transitioned to a 7lpm HFNC this morning keeping oxygen saturation 95%. Will continue to titrate as tolerated.

## 2025-05-06 NOTE — SIGNIFICANT NOTE
Wound Eval / Progress Noted     [place]     Patient Name: Roger D Snellen  : 1950  MRN: [id number]  Today's Date: 2025                 Admit Date: 2025    Visit Dx:    ICD-10-CM ICD-9-CM   1. Acute hypoxemic respiratory failure  J96.01 518.81   2. Acute on chronic systolic congestive heart failure  I50.23 428.23     428.0   3. Acute pulmonary edema  J81.0 518.4         Acute hypoxic respiratory failure        Past Medical History:   Diagnosis Date    Arrhythmia     ATRIAL FIB, PACE TERMINATED VT    Atrial fibrillation     BBB (bundle branch block)     L BBB    CHF (congestive heart failure)     Diabetes mellitus     Disease of thyroid gland     GERD (gastroesophageal reflux disease)     Hyperlipidemia     Hypertension     Non-ischemic cardiomyopathy     DILATED CM EF 20-25% 2017    Osteoarthritis         Past Surgical History:   Procedure Laterality Date    CARDIAC ELECTROPHYSIOLOGY PROCEDURE N/A 2017    Procedure: Device Upgrade  ICD TO A BIV ICD   medtronic;  Surgeon: Chris Phillips MD;  Location: [facility] INVASIVE LOCATION;  Service:     CHOLECYSTECTOMY      EYE SURGERY Left 2025    Retina Repair    INSERT / REPLACE / REMOVE PACEMAKER      INTERNAL CARDIAC DEFIBRILLATOR INSERTION      MEDTRONIC    SHOULDER ARTHROSCOPY      TOTAL KNEE ARTHROPLASTY       Physical Assessment:  Wound 25 1500 Left anterior foot Pressure Injury (Active)   Wound Image   25 1058   Dressing Appearance open to air 25 1058   Closure None 25 1058   Base dry;maroon/purple;red;scab 25 1058   Periwound dry;intact 25 1058   Periwound Temperature warm 25 1058   Periwound Skin Turgor soft 25 1058   Edges open 25 1058   Drainage Amount none 25 1058   Care, Wound cleansed with;sterile normal saline 25 1058   Dressing Care dressing applied;dressing moistened;hydrofiber;silver impregnated;silicone border foam 25 1058   Periwound Care  absorptive dressing applied 05/06/25 1058       Wound 05/05/25 1500 medial coccyx Pressure Injury (Active)   Wound Image   05/05/25 2000   Pressure Injury Stage 3 05/06/25 1058   Dressing Appearance open to air 05/06/25 1058   Closure None 05/06/25 1058   Base moist;red;slough;yellow 05/06/25 1058   Periwound dry;intact;redness;other (see comments) 05/06/25 1058   Periwound Temperature warm 05/06/25 1058   Periwound Skin Turgor soft 05/06/25 1058   Edges open 05/06/25 1058   Wound Length (cm) 1.3 cm 05/06/25 1058   Wound Width (cm) 2 cm 05/06/25 1058   Wound Depth (cm) 0.1 cm 05/06/25 1058   Wound Surface Area (cm^2) 2.04 cm^2 05/06/25 1058   Wound Volume (cm^3) 0.136 cm^3 05/06/25 1058   Drainage Characteristics/Odor serosanguineous 05/06/25 1058   Drainage Amount scant 05/06/25 1058   Care, Wound cleansed with;sterile normal saline 05/06/25 1058   Dressing Care dressing applied;hydrofiber;silver impregnated;silicone border foam 05/06/25 1058   Periwound Care absorptive dressing applied 05/06/25 1058       Wound 05/06/25 1058 Left medial ankle (Active)   Wound Image   05/06/25 1058   Dressing Appearance open to air 05/06/25 1058   Closure None 05/06/25 1058   Base dry;red;scab 05/06/25 1058   Periwound dry;intact 05/06/25 1058   Periwound Temperature warm 05/06/25 1058   Periwound Skin Turgor soft 05/06/25 1058   Edges open 05/06/25 1058   Drainage Amount none 05/06/25 1058   Care, Wound cleansed with;sterile normal saline 05/06/25 1058   Dressing Care dressing applied;dressing moistened;hydrofiber;silver impregnated;silicone border foam 05/06/25 1058   Periwound Care absorptive dressing applied 05/06/25 1058    Left medial plantar foot    Wound Check / Follow-up: Patient seen today for wound consult. Patient currently in CICU. He is awake, alert and oriented at time of visit. He is agreeable to assessment.    Patient with dry, red tissue to ulcerations to left anterior foot and left medial ankle. No drainage  noted. Cleansed with normal saline and gauze. Recommending daily dressing changes with normal saline moistened (8-10 drops), ABD pad, and gauze roll. There is dry, flaky skin to bilateral lower extremities and thickened callused tissue to left medial plantar foot. Recommending skin care and topical treatment with application of Aquaphor ointment to these areas.    Stage III pressure injury is present to right gluteal aspect, not located on coccyx. Moist, red tissue and thin yellow sloughing tissue present to wound base. Periwound tissue and tissue to left gluteal aspect intact with blanchable redness noted. Recommending daily dressing changes to wound with silver impregnated hydrofiber and silicone border dressing. Recommending skin care and skin protection to intact tissue with application of blue top barrier cream.    Recommending to implement pressure reduction measures including every two hour turns and offloading heels at all times.      Impression: Ulcerations to left anterior foot and left medial ankle. Dry, flaky skin to BLE. Dry, thickened callused tissue to left medial plantar foot. Stage III pressure injury to right gluteal aspect. Blanchable redness to bilateral gluteal aspects.      Short term goals: Regain skin integrity, skin protection, pressure reduction, skin care, topical treatment, daily dressing changes.      Jocelyne Lockhart RN    5/6/2025    13:15 EDT

## 2025-05-06 NOTE — THERAPY EVALUATION
Acute Care - Physical Therapy Initial Evaluation   [place]     Patient Name: Roger D Snellen  : 1950  MRN: [id number]  Today's Date: 2025      Visit Dx:     ICD-10-CM ICD-9-CM   1. Acute hypoxemic respiratory failure  J96.01 518.81   2. Acute on chronic systolic congestive heart failure  I50.23 428.23     428.0   3. Acute pulmonary edema  J81.0 518.4   4. Difficulty walking  R26.2 719.7     Patient Active Problem List   Diagnosis    Non-ischemic cardiomyopathy    Acute hypoxic respiratory failure     Past Medical History:   Diagnosis Date    Arrhythmia     ATRIAL FIB, PACE TERMINATED VT    Atrial fibrillation     BBB (bundle branch block)     L BBB    CHF (congestive heart failure)     Diabetes mellitus     Disease of thyroid gland     GERD (gastroesophageal reflux disease)     Hyperlipidemia     Hypertension     Non-ischemic cardiomyopathy     DILATED CM EF 20-25% 2017    Osteoarthritis      Past Surgical History:   Procedure Laterality Date    CARDIAC ELECTROPHYSIOLOGY PROCEDURE N/A 2017    Procedure: Device Upgrade  ICD TO A BIV ICD   medtronic;  Surgeon: Chris Phillips MD;  Location: [facility] INVASIVE LOCATION;  Service:     CHOLECYSTECTOMY      EYE SURGERY Left 2025    Retina Repair    INSERT / REPLACE / REMOVE PACEMAKER      INTERNAL CARDIAC DEFIBRILLATOR INSERTION      MEDTRONIC    SHOULDER ARTHROSCOPY      TOTAL KNEE ARTHROPLASTY       PT Assessment (Last 12 Hours)       PT Evaluation and Treatment       Row Name 25 1300          Physical Therapy Time and Intention    Document Type evaluation  -AV     Mode of Treatment individual therapy;physical therapy  -AV       Row Name 25 1300          General Information    Patient Profile Reviewed yes  -AV     Patient Observations alert;cooperative;agree to therapy  -AV     Prior Level of Function --  Recently at [facility] for rehab. At baseline, (I) with ADLs. Ambulated primarily without an assistive device. Had STC to  use if needed. No home O2.  -AV     Equipment Currently Used at Home none  Had STC to use as needed  -AV     Existing Precautions/Restrictions fall  -AV       Row Name 05/06/25 1300          Living Environment    Current Living Arrangements home  -AV     Home Accessibility stairs to enter home  -AV     People in Home spouse  Recently at [facility] for rehab  -AV       Row Name 05/06/25 1300          Home Main Entrance    Number of Stairs, Main Entrance three  -AV     Stair Railings, Main Entrance railing on left side (ascending)  -AV       Row Name 05/06/25 1300          Pain    Pain Location knee  -AV     Pain Side/Orientation right  -AV     Additional Documentation Pain Scale: FACES Pre/Post-Treatment (Group)  -AV       Row Name 05/06/25 1300          Pain Scale: FACES Pre/Post-Treatment    Pain: FACES Scale, Pretreatment 2-->hurts little bit  -AV     Posttreatment Pain Rating 4-->hurts little more  -AV       Row Name 05/06/25 1300          Cognition    Orientation Status (Cognition) oriented x 3  -AV       Row Name 05/06/25 1300          Bed Mobility    Bed Mobility supine-sit  -AV     Supine-Sit [place] (Bed Mobility) contact guard  -AV     Bed Mobility, Safety Issues decreased use of legs for bridging/pushing;decreased use of arms for pushing/pulling  -AV       Row Name 05/06/25 1300          Transfers    Transfers sit-stand transfer;stand-sit transfer  -AV     Comment, (Transfers) Patient leaves RLE extended, limited weightbearing throughout extremity to complete stand  -AV       Row Name 05/06/25 1300          Sit-Stand Transfer    Sit-Stand [place] (Transfers) minimum assist (75% patient effort)  -AV     Assistive Device (Sit-Stand Transfers) walker, front-wheeled  -AV       Row Name 05/06/25 1300          Stand-Sit Transfer    Stand-Sit [place] (Transfers) minimum assist (75% patient effort)  -AV     Assistive Device (Stand-Sit Transfers) walker, front-wheeled  -AV       Row Name 05/06/25 1300           Gait/Stairs (Locomotion)    Gait/Stairs Locomotion gait/ambulation independence;gait/ambulation assistive device;distance ambulated  -AV     [place] Level (Gait) contact guard  -AV     Assistive Device (Gait) walker, front-wheeled  -AV     Distance in Feet (Gait) 4  bed to recliner  -AV     Pattern (Gait) 3-point;step-to  -AV     Deviations/Abnormal Patterns (Gait) antalgic;gait speed decreased;stride length decreased  -AV     Right Sided Gait Deviations weight shift ability decreased  -AV       Row Name 05/06/25 1300          Safety Issues/Impairments Affecting Functional Mobility    Impairments Affecting Function (Mobility) balance;endurance/activity tolerance;pain;strength;shortness of breath  -AV       Row Name 05/06/25 1300          Balance    Balance Assessment standing dynamic balance  -AV     Dynamic Standing Balance contact guard  -AV     Position/Device Used, Standing Balance supported;walker, front-wheeled  -AV       Row Name             Wound 05/05/25 1500 Left anterior foot Pressure Injury    Wound - Properties Group Placement Date: 05/05/25  -JR Placement Time: 1500  -JR Present on Original Admission: Y  -JR Side: Left  -JR Orientation: anterior  -JR Location: foot  -JR Primary Wound Type: Pressure Inj  -JR    Retired Wound - Properties Group Placement Date: 05/05/25  -JR Placement Time: 1500  -JR Present on Original Admission: Y  -JR Side: Left  -JR Orientation: anterior  -JR Location: foot  -JR    Retired Wound - Properties Group Placement Date: 05/05/25  -JR Placement Time: 1500  -JR Present on Original Admission: Y  -JR Side: Left  -JR Orientation: anterior  -JR Location: foot  -JR    Retired Wound - Properties Group Date first assessed: 05/05/25  -JR Time first assessed: 1500  -JR Present on Original Admission: Y  -JR Side: Left  -JR Location: foot  -JR      Row Name             Wound 05/05/25 1500 medial coccyx Pressure Injury    Wound - Properties Group Placement Date: 05/05/25   -JR Placement Time: 1500  -JR Present on Original Admission: Y  -JR Orientation: medial  -JR Location: coccyx  -JR Primary Wound Type: Pressure Inj  -JR    Retired Wound - Properties Group Placement Date: 05/05/25  -JR Placement Time: 1500  -JR Present on Original Admission: Y  -JR Orientation: medial  -JR Location: coccyx  -JR    Retired Wound - Properties Group Placement Date: 05/05/25  -JR Placement Time: 1500  -JR Present on Original Admission: Y  -JR Orientation: medial  -JR Location: coccyx  -JR    Retired Wound - Properties Group Date first assessed: 05/05/25  -JR Time first assessed: 1500  -JR Present on Original Admission: Y  -JR Location: coccyx  -JR      Row Name             Wound 05/06/25 1058 Left medial ankle    Wound - Properties Group Placement Date: 05/06/25  -NH Placement Time: 1058  -NH Present on Original Admission: Y  -NH Side: Left  -NH Orientation: medial  -NH Location: ankle  -NH    Retired Wound - Properties Group Placement Date: 05/06/25  -NH Placement Time: 1058  -NH Present on Original Admission: Y  -NH Side: Left  -NH Orientation: medial  -NH Location: ankle  -NH    Retired Wound - Properties Group Placement Date: 05/06/25  -NH Placement Time: 1058  -NH Present on Original Admission: Y  -NH Side: Left  -NH Orientation: medial  -NH Location: ankle  -NH    Retired Wound - Properties Group Date first assessed: 05/06/25  -NH Time first assessed: 1058  -NH Present on Original Admission: Y  -NH Side: Left  -NH Location: ankle  -NH      Row Name 05/06/25 1300          Plan of Care Review    Plan of Care Reviewed With patient  -AV     Progress no change  -AV     Outcome Evaluation Patient presents with deficits in balance, endurance, transfers, and ambulation. Patient will benefit from skilled PT services to address these mobility deficits and decrease risk of falls.  -AV       Row Name 05/06/25 1300          Vital Signs    O2 Delivery Pre Treatment hi-flow  -AV     O2 Delivery Intra Treatment  hi-flow  -AV     O2 Delivery Post Treatment hi-flow  -AV       Row Name 05/06/25 1300          Positioning and Restraints    Pre-Treatment Position in bed  -AV     Post Treatment Position chair  -AV     In Chair reclined;call light within reach;encouraged to call for assist;with nsg  No alarms active upon therapist entry  -AV       Row Name 05/06/25 1300          Therapy Assessment/Plan (PT)    Rehab Potential (PT) good  -AV     Criteria for Skilled Interventions Met (PT) yes;meets criteria  -AV     Therapy Frequency (PT) daily  -AV     Predicted Duration of Therapy Intervention (PT) 10 days  -AV     Problem List (PT) problems related to;balance;mobility;strength;pain  -AV     Activity Limitations Related to Problem List (PT) unable to transfer safely;unable to ambulate safely  -AV       Row Name 05/06/25 1300          PT Evaluation Complexity    History, PT Evaluation Complexity 1-2 personal factors and/or comorbidities  -AV     Examination of Body Systems (PT Eval Complexity) total of 4 or more elements  -AV     Clinical Presentation (PT Evaluation Complexity) stable  -AV     Clinical Decision Making (PT Evaluation Complexity) low complexity  -AV     Overall Complexity (PT Evaluation Complexity) low complexity  -AV       Row Name 05/06/25 1300          Therapy Plan Review/Discharge Plan (PT)    Therapy Plan Review (PT) evaluation/treatment results reviewed;patient  -AV       Row Name 05/06/25 1300          Physical Therapy Goals    Bed Mobility Goal Selection (PT) bed mobility, PT goal 1  -AV     Transfer Goal Selection (PT) transfer, PT goal 1  -AV     Gait Training Goal Selection (PT) gait training, PT goal 1  -AV       Row Name 05/06/25 1300          Bed Mobility Goal 1 (PT)    Activity/Assistive Device (Bed Mobility Goal 1, PT) sit to supine/supine to sit  -AV     [place] Level/Cues Needed (Bed Mobility Goal 1, PT) independent  -AV     Time Frame (Bed Mobility Goal 1, PT) 10 days  -AV       Row Name  05/06/25 1300          Transfer Goal 1 (PT)    Activity/Assistive Device (Transfer Goal 1, PT) sit-to-stand/stand-to-sit;bed-to-chair/chair-to-bed;walker, rolling  -AV     [place] Level/Cues Needed (Transfer Goal 1, PT) modified independence  -AV     Time Frame (Transfer Goal 1, PT) 10 days  -AV       Row Name 05/06/25 1300          Gait Training Goal 1 (PT)    Activity/Assistive Device (Gait Training Goal 1, PT) gait (walking locomotion);assistive device use;walker, rolling  -AV     [place] Level (Gait Training Goal 1, PT) modified independence  -AV     Distance (Gait Training Goal 1, PT) 200  -AV     Time Frame (Gait Training Goal 1, PT) 10 days  -AV               User Key  (r) = Recorded By, (t) = Taken By, (c) = Cosigned By      Initials Name Provider Type    Jocelyne Castellano, RN Registered Nurse    Samson Sapp, PT Physical Therapist    Peterson Daigle RN Registered Nurse                    Physical Therapy Education       Title: PT OT SLP Therapies (In Progress)       Topic: Physical Therapy (In Progress)       Point: Mobility training (Done)       Learning Progress Summary            Patient Acceptance, E,TB, VU by AV at 5/6/2025 1336                      Point: Home exercise program (Not Started)       Learner Progress:  Not documented in this visit.              Point: Body mechanics (Done)       Learning Progress Summary            Patient Acceptance, E,TB, VU by AV at 5/6/2025 1336                      Point: Precautions (Done)       Learning Progress Summary            Patient Acceptance, E,TB, VU by AV at 5/6/2025 1336                                      User Key       Initials Effective Dates Name Provider Type Discipline    AV 06/11/21 -  Samson Cordoba, PT Physical Therapist PT                  PT Recommendation and Plan  Anticipated Discharge Disposition (PT): inpatient rehabilitation facility  Planned Therapy Interventions (PT): balance training, bed mobility training,  gait training, home exercise program, neuromuscular re-education, strengthening, transfer training  Therapy Frequency (PT): daily  Plan of Care Reviewed With: patient  Progress: no change  Outcome Evaluation: Patient presents with deficits in balance, endurance, transfers, and ambulation. Patient will benefit from skilled PT services to address these mobility deficits and decrease risk of falls.   Outcome Measures       Row Name 05/06/25 1300             How much help from another person do you currently need...    Turning from your back to your side while in flat bed without using bedrails? 3  -AV      Moving from lying on back to sitting on the side of a flat bed without bedrails? 3  -AV      Moving to and from a bed to a chair (including a wheelchair)? 3  -AV      Standing up from a chair using your arms (e.g., wheelchair, bedside chair)? 3  -AV      Climbing 3-5 steps with a railing? 2  -AV      To walk in hospital room? 3  -AV      AM-PAC 6 Clicks Score (PT) 17  -AV         Functional Assessment    Outcome Measure Options AM-PAC 6 Clicks Basic Mobility (PT)  -AV                User Key  (r) = Recorded By, (t) = Taken By, (c) = Cosigned By      Initials Name Provider Type    AV Samson Cordoba, PT Physical Therapist                     Time Calculation:    PT Charges       Row Name 05/06/25 1335             Time Calculation    PT Received On 05/06/25  -AV      PT Goal Re-Cert Due Date 05/15/25  -AV         Untimed Charges    PT Eval/Re-eval Minutes 50  -AV         Total Minutes    Untimed Charges Total Minutes 50  -AV       Total Minutes 50  -AV                User Key  (r) = Recorded By, (t) = Taken By, (c) = Cosigned By      Initials Name Provider Type    AV Samson Cordoba, PT Physical Therapist                  Therapy Charges for Today       Code Description Service Date Service Provider Modifiers Qty    [id number] HC PT EVAL LOW COMPLEXITY 4 5/6/2025 Samson Cordoba, PT GP 1            PT  G-Codes  Outcome Measure Options: AM-PAC 6 Clicks Basic Mobility (PT)  AM-PAC 6 Clicks Score (PT): 17    Samson Cordoba, PT  5/6/2025

## 2025-05-06 NOTE — PROGRESS NOTES
Pulmonary / Critical Care Progress Note      Patient Name: Roger D Snellen  : 1950  MRN: [id number]  Attending:  Owen Wooten MD   Date of admission: 2025    Subjective   Subjective   Patient critically ill with respiratory failure, decompensated congestive heart failure    Over past 24 hours: Patient remained on high flow oxygen receiving IV diuresis, antibiotics    This morning overall clinically improving down to 7 L high flow nasal cannula, robust urine output, multiple electrolyte abnormalities, antibiotics continued with recent hospitalization/septic arthritis      Objective   Objective     Vitals:   Vital signs for last 24 hours:  Temp:  [98 °F (36.7 °C)-98.2 °F (36.8 °C)] 98.1 °F (36.7 °C)  Heart Rate:  [70-74] 70  Resp:  [17-33] 18  BP: (101-124)/(55-97) 111/71    Intake/Output last 3 shifts:  I/O last 3 completed shifts:  In: 100 [IV Piggyback:100]  Out: 2475 [Urine:2475]    Physical Exam   Vital Signs Reviewed   General:  Alert, NAD. Lying in bed   HEENT:  PERRL, EOMI.  OP  Chest:  Clear to auscultation bilaterally, no work of breathing noted  CV: RRR, no MGR, pulses 2+, equal.  Abd:  Soft, NT, ND, + BS  EXT:  no clubbing, no cyanosis, no edema  Neuro:  A&Ox3, CN grossly intact, no focal deficits.  Skin: No rashes or lesions noted    Result Review    Result Review:  I have personally reviewed the results from the time of this admission to 2025 07:49 EDT and agree with these findings:  [x]  Laboratory  []  Microbiology  [x]  Radiology  [x]  EKG/Telemetry   [x]  Cardiology/Vascular   []  Pathology  []  Old records  []  Other:  Most notable findings include:    .    Assessment & Plan   Assessment / Plan     Active Hospital Problems:  Active Hospital Problems    Diagnosis     **Acute hypoxic respiratory failure          Impression:  Acute hypoxic respiratory failure  Multifocal pneumonia from unknown organism  Acute cardiogenic pulmonary edema  Decompensated congestive heart failure with  reduced EF  Volume overload  Recent septic arthritis of the left lower extremity on daptomycin/ceftriaxone as outpatient     Plan:    Remains on 7 L nasal cannula  Maintain SpO2 greater than 90%  Chest CT personally reviewed.  Bilateral groundglass opacities noted concerning   With reduced EF and elevated BNP, CT findings more consistent with pulmonary edema.  Continue with IV diuresis; monitor accurate I's and O's  Continue IV steroids  2D echo did show EF with 38%  Continue advanced heart failure medications  Continue Eliquis  Continue antibiotics with vancomycin and ceftriaxone for left lower extremity septic arthritis; of note patient was previously on daptomycin/ceftriaxone outpatient; PICC in place  Continue nebs and bronchopulmonary hygiene  Bowel regimen on board  Trend renal function and electrolytes to keep potassium 4, mag 2, Phos 4  Continue H2 blocker for GI prophylaxis  DVT prophylaxis, on Eliquis     Ok to transfer out of ICU    Pharmacologic VTE prophylaxis orders are present.        CODE STATUS:   Code Status (Patient has no pulse and is not breathing): No CPR (Do Not Attempt to Resuscitate)  Medical Interventions (Patient has pulse or is breathing): Limited Support  Medical Intervention Limits: No intubation (DNI)    I, LEONELA Harry, spent 14 minutes critical care time.  Electronically signed by LEONLEA Husain, 05/06/25, 12:00 PM EDT.    Patient is critically ill with acute hypoxic respiratory failure in the setting of heart failure, multifocal pneumonia, septic arthritis. I, Dr. Amrik Garay, spent 21 minutes of critical care time. Total Critical Care time spent: 35 minutes. This included personally reviewing all pertinent labs, imaging, microbiology and documentation. Also discussing the case with the patient and any available family, the admitting physician and any available ancillary staff.   Electronically signed by Amrik Garay MD, 05/06/25, 12:55 PM EDT.

## 2025-05-06 NOTE — NURSING NOTE
Purpose of the visit was to evaluate for: goals of care/advanced care planning. Spoke with MD, RN, patient, and family and discussed palliative care, goals of care, care options, and resuscitation status.      Assessment: Mr. Snellen has a medical history of  A-fib on anticoagulation, heart failure with preserved ejection fraction, diabetes, COPD, hypothyroidism, GERD, hyperlipidemia, hypertension.  Patient most recently discharged from [facility], 6/16-4/24.  Patient presented with complaints of left foot pain swelling and erythema.  Met criteria for severe sepsis due to infected left foot.  Also with issues of right knee pain.  Concern for septic right knee arthrocentesis performed, moderate amount of WBCs, few GPC's on cell count and Gram stain, cultures no growth.  Patient discharged on IV antibiotics 500 mg IV daptomycin, 1 g IV ceftriaxone daily with a stop date of 5/15/2025.  Patient was seen by podiatry for left foot wound, did have bedside debridement performed.  Patient was discharged to [facility] rehab.  Since being at [facility] patient endorses slowly worsening shortness of breath.  On morning of presentation, 5/5/2025, patient states he felt smothered.  Denies any associated fever chills or night sweats.  Patient presented to the emergency department hypoxic 81% on room air.  Patient with a heart rate of 70, respiratory rate of 28 and a blood pressure 122/72 initially in the emergency department.  Quickly titrated up on high flow nasal cannula eventually transitioned over to BiPAP, however due to intolerance patient transition to Airvo 55 L 60% FiO2 to maintain O2 saturations.  Patient's initial ABG shows a pH of 7.5, PCO2 of 27.5, PO2 of 65.8.  proBNP elevated at 6386.  Troponin 32 then 31, no complaints of chest pain.  On labs patient with a bicarb of 19.4, anion gap of 15.6, creatinine 0.73.  Patient has AST ALT elevations of 114/96 respectively.  Procalcitonin elevated 0.41.  Lactate negative x  2.  White count of 12.8, hemoglobin 11.3 and platelets of 333.  Chest x-ray showed extensive new opacities in right lung and possible new opacities in left perihilar and left basilar region representing multifocal pneumonia or edema.  Patient has a right arm PICC line in place.  CT scan with contrast obtained to rule out pulmonary embolus.  No evidence of PE.  Cardiomegaly with reflux of contrast into the Paddock veins and IVC, seen in right heart failure.  Patchy groundglass opacities throughout both lungs with smooth interlobular septal thickening and trace bilateral pleural effusions.  Representing pulmonary edema over bilateral pneumonia.  Given patient's work of breathing and oxygen demand, admitted to the ICU. Care team rounding discussed game plan of changing antibiotics and diuresing pt. Pt level of care to be down graded. Introduced self and  palliative care services. Discussed ACP and code status. Pt reported he wanted to wait for wife to complete anything but verbalized they may not want to do that. Will contact wife for family meeting tomorrow and follow up to attempt to re discuss ACP and should they want complete something. Primary RN aware. Also spoke with SW about pt wanting HH vs inpatient rehab. Palliative care will continue to follow.       Recommendations/Plan:  pending clinical course .    Tasks Completed: Emotional Support.      Alea GRAHAM RN  Palliative Care

## 2025-05-06 NOTE — PROGRESS NOTES
[facility] Clinical Pharmacy Services: Vancomycin Monitoring Note    Roger D Snellen is a 75 y.o. male who is on day 2/11 of pharmacy to dose vancomycin for Bone and/or Joint Infection. Continuation of treatment with Daptomycin from [facility] for concern for septic joint. Patient to complete a total of 4 weeks of therapy. Last day 5/15/25.     Previous Vancomycin Dose:   1250 mg IV every  12  hours  Imaging Reviewed?: Yes  Updated Cultures and Sensitivities:   Microbiology Results (last 10 days)       Procedure Component Value - Date/Time    MRSA Screen, PCR (Inpatient) - Swab, Nares [[id number]]  (Normal) Collected: 05/05/25 1628    Lab Status: Final result Specimen: Swab from Nares Updated: 05/05/25 1749     MRSA PCR No MRSA Detected    Narrative:      The negative predictive value of this diagnostic test is high and should only be used to consider de-escalating anti-MRSA therapy. A positive result may indicate colonization with MRSA and must be correlated clinically.    Respiratory Panel PCR w/COVID-19(SARS-CoV-2) SALLY/DULCE MARIA/CAROLEE/PAD/COR/TYRON In-House, NP Swab in UTM/VTM, 2 HR TAT - Swab, Nasopharynx [[id number]]  (Normal) Collected: 05/05/25 1452    Lab Status: Final result Specimen: Swab from Nasopharynx Updated: 05/05/25 1557     ADENOVIRUS, PCR Not Detected     Coronavirus 229E Not Detected     Coronavirus HKU1 Not Detected     Coronavirus NL63 Not Detected     Coronavirus OC43 Not Detected     COVID19 Not Detected     Human Metapneumovirus Not Detected     Human Rhinovirus/Enterovirus Not Detected     Influenza A PCR Not Detected     Influenza B PCR Not Detected     Parainfluenza Virus 1 Not Detected     Parainfluenza Virus 2 Not Detected     Parainfluenza Virus 3 Not Detected     Parainfluenza Virus 4 Not Detected     RSV, PCR Not Detected     Bordetella pertussis pcr Not Detected     Bordetella parapertussis PCR Not Detected     Chlamydophila pneumoniae PCR Not Detected     Mycoplasma pneumo by PCR Not Detected     "Narrative:      In the setting of a positive respiratory panel with a viral infection PLUS a negative procalcitonin without other underlying concern for bacterial infection, consider observing off antibiotics or discontinuation of antibiotics and continue supportive care. If the respiratory panel is positive for atypical bacterial infection (Bordetella pertussis, Chlamydophila pneumoniae, or Mycoplasma pneumoniae), consider antibiotic de-escalation to target atypical bacterial infection.    S. Pneumo Ag Urine or CSF - Urine, Urine, Clean Catch [[id number]]  (Normal) Collected: 05/05/25 1451    Lab Status: Final result Specimen: Urine, Clean Catch Updated: 05/05/25 1613     Strep Pneumo Ag Negative    Narrative:      5/18/26 (S. Pneumo)    Legionella Antigen, Urine - Urine, Urine, Clean Catch [[id number]]  (Normal) Collected: 05/05/25 1451    Lab Status: Final result Specimen: Urine, Clean Catch Updated: 05/05/25 1613     LEGIONELLA ANTIGEN, URINE Negative    Blood Culture - Blood, Hand, Left [[id number]]  (Normal) Collected: 05/05/25 0933    Lab Status: Preliminary result Specimen: Blood from Hand, Left Updated: 05/06/25 0945     Blood Culture No growth at 24 hours    Blood Culture - Blood, Hand, Right [[id number]]  (Normal) Collected: 05/05/25 0933    Lab Status: Preliminary result Specimen: Blood from Hand, Right Updated: 05/06/25 0945     Blood Culture No growth at 24 hours    COVID-19, FLU A/B, RSV PCR 1 HR TAT - Swab, Nasopharynx [[id number]]  (Normal) Collected: 05/05/25 0845    Lab Status: Final result Specimen: Swab from Nasopharynx Updated: 05/05/25 0931     COVID19 Not Detected     Influenza A PCR Not Detected     Influenza B PCR Not Detected     RSV, PCR Not Detected    Narrative:      Fact sheet for providers: https://www.fda.gov/media/576389/download    Fact sheet for patients: https://www.fda.gov/media/197256/download    Test performed by PCR.              Vitals/Labs  Ht: 198.1 cm (78\"); Wt: 109 kg (240 lb " 11.9 oz)   Temp (24hrs), Av °F (36.7 °C), Min:98 °F (36.7 °C), Max:98.1 °F (36.7 °C)   Estimated Creatinine Clearance: 149.1 mL/min (A) (by C-G formula based on SCr of 0.65 mg/dL (L)).     Results from last 7 days   Lab Units 25  0812 25  0525 25  1448 25  0933 25  0845   VANCOMYCIN RM mcg/mL  --  23.54  --   --   --    CREATININE mg/dL 0.65* 0.71* 0.85   < >  --    WBC 10*3/mm3  --  10.47  --   --  12.83*    < > = values in this interval not displayed.     Assessment/Plan    Current Vancomycin Dose:  1250 mg IV every 12 hours; which provides the following predicted parameters:  Exposure target: AUC24 (range)400-600 mg/L.hr   AUC24,ss: 529 mg/L.hr  Probability of AUC24 > 400: 91 %  Ctrough,ss: 15.8 mg/L  Probability of Ctrough,ss > 20: 23 %  Probability of nephrotoxicity (Lodise FREDDY ): 11 %  Next Vanc Trough ordered for  at 1030  We will continue to monitor patient changes and renal function     Thank you for involving pharmacy in this patient's care. Please contact pharmacy with any questions or concerns.    Kathryn Freeman  Clinical Pharmacist

## 2025-05-06 NOTE — PLAN OF CARE
Goal Outcome Evaluation:  Plan of Care Reviewed With: patient        Progress: improving  Outcome Evaluation: Pt in CCU on high flow nasal canula and bipap. Pt wore bipap most of the night, tolerated well. No c/o pain. VSS. Continue plan of care.

## 2025-05-06 NOTE — PROGRESS NOTES
[facility]   Hospitalist Progress Note  Date: 2025  Patient Name: Roger D Snellen  : 1950  MRN: [id number]  Date of admission: 2025      Subjective   Subjective     Chief Complaint: Shortness of air    Summary:   Roger D Snellen is a 75 y.o. male A-fib on anticoagulation, heart failure with preserved ejection fraction, diabetes, COPD, hypothyroidism, GERD, hyperlipidemia, hypertension.  Patient most recently discharged from [facility].  Patient presented with complaints of left foot pain swelling and erythema.  Met criteria for severe sepsis due to infected left foot.  Also with issues of right knee pain.  Concern for septic right knee arthrocentesis performed, moderate amount of WBCs, few GPC's on cell count and Gram stain, cultures no growth.  Patient discharged on IV antibiotics 500 mg IV daptomycin, 1 g IV ceftriaxone daily with a stop date of 5/15/2025.  Patient was seen by podiatry for left foot wound, did have bedside debridement performed.  Patient was discharged to [facility] rehab.  Since being at [facility] patient endorses slowly worsening shortness of breath.  On morning of presentation, 2025, patient states he felt smothered.  Denies any associated fever chills or night sweats.  Patient presented to the emergency department hypoxic 81% on room air.  Patient with a heart rate of 70, respiratory rate of 28 and a blood pressure 122/72 initially in the emergency department.  Quickly titrated up on high flow nasal cannula eventually transitioned over to BiPAP, however due to intolerance patient transition to Airvo 55 L 60% FiO2 to maintain O2 saturations.  Patient's initial ABG shows a pH of 7.5, PCO2 of 27.5, PO2 of 65.8.  proBNP elevated at 6386.  Troponin 32 then 31, no complaints of chest pain.  On labs patient with a bicarb of 19.4, anion gap of 15.6, creatinine 0.73.  Patient has AST ALT elevations of 114/96 respectively.  Procalcitonin elevated 0.41.  Lactate negative x 2.   White count of 12.8, hemoglobin 11.3 and platelets of 333.  Chest x-ray showed extensive new opacities in right lung and possible new opacities in left perihilar and left basilar region representing multifocal pneumonia or edema.  Patient has a right arm PICC line in place.  CT scan with contrast obtained to rule out pulmonary embolus.  No evidence of PE.  Cardiomegaly with reflux of contrast into the hepatic veins and IVC, seen in right heart failure.  Patchy groundglass opacities throughout both lungs with smooth interlobular septal thickening and trace bilateral pleural effusions.  Representing pulmonary edema over bilateral pneumonia.  Given patient's work of breathing and oxygen demand, admitted to the ICU.  Overnight patient did well and has been weaned down to 7 L high flow nasal cannula.  Transferred out of the unit on May 6     Interval Followup:   Vital signs stable   weaned off of Airvo, on 7 L with 96% saturation.  Patient did not like BiPAP.  Shortness of air improving.  No chest pain.  No cough.  Right knee remains swollen.    Review of Systems   All systems were reviewed and negative except for: Summary and interval follow-up    Objective   Objective     Vitals:   Temp:  [97.9 °F (36.6 °C)-98.1 °F (36.7 °C)] 98 °F (36.7 °C)  Heart Rate:  [70-73] 71  Resp:  [17-26] 20  BP: ()/(59-97) 97/64  Flow (L/min) (Oxygen Therapy):  [7-50] 8  Physical Exam      Constitutional: Awake, alert, increased work of breathing, mild respiratory distress              Eyes: Pupils equal, sclerae anicteric, no conjunctival injection              HENT: NCAT, mucous membranes moist              Neck: Supple, no thyromegaly, no lymphadenopathy, trachea midline              Respiratory: Crackles heard mostly left base and midlung area, minimal expiratory wheezing, prolonged expiratory phase              Cardiovascular: RRR, no murmurs, rubs, or gallops, palpable pedal pulses bilaterally.  +2 bilateral extremity LYNN               Gastrointestinal: Positive bowel sounds, soft, nontender, nondistended              Musculoskeletal: Right knee swollen, good range of motion and no tenderness on palpation              Neurologic: Oriented x 3, strength symmetric in all extremities, Cranial Nerves grossly intact to confrontation, speech clear              Skin: Wounds to the left foot is dressed, chronic discoloration lower extremities.      Result Review    Result Review:  I have personally reviewed the results for the past 24 hours and agree with these findings:  [x]  Laboratory  []  Microbiology  [x]  Radiology  [x]  EKG/Telemetry V paced rhythm 71  []  Cardiology/Vascular   []  Pathology  [x]  Old records  [x]  Other: Medications    Assessment & Plan   Assessment / Plan     Assessment:  Acute hypoxemic respiratory failure.  Improving.  Acute on chronic systolic CHF EF of 38%.  Moderate TR.  COPD exacerbation.  History of A-fib on Eliquis.  Status post PPM/AICD.  Recent right knee septic arthritis and left foot infection on IV daptomycin/Rocephin via RUE PICC line until May 15.  Transaminitis.  Likely hepatic congestion from CHF.  Rheumatoid factor positive.  No symptoms.  Dilated ascending thoracic aorta 4.4 cm.  Left bundle branch block.       Plan:  Continue supplemental oxygen to keep sats more than 90%.  Low-salt diet, fluid restriction, strict input output and daily weights.  IV Lasix.  2D echo noted.  Check ultrasound right upper quadrant.  Goal-directed medical therapy including Entresto and Aldactone.  Appreciate cardiology input.  Continue home Eliquis.  Continue home daptomycin and Rocephin until May 15.  DC Vanco and cefepime.  MRSA PCR negative.  Check right upper quadrant ultrasound.  IV steroids.  Sliding-scale insulin  Nebulizer treatment.  Bronchopulmonary hygiene protocol.  Wound nurse consulted.  CTA chest noted no PE.  Bilateral groundglass opacities consistent with pulmonary edema.  PT OT.  Recommending SNF  Transfer  out of ICU.  Appreciate intensivist input  Continue telemetry  Discussed plan with RN and .  Patient was about to be discharged from [facility] to go home.    VTE Prophylaxis:  Pharmacologic VTE prophylaxis orders are present.  Eliquis        CODE STATUS:   Code Status (Patient has no pulse and is not breathing): No CPR (Do Not Attempt to Resuscitate)  Medical Interventions (Patient has pulse or is breathing): Limited Support  Medical Intervention Limits: No intubation (DNI)      Part of this note may be an electronic transcription/translation of spoken language to printed text using the Dragon Dictation System.     Electronically signed by Owen Wooten MD, 05/06/25, 4:54 PM EDT.

## 2025-05-06 NOTE — THERAPY EVALUATION
RT EQUIPMENT DEVICE RELATED - SKIN ASSESSMENT    RT Medical Equipment/Device:     NIV Mask:  Under-the-nose   size:  B    Skin Assessment:      Cheek:  Intact  Nose:  Intact    Device Skin Pressure Protection:  Pressure points protected    Nurse Notification:  Nessa Bender, RRT

## 2025-05-06 NOTE — PROGRESS NOTES
[facility]   Progress Note    Patient Name: Roger D Snellen  : 1950  MRN: [id number]  Primary Care Physician: John Phelps Jr., MD  Date of admission: 2025    Subjective   Subjective     HPI:  Patient feels better today, no chest pain or shortness of breath he indicated that his appetite even has improved, he is on isolation because of a possible Candida infection.  His potassium is low    Review of Systems  Review of Systems   Constitutional: Negative.    HENT: Negative.     Eyes: Negative.    Respiratory:  Negative for shortness of breath.    Cardiovascular:  Negative for leg swelling.   Genitourinary: Negative.    Musculoskeletal: Negative.    Skin:  Positive for rash.   Neurological: Negative.    Psychiatric/Behavioral: Negative.         Objective   Objective     Vitals:  Temp:  [97.9 °F (36.6 °C)-98.1 °F (36.7 °C)] 98 °F (36.7 °C)  Heart Rate:  [70-73] 71  Resp:  [17-26] 20  BP: ()/(59-73) 97/64  Flow (L/min) (Oxygen Therapy):  [7-50] 8    Physical Exam:  Physical Exam  Constitutional:       Appearance: Normal appearance.   HENT:      Head: Normocephalic.      Nose: Nose normal.   Eyes:      Extraocular Movements: Extraocular movements intact.   Cardiovascular:      Rate and Rhythm: Regular rhythm.   Pulmonary:      Breath sounds: No rales.   Abdominal:      Palpations: Abdomen is soft.   Musculoskeletal:      Right lower leg: No edema.      Left lower leg: No edema.   Skin:     General: Skin is warm.   Neurological:      General: No focal deficit present.      Mental Status: He is alert.   Psychiatric:         Mood and Affect: Mood normal.         Result Review    Result Review:  I have personally reviewed the results from the time of this admission to 25 6:09 PM EDT and agree with these findings:  [x]  Laboratory  []  Microbiology  []  Radiology  [x]  EKG/Telemetry   []  Cardiology/Vascular   []  Pathology  []  Old records  []  Other:    Most notable findings include: 75-year-old  gentleman history of underlying cardiomyopathy, status post AICD placement twice also radiofrequency ablation of ventricular tachycardia, admitted because of acute on chronic systolic congestive failure, patient is diuresing well, his shortness of breath has significantly improved, patient potassium needs to be replaced.    Assessment & Plan   Assessment / Plan     Active Hospital Problems:  Active Hospital Problems    Diagnosis     **Acute hypoxic respiratory failure        Plan:   As per orders    DVT prophylaxis: To be done with  internal medicine    CODE STATUS:    Code Status and Medical Interventions: No CPR (Do Not Attempt to Resuscitate); Limited Support; No intubation (DNI)   Ordered at: 05/05/25 1213     Code Status (Patient has no pulse and is not breathing):    No CPR (Do Not Attempt to Resuscitate)     Medical Interventions (Patient has pulse or is breathing):    Limited Support     Medical Intervention Limits:    No intubation (DNI)       Disposition:  I expect patient to be discharged when patient gets better.    Electronically signed by Juliocesar Giang MD, 05/06/25, 6:09 PM EDT.

## 2025-05-06 NOTE — PLAN OF CARE
Goal Outcome Evaluation:  Plan of Care Reviewed With: patient        Progress: improving  Outcome Evaluation: Patient transferred off ICU to unit on shift. Patient is AOx4 on 8 liters high flow nasal cannula. Continuous pulse ox at bedside. Wound care completed on shift per wound care RN. PICC line dressing changed per ICU RN. Potassium 3.0 this morning, 40 meq of IV potassium replacement was given. IV Vancomycin started and given see MAR. Had trouble with oral temperature reading low, obtained rectal with result of 98 degrees. Patient in Contact [facility] for candida auris rule out. Patient needing to be NPO sips w/meds after midnight for a US of liver tomorrow. Last blood sugar 246, 3 units given see MAR. No complaints at this time. Call light in reach. Care plan ongoing.

## 2025-05-07 NOTE — PROGRESS NOTES
[facility]   Hospitalist Progress Note  Date: 2025  Patient Name: Roger D Snellen  : 1950  MRN: [id number]  Date of admission: 2025      Subjective   Subjective     Chief Complaint: Shortness of air    Summary:   Roger D Snellen is a 75 y.o. male A-fib on anticoagulation, heart failure with preserved ejection fraction, diabetes, COPD, hypothyroidism, GERD, hyperlipidemia, hypertension.  Patient most recently discharged from [facility].  Patient presented with complaints of left foot pain swelling and erythema.  Met criteria for severe sepsis due to infected left foot.  Also with issues of right knee pain.  Concern for septic right knee arthrocentesis performed, moderate amount of WBCs, few GPC's on cell count and Gram stain, cultures no growth.  Patient discharged on IV antibiotics 500 mg IV daptomycin, 1 g IV ceftriaxone daily with a stop date of 5/15/2025.  Patient was seen by podiatry for left foot wound, did have bedside debridement performed.  Patient was discharged to [facility] rehab.  Since being at [facility] patient endorses slowly worsening shortness of breath.  On morning of presentation, 2025, patient states he felt smothered.  Denies any associated fever chills or night sweats.  Patient presented to the emergency department hypoxic 81% on room air.  Patient with a heart rate of 70, respiratory rate of 28 and a blood pressure 122/72 initially in the emergency department.  Quickly titrated up on high flow nasal cannula eventually transitioned over to BiPAP, however due to intolerance patient transition to Airvo 55 L 60% FiO2 to maintain O2 saturations.  Patient's initial ABG shows a pH of 7.5, PCO2 of 27.5, PO2 of 65.8.  proBNP elevated at 6386.  Troponin 32 then 31, no complaints of chest pain.  On labs patient with a bicarb of 19.4, anion gap of 15.6, creatinine 0.73.  Patient has AST ALT elevations of 114/96 respectively.  Procalcitonin elevated 0.41.  Lactate negative x 2.   White count of 12.8, hemoglobin 11.3 and platelets of 333.  Chest x-ray showed extensive new opacities in right lung and possible new opacities in left perihilar and left basilar region representing multifocal pneumonia or edema.  Patient has a right arm PICC line in place.  CT scan with contrast obtained to rule out pulmonary embolus.  No evidence of PE.  Cardiomegaly with reflux of contrast into the hepatic veins and IVC, seen in right heart failure.  Patchy groundglass opacities throughout both lungs with smooth interlobular septal thickening and trace bilateral pleural effusions.  Representing pulmonary edema over bilateral pneumonia.  Given patient's work of breathing and oxygen demand, admitted to the ICU.  Overnight patient did well and has been weaned down to 7 L high flow nasal cannula.  Transferred out of the unit on May 6.  Patient seen by pulmonary and cardiology.     Interval Followup:   BP soft  Remains on 7 L with 96% saturation.  Patient did not like BiPAP.  Shortness of air improving.  No chest pain.  No cough.  Right knee remains swollen.  Not painful  Good urine output negative 269 mL.    Review of Systems   All systems were reviewed and negative except for: Summary and interval follow-up    Objective   Objective     Vitals:   Temp:  [97.3 °F (36.3 °C)-98.7 °F (37.1 °C)] 97.5 °F (36.4 °C)  Heart Rate:  [70-74] 70  Resp:  [18-20] 18  BP: ()/(52-65) 90/53  Flow (L/min) (Oxygen Therapy):  [8] 8  Physical Exam      Constitutional: Awake, alert, increased work of breathing, mild respiratory distress              Eyes: Pupils equal, sclerae anicteric, no conjunctival injection              HENT: NCAT, mucous membranes moist              Neck: Supple, no thyromegaly, no lymphadenopathy, trachea midline              Respiratory: Crackles heard mostly left base and midlung area, minimal expiratory wheezing, prolonged expiratory phase              Cardiovascular: RRR, no murmurs, rubs, or gallops,  palpable pedal pulses bilaterally.  +2 bilateral extremity LYNN              Gastrointestinal: Positive bowel sounds, soft, nontender, nondistended              Musculoskeletal: Right knee swollen, good range of motion and no tenderness on palpation              Neurologic: Oriented x 3, strength symmetric in all extremities, Cranial Nerves grossly intact to confrontation, speech clear              Skin: Wounds to the left foot is dressed, chronic discoloration lower extremities.      Result Review    Result Review:  I have personally reviewed the results for the past 24 hours and agree with these findings:  [x]  Laboratory  []  Microbiology  [x]  Radiology  [x]  EKG/Telemetry V paced rhythm  []  Cardiology/Vascular   []  Pathology  [x]  Old records  [x]  Other: Medications    Assessment & Plan   Assessment / Plan     Assessment:  Acute hypoxemic respiratory failure.  Improving.  Acute on chronic systolic CHF EF of 38%.  Moderate TR.  COPD exacerbation.  History of A-fib on Eliquis.  Status post PPM/AICD.  Recent right knee septic arthritis and left foot infection on IV daptomycin/Rocephin via RUE PICC line until May 15.  Transaminitis.  Likely hepatic congestion from CHF.  Rheumatoid factor positive.  No symptoms.  Dilated ascending thoracic aorta 4.4 cm.  Left bundle branch block.       Plan:  Continue supplemental oxygen to keep sats more than 90%.  Low-salt diet, fluid restriction, strict input output and daily weights.  IV Lasix decreased to daily.  Trend BNP  2D echo noted.  Check ultrasound right upper quadrant.  Pending  Check acute hepatitis panel  Goal-directed medical therapy including Entresto and Aldactone.  Appreciate cardiology input.  Continue home Eliquis.  Continue home Vanco and Rocephin until May 15.  Patient was on daptomycin on admission.  MRSA PCR negative.  IV steroids switched to p.o.  Sliding-scale insulin  Nebulizer treatment.  Bronchopulmonary hygiene protocol.  Wound nurse consulted.  CTA  chest noted no PE.  Bilateral groundglass opacities consistent with pulmonary edema.  PT OT.  Recommending SNF.  Patient wants to go home from here  Appreciate pulmonary input.  Plan for bronchoscopy noted in a.m.  Continue telemetry  Discussed plan with RN and .  Patient was about to be discharged from [facility] to go home.    VTE Prophylaxis:  Pharmacologic VTE prophylaxis orders are present.  Eliquis        CODE STATUS:   Code Status (Patient has no pulse and is not breathing): No CPR (Do Not Attempt to Resuscitate)  Medical Interventions (Patient has pulse or is breathing): Limited Support  Medical Intervention Limits: No intubation (DNI)      Part of this note may be an electronic transcription/translation of spoken language to printed text using the Dragon Dictation System.       Electronically signed by Owen Wooten MD, 05/07/25, 5:00 PM EDT.

## 2025-05-07 NOTE — PROGRESS NOTES
[facility]: Vancomycin Monitoring Note    Roger D Snellen is a 75 y.o. male who is on day 3/11 of pharmacy to dose vancomycin for Bone and/or Joint Infection. Continuation of treatment with Daptomycin from [facility] for concern for septic joint. Patient to complete a total of 4 weeks of therapy. Last day 5/15/25.     Previous Vancomycin Dose:   1250 mg IV every  12  hours  Imaging Reviewed?: Yes  Updated Cultures and Sensitivities:   Microbiology Results (last 10 days)       Procedure Component Value - Date/Time    CANDIDA AURIS PCR - Swab, Axilla Right, Axilla Left and Groin [[id number]]  (Normal) Collected: 05/06/25 1243    Lab Status: Final result Specimen: Swab from Axilla Right, Axilla Left and Groin Updated: 05/07/25 1030     CANDIDA AURIS PCR Not Detected    MRSA Screen, PCR (Inpatient) - Swab, Nares [[id number]]  (Normal) Collected: 05/05/25 1628    Lab Status: Final result Specimen: Swab from Nares Updated: 05/05/25 1749     MRSA PCR No MRSA Detected    Narrative:      The negative predictive value of this diagnostic test is high and should only be used to consider de-escalating anti-MRSA therapy. A positive result may indicate colonization with MRSA and must be correlated clinically.    Respiratory Panel PCR w/COVID-19(SARS-CoV-2) SALLY/DULCE MARIA/CAROLEE/PAD/COR/TYRON In-House, NP Swab in UTM/VTM, 2 HR TAT - Swab, Nasopharynx [[id number]]  (Normal) Collected: 05/05/25 1452    Lab Status: Final result Specimen: Swab from Nasopharynx Updated: 05/05/25 1557     ADENOVIRUS, PCR Not Detected     Coronavirus 229E Not Detected     Coronavirus HKU1 Not Detected     Coronavirus NL63 Not Detected     Coronavirus OC43 Not Detected     COVID19 Not Detected     Human Metapneumovirus Not Detected     Human Rhinovirus/Enterovirus Not Detected     Influenza A PCR Not Detected     Influenza B PCR Not Detected     Parainfluenza Virus 1 Not Detected     Parainfluenza Virus 2 Not Detected     Parainfluenza Virus 3 Not  Detected     Parainfluenza Virus 4 Not Detected     RSV, PCR Not Detected     Bordetella pertussis pcr Not Detected     Bordetella parapertussis PCR Not Detected     Chlamydophila pneumoniae PCR Not Detected     Mycoplasma pneumo by PCR Not Detected    Narrative:      In the setting of a positive respiratory panel with a viral infection PLUS a negative procalcitonin without other underlying concern for bacterial infection, consider observing off antibiotics or discontinuation of antibiotics and continue supportive care. If the respiratory panel is positive for atypical bacterial infection (Bordetella pertussis, Chlamydophila pneumoniae, or Mycoplasma pneumoniae), consider antibiotic de-escalation to target atypical bacterial infection.    S. Pneumo Ag Urine or CSF - Urine, Urine, Clean Catch [[id number]]  (Normal) Collected: 05/05/25 1451    Lab Status: Final result Specimen: Urine, Clean Catch Updated: 05/05/25 1613     Strep Pneumo Ag Negative    Narrative:      5/18/26 (S. Pneumo)    Legionella Antigen, Urine - Urine, Urine, Clean Catch [[id number]]  (Normal) Collected: 05/05/25 1451    Lab Status: Final result Specimen: Urine, Clean Catch Updated: 05/05/25 1613     LEGIONELLA ANTIGEN, URINE Negative    Blood Culture - Blood, Hand, Left [[id number]]  (Normal) Collected: 05/05/25 0933    Lab Status: Preliminary result Specimen: Blood from Hand, Left Updated: 05/07/25 0945     Blood Culture No growth at 2 days    Blood Culture - Blood, Hand, Right [[id number]]  (Normal) Collected: 05/05/25 0933    Lab Status: Preliminary result Specimen: Blood from Hand, Right Updated: 05/07/25 0945     Blood Culture No growth at 2 days    COVID-19, FLU A/B, RSV PCR 1 HR TAT - Swab, Nasopharynx [[id number]]  (Normal) Collected: 05/05/25 0845    Lab Status: Final result Specimen: Swab from Nasopharynx Updated: 05/05/25 0931     COVID19 Not Detected     Influenza A PCR Not Detected     Influenza B PCR Not Detected     RSV, PCR Not  "Detected    Narrative:      Fact sheet for providers: https://www.fda.gov/media/321889/download    Fact sheet for patients: https://www.fda.gov/media/511528/download    Test performed by PCR.              Vitals/Labs  Ht: 198.1 cm (78\"); Wt: 109 kg (240 lb 11.9 oz)   Temp (24hrs), Av.8 °F (36.6 °C), Min:97.3 °F (36.3 °C), Max:98.7 °F (37.1 °C)   Estimated Creatinine Clearance: 127.5 mL/min (by C-G formula based on SCr of 0.76 mg/dL).     Results from last 7 days   Lab Units 25  1155 25  0536 25  0412 25  0812 25  0525 25  0933 25  0845   VANCOMYCIN RM mcg/mL  --   --   --   --  23.54  --   --    VANCOMYCIN TR mcg/mL 18.94  --   --   --   --   --   --    CREATININE mg/dL  --  0.76  --  0.65* 0.71*   < >  --    WBC 10*3/mm3  --   --  12.11*  --  10.47  --  12.83*    < > = values in this interval not displayed.     Assessment/Plan    Change Vancomycin Dose to  1000 mg IV every 12 hours; which provides the following predicted parameters:  Exposure target: AUC24 (range)400-600 mg/L.hr   AUC24,ss: 508 mg/L.hr  Probability of AUC24 > 400: 97 %  Ctrough,ss: 16.1 mg/L  Probability of Ctrough,ss > 20: 10 %  Probability of nephrotoxicity (Lodise FREDDY ): 11 %  Next Vanc Random planned for  at 0600  We will continue to monitor patient changes and renal function     Thank you for involving pharmacy in this patient's care. Please contact pharmacy with any questions or concerns.    Minh Quintero [facility]  Clinical Pharmacist      "

## 2025-05-07 NOTE — PROGRESS NOTES
Pulmonary / Critical Care Progress Note      Patient Name: Roger D Snellen  : 1950  MRN: [id number]  Attending:  Owen Wooten MD   Date of admission: 2025    Subjective   Subjective   Follow-up for respiratory failure, decompensated congestive heart failure    Over past 24 hours: Patient remained on high flow oxygen receiving IV diuresis, antibiotics    This morning,  Currently on 8 L nasal cannula  Eating up in bed  Reports feeling better today  Denies any chest pain or chest tightness  No fever or chills  Edema improving    Objective   Objective     Vitals:   Vital signs for last 24 hours:  Temp:  [97.5 °F (36.4 °C)-98.7 °F (37.1 °C)] 97.5 °F (36.4 °C)  Heart Rate:  [70-74] 70  Resp:  [18-23] 20  BP: ()/(52-73) 101/63    Intake/Output last 3 shifts:  I/O last 3 completed shifts:  In: 1431 [P.O.:1431]  Out: 3575 [Urine:3575]    Physical Exam   Vital Signs Reviewed   General:  Alert, NAD. Lying in bed   HEENT:  PERRL, EOMI.  OP  Chest:  Clear to auscultation bilaterally, no work of breathing noted  CV: RRR, no MGR, pulses 2+, equal.  Abd:  Soft, NT, ND, + BS  EXT:  no clubbing, no cyanosis, no edema  Neuro:  A&Ox3, CN grossly intact, no focal deficits.  Skin: No rashes or lesions noted    Result Review    Result Review:  I have personally reviewed the results from the time of this admission to 2025 07:32 EDT and agree with these findings:  [x]  Laboratory  []  Microbiology  [x]  Radiology  [x]  EKG/Telemetry   [x]  Cardiology/Vascular   []  Pathology  []  Old records  []  Other:  Most notable findings include:    .    Assessment & Plan   Assessment / Plan     Active Hospital Problems:  Active Hospital Problems    Diagnosis     **Acute hypoxic respiratory failure          Impression:  Acute hypoxic respiratory failure  Multifocal pneumonia from unknown organism  Acute cardiogenic pulmonary edema  Decompensated congestive heart failure with reduced EF  Volume overload  Recent septic arthritis  of the left lower extremity on daptomycin/ceftriaxone as outpatient     Plan:    Remains on 8 L nasal cannula.  Continue to wean supplemental oxygen to maintain SpO2 greater than 90%  Chest CT personally reviewed.  Bilateral groundglass opacities noted concerning   With reduced EF and elevated BNP, CT findings more consistent with pulmonary edema.  Continue with IV diuresis; monitor accurate I's and O's  Continue prednisone 40 mg oral daily  2D echo did show EF with 38%  Continue advanced heart failure medications  Continue Eliquis  Continue antibiotics with vancomycin and ceftriaxone for left lower extremity septic arthritis; of note patient was previously on daptomycin/ceftriaxone outpatient; PICC in place  Trend renal function and electrolytes to keep potassium 4, mag 2, Phos 4  Continue H2 blocker for GI prophylaxis  DVT prophylaxis, on Eliquis  Will take for bronchoscopy with airway inspection, brushings, biopsies, bronchoalveolar lavage  I have discussed the risks of the procedure with the patient including pneumothorax, hemothorax, bleeding, hypoxia, required mechanical ventilation and death. The patient recognizes these findings, acknowledges these findings and is agreeable to the procedure.  N.p.o. after midnight     Pharmacologic VTE prophylaxis orders are present.    CODE STATUS:   Code Status (Patient has no pulse and is not breathing): No CPR (Do Not Attempt to Resuscitate)  Medical Interventions (Patient has pulse or is breathing): Limited Support  Medical Intervention Limits: No intubation (DNI)    I have reviewed labs, imaging, pertinent clinical data and provider notes.   I have discussed with bedside nurse and primary service.     Electronically signed by LEONELA Anderson, 05/07/25, 3:05 PM EDT.  Electronically signed by Tae Mosley MD, 05/07/25, 7:32 AM EDT.    This visit was performed by BOTH a physician and an APC. I personally evaluated and examined the patient. I performed all  aspects of MDM as documented. , I have reviewed and confirmed the accuracy of the patient's history as documented in this note., and I have reexamined the patient and the results are consistent with the previously documented exam. I have updated the documentation as necessary.     Electronically signed by Tae Mosley MD, 05/07/25, 4:20 PM EDT.

## 2025-05-07 NOTE — PROGRESS NOTES
[facility]   Progress Note    Patient Name: Roger D Snellen  : 1950  MRN: [id number]  Primary Care Physician: John Phelps Jr., MD  Date of admission: 2025    Subjective   Subjective     HPI:  Patient indicated that his blood pressure was noted to be low today, patient receiving significant amount of Lasix, also Aldactone, patient does not want to take Toprol because he indicated that he had low blood pressure with diet in the past.  Patient has prerenal azotemia, no evidence of pedal edema.    Review of Systems  Review of Systems   Constitutional: Negative.    HENT: Negative.     Eyes: Negative.    Respiratory: Negative.     Cardiovascular:  Negative for leg swelling.   Gastrointestinal: Negative.    Endocrine: Negative.    Genitourinary: Negative.    Musculoskeletal: Negative.    Skin: Negative.    Neurological: Negative.    Psychiatric/Behavioral: Negative.         Objective   Objective     Vitals:  Temp:  [97.3 °F (36.3 °C)-98.7 °F (37.1 °C)] 97.3 °F (36.3 °C)  Heart Rate:  [70-74] 70  Resp:  [18-20] 20  BP: ()/(52-67) 86/52  Flow (L/min) (Oxygen Therapy):  [8] 8    Physical Exam:  Physical Exam  Constitutional:       Appearance: Normal appearance.   HENT:      Head: Normocephalic.   Cardiovascular:      Rate and Rhythm: Regular rhythm.      Heart sounds: No murmur heard.  Pulmonary:      Breath sounds: No rales.   Abdominal:      Palpations: Abdomen is soft.   Musculoskeletal:      Right lower leg: No edema.      Left lower leg: No edema.   Skin:     General: Skin is warm.   Neurological:      General: No focal deficit present.      Mental Status: He is alert.   Psychiatric:         Mood and Affect: Mood normal.         Result Review    Result Review:  I have personally reviewed the results from the time of this admission to 25 1:32 PM EDT and agree with these findings:  [x]  Laboratory  []  Microbiology  []  Radiology  []  EKG/Telemetry   []  Cardiology/Vascular   []  Pathology  []   Old records  []  Other:    Most notable findings include: 75-year-old gentleman with history of cardiomyopathy, ventricular tachycardia status post AICD placement with pacemaker, patient has paced rhythm, he has diuresed significantly, he needs to be on multiple medication for his underlying severe cardiomyopathy, patient reported no history of coronary artery disease.  I am going to decrease the Aldactone to 12.5 mg a day discontinue the Toprol and the Lasix.    Assessment & Plan   Assessment / Plan     Active Hospital Problems:  Active Hospital Problems    Diagnosis     **Acute hypoxic respiratory failure        Plan:   Patient will get daily weights standing outside the bed I will discontinue the fluid restriction.    DVT prophylaxis: As per internal medicine    CODE STATUS:    Code Status and Medical Interventions: No CPR (Do Not Attempt to Resuscitate); Limited Support; No intubation (DNI)   Ordered at: 05/05/25 1213     Code Status (Patient has no pulse and is not breathing):    No CPR (Do Not Attempt to Resuscitate)     Medical Interventions (Patient has pulse or is breathing):    Limited Support     Medical Intervention Limits:    No intubation (DNI)       Disposition:  I expect patient to be discharged needs further evaluation.    Electronically signed by Juliocesar Giang MD, 05/07/25, 1:32 PM EDT.

## 2025-05-07 NOTE — NURSING NOTE
"Met with pt, spouse and son at bedside. Discussed in detail pt wishes and discussed completing a [facility]-MOST however during discussion pts son verbalized that \"if he goes down around me I will do cpr.\" Provided emotional support and explained that this does not appear to be what pt wishes but son disengaged in conversation. Talked with spouse and pt about his wishes at which point they verbalized they have discussed in detail due to tension in room it was determined that completing a KY-MOST at this time would not be appropriate. Did ensure we had a copy of POA papers which were copied and scanned to medical records. Have discussed with primary RN. Palliative care will sign off at this time as services are not needed.       Alae GRAHAM RN  Palliative Care    "

## 2025-05-07 NOTE — THERAPY EVALUATION
Patient Name: Roger D Snellen  : 1950    MRN: [id number]                              Today's Date: 2025       Admit Date: 2025    Visit Dx:     ICD-10-CM ICD-9-CM   1. Acute hypoxemic respiratory failure  J96.01 518.81   2. Acute on chronic systolic congestive heart failure  I50.23 428.23     428.0   3. Acute pulmonary edema  J81.0 518.4   4. Difficulty walking  R26.2 719.7   5. Impaired mobility and ADLs  Z74.09 V49.89    Z78.9      Patient Active Problem List   Diagnosis    Non-ischemic cardiomyopathy    Acute hypoxic respiratory failure     Past Medical History:   Diagnosis Date    Arrhythmia     ATRIAL FIB, PACE TERMINATED VT    Atrial fibrillation     BBB (bundle branch block)     L BBB    CHF (congestive heart failure)     Diabetes mellitus     Disease of thyroid gland     GERD (gastroesophageal reflux disease)     Hyperlipidemia     Hypertension     Non-ischemic cardiomyopathy     DILATED CM EF 20-25% 2017    Osteoarthritis      Past Surgical History:   Procedure Laterality Date    CARDIAC ELECTROPHYSIOLOGY PROCEDURE N/A 2017    Procedure: Device Upgrade  ICD TO A BIV ICD   medtronic;  Surgeon: Chris Phillips MD;  Location: [facility] INVASIVE LOCATION;  Service:     CHOLECYSTECTOMY      EYE SURGERY Left 2025    Retina Repair    INSERT / REPLACE / REMOVE PACEMAKER      INTERNAL CARDIAC DEFIBRILLATOR INSERTION      [employer]    SHOULDER ARTHROSCOPY      TOTAL KNEE ARTHROPLASTY        General Information       Row Name 25 1050          OT Time and Intention    Document Type evaluation  -AC     Mode of Treatment individual therapy;occupational therapy  -AC     Patient Effort good  -AC       Row Name 25 1050          General Information    Patient Profile Reviewed yes  -AC     Prior Level of Function --  Patient reports (I) with adls, used an occasional cane, No supplemental O2, has a shower chair but stands. Does use sock aid and reacher occassionally  -AC      Existing Precautions/Restrictions fall  -     Barriers to Rehab none identified  -       Row Name 05/07/25 1050          Occupational Profile    Reason for Services/Referral (Occupational Profile) Patient is a 75 year old male admitted for the above diagnosis. Occupational therapy services ordered to assess patient safety and independence with adls and functional mobility. Patient not currently receiving OT services for the above diagnosis.  -       Row Name 05/07/25 1050          Living Environment    Current Living Arrangements home  -     People in Home spouse  -       Row Name 05/07/25 1050          Home Main Entrance    Number of Stairs, Main Entrance three  -       Row Name 05/07/25 1050          Cognition    Orientation Status (Cognition) oriented x 3  -       Row Name 05/07/25 1050          Safety Issues/Impairments Affecting Functional Mobility    Impairments Affecting Function (Mobility) balance;endurance/activity tolerance;pain;strength;shortness of breath  -               User Key  (r) = Recorded By, (t) = Taken By, (c) = Cosigned By      Initials Name Provider Type     Rola Limon OT Occupational Therapist                     Mobility/ADL's       Row Name 05/07/25 1052          Bed Mobility    Bed Mobility supine-sit  -     Supine-Sit [place] (Bed Mobility) contact guard  -     Assistive Device (Bed Mobility) head of bed elevated  -       Row Name 05/07/25 1052          Transfers    Transfers sit-stand transfer  -       Row Name 05/07/25 1052          Sit-Stand Transfer    Sit-Stand [place] (Transfers) contact guard;1 person assist;verbal cues  -     Assistive Device (Sit-Stand Transfers) walker, front-wheeled  -       Row Name 05/07/25 1052          Functional Mobility    Functional Mobility- Ind. Level contact guard assist;1 person;verbal cues required  -     Functional Mobility- Device walker, front-wheeled  -     Functional Mobility- Comment patient  states earlier in the week he was not able to weightbear through RLE due to septic arthritis of the knee. Today, patient was able to step in place x5 wtih CGA and rolling walker with cues for safety and technique.  -       Row Name 05/07/25 1052          Activities of Daily Living    BADL Assessment/Intervention --  Patient is setup for upper body bathing/dressing, setup for grooming, setup for self-feeding, min A for lower body bathing/dressing, CGA for toileting.  -               User Key  (r) = Recorded By, (t) = Taken By, (c) = Cosigned By      Initials Name Provider Type     Rola Limon OT Occupational Therapist                   Obj/Interventions       Row Name 05/07/25 1057          Sensory Assessment (Somatosensory)    Sensory Assessment (Somatosensory) UE sensation intact  -[facility] Name 05/07/25 1057          Vision Assessment/Intervention    Visual Impairment/Limitations WFL  -[facility] Name 05/07/25 1057          Range of Motion Comprehensive    General Range of Motion bilateral upper extremity ROM WNL  -[facility] Name 05/07/25 1057          Strength Comprehensive (MMT)    Comment, General Manual Muscle Testing (MMT) Assessment BUE 4/5  -       Row Name 05/07/25 1057          Motor Skills    Motor Skills coordination;functional endurance  -     Coordination WFL  -     Functional Endurance fair minus, O2 sats dropped to high 70s with activity, patient able to increase rating to 86 with extra time and pursed lip breathing.  -       Row Name 05/07/25 1057          Balance    Balance Assessment standing dynamic balance  -     Dynamic Standing Balance contact guard  -     Position/Device Used, Standing Balance supported;walker, front-wheeled  -     Balance Interventions standing;sit to stand;supported;minimal challenge;dynamic;occupation based/functional task  -               User Key  (r) = Recorded By, (t) = Taken By, (c) = Cosigned By      Initials Name Provider Type     AC Rola Limon, OT Occupational Therapist                   Goals/Plan       Row Name 05/07/25 1103          Bed Mobility Goal 1 (OT)    Activity/Assistive Device (Bed Mobility Goal 1, OT) bed mobility activities, all  -AC     Leslie Level/Cues Needed (Bed Mobility Goal 1, OT) modified independence  -AC     Time Frame (Bed Mobility Goal 1, OT) long term goal (LTG)  -AC       Row Name 05/07/25 1103          Transfer Goal 1 (OT)    Activity/Assistive Device (Transfer Goal 1, OT) transfers, all  -AC     Leslie Level/Cues Needed (Transfer Goal 1, OT) modified independence  -AC     Time Frame (Transfer Goal 1, OT) long term goal (LTG);10 days  -AC       Row Name 05/07/25 1103          Bathing Goal 1 (OT)    Activity/Device (Bathing Goal 1, OT) bathing skills, all  -AC     Leslie Level/Cues Needed (Bathing Goal 1, OT) modified independence  -AC     Time Frame (Bathing Goal 1, OT) long term goal (LTG);10 days  -AC       Row Name 05/07/25 1103          Dressing Goal 1 (OT)    Activity/Device (Dressing Goal 1, OT) dressing skills, all  -AC     Leslie/Cues Needed (Dressing Goal 1, OT) modified independence  -AC     Time Frame (Dressing Goal 1, OT) long term goal (LTG);10 days  -AC       Row Name 05/07/25 1103          Toileting Goal 1 (OT)    Activity/Device (Toileting Goal 1, OT) toileting skills, all  -AC     Leslie Level/Cues Needed (Toileting Goal 1, OT) modified independence  -AC     Time Frame (Toileting Goal 1, OT) long term goal (LTG);10 days  -AC       Row Name 05/07/25 1103          Strength Goal 1 (OT)    Strength Goal 1 (OT) patient will demonstrate BUE strength of 5/5 for adls.  -AC     Time Frame (Strength Goal 1, OT) long term goal (LTG);10 days  -AC       Row Name 05/07/25 1103          Problem Specific Goal 1 (OT)    Problem Specific Goal 1 (OT) Patient will improve endurance to good for adls.  -AC     Time Frame (Problem Specific Goal 1, OT) long term goal (LTG);10 days  -AC        [facility] Name 05/07/25 1103          Therapy Assessment/Plan (OT)    Planned Therapy Interventions (OT) activity tolerance training;functional balance retraining;occupation/activity based interventions;patient/caregiver education/training;ROM/therapeutic exercise;BADL retraining  -               User Key  (r) = Recorded By, (t) = Taken By, (c) = Cosigned By      Initials Name Provider Type     Rola Limon, OT Occupational Therapist                   Clinical Impression       [facility] Name 05/07/25 1100          Pain Assessment    Pretreatment Pain Rating 0/10 - no pain  -     Posttreatment Pain Rating 0/10 - no pain  -AC       Row Name 05/07/25 1100          Plan of Care Review    Plan of Care Reviewed With patient  -     Progress no change  -     Outcome Evaluation Patient presents with balance, endurance and strength limitations that impact patients ability to safely and independently perform adls and adl transfers. patient to continue with therapy in order to maximize independence with adls.  -[facility] Name 05/07/25 1100          Therapy Assessment/Plan (OT)    Patient/Family Therapy Goal Statement (OT) home.  -     Rehab Potential (OT) good  -     Criteria for Skilled Therapeutic Interventions Met (OT) yes;meets criteria;skilled treatment is necessary  -     Therapy Frequency (OT) 5 times/wk  -[facility] Name 05/07/25 1100          Therapy Plan Review/Discharge Plan (OT)    Equipment Needs Upon Discharge (OT) walker, rolling;commode chair  -     Anticipated Discharge Disposition (OT) inpatient rehabilitation facility  -[facility] Name 05/07/25 1100          Positioning and Restraints    Pre-Treatment Position in bed  -     Post Treatment Position bed  -AC     In Bed fowlers;encouraged to call for assist;call light within reach;exit alarm on;legs elevated  -               User Key  (r) = Recorded By, (t) = Taken By, (c) = Cosigned By      Initials Name Provider Type    AC Rola Limon,  OT Occupational Therapist                   Outcome Measures       Row Name 05/07/25 1105          How much help from another is currently needed...    Putting on and taking off regular lower body clothing? 3  -AC     Bathing (including washing, rinsing, and drying) 3  -AC     Toileting (which includes using toilet bed pan or urinal) 3  -AC     Putting on and taking off regular upper body clothing 4  -AC     Taking care of personal grooming (such as brushing teeth) 4  -AC     Eating meals 4  -AC     AM-PAC 6 Clicks Score (OT) 21  -AC       Row Name 05/07/25 1105          Functional Assessment    Outcome Measure Options AM-PAC 6 Clicks Daily Activity (OT);Optimal Instrument  -AC       Row Name 05/07/25 1105          Optimal Instrument    Optimal Instrument Optimal - 3  -AC     Bending/Stooping 2  -AC     Standing 2  -AC     Reaching 1  -AC     From the list, choose the 3 activities you would most like to be able to do without any difficulty Bending/stooping;Standing;Reaching  -AC     Total Score Optimal - 3 5  -AC               User Key  (r) = Recorded By, (t) = Taken By, (c) = Cosigned By      Initials Name Provider Type    Rola Ball OT Occupational Therapist                    Occupational Therapy Education       Title: PT OT SLP Therapies (In Progress)       Topic: Occupational Therapy (Done)       Point: ADL training (Done)       Learning Progress Summary            Patient Acceptance, E, VU by NAN at 5/7/2025 1106                      Point: Home exercise program (Done)       Learning Progress Summary            Patient Acceptance, E, VU by NAN at 5/7/2025 1106                      Point: Precautions (Done)       Learning Progress Summary            Patient Acceptance, E, VU by NAN at 5/7/2025 1106                      Point: Body mechanics (Done)       Learning Progress Summary            Patient Acceptance, E, VU by  at 5/7/2025 1106                                      User Key       Initials Effective  Dates Name Provider Type Discipline     06/16/21 -  Rola Limon OT Occupational Therapist OT                  OT Recommendation and Plan  Planned Therapy Interventions (OT): activity tolerance training, functional balance retraining, occupation/activity based interventions, patient/caregiver education/training, ROM/therapeutic exercise, BADL retraining  Therapy Frequency (OT): 5 times/wk  Plan of Care Review  Plan of Care Reviewed With: patient  Progress: no change  Outcome Evaluation: Patient presents with balance, endurance and strength limitations that impact patients ability to safely and independently perform adls and adl transfers. patient to continue with therapy in order to maximize independence with adls.     Time Calculation:   Evaluation Complexity (OT)  Review Occupational Profile/Medical/Therapy History Complexity: expanded/moderate complexity  Assessment, Occupational Performance/Identification of Deficit Complexity: 1-3 performance deficits  Clinical Decision Making Complexity (OT): problem focused assessment/low complexity  Overall Complexity of Evaluation (OT): low complexity     Time Calculation- OT       Row Name 05/07/25 1107             Time Calculation- OT    OT Received On 05/07/25  -      OT Goal Re-Cert Due Date 05/16/25  -         Untimed Charges    OT Eval/Re-eval Minutes 31  -AC         Total Minutes    Untimed Charges Total Minutes 31  -AC       Total Minutes 31  -AC                User Key  (r) = Recorded By, (t) = Taken By, (c) = Cosigned By      Initials Name Provider Type    AC Rola Limon OT Occupational Therapist                  Therapy Charges for Today       Code Description Service Date Service Provider Modifiers Qty    [id number] HC OT EVAL LOW COMPLEXITY 3 5/7/2025 Rola Limon OT GO 1                 Rola Limon OT  5/7/2025

## 2025-05-07 NOTE — PLAN OF CARE
Goal Outcome Evaluation:  Plan of Care Reviewed With: patient        Progress: no change  Outcome Evaluation: Patient presents with balance, endurance and strength limitations that impact patients ability to safely and independently perform adls and adl transfers. patient to continue with therapy in order to maximize independence with adls.    Anticipated Discharge Disposition (OT): inpatient rehabilitation facility

## 2025-05-07 NOTE — PLAN OF CARE
Goal Outcome Evaluation:              Outcome Evaluation: Pt is Alert and Oriented X 4. No complaints of pain or discomfort. Wound care completed. Pt had liver ultra sound completed this shift. Will be NPO after MN for Bronch per Dr Mosley 5/8/25  Call light is within reach.

## 2025-05-07 NOTE — PLAN OF CARE
Goal Outcome Evaluation:  Plan of Care Reviewed With: patient           Outcome Evaluation: A/Ox4, 8L HFNC, V paced on monitor, IV steroids and abx continued, PRN pain meds given, pt resting, no requests at this time

## 2025-05-08 ENCOUNTER — ANESTHESIA EVENT (OUTPATIENT)
Dept: PERIOP | Facility: HOSPITAL | Age: 75
End: 2025-05-08
Payer: MEDICARE

## 2025-05-08 ENCOUNTER — ANESTHESIA (OUTPATIENT)
Dept: PERIOP | Facility: HOSPITAL | Age: 75
End: 2025-05-08
Payer: MEDICARE

## 2025-05-08 PROCEDURE — 25010000002 LIDOCAINE PF 2% 2 % SOLUTION

## 2025-05-08 PROCEDURE — 25810000003 LACTATED RINGERS PER 1000 ML: Performed by: ANESTHESIOLOGY

## 2025-05-08 PROCEDURE — 25010000002 PROPOFOL 10 MG/ML EMULSION

## 2025-05-08 RX ORDER — PROPOFOL 10 MG/ML
VIAL (ML) INTRAVENOUS AS NEEDED
Status: DISCONTINUED | OUTPATIENT
Start: 2025-05-08 | End: 2025-05-08 | Stop reason: SURG

## 2025-05-08 RX ORDER — LIDOCAINE HYDROCHLORIDE 20 MG/ML
INJECTION, SOLUTION EPIDURAL; INFILTRATION; INTRACAUDAL; PERINEURAL AS NEEDED
Status: DISCONTINUED | OUTPATIENT
Start: 2025-05-08 | End: 2025-05-08 | Stop reason: SURG

## 2025-05-08 RX ADMIN — LIDOCAINE HYDROCHLORIDE 100 MG: 20 INJECTION, SOLUTION INTRAVENOUS at 11:46

## 2025-05-08 RX ADMIN — PROPOFOL 50 MG: 10 INJECTION, EMULSION INTRAVENOUS at 11:46

## 2025-05-08 RX ADMIN — PROPOFOL 20 MG: 10 INJECTION, EMULSION INTRAVENOUS at 11:50

## 2025-05-08 RX ADMIN — SODIUM CHLORIDE, POTASSIUM CHLORIDE, SODIUM LACTATE AND CALCIUM CHLORIDE: 600; 310; 30; 20 INJECTION, SOLUTION INTRAVENOUS at 11:40

## 2025-05-08 RX ADMIN — PROPOFOL 50 MG: 10 INJECTION, EMULSION INTRAVENOUS at 11:48

## 2025-05-08 NOTE — ANESTHESIA POSTPROCEDURE EVALUATION
Patient: Roger D Snellen    Procedure Summary       Date: 05/08/25 Room / Location: [facility] OR 01 / [facility] MAIN OR    Anesthesia Start: 1140 Anesthesia Stop: 1207    Procedure: BRONCHOSCOPY WITH BRONCHOALVEOLAR LAVAGE, WASHING, AIRWAY INSPECTION: insertion of lighted instrument to view inside the lung (Bronchus) Diagnosis:       Acute hypoxic respiratory failure      (Acute hypoxic respiratory failure [J96.01])    Surgeons: Tae Mosley MD Provider: Bianka Ferris MD    Anesthesia Type: general, MAC ASA Status: 4            Anesthesia Type: general, MAC    Vitals  Vitals Value Taken Time   /75 05/08/25 13:03   Temp 36.2 °C (97.1 °F) 05/08/25 12:45   Pulse 70 05/08/25 13:09   Resp 23 05/08/25 13:05   SpO2 92 % 05/08/25 13:09   Vitals shown include unfiled device data.        Post Anesthesia Care and Evaluation    Patient location during evaluation: bedside  Patient participation: complete - patient participated  Level of consciousness: awake  Pain management: adequate    Airway patency: patent  PONV Status: none  Cardiovascular status: acceptable and stable  Respiratory status: acceptable  Hydration status: acceptable

## 2025-05-08 NOTE — ANESTHESIA PREPROCEDURE EVALUATION
Anesthesia Evaluation     Patient summary reviewed and Nursing notes reviewed   history of anesthetic complications:  PONV  NPO Solid Status: > 8 hours  NPO Liquid Status: > 2 hours           Airway   Mallampati: II  TM distance: >3 FB  Neck ROM: full  Dental - normal exam     Pulmonary    (+) a smoker,shortness of breath, decreased breath sounds  Cardiovascular   Exercise tolerance: poor (<4 METS)    (+) pacemaker pacemaker, ICD, hypertension, dysrhythmias Atrial Fib, CHF Systolic <55%, hyperlipidemia    PE comment: Paced    Neuro/Psych- negative ROS  GI/Hepatic/Renal/Endo    (+) GERD, diabetes mellitus, thyroid problem hypothyroidism    Musculoskeletal     Abdominal  - normal exam   Substance History - negative use     OB/GYN negative ob/gyn ROS         Other   arthritis,     ROS/Med Hx Other: PAT Nursing Notes unavailable.     On humidified O2 cannula at 92-93%              Anesthesia Plan    ASA 4     general and MAC   total IV anesthesia  intravenous induction     Anesthetic plan, risks, benefits, and alternatives have been provided, discussed and informed consent has been obtained with: patient.    Plan discussed with CRNA.    CODE STATUS:    While under anesthesia and immediate perioperative period:  Code Status: CPR - Full Support

## 2025-05-08 NOTE — PROGRESS NOTES
Enter Query Response Below      Query Response: Possible chronic atrial fibrillation, patient is on amiodarone because of also ventricular tachycardia, status post AICD placement.             If applicable, please update the problem list.

## 2025-05-08 NOTE — PROGRESS NOTES
[facility] Clinical Pharmacy Services: Vancomycin Monitoring Note    Roger D Snellen is a 75 y.o. male who is on day 4/11 of pharmacy to dose vancomycin for Bone and/or Joint Infection. Continuation of treatment with Daptomycin from [facility] for concern for septic joint. Patient to complete a total of 4 weeks of therapy. Last day 5/15/25.     Previous Vancomycin Dose:   1250 mg IV every  12  hours  Imaging Reviewed?: Yes  Updated Cultures and Sensitivities:   Microbiology Results (last 10 days)       Procedure Component Value - Date/Time    CANDIDA AURIS PCR - Swab, Axilla Right, Axilla Left and Groin [[id number]]  (Normal) Collected: 05/06/25 1243    Lab Status: Final result Specimen: Swab from Axilla Right, Axilla Left and Groin Updated: 05/07/25 1030     CANDIDA AURIS PCR Not Detected    MRSA Screen, PCR (Inpatient) - Swab, Nares [[id number]]  (Normal) Collected: 05/05/25 1628    Lab Status: Final result Specimen: Swab from Nares Updated: 05/05/25 1749     MRSA PCR No MRSA Detected    Narrative:      The negative predictive value of this diagnostic test is high and should only be used to consider de-escalating anti-MRSA therapy. A positive result may indicate colonization with MRSA and must be correlated clinically.    Respiratory Panel PCR w/COVID-19(SARS-CoV-2) SALLY/DULCE MARIA/CAROLEE/PAD/COR/TYRON In-House, NP Swab in UTM/VTM, 2 HR TAT - Swab, Nasopharynx [[id number]]  (Normal) Collected: 05/05/25 1452    Lab Status: Final result Specimen: Swab from Nasopharynx Updated: 05/05/25 1557     ADENOVIRUS, PCR Not Detected     Coronavirus 229E Not Detected     Coronavirus HKU1 Not Detected     Coronavirus NL63 Not Detected     Coronavirus OC43 Not Detected     COVID19 Not Detected     Human Metapneumovirus Not Detected     Human Rhinovirus/Enterovirus Not Detected     Influenza A PCR Not Detected     Influenza B PCR Not Detected     Parainfluenza Virus 1 Not Detected     Parainfluenza Virus 2 Not Detected     Parainfluenza Virus 3 Not  Detected     Parainfluenza Virus 4 Not Detected     RSV, PCR Not Detected     Bordetella pertussis pcr Not Detected     Bordetella parapertussis PCR Not Detected     Chlamydophila pneumoniae PCR Not Detected     Mycoplasma pneumo by PCR Not Detected    Narrative:      In the setting of a positive respiratory panel with a viral infection PLUS a negative procalcitonin without other underlying concern for bacterial infection, consider observing off antibiotics or discontinuation of antibiotics and continue supportive care. If the respiratory panel is positive for atypical bacterial infection (Bordetella pertussis, Chlamydophila pneumoniae, or Mycoplasma pneumoniae), consider antibiotic de-escalation to target atypical bacterial infection.    S. Pneumo Ag Urine or CSF - Urine, Urine, Clean Catch [[id number]]  (Normal) Collected: 05/05/25 1451    Lab Status: Final result Specimen: Urine, Clean Catch Updated: 05/05/25 1613     Strep Pneumo Ag Negative    Narrative:      5/18/26 (S. Pneumo)    Legionella Antigen, Urine - Urine, Urine, Clean Catch [[id number]]  (Normal) Collected: 05/05/25 1451    Lab Status: Final result Specimen: Urine, Clean Catch Updated: 05/05/25 1613     LEGIONELLA ANTIGEN, URINE Negative    Blood Culture - Blood, Hand, Left [[id number]]  (Normal) Collected: 05/05/25 0933    Lab Status: Preliminary result Specimen: Blood from Hand, Left Updated: 05/07/25 0945     Blood Culture No growth at 2 days    Blood Culture - Blood, Hand, Right [[id number]]  (Normal) Collected: 05/05/25 0933    Lab Status: Preliminary result Specimen: Blood from Hand, Right Updated: 05/07/25 0945     Blood Culture No growth at 2 days    COVID-19, FLU A/B, RSV PCR 1 HR TAT - Swab, Nasopharynx [[id number]]  (Normal) Collected: 05/05/25 0845    Lab Status: Final result Specimen: Swab from Nasopharynx Updated: 05/05/25 0931     COVID19 Not Detected     Influenza A PCR Not Detected     Influenza B PCR Not Detected     RSV, PCR Not  "Detected    Narrative:      Fact sheet for providers: https://www.fda.gov/media/849157/download    Fact sheet for patients: https://www.fda.gov/media/804994/download    Test performed by PCR.              Vitals/Labs  Ht: 198.1 cm (78\"); Wt: 99.9 kg (220 lb 3.8 oz)   Temp (24hrs), Av.4 °F (36.3 °C), Min:97.3 °F (36.3 °C), Max:97.5 °F (36.4 °C)   Estimated Creatinine Clearance: 113.8 mL/min (by C-G formula based on SCr of 0.78 mg/dL).     Results from last 7 days   Lab Units 25  0134 25  1155 25  0536 25  0412 25  0812 25  0525 25  0933 25  0845   VANCOMYCIN RM mcg/mL  --   --   --   --   --  23.54  --   --    VANCOMYCIN TR mcg/mL  --  18.94  --   --   --   --   --   --    CREATININE mg/dL 0.78  --  0.76  --  0.65* 0.71*   < >  --    WBC 10*3/mm3  --   --   --  12.11*  --  10.47  --  12.83*    < > = values in this interval not displayed.     Assessment/Plan    Current Vancomycin Dose:  1000 mg IV every 12 hours; which provides the following predicted parameters:  Exposure target: AUC24 (range)400-600 mg/L.hr   AUC24,ss: 535 mg/L.hr  Probability of AUC24 > 400: 99 %  Ctrough,ss: 16.9 mg/L  Probability of Ctrough,ss > 20: 16 %  Probability of nephrotoxicity (Lodise FREDDY ): 13 %  Next Vanc Random ordered for  at 0600  Daily BMP ordered  We will continue to monitor patient changes and renal function     Thank you for involving pharmacy in this patient's care. Please contact pharmacy with any questions or concerns.    Minh Quintero [facility]  Clinical Pharmacist        "

## 2025-05-08 NOTE — PROGRESS NOTES
Pulmonary / Critical Care Progress Note      Patient Name: Roger D Snellen  : 1950  MRN: [id number]  Attending:  Owen Wooten MD   Date of admission: 2025    Subjective   Subjective   Follow-up for respiratory failure, decompensated congestive heart failure    Over past 24 hours: Patient remained on high flow oxygen receiving IV diuresis, antibiotics.  Continued to require high oxygen.  Was on Eliquis.    This morning,  Currently on 8 L nasal cannula  Eating up in bed  Reports feeling better today  Denies any chest pain or chest tightness  No fever or chills  Edema improving    Objective   Objective     Vitals:   Vital signs for last 24 hours:  Temp:  [97.3 °F (36.3 °C)-97.5 °F (36.4 °C)] 97.3 °F (36.3 °C)  Heart Rate:  [70-96] 96  Resp:  [18-20] 20  BP: ()/(52-67) 101/67    Intake/Output last 3 shifts:  I/O last 3 completed shifts:  In: 1580 [P.O.:1080; IV Piggyback:500]  Out: 3925 [Urine:3925]    Physical Exam   Vital Signs Reviewed   General:  Alert, NAD. Lying in bed   HEENT:  PERRL, EOMI.  OP  Chest:  Clear to auscultation bilaterally, no work of breathing noted  CV: RRR, no MGR, pulses 2+, equal.  Abd:  Soft, NT, ND, + BS  EXT:  no clubbing, no cyanosis, no edema  Neuro:  A&Ox3, CN grossly intact, no focal deficits.  Skin: No rashes or lesions noted    Result Review    Result Review:  I have personally reviewed the results from the time of this admission to 2025 07:12 EDT and agree with these findings:  [x]  Laboratory  []  Microbiology  [x]  Radiology  [x]  EKG/Telemetry   [x]  Cardiology/Vascular   []  Pathology  []  Old records  []  Other:  Most notable findings include:    .    Assessment & Plan   Assessment / Plan     Active Hospital Problems:  Active Hospital Problems    Diagnosis     **Acute hypoxic respiratory failure          Impression:  Acute hypoxic respiratory failure  Multifocal pneumonia from unknown organism  Acute cardiogenic pulmonary edema  Decompensated congestive  heart failure with reduced EF  Volume overload  Recent septic arthritis of the left lower extremity on daptomycin/ceftriaxone as outpatient     Plan:    Remains on 8 L nasal cannula.  Continue to wean supplemental oxygen to maintain SpO2 greater than 90%  Chest CT personally reviewed.  Bilateral groundglass opacities noted concerning   With reduced EF and elevated BNP, CT findings more consistent with pulmonary edema.  Continue with IV diuresis; monitor accurate I's and O's  Continue prednisone 40 mg oral daily  2D echo did show EF with 38%  Continue advanced heart failure medications  Continue Eliquis  Continue antibiotics with vancomycin and ceftriaxone for left lower extremity septic arthritis; of note patient was previously on daptomycin/ceftriaxone outpatient; PICC in place  Trend renal function and electrolytes to keep potassium 4, mag 2, Phos 4  Continue H2 blocker for GI prophylaxis  DVT prophylaxis, on Eliquis  Will take for bronchoscopy with airway inspection, brushings, biopsies, bronchoalveolar lavage  Discussed bronchoscopy with bronchoalveolar lavage, bronchial washing, possible transbronchial biopsy, endobronchial brushing, airway inspection.  Risks and benefits of the procedure were discussed in detail.  Alternatives and options were reviewed.  Risks including pneumothorax, pneumonia, bleeding, respiratory depression and that it could be fatal were all reviewed.  Patient understands and is agreeable for the procedure.  Proceed with bronch today. Avoid biopsies.   Was on eliquis yesterday.     Pharmacologic VTE prophylaxis orders are present.    CODE STATUS:   Code Status (Patient has no pulse and is not breathing): No CPR (Do Not Attempt to Resuscitate)  Medical Interventions (Patient has pulse or is breathing): Limited Support  Medical Intervention Limits: No intubation (DNI)    I have reviewed labs, imaging, pertinent clinical data and provider notes.   I have discussed with bedside nurse and  primary service.     Electronically signed by Tae Mosley MD, 05/08/25, 7:12 AM EDT.

## 2025-05-08 NOTE — PLAN OF CARE
Goal Outcome Evaluation:              Outcome Evaluation: Pt had bronchoscopy this shift. Tolerated the procedure well. VSS. on 10L high flow sats 95%  Denies pain or discomfort. Call light is within reach.

## 2025-05-08 NOTE — SIGNIFICANT NOTE
05/08/25 1039   OTHER   Discipline occupational therapist   Rehab Time/Intention   Session Not Performed patient unavailable for treatment  (off unit for procedure)

## 2025-05-08 NOTE — PLAN OF CARE
Problem: Adult Inpatient Plan of Care  Goal: Plan of Care Review  Outcome: Progressing  Flowsheets (Taken 5/8/2025 0242)  Progress: no change  Outcome Evaluation: Patient alert and oriented, VSS, on 8 LPM via Hi Roger NC, no s/s of distress, pain controlled with current regimen, able to sleep in between care, NPO since MN for Bronch in am, call light within reach, care plan ongoing  Plan of Care Reviewed With: patient   Goal Outcome Evaluation:  Plan of Care Reviewed With: patient

## 2025-05-08 NOTE — OP NOTE
Bronchoscopy Procedure Note    Procedure:  Bronchoscopy with mucous plug removal  Bronchoscopy with bronchoalveolar lavage right lower lobe  Bronchoscopy with bronchial washings tracheobronchial tree  Airway inspection    Pre-Operative Diagnosis: Pneumonia, respiratory failure  Post-Operative Diagnosis: Mild mucoid secretions bilateral lower lobes, indurated airway    Indication:  Pneumonia, respiratory failure    Anesthesia: MAC anesthesia    Procedure Details: Patient was consented for the procedure with all risks and benefits of the procedure explained in detail. Patient was given the opportunity to ask questions and all concerns were answered.    The bronchoscope was inserted into the main airway via the mouth. An anatomical survey was done of the main airways and the subsegmental bronchus. The findings are reported below.  Bilateral significant mucus secretions was seen in lower lobes, suctioned out in several attempts.  A bronchoalveolar lavage was performed using 60 mL aliquots of normal saline x 2 instilled into the airways then aspirated back from right lower lobe, lateral basal segment of lung with 45 mL serosanguineous fluid in return.  Bronchial washing was obtained from entire tracheobronchial tree.    Findings:  Bilateral significant mucus secretions was seen in lower lobes, suctioned out in several attempts.   Indurated airway  Friable mucosa, easy bleeding with suction  Scattered purulent secretions    Estimated Blood Loss: Insignificant    Specimens:  BAL right lower lobe  Bronchial washings tracheobronchial tree    Complications: None; patient tolerated the procedure well.    Disposition: To [facility], once stable in recovery.    Patient tolerated the procedure well.      Electronically signed by Tae Mosley MD, 5/8/2025, 12:04 EDT.

## 2025-05-08 NOTE — PROGRESS NOTES
[facility]   Hospitalist Progress Note  Date: 2025  Patient Name: Roger D Snellen  : 1950  MRN: [id number]  Date of admission: 2025      Subjective   Subjective     Chief Complaint: Shortness of air    Summary:   Roger D Snellen is a 75 y.o. male A-fib on anticoagulation, heart failure with preserved ejection fraction, diabetes, COPD, hypothyroidism, GERD, hyperlipidemia, hypertension.  Patient most recently discharged from [facility].  Patient presented with complaints of left foot pain swelling and erythema.  Met criteria for severe sepsis due to infected left foot.  Also with issues of right knee pain.  Concern for septic right knee arthrocentesis performed, moderate amount of WBCs, few GPC's on cell count and Gram stain, cultures no growth.  Patient discharged on IV antibiotics 500 mg IV daptomycin, 1 g IV ceftriaxone daily with a stop date of 5/15/2025.  Patient was seen by podiatry for left foot wound, did have bedside debridement performed.  Patient was discharged to [facility] rehab.  Since being at [facility] patient endorses slowly worsening shortness of breath.  On morning of presentation, 2025, patient states he felt smothered.  Denies any associated fever chills or night sweats.  Patient presented to the emergency department hypoxic 81% on room air.  Patient with a heart rate of 70, respiratory rate of 28 and a blood pressure 122/72 initially in the emergency department.  Quickly titrated up on high flow nasal cannula eventually transitioned over to BiPAP, however due to intolerance patient transition to Airvo 55 L 60% FiO2 to maintain O2 saturations.  Patient's initial ABG shows a pH of 7.5, PCO2 of 27.5, PO2 of 65.8.  proBNP elevated at 6386.  Troponin 32 then 31, no complaints of chest pain.  On labs patient with a bicarb of 19.4, anion gap of 15.6, creatinine 0.73.  Patient has AST ALT elevations of 114/96 respectively.  Procalcitonin elevated 0.41.  Lactate negative x 2.   White count of 12.8, hemoglobin 11.3 and platelets of 333.  Chest x-ray showed extensive new opacities in right lung and possible new opacities in left perihilar and left basilar region representing multifocal pneumonia or edema.  Patient has a right arm PICC line in place.  CT scan with contrast obtained to rule out pulmonary embolus.  No evidence of PE.  Cardiomegaly with reflux of contrast into the hepatic veins and IVC, seen in right heart failure.  Patchy groundglass opacities throughout both lungs with smooth interlobular septal thickening and trace bilateral pleural effusions.  Representing pulmonary edema over bilateral pneumonia.  Given patient's work of breathing and oxygen demand, admitted to the ICU.  Overnight patient did well and has been weaned down to 7 L high flow nasal cannula.  Transferred out of the unit on May 6.  Patient seen by pulmonary and cardiology.  Patient had bronchoscopy May 8 with suctioning of mucous plugs and purulent secretions     Interval Followup:   BP soft  Remains on 7 L with 96% saturation.  Seen after bronchoscopy resting comfortably.  Denies any hemoptysis  LFTs improving  Shortness of air improving.  No chest pain.  No cough.  Right knee remains swollen.  Not painful  Good urine output negative 1.7 L.    Review of Systems   All systems were reviewed and negative except for: Summary and interval follow-up    Objective   Objective     Vitals:   Temp:  [97.1 °F (36.2 °C)-97.7 °F (36.5 °C)] 97.5 °F (36.4 °C)  Heart Rate:  [70-96] 71  Resp:  [18-28] 20  BP: ()/(52-80) 100/61  Flow (L/min) (Oxygen Therapy):  [8-10] 10  Physical Exam      Constitutional: Awake, alert, increased work of breathing, mild respiratory distress              Eyes: Pupils equal, sclerae anicteric, no conjunctival injection              HENT: NCAT, mucous membranes moist              Neck: Supple, no thyromegaly, no lymphadenopathy, trachea midline              Respiratory: Crackles heard mostly  left base and midlung area, minimal expiratory wheezing, prolonged expiratory phase              Cardiovascular: RRR, no murmurs, rubs, or gallops, palpable pedal pulses bilaterally.  +2 bilateral extremity LYNN              Gastrointestinal: Positive bowel sounds, soft, nontender, nondistended              Musculoskeletal: Right knee swollen, good range of motion and no tenderness on palpation              Neurologic: Oriented x 3, strength symmetric in all extremities, Cranial Nerves grossly intact to confrontation, speech clear              Skin: Wounds to the left foot is dressed, chronic discoloration lower extremities.      Result Review    Result Review:  I have personally reviewed the results for the past 24 hours and agree with these findings:  [x]  Laboratory  []  Microbiology  [x]  Radiology  [x]  EKG/Telemetry V paced rhythm  []  Cardiology/Vascular   []  Pathology  [x]  Old records  [x]  Other: Medications    Assessment & Plan   Assessment / Plan     Assessment:  Acute hypoxemic respiratory failure.  Improving.  Mucous plugs.  Status post bronchoscopy May 8  Acute on chronic systolic CHF EF of 38%.  Moderate TR.  COPD exacerbation.  History of A-fib on Eliquis.  Status post PPM/AICD.  Recent right knee septic arthritis and left foot infection on IV daptomycin/Rocephin via RUE PICC line until May 15.  Transaminitis.  Likely hepatic congestion from CHF.  Improving  Rheumatoid factor positive.  No symptoms.  Dilated ascending thoracic aorta 4.4 cm.  Left bundle branch block.       Plan:  Continue supplemental oxygen to keep sats more than 90%.  Low-salt diet, fluid restriction, strict input output and daily weights.  IV Lasix.  Trend BNP  2D echo noted.  Noted ultrasound right upper quadrant.  Negative  Check acute hepatitis panel.  Pending  Goal-directed medical therapy including Entresto and Aldactone.  Appreciate cardiology input.  Continue home Eliquis.  Continue home Vanco and Rocephin until May 15.   Patient was on daptomycin on admission.  MRSA PCR negative.   p.o. prednisone  Sliding-scale insulin  Nebulizer treatment.  Bronchopulmonary hygiene protocol.  Wound nurse consulted.  CTA chest noted no PE.  Bilateral groundglass opacities consistent with pulmonary edema.  PT OT.  Recommending SNF.  Patient wants to go home from here.  Refusing to go to rehab  Appreciate pulmonary input.  .  Continue telemetry  Discussed plan with RN and .  Patient was [facility] to go home.  Discharge home when oxygen level is stable    VTE Prophylaxis:  Pharmacologic VTE prophylaxis orders are present.  Eliquis        CODE STATUS:   Code Status (Patient has no pulse and is not breathing): No CPR (Do Not Attempt to Resuscitate)  Medical Interventions (Patient has pulse or is breathing): Limited Support  Medical Intervention Limits: No intubation (DNI)      Part of this note may be an electronic transcription/translation of spoken language to printed text using the Dragon Dictation System.       Electronically signed by Owen Wooten MD, 05/08/25, 5:03 PM EDT.  .

## 2025-05-08 NOTE — SIGNIFICANT NOTE
05/08/25 1100   Physical Therapy Time and Intention   Session Not Performed patient unavailable for treatment  (procedure; out of room)

## 2025-05-09 NOTE — PROGRESS NOTES
[facility]   Hospitalist Progress Note  Date: 2025  Patient Name: Roger D Snellen  : 1950  MRN: [id number]  Date of admission: 2025      Subjective   Subjective     Chief Complaint: Shortness of air    Summary:   Roger D Snellen is a 75 y.o. male A-fib on anticoagulation, heart failure with preserved ejection fraction, diabetes, COPD, hypothyroidism, GERD, hyperlipidemia, hypertension.  Patient most recently discharged from [facility].  Patient presented with complaints of left foot pain swelling and erythema.  Met criteria for severe sepsis due to infected left foot.  Also with issues of right knee pain.  Concern for septic right knee arthrocentesis performed, moderate amount of WBCs, few GPC's on cell count and Gram stain, cultures no growth.  Patient discharged on IV antibiotics 500 mg IV daptomycin, 1 g IV ceftriaxone daily with a stop date of 5/15/2025.  Patient was seen by podiatry for left foot wound, did have bedside debridement performed.  Patient was discharged to [facility] rehab.  Since being at [facility] patient endorses slowly worsening shortness of breath.  On morning of presentation, 2025, patient states he felt smothered.  Denies any associated fever chills or night sweats.  Patient presented to the emergency department hypoxic 81% on room air.  Patient with a heart rate of 70, respiratory rate of 28 and a blood pressure 122/72 initially in the emergency department.  Quickly titrated up on high flow nasal cannula eventually transitioned over to BiPAP, however due to intolerance patient transition to Airvo 55 L 60% FiO2 to maintain O2 saturations.  Patient's initial ABG shows a pH of 7.5, PCO2 of 27.5, PO2 of 65.8.  proBNP elevated at 6386.  Troponin 32 then 31, no complaints of chest pain.  On labs patient with a bicarb of 19.4, anion gap of 15.6, creatinine 0.73.  Patient has AST ALT elevations of 114/96 respectively.  Procalcitonin elevated 0.41.  Lactate negative x 2.   White count of 12.8, hemoglobin 11.3 and platelets of 333.  Chest x-ray showed extensive new opacities in right lung and possible new opacities in left perihilar and left basilar region representing multifocal pneumonia or edema.  Patient has a right arm PICC line in place.  CT scan with contrast obtained to rule out pulmonary embolus.  No evidence of PE.  Cardiomegaly with reflux of contrast into the hepatic veins and IVC, seen in right heart failure.  Patchy groundglass opacities throughout both lungs with smooth interlobular septal thickening and trace bilateral pleural effusions.  Representing pulmonary edema over bilateral pneumonia.  Given patient's work of breathing and oxygen demand, admitted to the ICU.  Overnight patient did well and has been weaned down to 7 L high flow nasal cannula.  Transferred out of the unit on May 6.  Patient seen by pulmonary and cardiology.  Patient had bronchoscopy May 8 with suctioning of mucous plugs and purulent secretions     Interval Followup:   BP soft  Remains on 7 L with 96% saturation.  No home oxygen use   Denies any hemoptysis  LFTs improving  Shortness of air improving.  No chest pain.  No cough.  Right knee remains swollen.  Not painful  Good urine output     Review of Systems   All systems were reviewed and negative except for: Summary and interval follow-up    Objective   Objective     Vitals:   Temp:  [97.2 °F (36.2 °C)-98.4 °F (36.9 °C)] 97.5 °F (36.4 °C)  Heart Rate:  [70-80] 71  Resp:  [20-24] 24  BP: ()/(57-70) 117/70  Flow (L/min) (Oxygen Therapy):  [8-10] 8  Physical Exam      Constitutional: Awake, alert, increased work of breathing, mild respiratory distress              Eyes: Pupils equal, sclerae anicteric, no conjunctival injection              HENT: NCAT, mucous membranes moist              Neck: Supple, no thyromegaly, no lymphadenopathy, trachea midline              Respiratory: Crackles heard mostly left base and midlung area, minimal  expiratory wheezing, prolonged expiratory phase              Cardiovascular: RRR, no murmurs, rubs, or gallops, palpable pedal pulses bilaterally.  +2 bilateral extremity LYNN              Gastrointestinal: Positive bowel sounds, soft, nontender, nondistended              Musculoskeletal: Right knee swollen, good range of motion and no tenderness on palpation              Neurologic: Oriented x 3, strength symmetric in all extremities, Cranial Nerves grossly intact to confrontation, speech clear              Skin: Wounds to the left foot is dressed, chronic discoloration lower extremities.      Result Review    Result Review:  I have personally reviewed the results for the past 24 hours and agree with these findings:  [x]  Laboratory  []  Microbiology  [x]  Radiology  [x]  EKG/Telemetry V paced rhythm  []  Cardiology/Vascular   []  Pathology  [x]  Old records  [x]  Other: Medications    Assessment & Plan   Assessment / Plan     Assessment:  Acute hypoxemic respiratory failure.  Improving.  No home oxygen use  Mucous plugs.  Status post bronchoscopy May 8  Acute on chronic systolic CHF EF of 38%.  Moderate TR.  COPD exacerbation.  History of A-fib on Eliquis.  Status post PPM/AICD.  Recent right knee septic arthritis and left foot infection on IV daptomycin/Rocephin via RUE PICC line until May 15.  Transaminitis.  Likely hepatic congestion from CHF.  Improving  Rheumatoid factor positive.  No symptoms.  Dilated ascending thoracic aorta 4.4 cm.  Left bundle branch block.       Plan:  Continue supplemental oxygen to keep sats more than 90%.  Low-salt diet, fluid restriction, strict input output and daily weights.  Spot IV Lasix.  Trend BNP  2D echo noted.  Noted ultrasound right upper quadrant.  Negative   acute hepatitis panel.  Negative   Goal-directed medical therapy including Entresto and Aldactone.  Appreciate cardiology input.  Continue home Eliquis.  Continue  Vanco and Rocephin until May 15.  Patient was on  daptomycin on admission.  MRSA PCR negative.   p.o. prednisone  Sliding-scale insulin  Nebulizer treatment.  Bronchopulmonary hygiene protocol.  Wound nurse consulted.  CTA chest noted no PE.  Bilateral groundglass opacities consistent with pulmonary edema.  PT OT.  Recommending SNF.  Patient wants to go home from here.  Refusing to go to rehab  Appreciate pulmonary input.  .  Continue telemetry  Discussed plan with RN and .  Patient was about to be discharged from [facility] to go home.  Discharge home when oxygen level is stable.  Will need walking oximetry.    VTE Prophylaxis:  Pharmacologic VTE prophylaxis orders are present.  Eliquis        CODE STATUS:   Code Status (Patient has no pulse and is not breathing): No CPR (Do Not Attempt to Resuscitate)  Medical Interventions (Patient has pulse or is breathing): Limited Support  Medical Intervention Limits: No intubation (DNI)      Part of this note may be an electronic transcription/translation of spoken language to printed text using the Dragon Dictation System.       Electronically signed by Owen Wooten MD, 05/09/25, 4:28 PM EDT.

## 2025-05-09 NOTE — PROGRESS NOTES
"[facility] Clinical Pharmacy Services: Vancomycin Monitoring Note    Roger D Snellen is a 75 y.o. male who is on day  of pharmacy to dose vancomycin for Bone and/or Joint Infection. Continuation of treatment with Daptomycin from [facility] for concern for septic joint. Patient to complete a total of 4 weeks of therapy. Last day 5/15/25.     Previous Vancomycin Dose: 1250 mg IV every  12  hours  Imaging Reviewed?: Yes  Updated Cultures and Sensitivities:   5/8 AFB Culture, bronch wash- Not detected  5/8 AFB culture, rt lung lobe- Not detected  5/8 PNA panel- Not detected  5/8 BAL culture- Occasional gram pos cocci  / Resp culture- Occasional WBCs  / Blood culture- NGTD     Vitals/Labs  Ht: 198.1 cm (78\"); Wt: 97 kg (213 lb 13.5 oz)   Temp (24hrs), Av.7 °F (36.5 °C), Min:97.1 °F (36.2 °C), Max:98.4 °F (36.9 °C)   Estimated Creatinine Clearance: 126.8 mL/min (A) (by C-G formula based on SCr of 0.68 mg/dL (L)).     Results from last 7 days   Lab Units 25  0142 25  0134 25  1155 25  0536 25  0412 25  0812 25  0525 25  0933 25  0845   VANCOMYCIN RM mcg/mL 12.10  --   --   --   --   --  23.54  --   --    VANCOMYCIN TR mcg/mL  --   --  18.94  --   --   --   --   --   --    CREATININE mg/dL 0.68* 0.78  --  0.76  --    < > 0.71*   < >  --    WBC 10*3/mm3  --   --   --   --  12.11*  --  10.47  --  12.83*    < > = values in this interval not displayed.     Assessment/Plan  Current Vancomycin Dose:  1000 mg IV every 12 hours; which provides the following predicted parameters:    Regimen: 1000 mg IV every 12 hours.  Start time: 13:56 on 2025  Exposure target: AUC24 (range)400-600 mg/L.hr   AUC24,ss: 441 mg/L.hr  Probability of AUC24 > 400: 85 %  Ctrough,ss: 12.6 mg/L  Probability of Ctrough,ss > 20: 0 %  Probability of nephrotoxicity (Lodise FREDDY ): 8 %    Vancomycin level this AM reported as 12.1- OK to continue on current dose.    Thank you for involving " pharmacy in this patient's care. Please contact pharmacy with any questions or concerns.    Donnie Akbar  Clinical Pharmacist

## 2025-05-09 NOTE — PLAN OF CARE
Problem: Adult Inpatient Plan of Care  Goal: Plan of Care Review  Outcome: Progressing  Flowsheets (Taken 5/9/2025 0319)  Progress: no change  Outcome Evaluation: Patient alert and oriented, VSS, on 10 LPM via Hi Roger NC, no s/s of distress, pain controlled with current regimen, able to sleep in between care, call light within reach, care plan ongoing  Plan of Care Reviewed With: patient   Goal Outcome Evaluation:  Plan of Care Reviewed With: patient

## 2025-05-09 NOTE — PROGRESS NOTES
[facility]   Progress Note    Patient Name: Roger D Snellen  : 1950  MRN: [id number]  Primary Care Physician: John Phelps Jr., MD  Date of admission: 2025    Subjective   Subjective     HPI:  Patient feels better, no chest pain or shortness of breath, no orthopnea, no palpitation, no weakness.    Review of Systems  Review of Systems   Constitutional: Negative.    HENT: Negative.     Eyes: Negative.    Respiratory: Negative.     Cardiovascular: Negative.    Gastrointestinal: Negative.    Endocrine: Negative.    Genitourinary: Negative.    Musculoskeletal: Negative.    Skin:  Positive for rash.   Neurological: Negative.    Psychiatric/Behavioral: Negative.         Objective   Objective     Vitals:  Temp:  [97.1 °F (36.2 °C)-97.7 °F (36.5 °C)] 97.5 °F (36.4 °C)  Heart Rate:  [70-96] 71  Resp:  [18-28] 20  BP: ()/(52-80) 100/61  Flow (L/min) (Oxygen Therapy):  [8-10] 10    Physical Exam:  Physical Exam  Constitutional:       Appearance: Normal appearance.   HENT:      Right Ear: Tympanic membrane normal.      Mouth/Throat:      Mouth: Mucous membranes are moist.   Eyes:      Extraocular Movements: Extraocular movements intact.   Cardiovascular:      Rate and Rhythm: Regular rhythm.      Heart sounds: No murmur heard.  Abdominal:      Palpations: Abdomen is soft.   Musculoskeletal:      Right lower leg: No edema.      Left lower leg: No edema.   Skin:     Findings: Rash present.   Neurological:      General: No focal deficit present.      Mental Status: He is alert.         Result Review    Result Review:  I have personally reviewed the results from the time of this admission to 25 8:19 PM EDT and agree with these findings:  [x]  Laboratory  []  Microbiology  []  Radiology  [x]  EKG/Telemetry   []  Cardiology/Vascular   []  Pathology  []  Old records  []  Other:    Most notable findings include: 75-year-old gentleman with history of atrial fibrillation, recurrent ventricular tachycardia  status post AICD placement also radiofrequency ablation, admitted because of systolic congestive heart failure, COPD exacerbation, respiratory failure, patient was diuresed, currently is tolerating a combination of medications for congestive failure.    Assessment & Plan   Assessment / Plan     Active Hospital Problems:  Active Hospital Problems    Diagnosis     **Acute hypoxic respiratory failure        Plan:   Continue current therapy, patient blood pressure is borderline low but stable.    DVT prophylaxis: Anticoagulated    CODE STATUS:    Code Status and Medical Interventions: No CPR (Do Not Attempt to Resuscitate); Limited Support; No intubation (DNI)   Ordered at: 05/05/25 1213     Code Status (Patient has no pulse and is not breathing):    No CPR (Do Not Attempt to Resuscitate)     Medical Interventions (Patient has pulse or is breathing):    Limited Support     Medical Intervention Limits:    No intubation (DNI)       Disposition:  I expect patient to be discharged when patient gets better.    Electronically signed by Juliocesar Giang MD, 05/08/25, 8:19 PM EDT.

## 2025-05-09 NOTE — THERAPY TREATMENT NOTE
Acute Care - Physical Therapy Treatment Note   [facility]     Patient Name: Roger D Snellen  : 1950  MRN: [id number]  Today's Date: 2025      Visit Dx:     ICD-10-CM ICD-9-CM   1. Acute hypoxemic respiratory failure  J96.01 518.81   2. Acute on chronic systolic congestive heart failure  I50.23 428.23     428.0   3. Acute pulmonary edema  J81.0 518.4   4. Difficulty walking  R26.2 719.7   5. Impaired mobility and ADLs  Z74.09 V49.89    Z78.9    6. Acute hypoxic respiratory failure  J96.01 518.81     Patient Active Problem List   Diagnosis    Non-ischemic cardiomyopathy    Acute hypoxic respiratory failure     Past Medical History:   Diagnosis Date    Arrhythmia     ATRIAL FIB, PACE TERMINATED VT    Atrial fibrillation     BBB (bundle branch block)     L BBB    CHF (congestive heart failure)     Diabetes mellitus     Disease of thyroid gland     GERD (gastroesophageal reflux disease)     Hyperlipidemia     Hypertension     Non-ischemic cardiomyopathy     DILATED CM EF 20-25% 2017    Osteoarthritis      Past Surgical History:   Procedure Laterality Date    BRONCHOSCOPY N/A 2025    Procedure: BRONCHOSCOPY WITH BRONCHOALVEOLAR LAVAGE, WASHING, AIRWAY INSPECTION: insertion of lighted instrument to view inside the lung;  Surgeon: Tea Mosley MD;  Location: [facility] MAIN OR;  Service: Pulmonary;  Laterality: N/A;    CARDIAC ELECTROPHYSIOLOGY PROCEDURE N/A 2017    Procedure: Device Upgrade  ICD TO A BIV ICD   medtronic;  Surgeon: Chris Phillips MD;  Location: [facility] INVASIVE LOCATION;  Service:     CHOLECYSTECTOMY      EYE SURGERY Left 2025    Retina Repair    INSERT / REPLACE / REMOVE PACEMAKER      INTERNAL CARDIAC DEFIBRILLATOR INSERTION      [employer]    SHOULDER ARTHROSCOPY      TOTAL KNEE ARTHROPLASTY       PT Assessment (Last 12 Hours)       PT Evaluation and Treatment       Row Name 25 4505          Physical Therapy Time and Intention    Subjective Information no  complaints (P)   -RA     Document Type therapy note (daily note) (P)   -RA     Mode of Treatment individual therapy;physical therapy (P)   -RA     Patient Effort good (P)   -RA     Symptoms Noted During/After Treatment dizziness;shortness of breath (P)   -RA       Row Name 05/09/25 1105          General Information    Patient Profile Reviewed yes (P)   -RA     Patient Observations alert;cooperative;agree to therapy (P)   -RA       Row Name 05/09/25 1105          Cognition    Orientation Status (Cognition) oriented x 3 (P)   -RA       Row Name 05/09/25 1105          Bed Mobility    Bed Mobility supine-sit;sit-supine (P)   -RA     Supine-Sit Nye (Bed Mobility) standby assist (P)   -RA     Sit-Supine Nye (Bed Mobility) standby assist (P)   -RA     Assistive Device (Bed Mobility) bed rails;head of bed elevated (P)   -RA       Row Name 05/09/25 1105          Transfers    Transfers sit-stand transfer;stand-sit transfer (P)   -RA       Row Name 05/09/25 1105          Sit-Stand Transfer    Sit-Stand Nye (Transfers) contact guard (P)   -RA     Assistive Device (Sit-Stand Transfers) walker, front-wheeled (P)   -RA       Row Name 05/09/25 1105          Stand-Sit Transfer    Stand-Sit Nye (Transfers) contact guard (P)   -RA     Assistive Device (Stand-Sit Transfers) walker, front-wheeled (P)   -RA       Row Name 05/09/25 1105          Gait/Stairs (Locomotion)    Gait/Stairs Locomotion gait/ambulation assistive device (P)   -RA     Nye Level (Gait) contact guard (P)   -RA     Assistive Device (Gait) walker, front-wheeled (P)   -RA     Patient was able to Ambulate yes (P)   -RA     Distance in Feet (Gait) 5 (P)   bed to chair  -RA       Row Name 05/09/25 1105          Safety Issues/Impairments Affecting Functional Mobility    Impairments Affecting Function (Mobility) balance;endurance/activity tolerance;shortness of breath;strength (P)   -RA       Row Name 05/09/25 1105           Balance    Balance Assessment standing dynamic balance (P)   -RA     Dynamic Standing Balance contact guard (P)   -RA     Position/Device Used, Standing Balance walker, front-wheeled (P)   -RA       Row Name             Wound 05/05/25 1500 Left anterior foot Pressure Injury    Wound - Properties Group Placement Date: 05/05/25  -JR Placement Time: 1500  -JR Present on Original Admission: Y  -JR Side: Left  -JR Orientation: anterior  -JR Location: foot  -JR Primary Wound Type: Pressure Inj  -JR    Retired Wound - Properties Group Placement Date: 05/05/25  -JR Placement Time: 1500  -JR Present on Original Admission: Y  -JR Side: Left  -JR Orientation: anterior  -JR Location: foot  -JR    Retired Wound - Properties Group Placement Date: 05/05/25  -JR Placement Time: 1500  -JR Present on Original Admission: Y  -JR Side: Left  -JR Orientation: anterior  -JR Location: foot  -JR    Retired Wound - Properties Group Date first assessed: 05/05/25  -JR Time first assessed: 1500  -JR Present on Original Admission: Y  -JR Side: Left  -JR Location: foot  -JR      Row Name             Wound 05/05/25 1500 medial coccyx Pressure Injury    Wound - Properties Group Placement Date: 05/05/25  -JR Placement Time: 1500  -JR Present on Original Admission: Y  -JR Orientation: medial  -JR Location: coccyx  -JR Primary Wound Type: Pressure Inj  -JR    Retired Wound - Properties Group Placement Date: 05/05/25  -JR Placement Time: 1500  -JR Present on Original Admission: Y  -JR Orientation: medial  -JR Location: coccyx  -JR    Retired Wound - Properties Group Placement Date: 05/05/25  -JR Placement Time: 1500  -JR Present on Original Admission: Y  -JR Orientation: medial  -JR Location: coccyx  -JR    Retired Wound - Properties Group Date first assessed: 05/05/25  -JR Time first assessed: 1500  -JR Present on Original Admission: Y  -JR Location: coccyx  -JR      Row Name             Wound 05/06/25 1058 Left medial ankle    Wound - Properties  Group Placement Date: 05/06/25  -NH Placement Time: 1058  -NH Present on Original Admission: Y  -NH Side: Left  -NH Orientation: medial  -NH Location: ankle  -NH    Retired Wound - Properties Group Placement Date: 05/06/25  -NH Placement Time: 1058  -NH Present on Original Admission: Y  -NH Side: Left  -NH Orientation: medial  -NH Location: ankle  -NH    Retired Wound - Properties Group Placement Date: 05/06/25  -NH Placement Time: 1058  -NH Present on Original Admission: Y  -NH Side: Left  -NH Orientation: medial  -NH Location: ankle  -NH    Retired Wound - Properties Group Date first assessed: 05/06/25  -NH Time first assessed: 1058  -NH Present on Original Admission: Y  -NH Side: Left  -NH Location: ankle  -NH      Row Name 05/09/25 1105          Progress Summary (PT)    Progress Toward Functional Goals (PT) progress toward functional goals is fair (P)   -RA     Daily Progress Summary (PT) Patient tolerated treatment session well. He is making fair progress towards his goals but limited by shortness of breath and fatigue. Continue POC. (P)   -RA               User Key  (r) = Recorded By, (t) = Taken By, (c) = Cosigned By      Initials Name Provider Type    Jocelyne Castellano, RN Registered Nurse    Peterson Daigle RN Registered Nurse    Tali Carrion, PT Student PT Student                    Physical Therapy Education       Title: PT OT SLP Therapies (In Progress)       Topic: Physical Therapy (In Progress)       Point: Mobility training (Done)       Learning Progress Summary            Patient Acceptance, E,TB, VU by AV at 5/6/2025 1336                      Point: Home exercise program (Not Started)       Learner Progress:  Not documented in this visit.              Point: Body mechanics (Done)       Learning Progress Summary            Patient Acceptance, E,TB, VU by AV at 5/6/2025 1336                      Point: Precautions (Done)       Learning Progress Summary            Patient Acceptance, E,TB,  VU by AV at 5/6/2025 1336                                      User Key       Initials Effective Dates Name Provider Type Discipline     06/11/21 -  Samson Cordoba, PT Physical Therapist PT                  PT Recommendation and Plan     Progress Summary (PT)  Progress Toward Functional Goals (PT): (P) progress toward functional goals is fair  Daily Progress Summary (PT): (P) Patient tolerated treatment session well. He is making fair progress towards his goals but limited by shortness of breath and fatigue. Continue POC.   Outcome Measures       Row Name 05/09/25 1100 05/06/25 1300          How much help from another person do you currently need...    Turning from your back to your side while in flat bed without using bedrails? 4 (P)   -RA 3  -AV     Moving from lying on back to sitting on the side of a flat bed without bedrails? 4 (P)   -RA 3  -AV     Moving to and from a bed to a chair (including a wheelchair)? 3 (P)   -RA 3  -AV     Standing up from a chair using your arms (e.g., wheelchair, bedside chair)? 3 (P)   -RA 3  -AV     Climbing 3-5 steps with a railing? 2 (P)   -RA 2  -AV     To walk in hospital room? 3 (P)   -RA 3  -AV     AM-PAC 6 Clicks Score (PT) 19 (P)   -RA 17  -AV        Functional Assessment    Outcome Measure Options -- AM-PAC 6 Clicks Basic Mobility (PT)  -AV               User Key  (r) = Recorded By, (t) = Taken By, (c) = Cosigned By      Initials Name Provider Type    AV Samson Cordoba, PT Physical Therapist    Tali Carrion, PT Student PT Student                     Time Calculation:    PT Charges       Row Name 05/09/25 1118             Time Calculation    PT Received On 05/09/25 (P)   -RA         Timed Charges    38216 - PT Therapeutic Activity Minutes 15 (P)   -RA         Total Minutes    Timed Charges Total Minutes 15 (P)   -RA       Total Minutes 15 (P)   -RA                User Key  (r) = Recorded By, (t) = Taken By, (c) = Cosigned By      Initials Name Provider Type     Tali Carrion, PT Student PT Student                      PT G-Codes  Outcome Measure Options: AM-PAC 6 Clicks Daily Activity (OT), Optimal Instrument  AM-PAC 6 Clicks Score (PT): (P) 19  AM-PAC 6 Clicks Score (OT): 21    Tali Bender, PT Student  5/9/2025

## 2025-05-09 NOTE — PROGRESS NOTES
Pulmonary / Critical Care Progress Note      Patient Name: Roger D Snellen  : 1950  MRN: [id number]  Attending:  Owen Wooten MD   Date of admission: 2025    Subjective   Subjective   Follow-up for respiratory failure, decompensated congestive heart failure    Over past 24 hours: Patient remained on high flow oxygen receiving IV diuresis, antibiotics.  Continued to require high oxygen.  Was on Eliquis.  Underwent bronchoscopy    This morning,  Currently on 8 L nasal cannula  Remains bedridden  Remains weak and fatigued  Reports feeling better after bronchoscopy  No fever or chills  Denies hemoptysis  Cough improving  Edema improving    Objective   Objective     Vitals:   Vital signs for last 24 hours:  Temp:  [97.1 °F (36.2 °C)-98.4 °F (36.9 °C)] 97.9 °F (36.6 °C)  Heart Rate:  [70-80] 71  Resp:  [20-28] 20  BP: ()/(58-80) 110/68    Intake/Output last 3 shifts:  I/O last 3 completed shifts:  In: 1420 [P.O.:360; I.V.:250; IV Piggyback:810]  Out: 1951 [Urine:1951]    Physical Exam   Vital Signs Reviewed   General:  Alert, NAD. Lying in bed   HEENT:  PERRL, EOMI.  OP  Chest:  Clear to auscultation bilaterally, no work of breathing noted  CV: RRR, no MGR, pulses 2+, equal.  Abd:  Soft, NT, ND, + BS  EXT:  no clubbing, no cyanosis, no edema  Neuro:  A&Ox3, CN grossly intact, no focal deficits.  Skin: No rashes or lesions noted    Result Review    Result Review:  I have personally reviewed the results from the time of this admission to 2025 07:25 EDT and agree with these findings:  [x]  Laboratory  []  Microbiology  [x]  Radiology  [x]  EKG/Telemetry   [x]  Cardiology/Vascular   []  Pathology  []  Old records  []  Other:  Most notable findings include:    .    Assessment & Plan   Assessment / Plan     Active Hospital Problems:  Active Hospital Problems    Diagnosis     **Acute hypoxic respiratory failure          Impression:  Acute hypoxic respiratory failure  Multifocal pneumonia from unknown  organism  Acute cardiogenic pulmonary edema  Decompensated congestive heart failure with reduced EF  Volume overload  Recent septic arthritis of the left lower extremity on daptomycin/ceftriaxone as outpatient     Plan:    Remains on 8 L nasal cannula.  Continue to wean supplemental oxygen to maintain SpO2 greater than 90%  Chest CT personally reviewed.  Bilateral groundglass opacities noted concerning   With reduced EF and elevated BNP, CT findings more consistent with pulmonary edema.  Continue with IV diuresis; monitor accurate I's and O's  Continue prednisone 40 mg oral daily  2D echo did show EF with 38%  Continue advanced heart failure medications  Continue Eliquis  Continue antibiotics with vancomycin and ceftriaxone for left lower extremity septic arthritis; of note patient was previously on daptomycin/ceftriaxone outpatient; PICC in place  Give Lasix 20 mg IV x 1 and continue Aldactone 12.5 mg oral daily  Trend renal function and electrolytes to keep potassium 4, mag 2, Phos 4  Continue H2 blocker for GI prophylaxis  DVT prophylaxis, on Eliquis  S/p bronchoscopy, pneumonia panel negative.  Cultures pending     Pharmacologic VTE prophylaxis orders are present.    CODE STATUS:   Code Status (Patient has no pulse and is not breathing): No CPR (Do Not Attempt to Resuscitate)  Medical Interventions (Patient has pulse or is breathing): Limited Support  Medical Intervention Limits: No intubation (DNI)    I have reviewed labs, imaging, pertinent clinical data and provider notes.   I have discussed with bedside nurse and primary service.     Electronically signed by Tae Mosley MD, 05/09/25, 7:25 AM EDT.    This visit was performed by BOTH a physician and an APC. I personally evaluated and examined the patient. I performed all aspects of MDM as documented. , I have reviewed and confirmed the accuracy of the patient's history as documented in this note., and I have reexamined the patient and the results are  consistent with the previously documented exam. I have updated the documentation as necessary.     Electronically signed by Tae Mosley MD, 05/09/25, 3:56 PM EDT.

## 2025-05-10 NOTE — PROGRESS NOTES
[facility]   Hospitalist Progress Note  Date: 5/10/2025  Patient Name: Roger D Snellen  : 1950  MRN: [id number]  Date of admission: 2025      Subjective   Subjective     Chief Complaint: Shortness of air    Summary:   Roger D Snellen is a 75 y.o. male A-fib on anticoagulation, heart failure with preserved ejection fraction, diabetes, COPD, hypothyroidism, GERD, hyperlipidemia, hypertension.  Patient most recently discharged from [facility].  Patient presented with complaints of left foot pain swelling and erythema.  Met criteria for severe sepsis due to infected left foot.  Also with issues of right knee pain.  Concern for septic right knee arthrocentesis performed, moderate amount of WBCs, few GPC's on cell count and Gram stain, cultures no growth.  Patient discharged on IV antibiotics 500 mg IV daptomycin, 1 g IV ceftriaxone daily with a stop date of 5/15/2025.  Patient was seen by podiatry for left foot wound, did have bedside debridement performed.  Patient was discharged to [facility] rehab.  Since being at [facility] patient endorses slowly worsening shortness of breath.  On morning of presentation, 2025, patient states he felt smothered.  Denies any associated fever chills or night sweats.  Patient presented to the emergency department hypoxic 81% on room air.  Patient with a heart rate of 70, respiratory rate of 28 and a blood pressure 122/72 initially in the emergency department.  Quickly titrated up on high flow nasal cannula eventually transitioned over to BiPAP, however due to intolerance patient transition to Airvo 55 L 60% FiO2 to maintain O2 saturations.  Patient's initial ABG shows a pH of 7.5, PCO2 of 27.5, PO2 of 65.8.  proBNP elevated at 6386.  Troponin 32 then 31, no complaints of chest pain.  On labs patient with a bicarb of 19.4, anion gap of 15.6, creatinine 0.73.  Patient has AST ALT elevations of 114/96 respectively.  Procalcitonin elevated 0.41.  Lactate negative x  2.  White count of 12.8, hemoglobin 11.3 and platelets of 333.  Chest x-ray showed extensive new opacities in right lung and possible new opacities in left perihilar and left basilar region representing multifocal pneumonia or edema.  Patient has a right arm PICC line in place.  CT scan with contrast obtained to rule out pulmonary embolus.  No evidence of PE.  Cardiomegaly with reflux of contrast into the hepatic veins and IVC, seen in right heart failure.  Patchy groundglass opacities throughout both lungs with smooth interlobular septal thickening and trace bilateral pleural effusions.  Representing pulmonary edema over bilateral pneumonia.  Given patient's work of breathing and oxygen demand, admitted to the ICU.  Overnight patient did well and has been weaned down to 7 L high flow nasal cannula.  Transferred out of the unit on May 6.  Patient seen by pulmonary and cardiology.  Patient had bronchoscopy May 8 with suctioning of mucous plugs and purulent secretions.  Cultures negative to date.     Interval Followup:   BP soft  Remains on 8 L with 96% saturation.  No home oxygen use   Denies any hemoptysis  LFTs improving  Shortness of air improving.  Intermittent.  Chest x-ray with persistent bilateral opacities and is unchanged  No chest pain.  No cough.  Right knee remains swollen.  Not painful  Good urine output     Review of Systems   All systems were reviewed and negative except for: Summary and interval follow-up    Objective   Objective     Vitals:   Temp:  [97.3 °F (36.3 °C)-97.7 °F (36.5 °C)] 97.7 °F (36.5 °C)  Heart Rate:  [70-81] 73  Resp:  [18-24] 24  BP: ()/(59-71) 99/64  Flow (L/min) (Oxygen Therapy):  [8-10] 9  Physical Exam      Constitutional: Awake, alert, increased work of breathing, mild respiratory distress              Eyes: Pupils equal, sclerae anicteric, no conjunctival injection              HENT: NCAT, mucous membranes moist              Neck: Supple, no thyromegaly, no  lymphadenopathy, trachea midline              Respiratory: Crackles heard mostly left base and midlung area, minimal expiratory wheezing, prolonged expiratory phase              Cardiovascular: RRR, no murmurs, rubs, or gallops, palpable pedal pulses bilaterally.  +2 bilateral extremity LYNN              Gastrointestinal: Positive bowel sounds, soft, nontender, nondistended              Musculoskeletal: Right knee swollen, good range of motion and no tenderness on palpation              Neurologic: Oriented x 3, strength symmetric in all extremities, Cranial Nerves grossly intact to confrontation, speech clear              Skin: Wounds to the left foot is dressed, chronic discoloration lower extremities.      Result Review    Result Review:  I have personally reviewed the results for the past 24 hours and agree with these findings:  [x]  Laboratory  []  Microbiology  [x]  Radiology  []  EKG/Telemetry   []  Cardiology/Vascular   []  Pathology  [x]  Old records  [x]  Other: Medications    Assessment & Plan   Assessment / Plan     Assessment:  Acute hypoxemic respiratory failure.  Improving.  No home oxygen use  Mucous plugs.  Status post bronchoscopy May 8  Acute on chronic systolic CHF EF of 38%.  Moderate TR.  COPD exacerbation.  History of A-fib on Eliquis.  Status post PPM/AICD.  Recent right knee septic arthritis and left foot infection on IV daptomycin/Rocephin via RUE PICC line until May 15.  Transaminitis.  Likely hepatic congestion from CHF.  Improving  Rheumatoid factor positive.  No symptoms.  Dilated ascending thoracic aorta 4.4 cm.  Left bundle branch block.       Plan:  Continue supplemental oxygen to keep sats more than 90%.  Low-salt diet, fluid restriction, strict input output and daily weights.  Spot IV Lasix.  Trend BNP  2D echo noted.  Noted ultrasound right upper quadrant.  Negative   acute hepatitis panel.  Negative   Goal-directed medical therapy including Entresto and Aldactone.  Appreciate  cardiology input.  Continue home Eliquis.  Continue  Vanco and Rocephin until May 15.  Patient was on daptomycin on admission.  MRSA PCR negative.   p.o. prednisone  Sliding-scale insulin  Nebulizer treatment.  Bronchopulmonary hygiene protocol.  Wound nurse consulted.  CTA chest noted no PE.  Bilateral groundglass opacities consistent with pulmonary edema.  PT OT.  Recommending SNF.  Patient wants to go home from here.  Refusing to go to rehab  Appreciate pulmonary input.  BAL cytology pending.  Continue telemetry  Discussed plan with RN and .  Patient was about to be discharged from [facility] to go home.  Discharge home when oxygen level is stable.  Will need walking oximetry.    VTE Prophylaxis:  Pharmacologic VTE prophylaxis orders are present.  Eliquis        CODE STATUS:   Code Status (Patient has no pulse and is not breathing): No CPR (Do Not Attempt to Resuscitate)  Medical Interventions (Patient has pulse or is breathing): Limited Support  Medical Intervention Limits: No intubation (DNI)      Part of this note may be an electronic transcription/translation of spoken language to printed text using the Dragon Dictation System.       Electronically signed by Owen Wooten MD, 05/10/25, 2:53 PM EDT.

## 2025-05-10 NOTE — PLAN OF CARE
Goal Outcome Evaluation:  Plan of Care Reviewed With: patient        Progress: improving  Outcome Evaluation: Patoient remains alert and oriented x 4. Vitals WNL. Patient now on * L Hi carmine NC. IV antibiotics. Call light within reach, will continue to monitor.

## 2025-05-10 NOTE — PLAN OF CARE
Goal Outcome Evaluation:      Pt has improved throughout the day with lasix. Aaox4 and vital signs stable. Pt remains on 8L nasal cannula. Uneventful day. Pt updated on the plan of care.      Progress: improving

## 2025-05-10 NOTE — PROGRESS NOTES
Pulmonary / Critical Care Progress Note      Patient Name: Roger D Snellen  : 1950  MRN: [id number]  Primary Care Physician:  John Phelps Jr., MD  Date of admission: 2025    Subjective   Subjective   Follow-up for acute hypoxic respiratory failure, acute on chronic congestive diastolic heart failure    No acute events overnight.    This morning,  Currently on 8 L high flow nasal cannula  Remains bedridden  Remains weak and fatigued  Edema slowly improving  Denies any chest pain chest tightness  Diuresing well  -1.3 L urine output      Objective   Objective     Vitals:   Temp:  [97.3 °F (36.3 °C)-97.7 °F (36.5 °C)] 97.7 °F (36.5 °C)  Heart Rate:  [70-81] 73  Resp:  [18-24] 24  BP: ()/(59-71) 99/64  Flow (L/min) (Oxygen Therapy):  [8-10] 9  Physical Exam   Vital Signs Reviewed   General: WDWN, Alert, NAD.  Chronically ill-appearing male, lying in bed  HEENT:  PERRL, EOMI.  OP, nares clear, no sinus tenderness  Neck:  Supple, no JVD, no thyromegaly  Chest:  good aeration, diminished bilaterally with Velcro-like crackles, currently on 8 L  CV: RRR, no MGR, pulses 2+, equal.  Abd:  Soft, NT, ND, + BS, no HSM  EXT:  no clubbing, no cyanosis, no edema  Neuro:  A&Ox3, CN grossly intact, no focal deficits.  Skin: No rashes or lesions noted      Result Review    Result Review:  I have personally reviewed the results from the time of this admission to 5/10/2025 11:56 EDT and agree with these findings:  []  Laboratory  []  Microbiology  []  Radiology  []  EKG/Telemetry   []  Cardiology/Vascular   []  Pathology  []  Old records  []  Other:  Most notable findings include:     Assessment & Plan   Assessment / Plan     Active Hospital Problems:  Active Hospital Problems    Diagnosis     **Acute hypoxic respiratory failure      Impression:  Acute hypoxic respiratory failure  Multifocal pneumonia from unknown organism  Acute cardiogenic pulmonary edema  Acute on chronic congestive diastolic heart failure, EF  38%  Volume overload  Recent septic arthritis of the left lower extremity on daptomycin/ceftriaxone as outpatient    Plan:    Remains on 9 to 10 L high flow nasal cannula  Continue to wean as tolerated to keep SpO2 greater than 90%  Will repeat chest x-ray today  Will give Lasix 40 mg IV x 1  Continue Aldactone 12 point  5 mg p.o. daily  Monitor renal function and electrolytes  Continue to monitor renal panel and electrolytes.  Replace electrolytes necessary  Continue vancomycin and ceftriaxone for bone/joint infection as well as multifocal pneumonia per primary  Continue with nebs and bronchopulmonary hygiene  Continue aggressive airway clearance  Encourage activity as tolerated    VTE Prophylaxis:  Pharmacologic VTE prophylaxis orders are present.    CODE STATUS:   Code Status (Patient has no pulse and is not breathing): No CPR (Do Not Attempt to Resuscitate)  Medical Interventions (Patient has pulse or is breathing): Limited Support  Medical Intervention Limits: No intubation (DNI)    Labs, imaging, and medications personally reviewed  Discussed with primary and patient    Electronically signed by LEONELA Anderson, 05/10/25, 11:56 AM EDT.    This patient was seen by both a physician and a NP. IAmrik MD, spent >50% of time in accordance with split shared billing. This included personally reviewing all pertinent labs, imaging, microbiology and documentation. Also discussing the case with the patient and any available family, the admitting physician and any available ancillary staff.   Electronically signed by Amrik Garay MD, 05/10/25, 2:54 PM EDT.

## 2025-05-10 NOTE — PROGRESS NOTES
[facility]   Progress Note    Patient Name: Roger D Snellen  : 1950  MRN: [id number]  Primary Care Physician: John Phelps Jr., MD  Date of admission: 2025    Subjective   Subjective     HPI:  Patient feels weak, no shortness of breath but he is unable to walk, no shortness of breath at rest patient has underlying COPD also problems in his lower extremity, currently he is fully treated for his underlying myopathic process, his blood pressure is stable, patient indicated that in the past he could not tolerate any beta-blocker.  Patient is on paced rhythm he underwent radiofrequency ablation for ventricular tachycardia in the past and he has a AICD pacemaker.    Review of Systems  Review of Systems   Constitutional:  Positive for fatigue.   HENT: Negative.     Eyes: Negative.    Respiratory: Negative.     Cardiovascular: Negative.  Negative for palpitations.   Gastrointestinal: Negative.    Endocrine: Negative.    Genitourinary: Negative.    Musculoskeletal:  Positive for arthralgias.   Skin: Negative.    Neurological:  Positive for weakness.   Psychiatric/Behavioral: Negative.         Objective   Objective     Vitals:  Temp:  [97.2 °F (36.2 °C)-97.9 °F (36.6 °C)] 97.5 °F (36.4 °C)  Heart Rate:  [70-81] 81  Resp:  [20-24] 24  BP: ()/(57-71) 98/59  Flow (L/min) (Oxygen Therapy):  [8-10] 8    Physical Exam:  Physical Exam  Constitutional:       Appearance: Normal appearance.   HENT:      Head: Normocephalic.      Nose: Nose normal.   Eyes:      Extraocular Movements: Extraocular movements intact.   Cardiovascular:      Rate and Rhythm: Regular rhythm.      Heart sounds: No murmur heard.  Pulmonary:      Breath sounds: No rales.   Abdominal:      Palpations: Abdomen is soft.   Musculoskeletal:      Right lower leg: No edema.      Left lower leg: No edema.      Comments: Has healing wounds in the lower extremities   Neurological:      Mental Status: He is alert.      Comments: Difficulty ambulating          Result Review    Result Review:  I have personally reviewed the results from the time of this admission to 05/09/25 11:01 PM EDT and agree with these findings:  [x]  Laboratory  []  Microbiology  []  Radiology  [x]  EKG/Telemetry   []  Cardiology/Vascular   []  Pathology  []  Old records  []  Other:    Most notable findings include: 75-year-old gentleman, admitted to the hospital because of shortness of breath, with hypoxia, underwent bronchoscopy because, has COPD exacerbation, his congestive failure has significantly improved, patient has atrial fibrillation and is anticoagulated, has history of septic arthritis.  Patient indicated that he probably  will do better at home.    Assessment & Plan   Assessment / Plan     Active Hospital Problems:  Active Hospital Problems    Diagnosis     **Acute hypoxic respiratory failure        Plan:   As per internal medicine and pulmonology, will need social service consult and evaluation.    DVT prophylaxis: Anticoagulated    CODE STATUS:    Code Status and Medical Interventions: No CPR (Do Not Attempt to Resuscitate); Limited Support; No intubation (DNI)   Ordered at: 05/05/25 1213     Code Status (Patient has no pulse and is not breathing):    No CPR (Do Not Attempt to Resuscitate)     Medical Interventions (Patient has pulse or is breathing):    Limited Support     Medical Intervention Limits:    No intubation (DNI)       Disposition:  I expect patient to be discharged may need physical therapy here in the hospital I will discontinue completely at the metoprolol..    Electronically signed by Juliocesar Giang MD, 05/09/25, 11:01 PM EDT.

## 2025-05-11 NOTE — PROGRESS NOTES
[facility] Clinical Pharmacy Services: Vancomycin Monitoring Note    Roger D Snellen is a 75 y.o. male who is on day 7/11 of pharmacy to dose vancomycin for Bone and/or Joint Infection. Continuation of treatment with Daptomycin from [facility] for concern for septic joint. Patient to complete a total of 4 weeks of therapy. Last day 5/15/25.     Previous Vancomycin Dose:   1000 mg IV every  12  hours  Imaging Reviewed?: Yes  Updated Cultures and Sensitivities:   Microbiology Results (last 10 days)       Procedure Component Value - Date/Time    AFB Culture - Wash, Bronchus [[id number]] Collected: 05/08/25 1153    Lab Status: Preliminary result Specimen: Wash from Bronchus Updated: 05/09/25 1213     AFB Stain No acid fast bacilli seen on direct smear      No acid fast bacilli seen on concentrated smear    Respiratory Culture - Wash, Bronchus [[id number]] Collected: 05/08/25 1153    Lab Status: Final result Specimen: Wash from Bronchus Updated: 05/10/25 1159     Respiratory Culture Light growth (2+) Normal respiratory arielle. No S. aureus or Pseudomonas aeruginosa detected. Final report.     Gram Stain Occasional WBCs seen      Occasional Gram positive cocci    AFB Culture - Lavage, Lung, Right Lower Lobe [[id number]] Collected: 05/08/25 1150    Lab Status: Preliminary result Specimen: Lavage from Lung, Right Lower Lobe Updated: 05/09/25 1213     AFB Stain No acid fast bacilli seen on direct smear      No acid fast bacilli seen on concentrated smear    BAL Culture, Quantitative - Lavage, Lung, Right Lower Lobe [[id number]] Collected: 05/08/25 1150    Lab Status: Final result Specimen: Lavage from Lung, Right Lower Lobe Updated: 05/10/25 1201     BAL Culture >100,000 CFU/mL Normal respiratory arielle. No S. aureus or Pseudomonas aeruginosa detected. Final report.     Gram Stain Rare (1+) WBCs seen      Occasional Gram positive cocci    Pneumonia Panel - Lavage, Lung, Right Lower Lobe [[id number]]  (Normal) Collected: 05/08/25 1150     Lab Status: Final result Specimen: Lavage from Lung, Right Lower Lobe Updated: 05/08/25 1324     Escherichia coli PCR Not Detected     Acinetobacter calcoaceticus-baumannii complex PCR Not Detected     Enterobacter cloacae PCR Not Detected     Klebsiella oxytoca PCR Not Detected     Klebsiella pneumoniae group PCR Not Detected     Klebsiella aerogenes PCR Not Detected     Moraxella catarrhalis PCR Not Detected     Proteus species PCR Not Detected     Pseudomonas aeroginosa PCR Not Detected     Serratia marcescens PCR Not Detected     Staphylococcus aureus PCR Not Detected     Streptococcus pyogenes PCR Not Detected     Haemophilus influenzae PCR Not Detected     Streptococcus agalactiae PCR Not Detected     Streptococcus pneumoniae PCR Not Detected     Chlamydophila pneumoniae PCR Not Detected     Legionella pneumophilia PCR Not Detected     Mycoplasma pneumo by PCR Not Detected     ADENOVIRUS, PCR Not Detected     CTX-M Gene N/A     IMP Gene N/A     KPC Gene N/A     mecA/C and MREJ Gene N/A     NDM Gene N/A     OXA-48-like Gene N/A     VIM Gene N/A     Coronavirus Not Detected     Human Metapneumovirus Not Detected     Human Rhinovirus/Enterovirus Not Detected     Influenza A PCR Not Detected     Influenza B PCR Not Detected     RSV, PCR Not Detected     Parainfluenza virus PCR Not Detected    CANDIDA AURIS PCR - Swab, Axilla Right, Axilla Left and Groin [[id number]]  (Normal) Collected: 05/06/25 1243    Lab Status: Final result Specimen: Swab from Axilla Right, Axilla Left and Groin Updated: 05/07/25 1030     CANDIDA AURIS PCR Not Detected    MRSA Screen, PCR (Inpatient) - Swab, Nares [[id number]]  (Normal) Collected: 05/05/25 1628    Lab Status: Final result Specimen: Swab from Nares Updated: 05/05/25 1749     MRSA PCR No MRSA Detected    Narrative:      The negative predictive value of this diagnostic test is high and should only be used to consider de-escalating anti-MRSA therapy. A positive result may  indicate colonization with MRSA and must be correlated clinically.    Respiratory Panel PCR w/COVID-19(SARS-CoV-2) SALLY/DULCE MARIA/CAROLEE/PAD/COR/TYRON In-House, NP Swab in UTM/VTM, 2 HR TAT - Swab, Nasopharynx [[id number]]  (Normal) Collected: 05/05/25 1452    Lab Status: Final result Specimen: Swab from Nasopharynx Updated: 05/05/25 1557     ADENOVIRUS, PCR Not Detected     Coronavirus 229E Not Detected     Coronavirus HKU1 Not Detected     Coronavirus NL63 Not Detected     Coronavirus OC43 Not Detected     COVID19 Not Detected     Human Metapneumovirus Not Detected     Human Rhinovirus/Enterovirus Not Detected     Influenza A PCR Not Detected     Influenza B PCR Not Detected     Parainfluenza Virus 1 Not Detected     Parainfluenza Virus 2 Not Detected     Parainfluenza Virus 3 Not Detected     Parainfluenza Virus 4 Not Detected     RSV, PCR Not Detected     Bordetella pertussis pcr Not Detected     Bordetella parapertussis PCR Not Detected     Chlamydophila pneumoniae PCR Not Detected     Mycoplasma pneumo by PCR Not Detected    Narrative:      In the setting of a positive respiratory panel with a viral infection PLUS a negative procalcitonin without other underlying concern for bacterial infection, consider observing off antibiotics or discontinuation of antibiotics and continue supportive care. If the respiratory panel is positive for atypical bacterial infection (Bordetella pertussis, Chlamydophila pneumoniae, or Mycoplasma pneumoniae), consider antibiotic de-escalation to target atypical bacterial infection.    S. Pneumo Ag Urine or CSF - Urine, Urine, Clean Catch [[id number]]  (Normal) Collected: 05/05/25 1451    Lab Status: Final result Specimen: Urine, Clean Catch Updated: 05/05/25 1613     Strep Pneumo Ag Negative    Narrative:      5/18/26 (S. Pneumo)    Legionella Antigen, Urine - Urine, Urine, Clean Catch [[id number]]  (Normal) Collected: 05/05/25 1451    Lab Status: Final result Specimen: Urine, Clean Catch Updated:  "25 1613     LEGIONELLA ANTIGEN, URINE Negative    Blood Culture - Blood, Hand, Left [[id number]]  (Normal) Collected: 25    Lab Status: Final result Specimen: Blood from Hand, Left Updated: 05/10/25 0945     Blood Culture No growth at 5 days    Blood Culture - Blood, Hand, Right [[id number]]  (Normal) Collected: 25    Lab Status: Final result Specimen: Blood from Hand, Right Updated: 05/10/25 0945     Blood Culture No growth at 5 days    COVID-19, FLU A/B, RSV PCR 1 HR TAT - Swab, Nasopharynx [[id number]]  (Normal) Collected: 25    Lab Status: Final result Specimen: Swab from Nasopharynx Updated: 25     COVID19 Not Detected     Influenza A PCR Not Detected     Influenza B PCR Not Detected     RSV, PCR Not Detected    Narrative:      Fact sheet for providers: https://www.fda.gov/media/749056/download    Fact sheet for patients: https://www.fda.gov/media/982066/download    Test performed by PCR.            Vitals/Labs  Ht: 198.1 cm (78\"); Wt: 99 kg (218 lb 4.1 oz)   Temp (24hrs), Av.7 °F (36.5 °C), Min:97.5 °F (36.4 °C), Max:98.1 °F (36.7 °C)   Estimated Creatinine Clearance: 122.2 mL/min (A) (by C-G formula based on SCr of 0.72 mg/dL (L)).     Results from last 7 days   Lab Units 25  0505 05/10/25  0509 25  0142 25  0134 25  1155 25  0536 25  0412 25  0812 25  0525 25  0933 25  0845   VANCOMYCIN RM mcg/mL  --   --  12.10  --   --   --   --   --  23.54  --   --    VANCOMYCIN TR mcg/mL  --   --   --   --  18.94  --   --   --   --   --   --    CREATININE mg/dL 0.72* 0.61* 0.68*   < >  --    < >  --    < > 0.71*   < >  --    WBC 10*3/mm3  --   --   --   --   --   --  12.11*  --  10.47  --  12.83*    < > = values in this interval not displayed.     Assessment/Plan    Current Vancomycin Dose:  1250 mg IV every 12 hours; which provides the following predicted parameters:  Exposure target: AUC24 (range)400-600 " mg/L.hr   AUC24,ss: 566 mg/L.hr  Probability of AUC24 > 400: 100 %  Ctrough,ss: 16.6 mg/L  Probability of Ctrough,ss > 20: 4 %  Probability of nephrotoxicity (Lodise FREDDY 2009): 12 %  Next Vanc Random planned for 5/12 at 1030  We will continue to monitor patient changes and renal function     Thank you for involving pharmacy in this patient's care. Please contact pharmacy with any questions or concerns.    Zachariah Stack  Clinical Pharmacist

## 2025-05-11 NOTE — PROGRESS NOTES
[facility]   Progress Note    Patient Name: Roger D Snellen  : 1950  MRN: [id number]  Primary Care Physician: John Phelps Jr., MD  Date of admission: 2025    Subjective   Subjective     HPI:  Patient short of breath, has persistent diffuse infiltrates may represent pneumonia also congestive failure that probably is unlikely, because of the persistent infiltrates despite the diuresis, the report does not indicate fibrosis.  He is still hypoxic    Review of Systems  Review of Systems   Constitutional: Negative.    HENT: Negative.     Eyes: Negative.    Respiratory:  Positive for shortness of breath. Negative for chest tightness.    Cardiovascular:  Negative for chest pain.   Gastrointestinal: Negative.    Genitourinary: Negative.    Musculoskeletal: Negative.    Skin:  Positive for rash.   Neurological: Negative.    Psychiatric/Behavioral: Negative.         Objective   Objective     Vitals:  Temp:  [97.5 °F (36.4 °C)-98.1 °F (36.7 °C)] 97.5 °F (36.4 °C)  Heart Rate:  [70-76] 70  Resp:  [20-22] 22  BP: (100-115)/(62-74) 115/64  Flow (L/min) (Oxygen Therapy):  [8-40] 40    Physical Exam:  Physical Exam  Constitutional:       Appearance: He is obese.   HENT:      Head: Normocephalic.      Nose: Nose normal.   Eyes:      Extraocular Movements: Extraocular movements intact.   Cardiovascular:      Rate and Rhythm: Normal rate.      Heart sounds: No murmur heard.  Pulmonary:      Breath sounds: Rhonchi present. No rales.   Abdominal:      Palpations: Abdomen is soft.   Musculoskeletal:      Right lower leg: No edema.      Left lower leg: No edema.   Skin:     General: Skin is warm.   Neurological:      General: No focal deficit present.      Mental Status: He is alert.   Psychiatric:         Mood and Affect: Mood normal.         Result Review    Result Review:  I have personally reviewed the results from the time of this admission to 25 2:20 PM EDT and agree with these findings:  [x]  Laboratory  []   Microbiology  [x]  Radiology  []  EKG/Telemetry   []  Cardiology/Vascular   []  Pathology  []  Old records  []  Other:    Most notable findings include: 75-year-old gentleman admitted to the hospital because of shortness of breath hypoxia, acute congestive failure, patient is diuresed but he has persistent infiltrates in the lung which may represent most likely pneumonia or ARDS type of pathology.  There is no pulmonary fibrosis according the report.    Assessment & Plan   Assessment / Plan     Active Hospital Problems:  Active Hospital Problems    Diagnosis     **Acute hypoxic respiratory failure        Plan:   Continue diuresis, antibiotics as per pulmonology, the amiodarone was decreased to 200 mg a day    DVT prophylaxis: As per admitting physician    CODE STATUS:    Code Status and Medical Interventions: No CPR (Do Not Attempt to Resuscitate); Limited Support; No intubation (DNI)   Ordered at: 05/05/25 1213     Code Status (Patient has no pulse and is not breathing):    No CPR (Do Not Attempt to Resuscitate)     Medical Interventions (Patient has pulse or is breathing):    Limited Support     Medical Intervention Limits:    No intubation (DNI)       Disposition:  I expect patient to be discharged patient is very sick we will see how he progresses.    Electronically signed by Juliocesar Giang MD, 05/11/25, 2:20 PM EDT.

## 2025-05-11 NOTE — PLAN OF CARE
Goal Outcome Evaluation:  Plan of Care Reviewed With: patient        Progress: no change  Outcome Evaluation: Patient continues to be alert and oriented. SATs drop quickly with any exertion. HI Roger humidified NC 8L. CT ordered for this am to evaluate the possibility of pneumonia and pulmonary fibrosis. Will continue to monitor.

## 2025-05-11 NOTE — PROGRESS NOTES
Pulmonary / Critical Care Progress Note      Patient Name: Roger D Snellen  : 1950  MRN: [id number]  Primary Care Physician:  John Phelps Jr., MD  Date of admission: 2025    Subjective   Subjective   Follow-up for acute hypoxic respiratory failure, acute on chronic congestive diastolic heart failure    No acute events overnight.    This morning,  Currently on 8 L high flow nasal cannula  Remains bedridden  Nacho weak and fatigued  Edema slowly improving  Dyspnea slowly improving  No fever or chills  Diuresing well  -3.8 L urine output        Objective   Objective     Vitals:   Temp:  [97.5 °F (36.4 °C)-98.1 °F (36.7 °C)] 97.7 °F (36.5 °C)  Heart Rate:  [70-79] 70  Resp:  [20-24] 22  BP: ()/(62-74) 112/67  Flow (L/min) (Oxygen Therapy):  [8-10] 10  Physical Exam   Vital Signs Reviewed   General: WDWN, Alert, NAD.  Chronically ill-appearing male, lying in bed  HEENT:  PERRL, EOMI.  OP, nares clear, no sinus tenderness  Neck:  Supple, no JVD, no thyromegaly  Chest:  good aeration, diminished bilaterally with Velcro-like crackles, currently on 8 L  CV: RRR, no MGR, pulses 2+, equal.  Abd:  Soft, NT, ND, + BS, no HSM  EXT:  no clubbing, no cyanosis, no edema  Neuro:  A&Ox3, CN grossly intact, no focal deficits.  Skin: No rashes or lesions noted      Result Review    Result Review:  I have personally reviewed the results from the time of this admission to 2025 10:39 EDT and agree with these findings:  []  Laboratory  []  Microbiology  []  Radiology  []  EKG/Telemetry   []  Cardiology/Vascular   []  Pathology  []  Old records  []  Other:  Most notable findings include:     Assessment & Plan   Assessment / Plan     Active Hospital Problems:  Active Hospital Problems    Diagnosis     **Acute hypoxic respiratory failure      Impression:  Acute hypoxic respiratory failure  Multifocal pneumonia from unknown organism  Acute cardiogenic pulmonary edema  Acute on chronic congestive diastolic heart failure, EF  38%  Volume overload  Recent septic arthritis of the left lower extremity on daptomycin/ceftriaxone as outpatient    Plan:  Increased to 10 L high flow nasal cannula and sat patient up in bed.  Patient SpO2 was around 88   We will hold Jardiance  Increase midodrine to 10 3 times daily  Start Lasix 40 twice daily and continue Aldactone 12.5    Increased to 10 L high flow nasal cannula today  Patient has oxygen improved with sitting up in bed  Continue supplemental oxygen to keep SpO2 greater than 90%  Will increase midodrine to 10 mg 3 times daily  Will be a little bit more aggressive with diuretic with increased Lasix  Continue Aldactone p.o.  Will hold Jardiance given hypotension  Monitor renal function and electrolytes  Continue to monitor renal panel and electrolytes.  Replace electrolytes necessary  Continue vancomycin and ceftriaxone for bone/joint infection as well as multifocal pneumonia per primary  Continue with nebs and bronchopulmonary hygiene  Continue aggressive airway clearance  Encourage activity as tolerated    VTE Prophylaxis:  Pharmacologic VTE prophylaxis orders are present.    CODE STATUS:   Code Status (Patient has no pulse and is not breathing): No CPR (Do Not Attempt to Resuscitate)  Medical Interventions (Patient has pulse or is breathing): Limited Support  Medical Intervention Limits: No intubation (DNI)    Labs, imaging, and medications personally reviewed  Discussed with primary and patient    Electronically signed by LEONELA Anderson, 05/11/25, 11:06 AM EDT.  This patient was seen by both a physician and a NP. IAmrik MD, spent >50% of time in accordance with split shared billing. This included personally reviewing all pertinent labs, imaging, microbiology and documentation. Also discussing the case with the patient and any available family, the admitting physician and any available ancillary staff.   Electronically signed by Amrik Garay MD, 05/11/25, 1:02 PM  EDT.

## 2025-05-11 NOTE — PROGRESS NOTES
[facility]   Hospitalist Progress Note  Date: 2025  Patient Name: Roger D Snellen  : 1950  MRN: [id number]  Date of admission: 2025      Subjective   Subjective     Chief Complaint: Shortness of air    Summary:   Roger D Snellen is a 75 y.o. male A-fib on anticoagulation, heart failure with preserved ejection fraction, diabetes, COPD, hypothyroidism, GERD, hyperlipidemia, hypertension.  Patient most recently discharged from [facility].  Patient presented with complaints of left foot pain swelling and erythema.  Met criteria for severe sepsis due to infected left foot.  Also with issues of right knee pain.  Concern for septic right knee arthrocentesis performed, moderate amount of WBCs, few GPC's on cell count and Gram stain, cultures no growth.  Patient discharged on IV antibiotics 500 mg IV daptomycin, 1 g IV ceftriaxone daily with a stop date of 5/15/2025.  Patient was seen by podiatry for left foot wound, did have bedside debridement performed.  Patient was discharged to [facility] rehab.  Since being at [facility] patient endorses slowly worsening shortness of breath.  On morning of presentation, 2025, patient states he felt smothered.  Denies any associated fever chills or night sweats.  Patient presented to the emergency department hypoxic 81% on room air.  Patient with a heart rate of 70, respiratory rate of 28 and a blood pressure 122/72 initially in the emergency department.  Quickly titrated up on high flow nasal cannula eventually transitioned over to BiPAP, however due to intolerance patient transition to Airvo 55 L 60% FiO2 to maintain O2 saturations.  Patient's initial ABG shows a pH of 7.5, PCO2 of 27.5, PO2 of 65.8.  proBNP elevated at 6386.  Troponin 32 then 31, no complaints of chest pain.  On labs patient with a bicarb of 19.4, anion gap of 15.6, creatinine 0.73.  Patient has AST ALT elevations of 114/96 respectively.  Procalcitonin elevated 0.41.  Lactate negative x  2.  White count of 12.8, hemoglobin 11.3 and platelets of 333.  Chest x-ray showed extensive new opacities in right lung and possible new opacities in left perihilar and left basilar region representing multifocal pneumonia or edema.  Patient has a right arm PICC line in place.  CT scan with contrast obtained to rule out pulmonary embolus.  No evidence of PE.  Cardiomegaly with reflux of contrast into the hepatic veins and IVC, seen in right heart failure.  Patchy groundglass opacities throughout both lungs with smooth interlobular septal thickening and trace bilateral pleural effusions.  Representing pulmonary edema over bilateral pneumonia.  Given patient's work of breathing and oxygen demand, admitted to the ICU.  Overnight patient did well and has been weaned down to 7 L high flow nasal cannula.  Transferred out of the unit on May 6.  Patient seen by pulmonary and cardiology.  Patient had bronchoscopy May 8 with suctioning of mucous plugs and purulent secretions.  Cultures negative to date.     Interval Followup:   BP soft  Patient now on Airvo with 40 L/min, no home oxygen use   Denies any hemoptysis  LFTs improving  Shortness of air improving.  Intermittent.  Chest x-ray with persistent bilateral opacities and is unchanged  No chest pain.  No cough.  Right knee remains swollen.  Not painful  Good urine output     Review of Systems   All systems were reviewed and negative except for: Summary and interval follow-up    Objective   Objective     Vitals:   Temp:  [97.5 °F (36.4 °C)-98.1 °F (36.7 °C)] 97.7 °F (36.5 °C)  Heart Rate:  [70-76] 73  Resp:  [20-22] 22  BP: (100-115)/(62-74) 103/67  Flow (L/min) (Oxygen Therapy):  [8-40] 40  Physical Exam      Constitutional: Awake, alert, increased work of breathing, mild respiratory distress              Eyes: Pupils equal, sclerae anicteric, no conjunctival injection              HENT: NCAT, mucous membranes moist              Neck: Supple, no thyromegaly, no  lymphadenopathy, trachea midline              Respiratory: Crackles heard mostly left base and midlung area, minimal expiratory wheezing, prolonged expiratory phase              Cardiovascular: RRR, no murmurs, rubs, or gallops, palpable pedal pulses bilaterally.  +1bilateral extremity LYNN              Gastrointestinal: Positive bowel sounds, soft, nontender, nondistended              Musculoskeletal: Right knee swollen, good range of motion and no tenderness on palpation              Neurologic: Oriented x 3, strength symmetric in all extremities, Cranial Nerves grossly intact to confrontation, speech clear              Skin: Wounds to the left foot is dressed, chronic discoloration lower extremities.      Result Review    Result Review:  I have personally reviewed the results for the past 24 hours and agree with these findings:  [x]  Laboratory  []  Microbiology  [x]  Radiology  []  EKG/Telemetry   []  Cardiology/Vascular   []  Pathology  [x]  Old records  [x]  Other: Medications    Assessment & Plan   Assessment / Plan     Assessment:  Acute hypoxemic respiratory failure.  Improving.  No home oxygen use  Mucous plugs.  Status post bronchoscopy May 8  Acute on chronic systolic CHF EF of 38%.  Moderate TR.  COPD exacerbation.  History of A-fib on Eliquis.  Status post PPM/AICD.  Recent right knee septic arthritis and left foot infection on IV daptomycin/Rocephin via RUE PICC line until May 15.  Transaminitis.  Likely hepatic congestion from CHF.  Improving  Rheumatoid factor positive.  No symptoms.  Dilated ascending thoracic aorta 4.4 cm.  Left bundle branch block.  Positive rheumatoid factor.       Plan:  Continue supplemental oxygen to keep sats more than 90%.  Low-salt diet, fluid restriction, strict input output and daily weights.  Noted repeat CT chest by cardiology unchanged bilateral GGO, it does improvement in pulm edema  Scheduled IV Lasix as per pulmonary.  Trend BNP  2D echo noted.  Noted ultrasound  right upper quadrant.  Negative   acute hepatitis panel.  Negative   Goal-directed medical therapy including Entresto and Aldactone.  Appreciate cardiology input.  Continue home Eliquis.  Continue  Vanco and Rocephin until May 15.  Patient was on daptomycin on admission.  MRSA PCR negative.   p.o. prednisone  Sliding-scale insulin  Nebulizer treatment.  Bronchopulmonary hygiene protocol.  Wound nurse consulted.  CTA chest noted no PE.  Bilateral groundglass opacities consistent with pulmonary edema.  PT OT.  Recommending SNF.  Patient wants to go home from here.  Refusing to go to rehab.  Digoxin level therapeutic  Appreciate pulmonary input.  BAL cytology pending.  Started on midodrine  Check anti-CCP.  VISH/kong 1 negative.  Continue telemetry  Discussed plan with RN and .  Patient was about to be discharged from [facility] to go home.  Discharge home when oxygen level is stable.  Will need walking oximetry.    VTE Prophylaxis:  Pharmacologic VTE prophylaxis orders are present.  Eliquis        CODE STATUS:   Code Status (Patient has no pulse and is not breathing): No CPR (Do Not Attempt to Resuscitate)  Medical Interventions (Patient has pulse or is breathing): Limited Support  Medical Intervention Limits: No intubation (DNI)      Part of this note may be an electronic transcription/translation of spoken language to printed text using the Dragon Dictation System.         Electronically signed by Owen Wooten MD, 05/11/25, 4:17 PM EDT.

## 2025-05-11 NOTE — THERAPY TREATMENT NOTE
Acute Care - Physical Therapy Treatment Note   [facility]     Patient Name: Roger D Snellen  : 1950  MRN: [id number]  Today's Date: 2025      Visit Dx:     ICD-10-CM ICD-9-CM   1. Acute hypoxemic respiratory failure  J96.01 518.81   2. Acute on chronic systolic congestive heart failure  I50.23 428.23     428.0   3. Acute pulmonary edema  J81.0 518.4   4. Difficulty walking  R26.2 719.7   5. Impaired mobility and ADLs  Z74.09 V49.89    Z78.9    6. Acute hypoxic respiratory failure  J96.01 518.81     Patient Active Problem List   Diagnosis    Non-ischemic cardiomyopathy    Acute hypoxic respiratory failure     Past Medical History:   Diagnosis Date    Arrhythmia     ATRIAL FIB, PACE TERMINATED VT    Atrial fibrillation     BBB (bundle branch block)     L BBB    CHF (congestive heart failure)     Diabetes mellitus     Disease of thyroid gland     GERD (gastroesophageal reflux disease)     Hyperlipidemia     Hypertension     Non-ischemic cardiomyopathy     DILATED CM EF 20-25% 2017    Osteoarthritis      Past Surgical History:   Procedure Laterality Date    BRONCHOSCOPY N/A 2025    Procedure: BRONCHOSCOPY WITH BRONCHOALVEOLAR LAVAGE, WASHING, AIRWAY INSPECTION: insertion of lighted instrument to view inside the lung;  Surgeon: Tae Mosley MD;  Location: [facility] MAIN OR;  Service: Pulmonary;  Laterality: N/A;    CARDIAC ELECTROPHYSIOLOGY PROCEDURE N/A 2017    Procedure: Device Upgrade  ICD TO A BIV ICD   medtronic;  Surgeon: Chris Phillips MD;  Location: [facility] INVASIVE LOCATION;  Service:     CHOLECYSTECTOMY      EYE SURGERY Left 2025    Retina Repair    INSERT / REPLACE / REMOVE PACEMAKER      INTERNAL CARDIAC DEFIBRILLATOR INSERTION      [employer]    SHOULDER ARTHROSCOPY      TOTAL KNEE ARTHROPLASTY       PT Assessment (Last 12 Hours)       PT Evaluation and Treatment       Row Name 25 1410          Physical Therapy Time and Intention    Subjective Information complains  of;dyspnea  -     Document Type therapy note (daily note)  -     Mode of Treatment physical therapy;individual therapy  -     Patient Effort adequate  -       Row Name 05/11/25 1410          Pain Scale: FACES Pre/Post-Treatment    Pain: FACES Scale, Pretreatment 0-->no hurt  -     Posttreatment Pain Rating 0-->no hurt  -       Row Name 05/11/25 1410          Motor Skills    Therapeutic Exercise hip;knee;ankle  -       Row Name 05/11/25 1410          Hip (Therapeutic Exercise)    Hip (Therapeutic Exercise) AROM (active range of motion);isometric exercises  -     Hip AROM (Therapeutic Exercise) bilateral;aBduction;aDduction;flexion;extension;supine;20 repititions  -     Hip Isometrics (Therapeutic Exercise) bilateral;gluteal sets;supine;10 repetitions;2 sets  -       Row Name 05/11/25 1410          Knee (Therapeutic Exercise)    Knee (Therapeutic Exercise) AROM (active range of motion);isometric exercises  -     Knee AROM (Therapeutic Exercise) bilateral;LAQ (long arc quad);supine;10 repetitions;2 sets  -     Knee Isometrics (Therapeutic Exercise) bilateral;quad sets;supine;10 repetitions;2 sets  -       Row Name 05/11/25 1410          Ankle (Therapeutic Exercise)    Ankle (Therapeutic Exercise) AROM (active range of motion)  -     Ankle AROM (Therapeutic Exercise) bilateral;dorsiflexion;plantarflexion;supine;10 repetitions;2 sets  -       Row Name             Wound 05/05/25 1500 Left anterior foot Pressure Injury    Wound - Properties Group Placement Date: 05/05/25  -JR Placement Time: 1500  -JR Present on Original Admission: Y  -JR Side: Left  -JR Orientation: anterior  -JR Location: foot  -JR Primary Wound Type: Pressure Inj  -JR    Retired Wound - Properties Group Placement Date: 05/05/25  -JR Placement Time: 1500  -JR Present on Original Admission: Y  -JR Side: Left  -JR Orientation: anterior  -JR Location: foot  -JR    Retired Wound - Properties Group Placement Date: 05/05/25  -JR  Placement Time: 1500  -JR Present on Original Admission: Y  -JR Side: Left  -JR Orientation: anterior  -JR Location: foot  -JR    Retired Wound - Properties Group Date first assessed: 05/05/25  -JR Time first assessed: 1500  -JR Present on Original Admission: Y  -JR Side: Left  -JR Location: foot  -JR      Row Name             Wound 05/05/25 1500 medial coccyx Pressure Injury    Wound - Properties Group Placement Date: 05/05/25  -JR Placement Time: 1500  -JR Present on Original Admission: Y  -JR Orientation: medial  -JR Location: coccyx  -JR Primary Wound Type: Pressure Inj  -JR    Retired Wound - Properties Group Placement Date: 05/05/25  -JR Placement Time: 1500  -JR Present on Original Admission: Y  -JR Orientation: medial  -JR Location: coccyx  -JR    Retired Wound - Properties Group Placement Date: 05/05/25  -JR Placement Time: 1500  -JR Present on Original Admission: Y  -JR Orientation: medial  -JR Location: coccyx  -JR    Retired Wound - Properties Group Date first assessed: 05/05/25  -JR Time first assessed: 1500  -JR Present on Original Admission: Y  -JR Location: coccyx  -JR      Row Name             Wound 05/06/25 1058 Left medial ankle    Wound - Properties Group Placement Date: 05/06/25  -NH Placement Time: 1058  -NH Present on Original Admission: Y  -NH Side: Left  -NH Orientation: medial  -NH Location: ankle  -NH    Retired Wound - Properties Group Placement Date: 05/06/25  -NH Placement Time: 1058  -NH Present on Original Admission: Y  -NH Side: Left  -NH Orientation: medial  -NH Location: ankle  -NH    Retired Wound - Properties Group Placement Date: 05/06/25  -NH Placement Time: 1058  -NH Present on Original Admission: Y  -NH Side: Left  -NH Orientation: medial  -NH Location: ankle  -NH    Retired Wound - Properties Group Date first assessed: 05/06/25  -NH Time first assessed: 1058  -NH Present on Original Admission: Y  -NH Side: Left  -NH Location: ankle  -NH      Row Name 05/11/25 1410           Vital Signs    O2 Delivery Intra Treatment --  Pt on Airvo.  -RH               User Key  (r) = Recorded By, (t) = Taken By, (c) = Cosigned By      Initials Name Provider Type    NH Jocelyne Lockhart, RN Registered Nurse    Mick Franklin, PTA Physical Therapist Assistant    Peterson Daigle, RN Registered Nurse                    Physical Therapy Education       Title: PT OT SLP Therapies (In Progress)       Topic: Physical Therapy (In Progress)       Point: Mobility training (Done)       Learning Progress Summary            Patient Acceptance, E,TB, VU by AV at 5/6/2025 1336                      Point: Home exercise program (Not Started)       Learner Progress:  Not documented in this visit.              Point: Body mechanics (Done)       Learning Progress Summary            Patient Acceptance, E,TB, VU by AV at 5/6/2025 1336                      Point: Precautions (Done)       Learning Progress Summary            Patient Acceptance, E,TB, VU by AV at 5/6/2025 1336                                      User Key       Initials Effective Dates Name Provider Type Discipline    AV 06/11/21 -  Samson Cordoba, PT Physical Therapist PT                  PT Recommendation and Plan         Outcome Measures       Row Name 05/09/25 1100             How much help from another person do you currently need...    Turning from your back to your side while in flat bed without using bedrails? 4  -DP (r) RA (t) DP (c)      Moving from lying on back to sitting on the side of a flat bed without bedrails? 4  -DP (r) RA (t) DP (c)      Moving to and from a bed to a chair (including a wheelchair)? 3  -DP (r) RA (t) DP (c)      Standing up from a chair using your arms (e.g., wheelchair, bedside chair)? 3  -DP (r) RA (t) DP (c)      Climbing 3-5 steps with a railing? 2  -DP (r) RA (t) DP (c)      To walk in hospital room? 3  -DP (r) RA (t) DP (c)      AM-PAC 6 Clicks Score (PT) 19  -DP (r) RA (t)                User Key  (r) = Recorded  By, (t) = Taken By, (c) = Cosigned By      Initials Name Provider Type    Mimi Cano, PT Physical Therapist    Tali Carrion, ALEXANDRE Student PT Student                     Time Calculation:    PT Charges       Row Name 05/11/25 1409             Time Calculation    PT Received On 05/11/25  -RH         Timed Charges    34729 - PT Therapeutic Exercise Minutes 15  -RH         Total Minutes    Timed Charges Total Minutes 15  -RH       Total Minutes 15  -RH                User Key  (r) = Recorded By, (t) = Taken By, (c) = Cosigned By      Initials Name Provider Type     Mick Woods PTA Physical Therapist Assistant                  Therapy Charges for Today       Code Description Service Date Service Provider Modifiers Qty    16012804806 HC PT THER PROC EA 15 MIN 5/11/2025 Mick Woods PTA GP 1            PT G-Codes  Outcome Measure Options: AM-PAC 6 Clicks Daily Activity (OT), Optimal Instrument  AM-PAC 6 Clicks Score (PT): 18  AM-PAC 6 Clicks Score (OT): 21    Mick Woods PTA  5/11/2025

## 2025-05-12 NOTE — SIGNIFICANT NOTE
No statlock device in place to secure picc line.  Primary RN notified that dressing would need to be changed as soon as possible.

## 2025-05-12 NOTE — PROGRESS NOTES
[facility]   Hospitalist Progress Note  Date: 2025  Patient Name: Roger D Snellen  : 1950  MRN: [id number]  Date of admission: 2025      Subjective   Subjective     Chief Complaint: Shortness of air    Summary:   Roger D Snellen is a 75 y.o. male A-fib on anticoagulation, heart failure with preserved ejection fraction, diabetes, COPD, hypothyroidism, GERD, hyperlipidemia, hypertension.  Patient most recently discharged from [facility].  Patient presented with complaints of left foot pain swelling and erythema.  Met criteria for severe sepsis due to infected left foot.  Also with issues of right knee pain.  Concern for septic right knee arthrocentesis performed, moderate amount of WBCs, few GPC's on cell count and Gram stain, cultures no growth.  Patient discharged on IV antibiotics 500 mg IV daptomycin, 1 g IV ceftriaxone daily with a stop date of 5/15/2025.  Patient was seen by podiatry for left foot wound, did have bedside debridement performed.  Patient was discharged to [facility] rehab.  Since being at [facility] patient endorses slowly worsening shortness of breath.  On morning of presentation, 2025, patient states he felt smothered.  Denies any associated fever chills or night sweats.  Patient presented to the emergency department hypoxic 81% on room air.  Patient with a heart rate of 70, respiratory rate of 28 and a blood pressure 122/72 initially in the emergency department.  Quickly titrated up on high flow nasal cannula eventually transitioned over to BiPAP, however due to intolerance patient transition to Airvo 55 L 60% FiO2 to maintain O2 saturations.  Patient's initial ABG shows a pH of 7.5, PCO2 of 27.5, PO2 of 65.8.  proBNP elevated at 6386.  Troponin 32 then 31, no complaints of chest pain.  On labs patient with a bicarb of 19.4, anion gap of 15.6, creatinine 0.73.  Patient has AST ALT elevations of 114/96 respectively.  Procalcitonin elevated 0.41.  Lactate negative x  2.  White count of 12.8, hemoglobin 11.3 and platelets of 333.  Chest x-ray showed extensive new opacities in right lung and possible new opacities in left perihilar and left basilar region representing multifocal pneumonia or edema.  Patient has a right arm PICC line in place.  CT scan with contrast obtained to rule out pulmonary embolus.  No evidence of PE.  Cardiomegaly with reflux of contrast into the hepatic veins and IVC, seen in right heart failure.  Patchy groundglass opacities throughout both lungs with smooth interlobular septal thickening and trace bilateral pleural effusions.  Representing pulmonary edema over bilateral pneumonia.  Given patient's work of breathing and oxygen demand, admitted to the ICU.  Overnight patient did well and has been weaned down to 7 L high flow nasal cannula.  Transferred out of the unit on May 6.  Patient seen by pulmonary and cardiology.  Patient had bronchoscopy May 8 with suctioning of mucous plugs and purulent secretions.  Cultures negative to date.  Rheumatoid factor positive but rest of autoimmune panel including anti-CCP negative.     Interval Followup:   BP better  Patient now on Airvo with 45 L/min, no home oxygen use   Denies any hemoptysis  LFTs improving  Shortness of air improving.  Intermittent.  Chest x-ray with persistent bilateral opacities and is unchanged  No chest pain.  No cough.  Right knee remains swollen.  Not painful  Good urine output -4 L    Review of Systems   All systems were reviewed and negative except for: Summary and interval follow-up    Objective   Objective     Vitals:   Temp:  [97.5 °F (36.4 °C)-98.1 °F (36.7 °C)] 97.5 °F (36.4 °C)  Heart Rate:  [70-72] 70  Resp:  [18-25] 20  BP: ()/(57-73) 93/64  Flow (L/min) (Oxygen Therapy):  [40-45] 45  Physical Exam      Constitutional: Awake, alert, increased work of breathing, mild respiratory distress              Eyes: Pupils equal, sclerae anicteric, no conjunctival injection               HENT: NCAT, mucous membranes moist              Neck: Supple, no thyromegaly, no lymphadenopathy, trachea midline              Respiratory: Crackles heard mostly left base and midlung area, minimal expiratory wheezing, prolonged expiratory phase              Cardiovascular: RRR, no murmurs, rubs, or gallops, palpable pedal pulses bilaterally.  +1bilateral extremity LYNN              Gastrointestinal: Positive bowel sounds, soft, nontender, nondistended              Musculoskeletal: Right knee swollen, good range of motion and no tenderness on palpation              Neurologic: Oriented x 3, strength symmetric in all extremities, Cranial Nerves grossly intact to confrontation, speech clear              Skin: Wounds to the left foot is dressed, chronic discoloration lower extremities.      Result Review    Result Review:  I have personally reviewed the results for the past 24 hours and agree with these findings:  [x]  Laboratory  []  Microbiology  [x]  Radiology  []  EKG/Telemetry   []  Cardiology/Vascular   []  Pathology  [x]  Old records  [x]  Other: Medications    Assessment & Plan   Assessment / Plan     Assessment:  Acute hypoxemic respiratory failure. No home oxygen use.  Worsening  Mucous plugs.  Status post bronchoscopy May 8  Acute on chronic systolic CHF EF of 38%.  Moderate TR.  COPD exacerbation.  History of A-fib on Eliquis.  Status post PPM/AICD.  Recent right knee septic arthritis and left foot infection on IV daptomycin/Rocephin via RUE PICC line until May 15.  Transaminitis.  Likely hepatic congestion from CHF.  Improving  Rheumatoid factor positive.  Other autoimmune panel negative no symptoms.  Dilated ascending thoracic aorta 4.4 cm.  Left bundle branch block.  Positive rheumatoid factor.       Plan:  Continue supplemental oxygen to keep sats more than 90%.  Low-salt diet, fluid restriction, strict input output and daily weights.  Noted repeat CT chest by cardiology unchanged bilateral GGO, it does  improvement in pulm edema  Scheduled IV Lasix as per pulmonary.  Trend BNP  2D echo noted.  Noted ultrasound right upper quadrant.  Negative   acute hepatitis panel.  Negative   Goal-directed medical therapy including Entresto and Aldactone.  Appreciate cardiology input.  Continue home Eliquis.  Continue  Vanco and Rocephin until May 15.  Patient was on daptomycin on admission.  MRSA PCR negative.   p.o. prednisone  Sliding-scale insulin  Nebulizer treatment.  Bronchopulmonary hygiene protocol.  Wound nurse consulted.  CTA chest noted no PE.  Bilateral groundglass opacities consistent with pulmonary edema.  PT OT.  Recommending SNF.  Patient wants to go home from here.  Refusing to go to rehab.  Digoxin level therapeutic  Appreciate pulmonary input.  BAL cytology pending.  Started on midodrine  Check anti-CCP.  VISH/kong 1 negative.  Continue telemetry  Discussed plan with RN and .  Patient was about to be discharged from [facility] to go home.  Discharge home when oxygen level is stable.  Will need walking oximetry.    VTE Prophylaxis:  Pharmacologic VTE prophylaxis orders are present.  Eliquis        CODE STATUS:   Code Status (Patient has no pulse and is not breathing): No CPR (Do Not Attempt to Resuscitate)  Medical Interventions (Patient has pulse or is breathing): Limited Support  Medical Intervention Limits: No intubation (DNI)      Part of this note may be an electronic transcription/translation of spoken language to printed text using the Dragon Dictation System.         Electronically signed by Owen Wooten MD, 05/12/25, 4:57 PM EDT.

## 2025-05-12 NOTE — PROGRESS NOTES
RT EQUIPMENT DEVICE RELATED - SKIN ASSESSMENT    RT Medical Equipment/Device:     High Flow Nasal Cannula:Airvo    Skin Assessment:      Cheek:  Intact  Nose:  Intact    Device Skin Pressure Protection:  Skin-to skin areas padded    Nurse Notification:  Nessa Arshad, RRT

## 2025-05-12 NOTE — THERAPY TREATMENT NOTE
Acute Care - Physical Therapy Treatment Note   [facility]     Patient Name: Roger D Snellen  : 1950  MRN: [id number]  Today's Date: 2025      Visit Dx:     ICD-10-CM ICD-9-CM   1. Acute hypoxemic respiratory failure  J96.01 518.81   2. Acute on chronic systolic congestive heart failure  I50.23 428.23     428.0   3. Acute pulmonary edema  J81.0 518.4   4. Difficulty walking  R26.2 719.7   5. Impaired mobility and ADLs  Z74.09 V49.89    Z78.9    6. Acute hypoxic respiratory failure  J96.01 518.81     Patient Active Problem List   Diagnosis    Non-ischemic cardiomyopathy    Acute hypoxic respiratory failure     Past Medical History:   Diagnosis Date    Arrhythmia     ATRIAL FIB, PACE TERMINATED VT    Atrial fibrillation     BBB (bundle branch block)     L BBB    CHF (congestive heart failure)     Diabetes mellitus     Disease of thyroid gland     GERD (gastroesophageal reflux disease)     Hyperlipidemia     Hypertension     Non-ischemic cardiomyopathy     DILATED CM EF 20-25% 2017    Osteoarthritis      Past Surgical History:   Procedure Laterality Date    BRONCHOSCOPY N/A 2025    Procedure: BRONCHOSCOPY WITH BRONCHOALVEOLAR LAVAGE, WASHING, AIRWAY INSPECTION: insertion of lighted instrument to view inside the lung;  Surgeon: Tae Mosley MD;  Location: [facility] MAIN OR;  Service: Pulmonary;  Laterality: N/A;    CARDIAC ELECTROPHYSIOLOGY PROCEDURE N/A 2017    Procedure: Device Upgrade  ICD TO A BIV ICD   medtronic;  Surgeon: Chris Phillips MD;  Location: [facility] INVASIVE LOCATION;  Service:     CHOLECYSTECTOMY      EYE SURGERY Left 2025    Retina Repair    INSERT / REPLACE / REMOVE PACEMAKER      INTERNAL CARDIAC DEFIBRILLATOR INSERTION      MEDTRONIC    SHOULDER ARTHROSCOPY      TOTAL KNEE ARTHROPLASTY       PT Assessment (Last 12 Hours)       PT Evaluation and Treatment       Row Name 25 1354          Physical Therapy Time and Intention    Subjective Information complains  of;weakness;fatigue;dyspnea  -DK     Document Type therapy note (daily note)  -DK     Mode of Treatment individual therapy;physical therapy  -DK     Patient Effort good  -DK     Symptoms Noted During/After Treatment fatigue;shortness of breath  -DK     Comment Pt is on Airvo, SATs ranged 94-87% with treatment.  -       Row Name 05/12/25 1354          Pain    Pretreatment Pain Rating 1/10  -DK     Posttreatment Pain Rating 1/10  -DK     Pain Side/Orientation generalized  -       Row Name 05/12/25 1354          Cognition    Affect/Mental Status (Cognition) WFL  -DK     Orientation Status (Cognition) oriented x 4  -DK     Follows Commands (Cognition) WNL  -DK     Cognitive Function (Cognition) WNL  -DK     Personal Safety Interventions gait belt;nonskid shoes/slippers when out of bed;supervised activity  -       Row Name 05/12/25 1354          Motor Skills    Motor Skills --  therapeutic exercises  -DK     Coordination WFL  -DK     Therapeutic Exercise hip;knee;ankle  -DK     Additional Documentation --  No transfers due to pt SOA, on Airvo. SATs ranged 94-87% with exercises.  -       Row Name 05/12/25 1354          Hip (Therapeutic Exercise)    Hip (Therapeutic Exercise) AROM (active range of motion)  -DK     Hip AROM (Therapeutic Exercise) bilateral;flexion;extension;aBduction;aDduction;sitting;15 repititions  -       Row Name 05/12/25 1354          Knee (Therapeutic Exercise)    Knee (Therapeutic Exercise) AROM (active range of motion)  -     Knee AROM (Therapeutic Exercise) bilateral;flexion;extension;LAQ (long arc quad);sitting;15 repititions  -       Row Name 05/12/25 1354          Ankle (Therapeutic Exercise)    Ankle (Therapeutic Exercise) AROM (active range of motion)  -     Ankle AROM (Therapeutic Exercise) bilateral;dorsiflexion;plantarflexion;sitting;20 repititions  -       Row Name             Wound 05/05/25 1500 Left anterior foot Pressure Injury    Wound - Properties Group Placement  Date: 05/05/25  -JR Placement Time: 1500  -JR Present on Original Admission: Y  -JR Side: Left  -JR Orientation: anterior  -JR Location: foot  -JR Primary Wound Type: Pressure Inj  -JR    Retired Wound - Properties Group Placement Date: 05/05/25  -JR Placement Time: 1500  -JR Present on Original Admission: Y  -JR Side: Left  -JR Orientation: anterior  -JR Location: foot  -JR    Retired Wound - Properties Group Placement Date: 05/05/25  -JR Placement Time: 1500  -JR Present on Original Admission: Y  -JR Side: Left  -JR Orientation: anterior  -JR Location: foot  -JR    Retired Wound - Properties Group Date first assessed: 05/05/25  -JR Time first assessed: 1500  -JR Present on Original Admission: Y  -JR Side: Left  -JR Location: foot  -JR      Row Name             Wound 05/05/25 1500 medial coccyx Pressure Injury    Wound - Properties Group Placement Date: 05/05/25  -JR Placement Time: 1500  -JR Present on Original Admission: Y  -JR Orientation: medial  -JR Location: coccyx  -JR Primary Wound Type: Pressure Inj  -JR    Retired Wound - Properties Group Placement Date: 05/05/25  -JR Placement Time: 1500  -JR Present on Original Admission: Y  -JR Orientation: medial  -JR Location: coccyx  -JR    Retired Wound - Properties Group Placement Date: 05/05/25  -JR Placement Time: 1500  -JR Present on Original Admission: Y  -JR Orientation: medial  -JR Location: coccyx  -JR    Retired Wound - Properties Group Date first assessed: 05/05/25  -JR Time first assessed: 1500  -JR Present on Original Admission: Y  -JR Location: coccyx  -JR      Row Name             Wound 05/06/25 1058 Left medial ankle    Wound - Properties Group Placement Date: 05/06/25  -NH Placement Time: 1058  -NH Present on Original Admission: Y  -NH Side: Left  -NH Orientation: medial  -NH Location: ankle  -NH    Retired Wound - Properties Group Placement Date: 05/06/25  -NH Placement Time: 1058  -NH Present on Original Admission: Y  -NH Side: Left  -NH  Orientation: medial  -NH Location: ankle  -NH    Retired Wound - Properties Group Placement Date: 05/06/25  -NH Placement Time: 1058  -NH Present on Original Admission: Y  -NH Side: Left  -NH Orientation: medial  -NH Location: ankle  -NH    Retired Wound - Properties Group Date first assessed: 05/06/25  -NH Time first assessed: 1058  -NH Present on Original Admission: Y  -NH Side: Left  -NH Location: ankle  -NH      Row Name 05/12/25 1354          Plan of Care Review    Plan of Care Reviewed With patient  -DK     Progress no change  -DK       Row Name 05/12/25 1354          Positioning and Restraints    Pre-Treatment Position sitting in chair/recliner  -DK     Post Treatment Position chair  -DK     In Chair reclined;call light within reach;encouraged to call for assist;legs elevated  -DK               User Key  (r) = Recorded By, (t) = Taken By, (c) = Cosigned By      Initials Name Provider Type    NH Jocelyne Lockhart, RN Registered Nurse    Kirstin Andrade PTA Physical Therapist Assistant    Peterson Daigle RN Registered Nurse                    Physical Therapy Education       Title: PT OT SLP Therapies (In Progress)       Topic: Physical Therapy (In Progress)       Point: Mobility training (Done)       Learning Progress Summary            Patient Acceptance, E,TB, VU by AV at 5/6/2025 1336                      Point: Home exercise program (Not Started)       Learner Progress:  Not documented in this visit.              Point: Body mechanics (Done)       Learning Progress Summary            Patient Acceptance, E,TB, VU by AV at 5/6/2025 1336                      Point: Precautions (Done)       Learning Progress Summary            Patient Acceptance, E,TB, VU by AV at 5/6/2025 1336                                      User Key       Initials Effective Dates Name Provider Type Discipline     06/11/21 -  Samson Cordoba PT Physical Therapist PT                  PT Recommendation and Plan     Plan of Care  Reviewed With: patient  Progress: no change   Outcome Measures       Row Name 05/12/25 5584             How much help from another person do you currently need...    Turning from your back to your side while in flat bed without using bedrails? 4  -DK      Moving from lying on back to sitting on the side of a flat bed without bedrails? 4  -DK      Moving to and from a bed to a chair (including a wheelchair)? 3  -DK      Standing up from a chair using your arms (e.g., wheelchair, bedside chair)? 3  -DK      Climbing 3-5 steps with a railing? 2  -DK      To walk in hospital room? 3  -DK      AM-PAC 6 Clicks Score (PT) 19  -DK         Functional Assessment    Outcome Measure Options AM-PAC 6 Clicks Basic Mobility (PT)  -DK                User Key  (r) = Recorded By, (t) = Taken By, (c) = Cosigned By      Initials Name Provider Type    Kirstin Andrade PTA Physical Therapist Assistant                     Time Calculation:    PT Charges       Row Name 05/12/25 3122             Time Calculation    PT Received On 05/12/25  -DK      PT Goal Re-Cert Due Date 05/15/25  -DK         Timed Charges    25538 - PT Therapeutic Exercise Minutes 12  -DK      59685 - PT Therapeutic Activity Minutes 6  -DK         Total Minutes    Timed Charges Total Minutes 18  -DK       Total Minutes 18  -DK                User Key  (r) = Recorded By, (t) = Taken By, (c) = Cosigned By      Initials Name Provider Type    Kirstin Andrade PTA Physical Therapist Assistant                  Therapy Charges for Today       Code Description Service Date Service Provider Modifiers Qty    22622822308 HC PT THER PROC EA 15 MIN 5/12/2025 Kirstin James PTA GP 1            PT G-Codes  Outcome Measure Options: AM-PAC 6 Clicks Basic Mobility (PT)  AM-PAC 6 Clicks Score (PT): 19  AM-PAC 6 Clicks Score (OT): 21    Kirstin James PTA  5/12/2025

## 2025-05-12 NOTE — PLAN OF CARE
Goal Outcome Evaluation:              Outcome Evaluation: Patient alert and able to make needs known. Wound care done per MD order. Patient tolerated well. Continues on airvo, tolerating well. Denies pain or discomfort at this time. Frequently used items within easy reach.

## 2025-05-12 NOTE — PLAN OF CARE
Goal Outcome Evaluation:   Patient alert and oriented.x4, SATs drop quickly with any exertion. HI Roger humidified NC 8L.  Pain controlled with PRN pain medication will continue to monitor.

## 2025-05-12 NOTE — PROGRESS NOTES
[facility]   Progress Note    Patient Name: Roger D Snellen  : 1950  MRN: [id number]  Primary Care Physician: John Phelps Jr., MD  Date of admission: 2025    Subjective   Subjective     HPI:  Patient has persistent infiltrates in the lungs, he is still short of breath, patient is afebrile, his blood pressure is borderline low heart rate is 70 pace.    Review of Systems  Review of Systems   Constitutional: Negative.    HENT: Negative.     Eyes: Negative.    Respiratory:  Positive for shortness of breath.    Cardiovascular:  Negative for chest pain.   Gastrointestinal: Negative.    Endocrine: Negative.    Genitourinary: Negative.    Musculoskeletal:  Positive for arthralgias.   Skin: Negative.    Neurological: Negative.    Psychiatric/Behavioral: Negative.         Objective   Objective     Vitals:  Temp:  [97.5 °F (36.4 °C)-98.1 °F (36.7 °C)] 97.5 °F (36.4 °C)  Heart Rate:  [70-72] 70  Resp:  [18-25] 20  BP: ()/(57-73) 93/64  Flow (L/min) (Oxygen Therapy):  [40-45] 45    Physical Exam:  Physical Exam  Constitutional:       Appearance: Normal appearance.   HENT:      Head: Normocephalic.   Eyes:      Extraocular Movements: Extraocular movements intact.   Cardiovascular:      Rate and Rhythm: Regular rhythm.      Heart sounds: Murmur heard.   Pulmonary:      Breath sounds: Rhonchi present.   Abdominal:      Palpations: Abdomen is soft.   Musculoskeletal:      Cervical back: No rigidity.      Right lower leg: No edema.      Left lower leg: No edema.   Skin:     General: Skin is warm.   Neurological:      General: No focal deficit present.      Mental Status: He is alert.   Psychiatric:      Comments: Difficult to evaluate.  Appears normal         Result Review    Result Review:  I have personally reviewed the results from the time of this admission to 25 5:41 PM EDT and agree with these findings:  [x]  Laboratory  []  Microbiology  [x]  Radiology  [x]  EKG/Telemetry   []  Cardiology/Vascular    []  Pathology  []  Old records  []  Other:    Most notable findings include: 75-year-old gentleman now with persistent infiltrates in the lung, possible pneumonia, patient is treated with antibiotics he was diuresed, currently CT, did pulmonary edema has improved.    Assessment & Plan   Assessment / Plan     Active Hospital Problems:  Active Hospital Problems    Diagnosis     **Acute hypoxic respiratory failure        Plan:   Continue antibiotics for now ordered as per pulmonology    DVT prophylaxis: Patient is anticoagulated    CODE STATUS:    Code Status and Medical Interventions: No CPR (Do Not Attempt to Resuscitate); Limited Support; No intubation (DNI)   Ordered at: 05/05/25 1213     Code Status (Patient has no pulse and is not breathing):    No CPR (Do Not Attempt to Resuscitate)     Medical Interventions (Patient has pulse or is breathing):    Limited Support     Medical Intervention Limits:    No intubation (DNI)       Disposition:  I expect patient to be discharged patient still very sick.    Electronically signed by Juliocesar Giang MD, 05/12/25, 5:41 PM EDT.

## 2025-05-12 NOTE — PROGRESS NOTES
[facility] Clinical Pharmacy Services: Vancomycin Monitoring Note    Roger D Snellen is a 75 y.o. male who is on day 8/11 of pharmacy to dose vancomycin for Bone and/or Joint Infection. Continuation of treatment with daptomycin from [facility] for concern for septic joint. Pt to complete 4 weeks of therapy total. Last day 5/15/25.    Previous Vancomycin Dose:   1250 mg IV every  12  hours  Imaging Reviewed?: Yes  Updated Cultures and Sensitivities:   Microbiology Results (last 10 days)       Procedure Component Value - Date/Time    AFB Culture - Wash, Bronchus [[id number]] Collected: 05/08/25 1153    Lab Status: Preliminary result Specimen: Wash from Bronchus Updated: 05/09/25 1213     AFB Stain No acid fast bacilli seen on direct smear      No acid fast bacilli seen on concentrated smear    Respiratory Culture - Wash, Bronchus [[id number]] Collected: 05/08/25 1153    Lab Status: Final result Specimen: Wash from Bronchus Updated: 05/10/25 1159     Respiratory Culture Light growth (2+) Normal respiratory arielle. No S. aureus or Pseudomonas aeruginosa detected. Final report.     Gram Stain Occasional WBCs seen      Occasional Gram positive cocci    AFB Culture - Lavage, Lung, Right Lower Lobe [[id number]] Collected: 05/08/25 1150    Lab Status: Preliminary result Specimen: Lavage from Lung, Right Lower Lobe Updated: 05/09/25 1213     AFB Stain No acid fast bacilli seen on direct smear      No acid fast bacilli seen on concentrated smear    BAL Culture, Quantitative - Lavage, Lung, Right Lower Lobe [[id number]] Collected: 05/08/25 1150    Lab Status: Final result Specimen: Lavage from Lung, Right Lower Lobe Updated: 05/10/25 1201     BAL Culture >100,000 CFU/mL Normal respiratory arielle. No S. aureus or Pseudomonas aeruginosa detected. Final report.     Gram Stain Rare (1+) WBCs seen      Occasional Gram positive cocci    Pneumonia Panel - Lavage, Lung, Right Lower Lobe [[id number]]  (Normal) Collected: 05/08/25 1150    Lab  Status: Final result Specimen: Lavage from Lung, Right Lower Lobe Updated: 05/08/25 1324     Escherichia coli PCR Not Detected     Acinetobacter calcoaceticus-baumannii complex PCR Not Detected     Enterobacter cloacae PCR Not Detected     Klebsiella oxytoca PCR Not Detected     Klebsiella pneumoniae group PCR Not Detected     Klebsiella aerogenes PCR Not Detected     Moraxella catarrhalis PCR Not Detected     Proteus species PCR Not Detected     Pseudomonas aeroginosa PCR Not Detected     Serratia marcescens PCR Not Detected     Staphylococcus aureus PCR Not Detected     Streptococcus pyogenes PCR Not Detected     Haemophilus influenzae PCR Not Detected     Streptococcus agalactiae PCR Not Detected     Streptococcus pneumoniae PCR Not Detected     Chlamydophila pneumoniae PCR Not Detected     Legionella pneumophilia PCR Not Detected     Mycoplasma pneumo by PCR Not Detected     ADENOVIRUS, PCR Not Detected     CTX-M Gene N/A     IMP Gene N/A     KPC Gene N/A     mecA/C and MREJ Gene N/A     NDM Gene N/A     OXA-48-like Gene N/A     VIM Gene N/A     Coronavirus Not Detected     Human Metapneumovirus Not Detected     Human Rhinovirus/Enterovirus Not Detected     Influenza A PCR Not Detected     Influenza B PCR Not Detected     RSV, PCR Not Detected     Parainfluenza virus PCR Not Detected    CANDIDA AURIS PCR - Swab, Axilla Right, Axilla Left and Groin [[id number]]  (Normal) Collected: 05/06/25 1243    Lab Status: Final result Specimen: Swab from Axilla Right, Axilla Left and Groin Updated: 05/07/25 1030     CANDIDA AURIS PCR Not Detected    MRSA Screen, PCR (Inpatient) - Swab, Nares [[id number]]  (Normal) Collected: 05/05/25 1628    Lab Status: Final result Specimen: Swab from Nares Updated: 05/05/25 1749     MRSA PCR No MRSA Detected    Narrative:      The negative predictive value of this diagnostic test is high and should only be used to consider de-escalating anti-MRSA therapy. A positive result may indicate  colonization with MRSA and must be correlated clinically.    Respiratory Panel PCR w/COVID-19(SARS-CoV-2) SALLY/DULCE MARIA/CAROLEE/PAD/COR/TYRON In-House, NP Swab in UTM/VTM, 2 HR TAT - Swab, Nasopharynx [[id number]]  (Normal) Collected: 05/05/25 1452    Lab Status: Final result Specimen: Swab from Nasopharynx Updated: 05/05/25 1557     ADENOVIRUS, PCR Not Detected     Coronavirus 229E Not Detected     Coronavirus HKU1 Not Detected     Coronavirus NL63 Not Detected     Coronavirus OC43 Not Detected     COVID19 Not Detected     Human Metapneumovirus Not Detected     Human Rhinovirus/Enterovirus Not Detected     Influenza A PCR Not Detected     Influenza B PCR Not Detected     Parainfluenza Virus 1 Not Detected     Parainfluenza Virus 2 Not Detected     Parainfluenza Virus 3 Not Detected     Parainfluenza Virus 4 Not Detected     RSV, PCR Not Detected     Bordetella pertussis pcr Not Detected     Bordetella parapertussis PCR Not Detected     Chlamydophila pneumoniae PCR Not Detected     Mycoplasma pneumo by PCR Not Detected    Narrative:      In the setting of a positive respiratory panel with a viral infection PLUS a negative procalcitonin without other underlying concern for bacterial infection, consider observing off antibiotics or discontinuation of antibiotics and continue supportive care. If the respiratory panel is positive for atypical bacterial infection (Bordetella pertussis, Chlamydophila pneumoniae, or Mycoplasma pneumoniae), consider antibiotic de-escalation to target atypical bacterial infection.    S. Pneumo Ag Urine or CSF - Urine, Urine, Clean Catch [[id number]]  (Normal) Collected: 05/05/25 1451    Lab Status: Final result Specimen: Urine, Clean Catch Updated: 05/05/25 1613     Strep Pneumo Ag Negative    Narrative:      5/18/26 (S. Pneumo)    Legionella Antigen, Urine - Urine, Urine, Clean Catch [[id number]]  (Normal) Collected: 05/05/25 1451    Lab Status: Final result Specimen: Urine, Clean Catch Updated: 05/05/25  "1613     LEGIONELLA ANTIGEN, URINE Negative    Blood Culture - Blood, Hand, Left [[id number]]  (Normal) Collected: 25    Lab Status: Final result Specimen: Blood from Hand, Left Updated: 05/10/25 0945     Blood Culture No growth at 5 days    Blood Culture - Blood, Hand, Right [[id number]]  (Normal) Collected: 25    Lab Status: Final result Specimen: Blood from Hand, Right Updated: 05/10/25 0945     Blood Culture No growth at 5 days    COVID-19, FLU A/B, RSV PCR 1 HR TAT - Swab, Nasopharynx [[id number]]  (Normal) Collected: 25    Lab Status: Final result Specimen: Swab from Nasopharynx Updated: 25     COVID19 Not Detected     Influenza A PCR Not Detected     Influenza B PCR Not Detected     RSV, PCR Not Detected    Narrative:      Fact sheet for providers: https://www.fda.gov/media/893184/download    Fact sheet for patients: https://www.fda.gov/media/301569/download    Test performed by PCR.           Vitals/Labs  Ht: 198.1 cm (78\"); Wt: 99.2 kg (218 lb 11.1 oz)   Temp (24hrs), Av.7 °F (36.5 °C), Min:97.5 °F (36.4 °C), Max:98.1 °F (36.7 °C)   Estimated Creatinine Clearance: 160.3 mL/min (A) (by C-G formula based on SCr of 0.55 mg/dL (L)).       Results from last 7 days   Lab Units 25  0526 25  0505 05/10/25  0509 25  0142 25  0134 25  1155 25  0536 25  0412 25  0812 25  0525   VANCOMYCIN RM mcg/mL 23.12  --   --  12.10  --   --   --   --   --  23.54   VANCOMYCIN TR mcg/mL  --   --   --   --   --  18.94  --   --   --   --    CREATININE mg/dL 0.55* 0.72* 0.61* 0.68*   < >  --    < >  --    < > 0.71*   WBC 10*3/mm3 10.25  --   --   --   --   --   --  12.11*  --  10.47    < > = values in this interval not displayed.     Assessment/Plan    Continue current Vancomycin Dose:  1250 mg IV every 12 hours; which provides the following predicted parameters:  AUC24,ss: 456 mg/L.hr  Probability of AUC24 > 400: 93 %  Ctrough,ss: 12.7 " mg/L  Probability of Ctrough,ss > 20: 0 %  Probability of nephrotoxicity (Lodise FREDDY 2009): 8 %  No levels planned unless therapy is extended  We will continue to monitor patient changes and renal function     Thank you for involving pharmacy in this patient's care. Please contact pharmacy with any questions or concerns.    Minh Quintero [facility]  Clinical Pharmacist

## 2025-05-12 NOTE — PROGRESS NOTES
Pulmonary / Critical Care Progress Note      Patient Name: Roger D Snellen  : 1950  MRN: [id number]  Primary Care Physician:  John Phelps Jr., MD  Date of admission: 2025    Subjective   Subjective   Follow-up for acute hypoxic respiratory failure, acute on chronic congestive diastolic heart failure  Hypoxia worsening now  Now currently on Airvo  Complains of significant shortness of breath with any on all movement      Objective   Objective     Vitals:   Temp:  [97.5 °F (36.4 °C)-98.1 °F (36.7 °C)] 97.5 °F (36.4 °C)  Heart Rate:  [70-72] 70  Resp:  [18-25] 20  BP: ()/(57-73) 93/64  Flow (L/min) (Oxygen Therapy):  [40-45] 45  Physical Exam   Basilar Velcro crackles concerning for pulmonary fibrosis    Result Review    Result Review:  I have personally reviewed the results from the time of this admission to 2025 15:58 EDT and agree with these findings:  []  Laboratory  []  Microbiology  []  Radiology  []  EKG/Telemetry   []  Cardiology/Vascular   []  Pathology  []  Old records  []  Other:  Most notable findings include:       Impression:  Acute hypoxic respiratory failure  Multifocal pneumonia  Acute decompensated systolic heart failure on chronic decompensated systolic heart failure  Pulmonary fibrosis with UIP pattern seen by Dr. Galvan as an outpatient      Plan  Continue Airvo at this time  Diuresis as tolerated keep the lung dry  Suspect that the patient's hypoxia due to heart failure in the setting of his pulmonary fibrosis  Currently appears to be on the drier side  Antibiotics to complete course  Heart failure medicines per cardiology  Suspect patient will have a prolonged recovery.  Given the disease  Continue with steroids        VTE Prophylaxis:  Pharmacologic VTE prophylaxis orders are present.    CODE STATUS:   Code Status (Patient has no pulse and is not breathing): No CPR (Do Not Attempt to Resuscitate)  Medical Interventions (Patient has pulse or is breathing): Limited  Support  Medical Intervention Limits: No intubation (DNI)    Prior imaging reviewed from 2023 showing evidence of pulmonary fibrosis  Recent imaging reviewed  Case discussed with hospitalist    Electronically signed by Mars Calloway DO, 05/12/25, 4:00 PM EDT.

## 2025-05-13 NOTE — PROGRESS NOTES
[facility]   Progress Note    Patient Name: Roger D Snellen  : 1950  MRN: [id number]  Primary Care Physician: John Phelps Jr., MD  Date of admission: 2025    Subjective   Subjective     HPI:  Patient is short of breath not significant improvement, has persistent infiltrates, admitted to the hospital because of shortness of breath and hypoxia, patient indicated that his shortness of breath is present since long time ago, reports no chest pain.  Telemetry atrial paced rhythm    Review of Systems  Review of Systems   Constitutional:  Negative for fever.   HENT: Negative.     Eyes: Negative.    Respiratory:  Positive for shortness of breath.    Cardiovascular:  Negative for chest pain, palpitations and leg swelling.   Gastrointestinal: Negative.    Endocrine: Negative.    Genitourinary: Negative.    Musculoskeletal:  Positive for arthralgias.   Hematological: Negative.    Psychiatric/Behavioral:          Difficult to evaluate appears to be doing well       Objective   Objective     Vitals:  Temp:  [97.7 °F (36.5 °C)-98.2 °F (36.8 °C)] 98.1 °F (36.7 °C)  Heart Rate:  [70-71] 71  Resp:  [18-20] 18  BP: ()/(66-71) 103/71  Flow (L/min) (Oxygen Therapy):  [45] 45    Physical Exam:  Physical Exam  Constitutional:       Appearance: Normal appearance.   HENT:      Nose: Nose normal.   Eyes:      Extraocular Movements: Extraocular movements intact.   Cardiovascular:      Rate and Rhythm: Regular rhythm.      Heart sounds: Murmur heard.   Pulmonary:      Breath sounds: Rhonchi present.   Abdominal:      Palpations: Abdomen is soft.   Musculoskeletal:      Right lower leg: No edema.      Left lower leg: No edema.   Skin:     General: Skin is warm.   Neurological:      General: No focal deficit present.      Mental Status: He is alert.   Psychiatric:         Mood and Affect: Mood normal.         Result Review    Result Review:  I have personally reviewed the results from the time of this admission to  05/13/25 6:17 PM EDT and agree with these findings:  [x]  Laboratory  []  Microbiology  []  Radiology  [x]  EKG/Telemetry   []  Cardiology/Vascular   []  Pathology  []  Old records  []  Other:    Most notable findings include: 75-year-old gentleman with persistent bilateral infiltrates, currently evaluated in follow-up with pulmonology, reports shortness of breath with minimal activity, apparently his shortness of breath is fairly chronic but became worse during this hospitalization.    Assessment & Plan   Assessment / Plan     Active Hospital Problems:  Active Hospital Problems    Diagnosis     **Acute hypoxic respiratory failure        Plan:   Continue current treatment diuresis, antibiotics    DVT prophylaxis: Patient is anticoagulated    CODE STATUS:    Code Status and Medical Interventions: No CPR (Do Not Attempt to Resuscitate); Limited Support; No intubation (DNI)   Ordered at: 05/05/25 1213     Code Status (Patient has no pulse and is not breathing):    No CPR (Do Not Attempt to Resuscitate)     Medical Interventions (Patient has pulse or is breathing):    Limited Support     Medical Intervention Limits:    No intubation (DNI)       Disposition:  I expect patient to be discharged patient is severely ill we will see how he does.    Electronically signed by Juliocesar Giang MD, 05/13/25, 6:17 PM EDT.

## 2025-05-13 NOTE — PLAN OF CARE
Goal Outcome Evaluation:  Plan of Care Reviewed With: patient        Progress: no change  Outcome Evaluation: No changes care plan ongoing

## 2025-05-13 NOTE — PROGRESS NOTES
[facility] Clinical Pharmacy Services: Vancomycin Monitoring Note    Roger D Snellen is a 75 y.o. male who is on day 9/11 of pharmacy to dose vancomycin for Bone and/or Joint Infection. Continuation of treatment with daptomycin from [facility] for concern for septic joint. Pt to complete 4 weeks of therapy total. Last day 5/15/25. .    Previous Vancomycin Dose:   1250 mg IV every  12  hours  Imaging Reviewed?: Yes  Updated Cultures and Sensitivities:   Microbiology Results (last 10 days)       Procedure Component Value - Date/Time    AFB Culture - Wash, Bronchus [[id number]] Collected: 05/08/25 1153    Lab Status: Preliminary result Specimen: Wash from Bronchus Updated: 05/09/25 1213     AFB Stain No acid fast bacilli seen on direct smear      No acid fast bacilli seen on concentrated smear    Respiratory Culture - Wash, Bronchus [[id number]] Collected: 05/08/25 1153    Lab Status: Final result Specimen: Wash from Bronchus Updated: 05/10/25 1159     Respiratory Culture Light growth (2+) Normal respiratory arielle. No S. aureus or Pseudomonas aeruginosa detected. Final report.     Gram Stain Occasional WBCs seen      Occasional Gram positive cocci    AFB Culture - Lavage, Lung, Right Lower Lobe [[id number]] Collected: 05/08/25 1150    Lab Status: Preliminary result Specimen: Lavage from Lung, Right Lower Lobe Updated: 05/09/25 1213     AFB Stain No acid fast bacilli seen on direct smear      No acid fast bacilli seen on concentrated smear    BAL Culture, Quantitative - Lavage, Lung, Right Lower Lobe [[id number]] Collected: 05/08/25 1150    Lab Status: Final result Specimen: Lavage from Lung, Right Lower Lobe Updated: 05/10/25 1201     BAL Culture >100,000 CFU/mL Normal respiratory arielle. No S. aureus or Pseudomonas aeruginosa detected. Final report.     Gram Stain Rare (1+) WBCs seen      Occasional Gram positive cocci    Histoplasma Antigen, CSF or BAL - Lavage, Lung, Right Lower Lobe [[id number]] Collected: 05/08/25 1150     "Lab Status: Final result Specimen: Lavage from Lung, Right Lower Lobe Updated: 05/12/25 0806     Result None Detected ng/mL      Comment: Reference Interval: None Detected  Reportable Range: Positive Results reported in ng/mL from  0.20 ng/mL to 20.00 ng/mL. Positive results above 20.00 ng/mL  are reported as \"Above the Limit of Quantification\"  Cross-reactions occur with Blastomyces spp., Coccidioides spp.,  and Paracoccidioides brasiliensis.  The test was developed and its performance characteristics  determined by [employer]. It has not been cleared  or approved by the FDA; however, FDA clearance or approval is  not currently required for clinical use. The results are not  intended to be used as the sole means for clinical diagnosis  or patient management decisions.        Interpretation Negative     Specimen Type BAL    Narrative:      Performed at:  01 - [facility] [address] [place]  [id number]  : Rickey Maya MD, Phone:  [phone]    Pneumonia Panel - Lavage, Lung, Right Lower Lobe [[id number]]  (Normal) Collected: 05/08/25 1150    Lab Status: Final result Specimen: Lavage from Lung, Right Lower Lobe Updated: 05/08/25 1324     Escherichia coli PCR Not Detected     Acinetobacter calcoaceticus-baumannii complex PCR Not Detected     Enterobacter cloacae PCR Not Detected     Klebsiella oxytoca PCR Not Detected     Klebsiella pneumoniae group PCR Not Detected     Klebsiella aerogenes PCR Not Detected     Moraxella catarrhalis PCR Not Detected     Proteus species PCR Not Detected     Pseudomonas aeroginosa PCR Not Detected     Serratia marcescens PCR Not Detected     Staphylococcus aureus PCR Not Detected     Streptococcus pyogenes PCR Not Detected     Haemophilus influenzae PCR Not Detected     Streptococcus agalactiae PCR Not Detected     Streptococcus pneumoniae PCR Not Detected     Chlamydophila pneumoniae PCR Not Detected     Legionella pneumophilia PCR Not " Detected     Mycoplasma pneumo by PCR Not Detected     ADENOVIRUS, PCR Not Detected     CTX-M Gene N/A     IMP Gene N/A     KPC Gene N/A     mecA/C and MREJ Gene N/A     NDM Gene N/A     OXA-48-like Gene N/A     VIM Gene N/A     Coronavirus Not Detected     Human Metapneumovirus Not Detected     Human Rhinovirus/Enterovirus Not Detected     Influenza A PCR Not Detected     Influenza B PCR Not Detected     RSV, PCR Not Detected     Parainfluenza virus PCR Not Detected    CANDIDA AURIS PCR - Swab, Axilla Right, Axilla Left and Groin [[id number]]  (Normal) Collected: 05/06/25 1243    Lab Status: Final result Specimen: Swab from Axilla Right, Axilla Left and Groin Updated: 05/07/25 1030     CANDIDA AURIS PCR Not Detected    MRSA Screen, PCR (Inpatient) - Swab, Nares [[id number]]  (Normal) Collected: 05/05/25 1628    Lab Status: Final result Specimen: Swab from Nares Updated: 05/05/25 1749     MRSA PCR No MRSA Detected    Narrative:      The negative predictive value of this diagnostic test is high and should only be used to consider de-escalating anti-MRSA therapy. A positive result may indicate colonization with MRSA and must be correlated clinically.    Respiratory Panel PCR w/COVID-19(SARS-CoV-2) SALLY/DULCE MARIA/CAROLEE/PAD/COR/TYRON In-House, NP Swab in UTM/VTM, 2 HR TAT - Swab, Nasopharynx [[id number]]  (Normal) Collected: 05/05/25 1452    Lab Status: Final result Specimen: Swab from Nasopharynx Updated: 05/05/25 1557     ADENOVIRUS, PCR Not Detected     Coronavirus 229E Not Detected     Coronavirus HKU1 Not Detected     Coronavirus NL63 Not Detected     Coronavirus OC43 Not Detected     COVID19 Not Detected     Human Metapneumovirus Not Detected     Human Rhinovirus/Enterovirus Not Detected     Influenza A PCR Not Detected     Influenza B PCR Not Detected     Parainfluenza Virus 1 Not Detected     Parainfluenza Virus 2 Not Detected     Parainfluenza Virus 3 Not Detected     Parainfluenza Virus 4 Not Detected     RSV, PCR Not  Detected     Bordetella pertussis pcr Not Detected     Bordetella parapertussis PCR Not Detected     Chlamydophila pneumoniae PCR Not Detected     Mycoplasma pneumo by PCR Not Detected    Narrative:      In the setting of a positive respiratory panel with a viral infection PLUS a negative procalcitonin without other underlying concern for bacterial infection, consider observing off antibiotics or discontinuation of antibiotics and continue supportive care. If the respiratory panel is positive for atypical bacterial infection (Bordetella pertussis, Chlamydophila pneumoniae, or Mycoplasma pneumoniae), consider antibiotic de-escalation to target atypical bacterial infection.    S. Pneumo Ag Urine or CSF - Urine, Urine, Clean Catch [[id number]]  (Normal) Collected: 05/05/25 1451    Lab Status: Final result Specimen: Urine, Clean Catch Updated: 05/05/25 1613     Strep Pneumo Ag Negative    Narrative:      5/18/26 (S. Pneumo)    Legionella Antigen, Urine - Urine, Urine, Clean Catch [[id number]]  (Normal) Collected: 05/05/25 1451    Lab Status: Final result Specimen: Urine, Clean Catch Updated: 05/05/25 1613     LEGIONELLA ANTIGEN, URINE Negative    Blood Culture - Blood, Hand, Left [[id number]]  (Normal) Collected: 05/05/25 0933    Lab Status: Final result Specimen: Blood from Hand, Left Updated: 05/10/25 0945     Blood Culture No growth at 5 days    Blood Culture - Blood, Hand, Right [[id number]]  (Normal) Collected: 05/05/25 0933    Lab Status: Final result Specimen: Blood from Hand, Right Updated: 05/10/25 0945     Blood Culture No growth at 5 days    COVID-19, FLU A/B, RSV PCR 1 HR TAT - Swab, Nasopharynx [[id number]]  (Normal) Collected: 05/05/25 0845    Lab Status: Final result Specimen: Swab from Nasopharynx Updated: 05/05/25 0931     COVID19 Not Detected     Influenza A PCR Not Detected     Influenza B PCR Not Detected     RSV, PCR Not Detected    Narrative:      Fact sheet for providers:  "https://www.fda.gov/media/914473/download    Fact sheet for patients: https://www.fda.gov/media/410976/download    Test performed by PCR.             Vitals/Labs  Ht: 198.1 cm (78\"); Wt: 99.2 kg (218 lb 11.1 oz)   Temp (24hrs), Av.8 °F (36.6 °C), Min:97.5 °F (36.4 °C), Max:98.2 °F (36.8 °C)   Estimated Creatinine Clearance: 144.6 mL/min (A) (by C-G formula based on SCr of 0.61 mg/dL (L)).       Results from last 7 days   Lab Units 25  0340 25  0526 25  0505 05/10/25  0509 25  0142 25  0134 25  1155 25  0536 25  0412   VANCOMYCIN RM mcg/mL  --  23.12  --   --  12.10  --   --   --   --    VANCOMYCIN TR mcg/mL  --   --   --   --   --   --  18.94  --   --    CREATININE mg/dL 0.61* 0.55* 0.72*   < > 0.68*   < >  --    < >  --    WBC 10*3/mm3  --  10.25  --   --   --   --   --   --  12.11*    < > = values in this interval not displayed.     Assessment/Plan    Continue current Vancomycin Dose:  1250 mg IV every 12 hours; which provides the following predicted parameters:  AUC24,ss: 471 mg/L.hr  Probability of AUC24 > 400: 97 %  Ctrough,ss: 13.3 mg/L  Probability of Ctrough,ss > 20: 0 %  Probability of nephrotoxicity (Lodise FREDDY ): 8 %  No levels planned unless therapy is extended  We will continue to monitor patient changes and renal function     Thank you for involving pharmacy in this patient's care. Please contact pharmacy with any questions or concerns.    Minh Quintero [facility]  Clinical Pharmacist   "

## 2025-05-13 NOTE — SIGNIFICANT NOTE
Wound Eval / Progress Noted     Cara     Patient Name: Roger D Snellen  : 1950  MRN: [id number]  Today's Date: 2025                 Admit Date: 2025    Visit Dx:    ICD-10-CM ICD-9-CM   1. Acute hypoxemic respiratory failure  J96.01 518.81   2. Acute on chronic systolic congestive heart failure  I50.23 428.23     428.0   3. Acute pulmonary edema  J81.0 518.4   4. Difficulty walking  R26.2 719.7   5. Impaired mobility and ADLs  Z74.09 V49.89    Z78.9    6. Acute hypoxic respiratory failure  J96.01 518.81         Acute hypoxic respiratory failure        Past Medical History:   Diagnosis Date    Arrhythmia     ATRIAL FIB, PACE TERMINATED VT    Atrial fibrillation     BBB (bundle branch block)     L BBB    CHF (congestive heart failure)     Diabetes mellitus     Disease of thyroid gland     GERD (gastroesophageal reflux disease)     Hyperlipidemia     Hypertension     Non-ischemic cardiomyopathy     DILATED CM EF 20-25% 2017    Osteoarthritis         Past Surgical History:   Procedure Laterality Date    BRONCHOSCOPY N/A 2025    Procedure: BRONCHOSCOPY WITH BRONCHOALVEOLAR LAVAGE, WASHING, AIRWAY INSPECTION: insertion of lighted instrument to view inside the lung;  Surgeon: Tae Mosley MD;  Location: [facility] MAIN OR;  Service: Pulmonary;  Laterality: N/A;    CARDIAC ELECTROPHYSIOLOGY PROCEDURE N/A 2017    Procedure: Device Upgrade  ICD TO A BIV ICD   medtronic;  Surgeon: Chris Phillips MD;  Location: [facility] INVASIVE LOCATION;  Service:     CHOLECYSTECTOMY      EYE SURGERY Left 2025    Retina Repair    INSERT / REPLACE / REMOVE PACEMAKER      INTERNAL CARDIAC DEFIBRILLATOR INSERTION      MEDTRONIC    SHOULDER ARTHROSCOPY      TOTAL KNEE ARTHROPLASTY           Physical Assessment:  Wound 25 1500 medial coccyx Pressure Injury (Active)   Wound Image   25 1246   Dressing Appearance dry;intact 25 1246   Dressing Removed Type hydrofiber;silver  impregnated;silicone border foam 05/13/25 1246   Confirmed Empty Wound Bed Yes, visual inspection of wound bed 05/13/25 1246   Closure None 05/13/25 1246   Base moist;red;slough;yellow;nonblanchable 05/13/25 1246   Periwound dry;intact 05/13/25 1246   Periwound Temperature warm 05/13/25 1246   Periwound Skin Turgor soft 05/13/25 1246   Edges open 05/13/25 1246   Drainage Characteristics/Odor serosanguineous 05/13/25 1246   Drainage Amount small 05/13/25 1246   Care, Wound cleansed with;sterile normal saline 05/13/25 1246   Dressing Care dressing applied;hydrofiber;silver impregnated;silicone border foam 05/13/25 1246   Periwound Care absorptive dressing applied 05/13/25 1246       Wound 05/06/25 1058 Left medial ankle (Active)   Wound Image   05/13/25 1246   Dressing Appearance dry;intact 05/13/25 1246   Dressing Removed Type hydrofiber;silver impregnated;gauze, dry 05/13/25 1246   Closure None 05/13/25 1246   Periwound dry;intact 05/13/25 1246   Periwound Temperature warm 05/13/25 1246   Periwound Skin Turgor soft 05/13/25 1246   Edges open 05/13/25 1246   Drainage Characteristics/Odor serosanguineous 05/13/25 1246   Drainage Amount scant 05/13/25 1246   Care, Wound cleansed with;sterile normal saline 05/13/25 1246   Dressing Care dressing applied;dressing moistened;hydrofiber;silver impregnated;silicone border foam 05/13/25 1246   Periwound Care absorptive dressing applied 05/13/25 1246       Wound 05/13/25 0800 Left posterior ear Pressure Injury (Active)   Wound Image   05/13/25 1246   Pressure Injury Stage 1 05/13/25 1246   Dressing Appearance dry;intact 05/13/25 1246   Closure None 05/13/25 1246   Base dry;red;nonblanchable 05/13/25 1246   Periwound dry;intact 05/13/25 1246   Periwound Temperature warm 05/13/25 1246   Periwound Skin Turgor soft 05/13/25 1246   Edges rolled/closed 05/13/25 1246   Drainage Amount none 05/13/25 1246   Care, Wound cleansed with;sterile normal saline 05/13/25 0800   Dressing Care  gauze, dry 05/13/25 1246       Wound 05/13/25 0800 Right posterior ear Pressure Injury (Active)   Wound Image   05/13/25 1246   Pressure Injury Stage 1 05/13/25 1246   Dressing Appearance dry;intact 05/13/25 1246   Closure None 05/13/25 1246   Base dry;red;nonblanchable 05/13/25 1246   Periwound dry;intact 05/13/25 1246   Periwound Temperature warm 05/13/25 1246   Periwound Skin Turgor soft 05/13/25 1246   Edges rolled/closed 05/13/25 1246   Drainage Amount none 05/13/25 1246   Care, Wound cleansed with;sterile normal saline 05/13/25 0800   Dressing Care gauze, dry 05/13/25 1246       Wound 05/13/25 1246 Right posterior heel Pressure Injury (Active)   Wound Image   05/13/25 1246   Pressure Injury Stage U 05/13/25 1246   Dressing Appearance open to air 05/13/25 1246   Closure None 05/13/25 1246   Base dry;scab 05/13/25 1246   Periwound dry;intact 05/13/25 1246   Periwound Temperature warm 05/13/25 1246   Periwound Skin Turgor soft 05/13/25 1246   Edges rolled/closed 05/13/25 1246   Drainage Amount none 05/13/25 1246   Care, Wound cleansed with;sterile normal saline 05/13/25 1246   Dressing Care dressing applied;petroleum-based;non-adherent;gauze;silicone border foam 05/13/25 1246   Periwound Care absorptive dressing applied 05/13/25 1246    Left anterior foot     Left medial plantar foot       Wound Check / Follow-up: Patient seen today for wound follow-up.  Patient is awake, alert and oriented at time of visit.  He is agreeable to assessment.  Patient's family is present at bedside.    Ulcerations to left foot much improved since last assessment.  Crusting to left anterior foot was removed with cleansing and revealing full closure with intact tissue.  Additionally, crusting and callused tissue to left medial plantar foot was removed with cleansing revealing closed intact pink tissue.  Left medial ankle remains with dry, tightly adhered crusted tissue.  Cleansed with normal saline and gauze.  Applied normal saline  moistened silver impregnated Hydrofiber to this area then secured with silicone border dressing.  Recommending to continue daily dressing changes.  Dry skin remains to intact tissue but has improved since last assessment.  Recommending to continue topical treatment with application of Aquaphor ointment.    Stage III pressure injury remains to right gluteal aspect with stage II pressure injury noted along coccyx.  Moist, red tissue along with thin yellow sloughing tissue is noted to right gluteal aspect wound base.  Moist, red non-blanchable tissue is present along coccyx wound base.  Cleansed both wounds with normal saline and gauze.  Applied silver impregnated Hydrofiber and secured with silicone border dressing.  Recommending to continue daily dressing changes.    Unstageable pressure injury noted to right heel with dry, crusted tissue present obscuring full view of wound base.  Cleansed with normal saline and gauze.  Applied a non-adherent petroleum based gauze and secured with silicone border dressing.  Recommending daily dressing changes.    Stage I pressure injuries are noted to bilateral posterior ears.  Dry, red non-blanchable intact tissue is present.  Recommending to pad heated high flow nasal cannula strap with dry gauze to act as a barrier.    Recommending to continue pressure reduction measures including every 2 hour turns and offloading heels at all times.      Impression: Improving wounds to left foot.  Stage III pressure injury to right gluteal aspect and stage II pressure injury to coccyx.  Unstageable pressure injury to right heel.  Stage I pressure injuries to bilateral posterior ears.      Short term goals: Regain skin integrity, skin protection, pressure reduction, skin care, topical treatment, daily dressing changes.      Jocelyne Lockhart RN    5/13/2025    16:22 EDT

## 2025-05-13 NOTE — PROGRESS NOTES
[facility]   Hospitalist Progress Note  Date: 2025  Patient Name: Roger D Snellen  : 1950  MRN: [id number]  Date of admission: 2025      Subjective   Subjective     Chief Complaint: Shortness of air    Summary:   Roger D Snellen is a 75 y.o. male A-fib on anticoagulation, heart failure with preserved ejection fraction, diabetes, COPD, hypothyroidism, GERD, hyperlipidemia, hypertension.  Patient most recently discharged from [facility].  Patient presented with complaints of left foot pain swelling and erythema.  Met criteria for severe sepsis due to infected left foot.  Also with issues of right knee pain.  Concern for septic right knee arthrocentesis performed, moderate amount of WBCs, few GPC's on cell count and Gram stain, cultures no growth.  Patient discharged on IV antibiotics 500 mg IV daptomycin, 1 g IV ceftriaxone daily with a stop date of 5/15/2025.  Patient was seen by podiatry for left foot wound, did have bedside debridement performed.  Patient was discharged to [facility] rehab.  Since being at [facility] patient endorses slowly worsening shortness of breath.  On morning of presentation, 2025, patient states he felt smothered.  Denies any associated fever chills or night sweats.  Patient presented to the emergency department hypoxic 81% on room air.  Patient with a heart rate of 70, respiratory rate of 28 and a blood pressure 122/72 initially in the emergency department.  Quickly titrated up on high flow nasal cannula eventually transitioned over to BiPAP, however due to intolerance patient transition to Airvo 55 L 60% FiO2 to maintain O2 saturations.  Patient's initial ABG shows a pH of 7.5, PCO2 of 27.5, PO2 of 65.8.  proBNP elevated at 6386.  Troponin 32 then 31, no complaints of chest pain.  On labs patient with a bicarb of 19.4, anion gap of 15.6, creatinine 0.73.  Patient has AST ALT elevations of 114/96 respectively.  Procalcitonin elevated 0.41.  Lactate negative x  2.  White count of 12.8, hemoglobin 11.3 and platelets of 333.  Chest x-ray showed extensive new opacities in right lung and possible new opacities in left perihilar and left basilar region representing multifocal pneumonia or edema.  Patient has a right arm PICC line in place.  CT scan with contrast obtained to rule out pulmonary embolus.  No evidence of PE.  Cardiomegaly with reflux of contrast into the hepatic veins and IVC, seen in right heart failure.  Patchy groundglass opacities throughout both lungs with smooth interlobular septal thickening and trace bilateral pleural effusions.  Representing pulmonary edema over bilateral pneumonia.  Given patient's work of breathing and oxygen demand, admitted to the ICU.  Overnight patient did well and has been weaned down to 7 L high flow nasal cannula.  Transferred out of the unit on May 6.  Patient seen by pulmonary and cardiology.  Patient had bronchoscopy May 8 with suctioning of mucous plugs and purulent secretions.  Cultures negative to date.  Rheumatoid factor positive but rest of autoimmune panel including anti-CCP negative.  Patient follows with Dr. Cole arriola as an outpatient for pulmonary fibrosis with a UIP pattern.     Interval Followup:   BP better  Remains on Airvo with 45 L/min, no home oxygen use   Denies any hemoptysis  LFTs improving  Shortness of air improving.  Intermittent.  Chest x-ray with persistent bilateral opacities and is unchanged  No chest pain.  No cough.  Right knee remains swollen.  Not painful, left foot wound healing well  Good urine output    Review of Systems   All systems were reviewed and negative except for: Summary and interval follow-up    Objective   Objective     Vitals:   Temp:  [97.5 °F (36.4 °C)-98.2 °F (36.8 °C)] 97.9 °F (36.6 °C)  Heart Rate:  [70-71] 71  Resp:  [18-20] 18  BP: ()/(64-71) 96/66  Flow (L/min) (Oxygen Therapy):  [45] 45  Physical Exam      Constitutional: Awake, alert, increased work of  breathing, mild respiratory distress              Eyes: Pupils equal, sclerae anicteric, no conjunctival injection              HENT: NCAT, mucous membranes moist              Neck: Supple, no thyromegaly, no lymphadenopathy, trachea midline              Respiratory: Crackles heard mostly left base and midlung area, minimal expiratory wheezing, prolonged expiratory phase              Cardiovascular: RRR, no murmurs, rubs, or gallops, palpable pedal pulses bilaterally.  +1bilateral extremity LYNN              Gastrointestinal: Positive bowel sounds, soft, nontender, nondistended              Musculoskeletal: Right knee swollen, good range of motion and no tenderness on palpation              Neurologic: Oriented x 3, strength symmetric in all extremities, Cranial Nerves grossly intact to confrontation, speech clear              Skin: Wounds to the left foot is dressed, chronic discoloration lower extremities.      Result Review    Result Review:  I have personally reviewed the results for the past 24 hours and agree with these findings:  [x]  Laboratory  []  Microbiology  [x]  Radiology  []  EKG/Telemetry   []  Cardiology/Vascular   []  Pathology  [x]  Old records  [x]  Other: Medications    Assessment & Plan   Assessment / Plan     Assessment:  Acute hypoxemic respiratory failure. No home oxygen use.  Worsening  Likely acute flare of pulmonary fibrosis with UIP pattern  Mucous plugs.  Status post bronchoscopy May 8  Acute on chronic systolic CHF EF of 38%.  Moderate TR.  COPD exacerbation.  History of A-fib on Eliquis.  Status post PPM/AICD.  Recent right knee septic arthritis and left foot infection on IV daptomycin/Rocephin via RUE PICC line until May 15.  Transaminitis.  Likely hepatic congestion from CHF.  Improving  Rheumatoid factor positive.  Other autoimmune panel negative no symptoms.  Dilated ascending thoracic aorta 4.4 cm.  Left bundle branch block.         Plan:  Continue supplemental oxygen to keep sats  more than 90%.  Low-salt diet, fluid restriction, strict input output and daily weights.  Noted repeat CT chest by cardiology unchanged bilateral GGO, it does improvement in pulm edema  Scheduled IV Lasix as per pulmonary.  Trend BNP  2D echo noted.  Noted ultrasound right upper quadrant.  Negative   acute hepatitis panel.  Negative   Goal-directed medical therapy including Entresto and Aldactone.  Appreciate cardiology input.  Continue home Eliquis.  Continue  Vanco and Rocephin until May 15.  Patient was on daptomycin on admission.  MRSA PCR negative.  IV steroids per pulmonary  Sliding-scale insulin  Nebulizer treatment.  Bronchopulmonary hygiene protocol.  Wound nurse consulted.  Appreciate input  CTA chest noted no PE.  Bilateral groundglass opacities consistent with pulmonary edema.  PT OT.  Recommending SNF.  Patient wants to go home from here.  Refusing to go to rehab.  Digoxin level therapeutic  Appreciate pulmonary input.  BAL cytology pending.  Started on midodrine  Negative anti-CCP.  VISH/kong 1 negative.  Continue telemetry  Discussed plan with RN and .  Patient was about to be discharged from [facility] to go home.  Discharge home when oxygen level is stable.  Will need walking oximetry.    VTE Prophylaxis:  Pharmacologic VTE prophylaxis orders are present.  Eliquis        CODE STATUS:   Code Status (Patient has no pulse and is not breathing): No CPR (Do Not Attempt to Resuscitate)  Medical Interventions (Patient has pulse or is breathing): Limited Support  Medical Intervention Limits: No intubation (DNI)      Part of this note may be an electronic transcription/translation of spoken language to printed text using the Dragon Dictation System.       Electronically signed by Owen Wooten MD, 05/13/25, 3:20 PM EDT.

## 2025-05-13 NOTE — PROGRESS NOTES
Pulmonary / Critical Care Progress Note      Patient Name: Roger D Snellen  : 1950  MRN: [id number]  Primary Care Physician:  John Phelps Jr., MD  Date of admission: 2025    Subjective   Subjective   Follow-up for acute hypoxic respiratory failure, acute on chronic congestive diastolic heart failure  Hypoxia worsening now  Now currently on Airvo  Still very SOB      Objective   Objective     Vitals:   Temp:  [97.5 °F (36.4 °C)-98.2 °F (36.8 °C)] 97.9 °F (36.6 °C)  Heart Rate:  [70-71] 70  Resp:  [18-22] 18  BP: ()/(57-71) 91/66  Flow (L/min) (Oxygen Therapy):  [45] 45  Physical Exam   Basilar Velcro crackles concerning for pulmonary fibrosis    Result Review    Result Review:  I have personally reviewed the results from the time of this admission to 2025 10:03 EDT and agree with these findings:  []  Laboratory  []  Microbiology  []  Radiology  []  EKG/Telemetry   []  Cardiology/Vascular   []  Pathology  []  Old records  []  Other:  Most notable findings include:       Impression:  Acute hypoxic respiratory failure  Multifocal pneumonia  Acute decompensated systolic heart failure on chronic decompensated systolic heart failure  Pulmonary fibrosis with UIP pattern seen by Dr. Galvan as an outpatient      Plan  Continue Airvo at this time  Diuresis as tolerated keep the lung dry  We will increase dose of IV steroid to see if this helps clear the hypoxia  Continue with negative fluid balance  Currently appears to be on the drier side  Antibiotics to complete course  Heart failure medicines per cardiology  Continue with steroids        VTE Prophylaxis:  Pharmacologic VTE prophylaxis orders are present.    CODE STATUS:   Code Status (Patient has no pulse and is not breathing): No CPR (Do Not Attempt to Resuscitate)  Medical Interventions (Patient has pulse or is breathing): Limited Support  Medical Intervention Limits: No intubation (DNI)    Prior imaging reviewed from  showing evidence of  pulmonary fibrosis  Recent imaging reviewed  Case discussed with hospitalist    Electronically signed by Mars Calloway DO, 05/13/25, 10:04 AM EDT.

## 2025-05-13 NOTE — PLAN OF CARE
Problem: Adult Inpatient Plan of Care  Goal: Plan of Care Review  Outcome: Progressing  Flowsheets (Taken 5/13/2025 0500)  Progress: no change  Outcome Evaluation: Patient alert and oriented, VSS, on Airvo, no s/s of distress, pain controlled with current regimen, PICC blood return sluggish, MD notified, cath carmine ordered, hand off to day shift to notify PICC team to give cath carmine, able to sleep in between care, call light within reach, care plan ongoing  Plan of Care Reviewed With: patient   Goal Outcome Evaluation:  Plan of Care Reviewed With: patient

## 2025-05-14 NOTE — PROGRESS NOTES
RT EQUIPMENT DEVICE RELATED - SKIN ASSESSMENT    RT Medical Equipment/Device:     High Flow Nasal Cannula:Airvo    Skin Assessment:      Cheek:  Intact  Ears:  Intact  Nares:  Intact    Device Skin Pressure Protection:  Skin-to skin areas padded    Nurse Notification:  Nessa Espino, RRT

## 2025-05-14 NOTE — PROGRESS NOTES
[facility]   Hospitalist Progress Note  Date: 2025  Patient Name: Roger D Snellen  : 1950  MRN: [id number]  Date of admission: 2025      Subjective   Subjective     Chief Complaint: Shortness of air    Summary:   Roger D Snellen is a 75 y.o. male A-fib on anticoagulation, heart failure with preserved ejection fraction, diabetes, COPD, hypothyroidism, GERD, hyperlipidemia, hypertension.  Patient most recently discharged from [facility].  Patient presented with complaints of left foot pain swelling and erythema.  Met criteria for severe sepsis due to infected left foot.  Also with issues of right knee pain.  Concern for septic right knee arthrocentesis performed, moderate amount of WBCs, few GPC's on cell count and Gram stain, cultures no growth.  Patient discharged on IV antibiotics 500 mg IV daptomycin, 1 g IV ceftriaxone daily with a stop date of 5/15/2025.  Patient was seen by podiatry for left foot wound, did have bedside debridement performed.  Patient was discharged to [facility] rehab.  Since being at [facility] patient endorses slowly worsening shortness of breath.  On morning of presentation, 2025, patient states he felt smothered.  Denies any associated fever chills or night sweats.  Patient presented to the emergency department hypoxic 81% on room air.  Patient with a heart rate of 70, respiratory rate of 28 and a blood pressure 122/72 initially in the emergency department.  Quickly titrated up on high flow nasal cannula eventually transitioned over to BiPAP, however due to intolerance patient transition to Airvo 55 L 60% FiO2 to maintain O2 saturations.  Patient's initial ABG shows a pH of 7.5, PCO2 of 27.5, PO2 of 65.8.  proBNP elevated at 6386.  Troponin 32 then 31, no complaints of chest pain.  On labs patient with a bicarb of 19.4, anion gap of 15.6, creatinine 0.73.  Patient has AST ALT elevations of 114/96 respectively.  Procalcitonin elevated 0.41.  Lactate negative x  2.  White count of 12.8, hemoglobin 11.3 and platelets of 333.  Chest x-ray showed extensive new opacities in right lung and possible new opacities in left perihilar and left basilar region representing multifocal pneumonia or edema.  Patient has a right arm PICC line in place.  CT scan with contrast obtained to rule out pulmonary embolus.  No evidence of PE.  Cardiomegaly with reflux of contrast into the hepatic veins and IVC, seen in right heart failure.  Patchy groundglass opacities throughout both lungs with smooth interlobular septal thickening and trace bilateral pleural effusions.  Representing pulmonary edema over bilateral pneumonia.  Given patient's work of breathing and oxygen demand, admitted to the ICU.  Overnight patient did well and has been weaned down to 7 L high flow nasal cannula.  Transferred out of the unit on May 6.  Patient seen by pulmonary and cardiology.  Patient had bronchoscopy May 8 with suctioning of mucous plugs and purulent secretions.  Cultures negative to date.  Rheumatoid factor positive but rest of autoimmune panel including anti-CCP negative.  Patient follows with Dr. Cole arriola as an outpatient for pulmonary fibrosis with a UIP pattern.  Patient finished Rocephin May 14.  BAL showed Candida     Interval Followup:   BP better  Remains on Airvo, now on max 100% FiO2 with 60 L/min  Complaining of sinus congestion   Denies any hemoptysis  LFTs improving  Does have shortness of air with minimal exertion  Chest x-ray with persistent bilateral opacities with worsening right upper lobe.  Repeat x-ray without right hilar lucency   No chest pain.  No cough.  Right knee remains swollen.  Not painful, left foot wound healing well  Good urine output    Review of Systems   All systems were reviewed and negative except for: Summary and interval follow-up    Objective   Objective     Vitals:   Temp:  [97.5 °F (36.4 °C)-97.9 °F (36.6 °C)] 97.5 °F (36.4 °C)  Heart Rate:  [70-72] 70  Resp:   [16-24] 20  BP: ()/(61-67) 97/63  Flow (L/min) (Oxygen Therapy):  [45-60] 60  Physical Exam      Constitutional: Awake, alert, increased work of breathing, mild respiratory distress              Eyes: Pupils equal, sclerae anicteric, no conjunctival injection              HENT: NCAT, mucous membranes moist              Neck: Supple, no thyromegaly, no lymphadenopathy, trachea midline              Respiratory: Crackles heard mostly bilateral base and midlung area, minimal expiratory wheezing, prolonged expiratory phase              Cardiovascular: RRR, no murmurs, rubs, or gallops, palpable pedal pulses bilaterally.  +1bilateral extremity LYNN              Gastrointestinal: Positive bowel sounds, soft, nontender, nondistended              Musculoskeletal: Right knee swollen, good range of motion and no tenderness on palpation              Neurologic: Oriented x 3, strength symmetric in all extremities, Cranial Nerves grossly intact to confrontation, speech clear              Skin: Wounds to the left foot is dressed but healing, chronic discoloration lower extremities.      Result Review    Result Review:  I have personally reviewed the results for the past 24 hours and agree with these findings:  [x]  Laboratory  []  Microbiology  [x]  Radiology  []  EKG/Telemetry   []  Cardiology/Vascular   []  Pathology  [x]  Old records  [x]  Other: Medications    Assessment & Plan   Assessment / Plan     Assessment:  Acute hypoxemic respiratory failure. No home oxygen use.  Worsening  Likely acute flare of pulmonary fibrosis with UIP pattern.  Mucous plugs.  Status post bronchoscopy May 8.  BAL with Candida likely colonization.  Acute on chronic systolic CHF EF of 38%.  Moderate TR.  COPD exacerbation.  History of A-fib on Eliquis.  Status post PPM/AICD.  Recent right knee septic arthritis and left foot infection on IV daptomycin/Rocephin via RUE PICC line until May 15.  Transaminitis.  Likely hepatic congestion from CHF.   Improving  Rheumatoid factor positive.  Other autoimmune panel negative no symptoms.  Dilated ascending thoracic aorta 4.4 cm.  Left bundle branch block.         Plan:  Continue supplemental oxygen to keep sats more than 90%.  NIPPV in room if needed  Low-salt diet, fluid restriction, strict input output and daily weights.  Noted repeat CT chest by cardiology noted unchanged bilateral GGO, it does show improvement in pulm edema  Scheduled IV Lasix as per pulmonary.  Dose increased because of worsening hypoxemia.  BNP trending down  2D echo noted.  Noted ultrasound right upper quadrant.  Negative   acute hepatitis panel.  Negative   Goal-directed medical therapy including Entresto and Aldactone.  Appreciate cardiology input.  Continue home Eliquis.  Continue  Vanco until May 15.  finished Rocephin for right knee septic arthritis  IV steroids  increased every 6 hours because of worsening hypoxemia  Sliding-scale insulin  Nebulizer treatment.  Bronchopulmonary hygiene protocol.  Incentive spirometry, flutter valve, hypertonic saline and MetaNeb  Wound nurse consulted.  Appreciate input  CTA chest noted no PE.  Bilateral groundglass opacities consistent with pulmonary edema.  PT OT.  Recommending SNF.  Patient wants to go home from here.  Refusing to go to rehab.  Digoxin level therapeutic  Appreciate pulmonary input.  BAL cytology negative.  Started on midodrine.  Afrin and saline no spray for sinus congestion  Negative anti-CCP.  VISH/kong 1 negative.  Continue telemetry  Discussed plan with RN and .  Patient was about to be discharged from [facility] to go home.  Discharge home when oxygen level is stable.  Will need walking oximetry.    VTE Prophylaxis:  Pharmacologic VTE prophylaxis orders are present.  Eliquis        CODE STATUS:   Code Status (Patient has no pulse and is not breathing): No CPR (Do Not Attempt to Resuscitate)  Medical Interventions (Patient has pulse or is breathing): Limited  Support  Medical Intervention Limits: No intubation (DNI)      Part of this note may be an electronic transcription/translation of spoken language to printed text using the Dragon Dictation System.     Electronically signed by Owen Wooten MD, 05/14/25, 4:15 PM EDT.

## 2025-05-14 NOTE — THERAPY RE-EVALUATION
Acute Care - Physical Therapy Re-Evaluation   [place]     Patient Name: Roger D Snellen  : 1950  MRN: [id number]  Today's Date: 2025      Visit Dx:     ICD-10-CM ICD-9-CM   1. Acute hypoxemic respiratory failure  J96.01 518.81   2. Acute on chronic systolic congestive heart failure  I50.23 428.23     428.0   3. Acute pulmonary edema  J81.0 518.4   4. Difficulty walking  R26.2 719.7   5. Impaired mobility and ADLs  Z74.09 V49.89    Z78.9    6. Acute hypoxic respiratory failure  J96.01 518.81     Patient Active Problem List   Diagnosis    Non-ischemic cardiomyopathy    Acute hypoxic respiratory failure     Past Medical History:   Diagnosis Date    Arrhythmia     ATRIAL FIB, PACE TERMINATED VT    Atrial fibrillation     BBB (bundle branch block)     L BBB    CHF (congestive heart failure)     Diabetes mellitus     Disease of thyroid gland     GERD (gastroesophageal reflux disease)     Hyperlipidemia     Hypertension     Non-ischemic cardiomyopathy     DILATED CM EF 20-25% 2017    Osteoarthritis      Past Surgical History:   Procedure Laterality Date    BRONCHOSCOPY N/A 2025    Procedure: BRONCHOSCOPY WITH BRONCHOALVEOLAR LAVAGE, WASHING, AIRWAY INSPECTION: insertion of lighted instrument to view inside the lung;  Surgeon: Tae Mosley MD;  [facility] MAIN OR;  Service: Pulmonary;  Laterality: N/A;    CARDIAC ELECTROPHYSIOLOGY PROCEDURE N/A 2017    Procedure: Device Upgrade  ICD TO A BIV ICD   medtronic;  Surgeon: Chris Phillips MD;  Location: [facility] INVASIVE LOCATION;  Service:     CHOLECYSTECTOMY      EYE SURGERY Left 2025    Retina Repair    INSERT / REPLACE / REMOVE PACEMAKER      INTERNAL CARDIAC DEFIBRILLATOR INSERTION      [employer]    SHOULDER ARTHROSCOPY      TOTAL KNEE ARTHROPLASTY       PT Assessment (Last 12 Hours)       PT Evaluation and Treatment       Row Name 25 1026          Physical Therapy Time and Intention    Subjective Information complains of  (P)   shortness of breath  -RA     Document Type re-evaluation (P)   -RA     Mode of Treatment individual therapy;physical therapy (P)   -RA     Patient Effort good (P)   -RA     Symptoms Noted During/After Treatment fatigue;shortness of breath;significant change in vital signs (P)   -RA     Comment Pt on Airvo, SAT down to low 70s upon transfer to chair. Back up to 89 after seated breathing. Nursing alerted. (P)   -RA       Row Name 05/14/25 1026          General Information    Patient Profile Reviewed yes (P)   -RA     Patient Observations alert;cooperative;agree to therapy (P)   -RA     Existing Precautions/Restrictions fall;oxygen therapy device and L/min (P)   -RA       Row Name 05/14/25 1026          Cognition    Orientation Status (Cognition) oriented x 4 (P)   -RA       Row Name 05/14/25 1026          Bed Mobility    Bed Mobility supine-sit;sit-supine (P)   -RA     Supine-Sit [place] (Bed Mobility) standby assist (P)   -RA     Sit-Supine [place] (Bed Mobility) standby assist (P)   -RA       Row Name 05/14/25 1026          Transfers    Transfers sit-stand transfer;stand-sit transfer (P)   -RA       Row Name 05/14/25 1026          Sit-Stand Transfer    Sit-Stand [place] (Transfers) contact guard (P)   -RA     Assistive Device (Sit-Stand Transfers) walker, front-wheeled (P)   -RA       Row Name 05/14/25 1026          Stand-Sit Transfer    Stand-Sit [place] (Transfers) contact guard (P)   -RA     Assistive Device (Stand-Sit Transfers) walker, front-wheeled (P)   -RA       Row Name 05/14/25 1026          Gait/Stairs (Locomotion)    Gait/Stairs Locomotion gait/ambulation assistive device (P)   -RA     [place] Level (Gait) contact guard (P)   -RA     Assistive Device (Gait) walker, front-wheeled (P)   -RA     Patient was able to Ambulate yes (P)   -RA     Distance in Feet (Gait) 5 (P)   bed to chair  -RA       Row Name 05/14/25 1026          Safety Issues/Impairments Affecting Functional  Mobility    Impairments Affecting Function (Mobility) balance;endurance/activity tolerance;shortness of breath (P)   -RA       Row Name 05/14/25 1026          Balance    Balance Assessment standing dynamic balance (P)   -RA     Dynamic Standing Balance contact guard (P)   -RA     Position/Device Used, Standing Balance walker, front-wheeled (P)   -RA       Row Name             Wound 05/05/25 1500 medial coccyx Pressure Injury    Wound - Properties Group Placement Date: 05/05/25  -JR Placement Time: 1500  -JR Present on Original Admission: Y  -JR Orientation: medial  -JR Location: coccyx  -JR Primary Wound Type: Pressure Inj  -JR    Retired Wound - Properties Group Placement Date: 05/05/25  -JR Placement Time: 1500  -JR Present on Original Admission: Y  -JR Orientation: medial  -JR Location: coccyx  -JR    Retired Wound - Properties Group Placement Date: 05/05/25  -JR Placement Time: 1500  -JR Present on Original Admission: Y  -JR Orientation: medial  -JR Location: coccyx  -JR    Retired Wound - Properties Group Date first assessed: 05/05/25  -JR Time first assessed: 1500  -JR Present on Original Admission: Y  -JR Location: coccyx  -JR      Row Name             Wound 05/06/25 1058 Left medial ankle    Wound - Properties Group Placement Date: 05/06/25  -NH Placement Time: 1058  -NH Present on Original Admission: Y  -NH Side: Left  -NH Orientation: medial  -NH Location: ankle  -NH    Retired Wound - Properties Group Placement Date: 05/06/25  -NH Placement Time: 1058  -NH Present on Original Admission: Y  -NH Side: Left  -NH Orientation: medial  -NH Location: ankle  -NH    Retired Wound - Properties Group Placement Date: 05/06/25  -NH Placement Time: 1058  -NH Present on Original Admission: Y  -NH Side: Left  -NH Orientation: medial  -NH Location: ankle  -NH    Retired Wound - Properties Group Date first assessed: 05/06/25  -NH Time first assessed: 1058  -NH Present on Original Admission: Y  -NH Side: Left  -NH Location:  ankle  -NH      Row Name             Wound 05/13/25 0800 Left posterior ear Pressure Injury    Wound - Properties Group Placement Date: 05/13/25  -RB Placement Time: 0800  -RB Side: Left  -RB Orientation: posterior  -RB Location: ear  -RB Primary Wound Type: Pressure Inj  -RB    Retired Wound - Properties Group Placement Date: 05/13/25  -RB Placement Time: 0800  -RB Side: Left  -RB Orientation: posterior  -RB Location: ear  -RB    Retired Wound - Properties Group Placement Date: 05/13/25  -RB Placement Time: 0800  -RB Side: Left  -RB Orientation: posterior  -RB Location: ear  -RB    Retired Wound - Properties Group Date first assessed: 05/13/25  -RB Time first assessed: 0800  -RB Side: Left  -RB Location: ear  -RB      Row Name             Wound 05/13/25 0800 Right posterior ear Pressure Injury    Wound - Properties Group Placement Date: 05/13/25  -RB Placement Time: 0800  -RB Side: Right  -RB Orientation: posterior  -RB Location: ear  -RB Primary Wound Type: Pressure Inj  -RB    Retired Wound - Properties Group Placement Date: 05/13/25  -RB Placement Time: 0800  -RB Side: Right  -RB Orientation: posterior  -RB Location: ear  -RB    Retired Wound - Properties Group Placement Date: 05/13/25  -RB Placement Time: 0800  -RB Side: Right  -RB Orientation: posterior  -RB Location: ear  -RB    Retired Wound - Properties Group Date first assessed: 05/13/25  -RB Time first assessed: 0800  -RB Side: Right  -RB Location: ear  -RB      Row Name             Wound 05/13/25 1246 Right posterior heel Pressure Injury    Wound - Properties Group Placement Date: 05/13/25  -NH Placement Time: 1246  -NH Side: Right  -NH Orientation: posterior  -NH Location: heel  -NH Primary Wound Type: Pressure Inj  -NH    Retired Wound - Properties Group Placement Date: 05/13/25  -NH Placement Time: 1246  -NH Side: Right  -NH Orientation: posterior  -NH Location: heel  -NH    Retired Wound - Properties Group Placement Date: 05/13/25  -NH Placement  Time: 1246  -NH Side: Right  -NH Orientation: posterior  -NH Location: heel  -NH    Retired Wound - Properties Group Date first assessed: 05/13/25  -NH Time first assessed: 1246  -NH Side: Right  -NH Location: heel  -NH      Row Name 05/14/25 1026          Plan of Care Review    Outcome Evaluation Patient presents with limitations in endurance, balance, and functional mobility. He is limited by shortness of breath and change in oxygen saturation. He is not safe to transfer or ambulate independently and would continue to benefit from skilled PT services. (P)   -RA       Row Name 05/14/25 1026          Therapy Assessment/Plan (PT)    Rehab Potential (PT) good (P)   -RA     Criteria for Skilled Interventions Met (PT) yes;meets criteria (P)   -RA     Therapy Frequency (PT) daily (P)   -RA     Predicted Duration of Therapy Intervention (PT) 10 days (P)   -RA     Problem List (PT) problems related to;balance;mobility (P)   -RA     Activity Limitations Related to Problem List (PT) unable to ambulate safely;unable to transfer safely (P)   -RA       Row Name 05/14/25 1026          Bed Mobility Goal 1 (PT)    Activity/Assistive Device (Bed Mobility Goal 1, PT) sit to supine/supine to sit (P)   -RA     [place] Level/Cues Needed (Bed Mobility Goal 1, PT) independent (P)   -RA     Time Frame (Bed Mobility Goal 1, PT) 10 days (P)   -RA     Progress/Outcomes (Bed Mobility Goal 1, PT) good progress toward goal (P)   -RA       Row Name 05/14/25 1026          Transfer Goal 1 (PT)    Activity/Assistive Device (Transfer Goal 1, PT) sit-to-stand/stand-to-sit;bed-to-chair/chair-to-bed;walker, rolling (P)   -RA     [place] Level/Cues Needed (Transfer Goal 1, PT) modified independence (P)   -RA     Time Frame (Transfer Goal 1, PT) 10 days (P)   -RA     Progress/Outcome (Transfer Goal 1, PT) good progress toward goal (P)   -RA       Row Name 05/14/25 1026          Gait Training Goal 1 (PT)    Activity/Assistive Device (Gait  Training Goal 1, PT) gait (walking locomotion);assistive device use;walker, rolling (P)   -RA     [place] Level (Gait Training Goal 1, PT) modified independence (P)   -RA     Distance (Gait Training Goal 1, PT) 200 (P)   -RA     Time Frame (Gait Training Goal 1, PT) 10 days (P)   -RA     Progress/Outcome (Gait Training Goal 1, PT) progress slower than expected (P)   -RA               User Key  (r) = Recorded By, (t) = Taken By, (c) = Cosigned By      Initials Name Provider Type    Jocelyne Castellano, RN Registered Nurse    Arelis Guzman, RN Registered Nurse    Peterson Daigle, RN Registered Nurse    Tali Carrion, PT Student PT Student                    Physical Therapy Education       Title: PT OT SLP Therapies (In Progress)       Topic: Physical Therapy (In Progress)       Point: Mobility training (Done)       Learning Progress Summary            Patient Acceptance, E,TB, VU by AV at 5/6/2025 1336                      Point: Home exercise program (Not Started)       Learner Progress:  Not documented in this visit.              Point: Body mechanics (Done)       Learning Progress Summary            Patient Acceptance, E,TB, VU by AV at 5/6/2025 1336                      Point: Precautions (Done)       Learning Progress Summary            Patient Acceptance, E,TB, VU by AV at 5/6/2025 1336                                      User Key       Initials Effective Dates Name Provider Type Discipline    AV 06/11/21 -  Samson Cordoba, PT Physical Therapist PT                  PT Recommendation and Plan  Planned Therapy Interventions (PT): (P) balance training, bed mobility training, gait training, strengthening, transfer training  Therapy Frequency (PT): (P) daily  Progress Summary (PT)  Progress Toward Functional Goals (PT): progress toward functional goals is fair  Daily Progress Summary (PT): Patient tolerated treatment session well. He is making fair progress towards his goals but limited by shortness  of breath and fatigue. Continue POC.  Outcome Evaluation: (P) Patient presents with limitations in endurance, balance, and functional mobility. He is limited by shortness of breath and change in oxygen saturation. He is not safe to transfer or ambulate independently and would continue to benefit from skilled PT services.   Outcome Measures       Row Name 05/14/25 1000 05/12/25 1254          How much help from another person do you currently need...    Turning from your back to your side while in flat bed without using bedrails? 4 (P)   -RA 4  -DK     Moving from lying on back to sitting on the side of a flat bed without bedrails? 4 (P)   -RA 4  -DK     Moving to and from a bed to a chair (including a wheelchair)? 3 (P)   -RA 3  -DK     Standing up from a chair using your arms (e.g., wheelchair, bedside chair)? 3 (P)   -RA 3  -DK     Climbing 3-5 steps with a railing? 2 (P)   -RA 2  -DK     To walk in hospital room? 3 (P)   -RA 3  -DK     AM-PAC 6 Clicks Score (PT) 19 (P)   -RA 19  -DK        Functional Assessment    Outcome Measure Options -- AM-PAC 6 Clicks Basic Mobility (PT)  -DK               User Key  (r) = Recorded By, (t) = Taken By, (c) = Cosigned By      Initials Name Provider Type    Kirstin Andrade, PTA Physical Therapist Assistant    Tali Carrion, PT Student PT Student                     Time Calculation:    PT Charges       Row Name 05/14/25 1032             Time Calculation    PT Received On 05/14/25 (P)   -RA      PT Goal Re-Cert Due Date 05/23/25 (P)   -RA         Untimed Charges    PT Eval/Re-eval Minutes 20 (P)   -RA         Total Minutes    Untimed Charges Total Minutes 20 (P)   -RA       Total Minutes 20 (P)   -RA                User Key  (r) = Recorded By, (t) = Taken By, (c) = Cosigned By      Initials Name Provider Type    Tali Carrion, PT Student PT Student                      PT G-Codes  Outcome Measure Options: AM-PAC 6 Clicks Basic Mobility (PT)  AM-PAC 6 Clicks Score (PT): (P)  19  AM-PAC 6 Clicks Score (OT): 21    Tali Bender, PT Student  5/14/2025

## 2025-05-14 NOTE — PLAN OF CARE
Goal Outcome Evaluation:              Outcome Evaluation: A&Ox4. VSS on max Airvo settings.  ABGs and Chest Xray ordered this AM for decreased Sats. Patient voices no acute distress or pain at this time.  Tele is V-paced.  Care plan continues.  Call bell within reach at all times for needs and safety.,

## 2025-05-14 NOTE — PROGRESS NOTES
[facility]   Progress Note    Patient Name: Roger D Snellen  : 1950  MRN: [id number]  Primary Care Physician: John Phelps Jr., MD  Date of admission: 2025    Subjective   Subjective     HPI:  Patient short of breath, persistent infiltrates related to multifocal pneumonia, patient was diuresed according CT his edema has improved but still has persistent infiltrates, is followed with pulmonology    Review of Systems  Review of Systems   Constitutional:  Negative for chills.   HENT:  Negative for congestion.    Respiratory:  Positive for shortness of breath.    Cardiovascular: Negative.    Gastrointestinal: Negative.    Endocrine: Negative.    Genitourinary: Negative.    Musculoskeletal:  Positive for arthralgias.   Neurological: Negative.    Psychiatric/Behavioral: Negative.         Objective   Objective     Vitals:  Temp:  [97.5 °F (36.4 °C)-97.9 °F (36.6 °C)] 97.5 °F (36.4 °C)  Heart Rate:  [70-72] 70  Resp:  [16-24] 20  BP: ()/(61-67) 97/63  Flow (L/min) (Oxygen Therapy):  [45-60] 60    Physical Exam:  Physical Exam    Result Review    Result Review:  I have personally reviewed the results from the time of this admission to 25 4:00 PM EDT and agree with these findings:  [x]  Laboratory  []  Microbiology  []  Radiology  [x]  EKG/Telemetry   []  Cardiology/Vascular   []  Pathology  []  Old records  []  Other:    Most notable findings include: 75-year-old gentleman with persistent bilateral infiltrates, related to multifocal pneumonia on antibiotic therapy no significant improvement so far, patient has been diuresed his blood pressure is borderline low.    Assessment & Plan   Assessment / Plan     Active Hospital Problems:  Active Hospital Problems    Diagnosis     **Acute hypoxic respiratory failure        Plan:   Continue close observation hopefully his infiltrates will get better, plan as per pulmonology.    DVT prophylaxis: Anticoagulated    CODE STATUS:    Code Status and Medical  Interventions: No CPR (Do Not Attempt to Resuscitate); Limited Support; No intubation (DNI)   Ordered at: 05/05/25 1213     Code Status (Patient has no pulse and is not breathing):    No CPR (Do Not Attempt to Resuscitate)     Medical Interventions (Patient has pulse or is breathing):    Limited Support     Medical Intervention Limits:    No intubation (DNI)       Disposition:  I expect patient to be discharged when patient gets better.    Electronically signed by Juliocesar Giang MD, 05/14/25, 4:00 PM EDT.

## 2025-05-14 NOTE — PLAN OF CARE
Goal Outcome Evaluation:  Plan of Care Reviewed With: patient           Outcome Evaluation: pt is alert and oriented x 4 able to make his needs known. OOB to his chair per PT. Wound care completed. Remains  max on  Air-vo SOA with minimal activity. Call light is within reach. Chair alert is in place.

## 2025-05-14 NOTE — PROGRESS NOTES
Pulmonary / Critical Care Progress Note      Patient Name: Roger D Snellen  : 1950  MRN: [id number]  Primary Care Physician:  John Phelps Jr., MD  Date of admission: 2025    Subjective   Subjective   Follow-up for acute hypoxic respiratory failure, acute on chronic congestive diastolic heart failure  Hypoxia worsening now  Now currently on Airvo  Still very SOB  Oxygenation worsened      Objective   Objective     Vitals:   Temp:  [97.5 °F (36.4 °C)-98.1 °F (36.7 °C)] 97.5 °F (36.4 °C)  Heart Rate:  [70-72] 70  Resp:  [16-24] 20  BP: ()/(61-71) 97/63  Flow (L/min) (Oxygen Therapy):  [45-60] 60  Physical Exam   Basilar Velcro crackles concerning for pulmonary fibrosis    Result Review    Result Review:  I have personally reviewed the results from the time of this admission to 2025 14:58 EDT and agree with these findings:  []  Laboratory  []  Microbiology  []  Radiology  []  EKG/Telemetry   []  Cardiology/Vascular   []  Pathology  []  Old records  []  Other:  Most notable findings include:       Impression:  Acute hypoxic respiratory failure  Multifocal pneumonia  Acute decompensated systolic heart failure on chronic decompensated systolic heart failure  Pulmonary fibrosis with UIP pattern seen by Dr. Galvan as an outpatient      Plan  Continue Airvo at this time  Diuresis as tolerated keep the lung dry  Continue IV steroid   Continue with negative fluid balance  Currently appears to be on the drier side  Antibiotics to complete course  Heart failure medicines per cardiology  Continue with steroids  Increase frequency of the steroids  X-ray reviewed  Check BNP      VTE Prophylaxis:  Pharmacologic VTE prophylaxis orders are present.    CODE STATUS:   Code Status (Patient has no pulse and is not breathing): No CPR (Do Not Attempt to Resuscitate)  Medical Interventions (Patient has pulse or is breathing): Limited Support  Medical Intervention Limits: No intubation (DNI)    Prior imaging reviewed  from 2023 showing evidence of pulmonary fibrosis  Recent imaging reviewed  Case discussed with hospitalist    Electronically signed by Mars Calloway DO, 05/14/25, 2:59 PM EDT.

## 2025-05-15 NOTE — THERAPY TREATMENT NOTE
Patient Name: Roger D Snellen  : 1950    MRN: [id number]                              Today's Date: 5/15/2025       Admit Date: 2025    Visit Dx:     ICD-10-CM ICD-9-CM   1. Acute hypoxemic respiratory failure  J96.01 518.81   2. Acute on chronic systolic congestive heart failure  I50.23 428.23     428.0   3. Acute pulmonary edema  J81.0 518.4   4. Difficulty walking  R26.2 719.7   5. Impaired mobility and ADLs  Z74.09 V49.89    Z78.9    6. Acute hypoxic respiratory failure  J96.01 518.81     Patient Active Problem List   Diagnosis    Non-ischemic cardiomyopathy    Acute hypoxic respiratory failure     Past Medical History:   Diagnosis Date    Arrhythmia     ATRIAL FIB, PACE TERMINATED VT    Atrial fibrillation     BBB (bundle branch block)     L BBB    CHF (congestive heart failure)     Diabetes mellitus     Disease of thyroid gland     GERD (gastroesophageal reflux disease)     Hyperlipidemia     Hypertension     Non-ischemic cardiomyopathy     DILATED CM EF 20-25% 2017    Osteoarthritis      Past Surgical History:   Procedure Laterality Date    BRONCHOSCOPY N/A 2025    Procedure: BRONCHOSCOPY WITH BRONCHOALVEOLAR LAVAGE, WASHING, AIRWAY INSPECTION: insertion of lighted instrument to view inside the lung;  Surgeon: Tae Mosley MD;  Location: [facility] MAIN OR;  Service: Pulmonary;  Laterality: N/A;    CARDIAC ELECTROPHYSIOLOGY PROCEDURE N/A 2017    Procedure: Device Upgrade  ICD TO A BIV ICD   medtronic;  Surgeon: Chris Phillips MD;  Location: [facility] INVASIVE LOCATION;  Service:     CHOLECYSTECTOMY      EYE SURGERY Left 2025    Retina Repair    INSERT / REPLACE / REMOVE PACEMAKER      INTERNAL CARDIAC DEFIBRILLATOR INSERTION      MEDTRONIC    SHOULDER ARTHROSCOPY      TOTAL KNEE ARTHROPLASTY        General Information       Row Name 05/15/25 1346          OT Time and Intention    Document Type therapy note (daily note)  -LF     Mode of Treatment individual  therapy;occupational therapy  -               User Key  (r) = Recorded By, (t) = Taken By, (c) = Cosigned By      Initials Name Provider Type     Hanna Buenrostro OT Occupational Therapist                     Mobility/ADL's       Row Name 05/15/25 1347          Bed Mobility    Comment, (Bed Mobility) Pt seated on EOB with nsg on arrival.  -       Row Name 05/15/25 1347          Transfers    Transfers sit-stand transfer;stand-sit transfer  -       Row Name 05/15/25 1347          Sit-Stand Transfer    Sit-Stand [place] (Transfers) contact guard  -LF     Assistive Device (Sit-Stand Transfers) walker, front-wheeled  -LF       Row Name 05/15/25 1347          Stand-Sit Transfer    Stand-Sit [place] (Transfers) contact guard  -LF     Assistive Device (Stand-Sit Transfers) walker, front-wheeled  -LF       Row Name 05/15/25 1347          Functional Mobility    Functional Mobility- Ind. Level contact guard assist  -LF     Functional Mobility- Device walker, front-wheeled  -LF     Functional Mobility- Comment 3-4 steps from EOB to recliner using RW, cues for pacing and safety  -               User Key  (r) = Recorded By, (t) = Taken By, (c) = Cosigned By      Initials Name Provider Type     Hanna Buenrostro OT Occupational Therapist                   Obj/Interventions       Row Name 05/15/25 1348          Shoulder (Therapeutic Exercise)    Shoulder (Therapeutic Exercise) strengthening exercise  -     Shoulder Strengthening (Therapeutic Exercise) bilateral;flexion;extension;horizontal aBduction/aDduction;resistance band;yellow;2 sets;10 repetitions  -       Row Name 05/15/25 1348          Elbow/Forearm (Therapeutic Exercise)    Elbow/Forearm (Therapeutic Exercise) strengthening exercise  -     Elbow/Forearm Strengthening (Therapeutic Exercise) bilateral;flexion;extension;resistance band  -       Row Name 05/15/25 1348          Motor Skills    Motor Skills functional endurance  -     Functional  Endurance P+, O2 sat dropped to 78% transferring to recliner requiring extended time to rebound to 87%. Pt reported ready for BUE exercises at 87%, with O2 ranging from 78-87% throughout. Cues for breathing technique and pacing throughout.  -     Therapeutic Exercise shoulder;elbow/forearm  -       Row Name 05/15/25 4253          Balance    Balance Assessment sitting dynamic balance;standing dynamic balance  -     Dynamic Sitting Balance supervision  -     Position, Sitting Balance unsupported;sitting edge of bed;sitting in chair  -     Dynamic Standing Balance contact guard  -     Position/Device Used, Standing Balance supported;walker, front-wheeled  -     Balance Interventions sitting;standing;sit to stand;supported;dynamic;occupation based/functional task;UE activity with balance activity  -               User Key  (r) = Recorded By, (t) = Taken By, (c) = Cosigned By      Initials Name Provider Type     Hanna Buenrostro, OT Occupational Therapist                   Goals/Plan    No documentation.                  Clinical Impression       Row Name 05/15/25 1350          Pain Assessment    Additional Documentation Pain Scale: FACES Pre/Post-Treatment (Group)  -[facility] Name 05/15/25 1350          Pain Scale: FACES Pre/Post-Treatment    Pain: FACES Scale, Pretreatment 0-->no hurt  -     Posttreatment Pain Rating 0-->no hurt  -[facility] Name 05/15/25 1350          Plan of Care Review    Plan of Care Reviewed With patient  -     Progress improving  -     Outcome Evaluation Pt agreeable to functional transfers, followed by BUE exercises in recliner, demonstrating poor+ endurance. He would benefit from continued OT services until safe d/c.  -       Row Name 05/15/25 1350          Vital Signs    O2 Delivery Pre Treatment other (see comments)  Airvo  -LF     O2 Delivery Intra Treatment other (see comments)  Airvo  -LF     O2 Delivery Post Treatment other (see comments)  Airvo  -LF        Row Name 05/15/25 1350          Positioning and Restraints    Pre-Treatment Position in bed  -LF     Post Treatment Position chair  -LF     In Chair reclined;call light within reach;encouraged to call for assist;exit alarm on;with family/caregiver  -LF               User Key  (r) = Recorded By, (t) = Taken By, (c) = Cosigned By      Initials Name Provider Type    LF Hanna Buenrostro, OT Occupational Therapist                   Outcome Measures       Row Name 05/15/25 1351          How much help from another is currently needed...    Putting on and taking off regular lower body clothing? 3  -LF     Bathing (including washing, rinsing, and drying) 3  -LF     Toileting (which includes using toilet bed pan or urinal) 3  -LF     Putting on and taking off regular upper body clothing 4  -LF     Taking care of personal grooming (such as brushing teeth) 4  -LF     Eating meals 4  -LF     AM-PAC 6 Clicks Score (OT) 21  -LF       Row Name 05/15/25 0840          How much help from another person do you currently need...    Turning from your back to your side while in flat bed without using bedrails? 4  -AW     Moving from lying on back to sitting on the side of a flat bed without bedrails? 4  -AW     Moving to and from a bed to a chair (including a wheelchair)? 3  -AW     Standing up from a chair using your arms (e.g., wheelchair, bedside chair)? 3  -AW     Climbing 3-5 steps with a railing? 2  -AW     To walk in hospital room? 3  -AW     AM-PAC 6 Clicks Score (PT) 19  -AW     Highest Level of Mobility Goal Walk 10 Steps or More-6  -AW       Row Name 05/15/25 1351          Functional Assessment    Outcome Measure Options AM-PAC 6 Clicks Daily Activity (OT);Optimal Instrument  -LF       Row Name 05/15/25 1351          Optimal Instrument    Optimal Instrument Optimal - 3  -LF     Bending/Stooping 2  -LF     Standing 2  -LF     Reaching 1  -LF     From the list, choose the 3 activities you would most like to be able to do without  any difficulty Bending/stooping;Standing;Reaching  -LF     Total Score Optimal - 3 5  -LF               User Key  (r) = Recorded By, (t) = Taken By, (c) = Cosigned By      Initials Name Provider Type     Hanna Buenrostro OT Occupational Therapist    Akua Billings, RN Registered Nurse                      OT Recommendation and Plan     Plan of Care Review  Plan of Care Reviewed With: patient  Progress: improving  Outcome Evaluation: Pt agreeable to functional transfers, followed by BUE exercises in recliner, demonstrating poor+ endurance. He would benefit from continued OT services until safe d/c.     Time Calculation:         Time Calculation- OT       Row Name 05/15/25 1352             Time Calculation- OT    OT Received On 05/15/25  -LF      OT Goal Re-Cert Due Date 05/16/25  -LF         Timed Charges    61288 - OT Therapeutic Exercise Minutes 15  -LF      55337 - OT Therapeutic Activity Minutes 10  -LF         Total Minutes    Timed Charges Total Minutes 25  -LF       Total Minutes 25  -LF                User Key  (r) = Recorded By, (t) = Taken By, (c) = Cosigned By      Initials Name Provider Type     Hanna Buenrostro OT Occupational Therapist                  Therapy Charges for Today       Code Description Service Date Service Provider Modifiers Qty    40473141501 HC OT THER PROC EA 15 MIN 5/15/2025 Hanna Buenrostro OT GO 1    18143058917 HC OT THERAPEUTIC ACT EA 15 MIN 5/15/2025 Hanna Buenrostro OT GO 1                 Hanna Buenrostro OT  5/15/2025

## 2025-05-15 NOTE — PLAN OF CARE
Goal Outcome Evaluation:           Progress: no change  Outcome Evaluation: A&Ox4. VSS on max Air-vo settings. Tele is Vpaced at 70.  PRN pain med x 1 for pain effective per patient. See MAR.  . No acute distress noted. Care plan continues.  Call bell within reach at all times for needs and safety.

## 2025-05-15 NOTE — THERAPY EVALUATION
RT EQUIPMENT DEVICE RELATED - SKIN ASSESSMENT    RT Medical Equipment/Device:     High Flow Nasal Cannula:Airvo    Skin Assessment:      Ears:  Intact  Nose:  Intact  Mouth:  Intact    Device Skin Pressure Protection:  Positioning supports utilized    Nurse Notification:  Nessa Nair, RRT

## 2025-05-15 NOTE — PLAN OF CARE
Goal Outcome Evaluation:   Patient currently on airvo 60L/70%. I had to increase from 60%, as o2 sats were hanging around 87%.

## 2025-05-15 NOTE — PROGRESS NOTES
[facility]   Hospitalist Progress Note  Date: 5/15/2025  Patient Name: Roger D Snellen  : 1950  MRN: [id number]  Date of admission: 2025      Subjective   Subjective     Chief Complaint: Shortness of air    Summary:   Roger D Snellen is a 75 y.o. male A-fib on anticoagulation, heart failure with preserved ejection fraction, diabetes, COPD, hypothyroidism, GERD, hyperlipidemia, hypertension.  Patient most recently discharged from [facility].  Patient presented with complaints of left foot pain swelling and erythema.  Met criteria for severe sepsis due to infected left foot.  Also with issues of right knee pain.  Concern for septic right knee arthrocentesis performed, moderate amount of WBCs, few GPC's on cell count and Gram stain, cultures no growth.  Patient discharged on IV antibiotics 500 mg IV daptomycin, 1 g IV ceftriaxone daily with a stop date of 5/15/2025.  Patient was seen by podiatry for left foot wound, did have bedside debridement performed.  Patient was discharged to [facility] rehab.  Since being at [facility] patient endorses slowly worsening shortness of breath.  On morning of presentation, 2025, patient states he felt smothered.  Denies any associated fever chills or night sweats.  Patient presented to the emergency department hypoxic 81% on room air.  Patient with a heart rate of 70, respiratory rate of 28 and a blood pressure 122/72 initially in the emergency department.  Quickly titrated up on high flow nasal cannula eventually transitioned over to BiPAP, however due to intolerance patient transition to Airvo 55 L 60% FiO2 to maintain O2 saturations.  Patient's initial ABG shows a pH of 7.5, PCO2 of 27.5, PO2 of 65.8.  proBNP elevated at 6386.  Troponin 32 then 31, no complaints of chest pain.  On labs patient with a bicarb of 19.4, anion gap of 15.6, creatinine 0.73.  Patient has AST ALT elevations of 114/96 respectively.  Procalcitonin elevated 0.41.  Lactate negative x  2.  White count of 12.8, hemoglobin 11.3 and platelets of 333.  Chest x-ray showed extensive new opacities in right lung and possible new opacities in left perihilar and left basilar region representing multifocal pneumonia or edema.  Patient has a right arm PICC line in place.  CT scan with contrast obtained to rule out pulmonary embolus.  No evidence of PE.  Cardiomegaly with reflux of contrast into the hepatic veins and IVC, seen in right heart failure.  Patchy groundglass opacities throughout both lungs with smooth interlobular septal thickening and trace bilateral pleural effusions.  Representing pulmonary edema over bilateral pneumonia.  Given patient's work of breathing and oxygen demand, admitted to the ICU.  Overnight patient did well and has been weaned down to 7 L high flow nasal cannula.  Transferred out of the unit on May 6.  Patient seen by pulmonary and cardiology.  Patient had bronchoscopy May 8 with suctioning of mucous plugs and purulent secretions.  Cultures negative to date.  Rheumatoid factor positive but rest of autoimmune panel including anti-CCP negative.  Patient follows with Dr. Cole arriola as an outpatient for pulmonary fibrosis with a UIP pattern.  Patient finished Rocephin May 14.  BAL showed Candida     Interval Followup:   BP better, V-paced rhythm  Remains on Airvo, down to 70% % FiO2 with 60 L/min  Complaining of sinus congestion   Denies any hemoptysis  LFTs improving  Does have shortness of air with minimal exertion  Chest x-ray with persistent bilateral opacities with worsening right upper lobe.  Repeat x-ray without right hilar lucency.  No chest pain.  No cough.  Right knee remains swollen.  Not painful, left foot wound healing well  Good urine output    Review of Systems   All systems were reviewed and negative except for: Summary and interval follow-up    Objective   Objective     Vitals:   Temp:  [97.3 °F (36.3 °C)-98.2 °F (36.8 °C)] 97.5 °F (36.4 °C)  Heart Rate:   [70-80] 72  Resp:  [20-28] 24  BP: ()/(57-76) 91/67  Flow (L/min) (Oxygen Therapy):  [50-60] 50  Physical Exam      Constitutional: Awake, alert, increased work of breathing, mild respiratory distress              Eyes: Pupils equal, sclerae anicteric, no conjunctival injection              HENT: NCAT, mucous membranes moist              Neck: Supple, no thyromegaly, no lymphadenopathy, trachea midline              Respiratory: Crackles heard mostly bilateral base and midlung area, minimal expiratory wheezing, prolonged expiratory phase              Cardiovascular: RRR, no murmurs, rubs, or gallops, palpable pedal pulses bilaterally.  +1bilateral extremity LYNN              Gastrointestinal: Positive bowel sounds, soft, nontender, nondistended              Musculoskeletal: Right knee swollen, good range of motion and no tenderness on palpation              Neurologic: Oriented x 3, strength symmetric in all extremities, Cranial Nerves grossly intact to confrontation, speech clear              Skin: Wounds to the left foot is dressed but healing, chronic discoloration lower extremities.      Result Review    Result Review:  I have personally reviewed the results for the past 24 hours and agree with these findings:  [x]  Laboratory  []  Microbiology  [x]  Radiology  []  EKG/Telemetry   []  Cardiology/Vascular   []  Pathology  [x]  Old records  [x]  Other: Medications    Assessment & Plan   Assessment / Plan     Assessment:  Acute hypoxemic respiratory failure. No home oxygen use.  Worsening  Likely acute flare of pulmonary fibrosis with UIP pattern.  Mucous plugs.  Status post bronchoscopy May 8.  BAL with Candida likely colonization.  Acute on chronic systolic CHF EF of 38%.  Moderate TR.  COPD exacerbation.  History of A-fib on Eliquis.  Status post PPM/AICD.  Recent right knee septic arthritis and left foot infection on IV daptomycin/Rocephin via RUE PICC line until May 15.  Transaminitis.  Likely hepatic  congestion from CHF.  Improving  Rheumatoid factor positive.  Other autoimmune panel negative no symptoms.  Dilated ascending thoracic aorta 4.4 cm.  Left bundle branch block.         Plan:  Continue supplemental oxygen to keep sats more than 90%.  NIPPV in room if needed  Low-salt diet, fluid restriction, strict input output and daily weights.  Noted repeat CT chest by cardiology noted unchanged bilateral GGO, it does show improvement in pulm edema  Scheduled IV Lasix as per pulmonary.  Dose increased because of worsening hypoxemia.  BNP trending down  2D echo noted.  Noted ultrasound right upper quadrant.  Negative   acute hepatitis panel.  Negative   Goal-directed medical therapy including Entresto and Aldactone.  Appreciate cardiology input.  Continue home Eliquis.  Continue  Vanco until May 15.  finished Rocephin for right knee septic arthritis  IV steroids  increased every 6 hours because of worsening hypoxemia  Sliding-scale insulin  Nebulizer treatment.  Bronchopulmonary hygiene protocol.  Incentive spirometry, flutter valve, hypertonic saline and MetaNeb  Wound nurse consulted.  Appreciate input  CTA chest noted no PE.  Bilateral groundglass opacities consistent with pulmonary edema.  PT OT.  Recommending SNF.  Patient wants to go home from here.  Refusing to go to rehab.  Digoxin level therapeutic  Appreciate pulmonary input.  BAL cytology negative.  Started on midodrine.  Afrin and saline nasal spray for sinus congestion.  Plan for ultrasound of lungs to assess for effusions  Negative anti-CCP.  VISH/kong 1 negative.  Continue telemetry  Discussed plan with RN and .  Patient was about to be discharged from [facility] to go home.  Discharge home when oxygen level is stable.  Will need walking oximetry.    VTE Prophylaxis:  Pharmacologic VTE prophylaxis orders are present.  Eliquis        CODE STATUS:   Code Status (Patient has no pulse and is not breathing): No CPR (Do Not Attempt to  Resuscitate)  Medical Interventions (Patient has pulse or is breathing): Limited Support  Medical Intervention Limits: No intubation (DNI)      Part of this note may be an electronic transcription/translation of spoken language to printed text using the Dragon Dictation System.       Electronically signed by Owen Wooten MD, 05/15/25, 5:09 PM EDT.

## 2025-05-15 NOTE — PLAN OF CARE
Goal Outcome Evaluation:              Outcome Evaluation: Pt is alert and oriented. Pt reports mild pain and declines pharmacological intervention. Pt ambulates with assist x 1. Pt spent majority of day up in chair. Pt on 70/50 AirVO. Dressings change on BLE. Pt call light and personal items are within reach.

## 2025-05-15 NOTE — PROGRESS NOTES
Pulmonary / Critical Care Progress Note      Patient Name: Roger D Snellen  : 1950  MRN: [id number]  Primary Care Physician:  John Phelps Jr., MD  Date of admission: 2025    Subjective   Subjective   Follow-up for acute hypoxic respiratory failure, acute on chronic congestive diastolic heart failure  Hypoxia worsening now  Now currently on Airvo  Still very SOB  Oxygenation worsened      Objective   Objective     Vitals:   Temp:  [97.3 °F (36.3 °C)-98.2 °F (36.8 °C)] 97.3 °F (36.3 °C)  Heart Rate:  [70-80] 70  Resp:  [20-28] 28  BP: ()/(57-76) 94/57  Flow (L/min) (Oxygen Therapy):  [60] 60  Physical Exam   Basilar Velcro crackles concerning for pulmonary fibrosis    Result Review    Result Review:  I have personally reviewed the results from the time of this admission to 5/15/2025 12:25 EDT and agree with these findings:  [x]  Laboratory  [x]  Microbiology  [x]  Radiology  [x]  EKG/Telemetry   []  Cardiology/Vascular   []  Pathology  []  Old records  []  Other:  Most notable findings include:       Impression:  Acute hypoxic respiratory failure  Multifocal pneumonia  Acute decompensated systolic heart failure on chronic decompensated systolic heart failure  Pulmonary fibrosis with UIP pattern seen by Dr. Galvan as an outpatient      Plan  Continue Airvo at this time  Diuresis as tolerated keep the lung dry  Continue IV steroid   Continue with negative fluid balance  Currently appears to be on the drier side  Antibiotics to complete course  Heart failure medicines per cardiology  Continue with steroids  Plan on doing bedside ultrasound of the chest tomorrow 2025  If any significant fluid collection we will plan on draining      VTE Prophylaxis:  Pharmacologic VTE prophylaxis orders are present.    CODE STATUS:   Code Status (Patient has no pulse and is not breathing): No CPR (Do Not Attempt to Resuscitate)  Medical Interventions (Patient has pulse or is breathing): Limited Support  Medical  Intervention Limits: No intubation (DNI)    Prior imaging reviewed from 2023 showing evidence of pulmonary fibrosis  Recent imaging reviewed  Case discussed with hospitalist    Electronically signed by Mars Calloway DO, 05/15/25, 4:08 PM EDT.

## 2025-05-16 NOTE — PROGRESS NOTES
RT EQUIPMENT DEVICE RELATED - SKIN ASSESSMENT    RT Medical Equipment/Device:     High Flow Nasal Cannula:Airvo    Skin Assessment:      Cheek:  Intact  Chin:  Intact  Nose:  Intact    Device Skin Pressure Protection:  Skin-to-device areas padded:  None Required    Nurse Notification:  Nessa Cobian, CRT

## 2025-05-16 NOTE — PROGRESS NOTES
[facility]   Progress Note    Patient Name: Roger D Snellen  : 1950  MRN: [id number]  Primary Care Physician: John Phelps Jr., MD  Date of admission: 2025    Subjective   Subjective     HPI:  Patient reports shortness of breath, no significant improvement, currently on antibiotic therapy for multifocal pneumonia, extremities without edema, no clinical evidence of congestive failure.    Review of Systems  Review of Systems   Constitutional:  Positive for fatigue.   HENT: Negative.     Respiratory:  Positive for shortness of breath.    Cardiovascular:  Negative for chest pain, palpitations and leg swelling.   Gastrointestinal: Negative.    Endocrine: Negative.    Genitourinary: Negative.    Musculoskeletal: Negative.    Skin: Negative.    Neurological: Negative.    Psychiatric/Behavioral: Negative.         Objective   Objective     Vitals:  Temp:  [97.3 °F (36.3 °C)-98.2 °F (36.8 °C)] 97.5 °F (36.4 °C)  Heart Rate:  [70-75] 72  Resp:  [18-28] 18  BP: ()/(57-76) 91/67  Flow (L/min) (Oxygen Therapy):  [50-60] 50    Physical Exam:  Physical Exam  Constitutional:       Appearance: Normal appearance.   HENT:      Head: Normocephalic.      Nose: Nose normal.   Eyes:      Extraocular Movements: Extraocular movements intact.   Cardiovascular:      Rate and Rhythm: Regular rhythm.      Heart sounds:      No gallop.   Pulmonary:      Breath sounds: Rhonchi present.   Abdominal:      Palpations: Abdomen is soft.   Musculoskeletal:      Right lower leg: No edema.      Left lower leg: No edema.   Skin:     General: Skin is warm.   Neurological:      General: No focal deficit present.      Mental Status: He is alert.         Result Review    Result Review:  I have personally reviewed the results from the time of this admission to 05/15/25 8:24 PM EDT and agree with these findings:  [x]  Laboratory  []  Microbiology  []  Radiology  [x]  EKG/Telemetry   []  Cardiology/Vascular   []  Pathology  []  Old  records  []  Other:    Most notable findings include: 75-year-old very pleasant gentleman with persistent bilateral lung infiltrates most likely pneumonia, without significant improvement so far with diuresis and antibiotic therapy, may need to have another CT without contrast for follow-up of the treatment    Assessment & Plan   Assessment / Plan     Active Hospital Problems:  Active Hospital Problems    Diagnosis     **Acute hypoxic respiratory failure        Plan:   I will recommend to discontinue amiodarone patient is in a monitored bed.    DVT prophylaxis: As per internal medicine    CODE STATUS:    Code Status and Medical Interventions: No CPR (Do Not Attempt to Resuscitate); Limited Support; No intubation (DNI)   Ordered at: 05/05/25 1213     Code Status (Patient has no pulse and is not breathing):    No CPR (Do Not Attempt to Resuscitate)     Medical Interventions (Patient has pulse or is breathing):    Limited Support     Medical Intervention Limits:    No intubation (DNI)       Disposition:  I expect patient to be discharged patient is seriously ill.    Electronically signed by Juliocesar Giang MD, 05/15/25, 8:24 PM EDT.

## 2025-05-16 NOTE — PLAN OF CARE
Goal Outcome Evaluation:  Plan of Care Reviewed With: patient           Outcome Evaluation: Pt is alert and oriented, desats easily, Is on Air -Vo. Pt had Chest CT and ECHO completed this shift. Wound care completed per MD orders.VSS VPon the cardiac monitor.

## 2025-05-16 NOTE — PROGRESS NOTES
RT EQUIPMENT DEVICE RELATED - SKIN ASSESSMENT    RT Medical Equipment/Device:     High Flow Nasal Cannula:Airvo    Skin Assessment:      Cheek:  Intact  Nares:  Intact  Nose:  Intact    Device Skin Pressure Protection:  Positioning supports utilized    Nurse Notification:  Nessa Sharma, RRT

## 2025-05-16 NOTE — PROGRESS NOTES
[facility]   Hospitalist Progress Note  Date: 2025  Patient Name: Roger D Snellen  : 1950  MRN: [id number]  Date of admission: 2025      Subjective   Subjective     Chief Complaint: Shortness of air    Summary:   Roger D Snellen is a 75 y.o. male A-fib on anticoagulation, heart failure with preserved ejection fraction, diabetes, COPD, hypothyroidism, GERD, hyperlipidemia, hypertension.  Patient most recently discharged from [facility].  Patient presented with complaints of left foot pain swelling and erythema.  Met criteria for severe sepsis due to infected left foot.  Also with issues of right knee pain.  Concern for septic right knee arthrocentesis performed, moderate amount of WBCs, few GPC's on cell count and Gram stain, cultures no growth.  Patient discharged on IV antibiotics 500 mg IV daptomycin, 1 g IV ceftriaxone daily with a stop date of 5/15/2025.  Patient was seen by podiatry for left foot wound, did have bedside debridement performed.  Patient was discharged to [facility] rehab.  Since being at [facility] patient endorses slowly worsening shortness of breath.  On morning of presentation, 2025, patient states he felt smothered.  Denies any associated fever chills or night sweats.  Patient presented to the emergency department hypoxic 81% on room air.  Patient with a heart rate of 70, respiratory rate of 28 and a blood pressure 122/72 initially in the emergency department.  Quickly titrated up on high flow nasal cannula eventually transitioned over to BiPAP, however due to intolerance patient transition to Airvo 55 L 60% FiO2 to maintain O2 saturations.  Patient's initial ABG shows a pH of 7.5, PCO2 of 27.5, PO2 of 65.8.  proBNP elevated at 6386.  Troponin 32 then 31, no complaints of chest pain.  On labs patient with a bicarb of 19.4, anion gap of 15.6, creatinine 0.73.  Patient has AST ALT elevations of 114/96 respectively.  Procalcitonin elevated 0.41.  Lactate negative x  2.  White count of 12.8, hemoglobin 11.3 and platelets of 333.  Chest x-ray showed extensive new opacities in right lung and possible new opacities in left perihilar and left basilar region representing multifocal pneumonia or edema.  Patient has a right arm PICC line in place.  CT scan with contrast obtained to rule out pulmonary embolus.  No evidence of PE.  Cardiomegaly, seen in right heart failure.  Patchy groundglass opacities throughout both lungs with smooth interlobular septal thickening and trace bilateral pleural effusions.  Representing pulmonary edema over bilateral pneumonia.  Given patient's work of breathing and oxygen demand, admitted to the ICU.  Overnight patient did well and has been weaned down to 7 L high flow nasal cannula.  Transferred out of the unit on May 6.  Patient seen by pulmonary and cardiology.  Patient had bronchoscopy May 8 with suctioning of mucous plugs and purulent secretions.  Cultures negative to date.  Rheumatoid factor positive but rest of autoimmune panel including anti-CCP negative.  Patient follows with Dr. Cole arriola as an outpatient for pulmonary fibrosis with a UIP pattern.  Patient finished Rocephin May 14.  BAL showed Candida     Interval Followup:   BP better, V-paced rhythm  Remains on Airvo, down to 70% % FiO2 with 50 L/min  Complaining of sinus congestion, Denies any hemoptysis  Does have shortness of air with minimal exertion  Repeat CT chest today.  Echo ordered to evaluate for shunt.  Good urine output    Review of Systems   All systems were reviewed and negative except for: Summary and interval follow-up    Objective   Objective     Vitals:   Temp:  [97.3 °F (36.3 °C)-97.7 °F (36.5 °C)] 97.7 °F (36.5 °C)  Heart Rate:  [70-75] 70  Resp:  [18-24] 20  BP: ()/(63-77) 93/65  Flow (L/min) (Oxygen Therapy):  [50-60] 50  Physical Exam      Constitutional: Awake, alert, increased work of breathing, mild respiratory distress, HFNC              Eyes: Pupils  equal, sclerae anicteric, no conjunctival injection              HENT: NCAT, mucous membranes moist              Neck: Supple, no thyromegaly, no lymphadenopathy, trachea midline              Respiratory: Crackles heard mostly bilateral base and midlung area, minimal expiratory wheezing, prolonged expiratory phase              Cardiovascular: RRR, no murmurs, rubs, or gallops, palpable pedal pulses bilaterally.  +1bilateral extremity LYNN              Gastrointestinal: Positive bowel sounds, soft, nontender, nondistended              Musculoskeletal: Right knee swollen, good range of motion and no tenderness on palpation              Neurologic: Oriented x 3, strength symmetric in all extremities, Cranial Nerves grossly intact to confrontation, speech clear              Skin: Wounds to the left foot is dressed but healing, chronic discoloration lower extremities.      Result Review    Result Review:  I have personally reviewed the results for the past 24 hours and agree with these findings:  [x]  Laboratory  []  Microbiology  [x]  Radiology  []  EKG/Telemetry   []  Cardiology/Vascular   []  Pathology  [x]  Old records  [x]  Other: Medications    Assessment & Plan   Assessment / Plan     Assessment:  Acute hypoxemic respiratory failure. No home oxygen use.  Worsening  Likely acute flare of pulmonary fibrosis with UIP pattern.  Mucous plugs.  Status post bronchoscopy May 8.  BAL with Candida likely colonization.  Acute on chronic systolic CHF EF of 38%.  Moderate TR.  COPD exacerbation.  History of A-fib on Eliquis.  Status post PPM/AICD.  Recent right knee septic arthritis and left foot infection on IV daptomycin/Rocephin via RUE PICC line until May 15.  Transaminitis.  Likely hepatic congestion from CHF.  Improving  Rheumatoid factor positive.  Other autoimmune panel negative no symptoms.  Dilated ascending thoracic aorta 4.4 cm.  Left bundle branch block.         Plan:  Continue supplemental oxygen to keep sats more  than 90%.  NIPPV in room if needed  Low-salt diet, fluid restriction, strict input output and daily weights.  Noted repeat CT chest by cardiology 5/11/25 noted unchanged bilateral GGO, it does show improvement in pulm edema  Scheduled IV Lasix as per pulmonary.  Dose increased because of worsening hypoxemia.  BNP trending down  2D echo noted. Repeat ordered to evaluate PFO  Noted ultrasound right upper quadrant.  Negative   acute hepatitis panel.  Negative   Goal-directed medical therapy including Entresto and Aldactone.  Appreciate cardiology input.  Continue home Eliquis.  Completed Vancomycin.  finished Rocephin for right knee septic arthritis  IV steroids 40mg every 6 hours because of worsening hypoxemia  Sliding-scale insulin  Nebulizer treatment.  Bronchopulmonary hygiene protocol.  Incentive spirometry, flutter valve, hypertonic saline and MetaNeb  Wound nurse consulted.  Appreciate input  CTA chest noted no PE.  Bilateral groundglass opacities consistent with pulmonary edema.  Repeat CT chest 5/16/25  PT OT.  Recommending SNF.  Patient wants to go home from here.  Refusing to go to rehab. Patient may benefit from LTAC  Digoxin level therapeutic  Appreciate pulmonary input.  BAL cytology negative.  Started on midodrine.  Afrin and saline nasal spray for sinus congestion.  Plan for ultrasound of lungs to assess for effusions  Negative anti-CCP.  VISH/kong 1 negative.  Continue telemetry  Discussed plan with RN and .  Patient was about to be discharged from [facility] to go home.  Discharge home when oxygen level is stable.  Will need walking oximetry.    VTE Prophylaxis:  Pharmacologic VTE prophylaxis orders are present.  Eliquis        CODE STATUS:   Code Status (Patient has no pulse and is not breathing): No CPR (Do Not Attempt to Resuscitate)  Medical Interventions (Patient has pulse or is breathing): Limited Support  Medical Intervention Limits: No intubation (DNI)      Part of this note may be an  electronic transcription/translation of spoken language to printed text using the Dragon Dictation System.

## 2025-05-16 NOTE — PROGRESS NOTES
Pulmonary / Critical Care Progress Note      Patient Name: Roger D Snellen  : 1950  MRN: [id number]  Primary Care Physician:  John Phelps Jr., MD  Date of admission: 2025    Subjective   Subjective   Follow-up for acute hypoxic respiratory failure, acute on chronic congestive diastolic heart failure  Hypoxia worsening now  Now currently on Airvo  Still very SOB  Oxygenation worsened      Objective   Objective     Vitals:   Temp:  [97.3 °F (36.3 °C)-97.7 °F (36.5 °C)] 97.6 °F (36.4 °C)  Heart Rate:  [70-73] 70  Resp:  [18-22] 22  BP: ()/(63-77) 114/64  Flow (L/min) (Oxygen Therapy):  [50-60] 60  Physical Exam   Basilar Velcro crackles concerning for pulmonary fibrosis    Result Review    Result Review:  I have personally reviewed the results from the time of this admission to 2025 17:12 EDT and agree with these findings:  [x]  Laboratory  [x]  Microbiology  [x]  Radiology  [x]  EKG/Telemetry   []  Cardiology/Vascular   []  Pathology  []  Old records  []  Other:  Most notable findings include:       Impression:  Acute hypoxic respiratory failure  Multifocal pneumonia  Acute decompensated systolic heart failure on chronic decompensated systolic heart failure  Pulmonary fibrosis with UIP pattern seen by Dr. Galvan as an outpatient      Plan  Continue Airvo at this time  Diuresis as tolerated keep the lung dry  Continue IV steroid   Continue with negative fluid balance  Obtain echocardiogram with bubble study to look for cause of patient's persistent hypoxia  Repeat CT scan of the chest without contrast today  Bedside ultrasound performed showed no evidence of pleural effusion  Continue current antibiotics  Continue with diuresis  Daily renal panel  VTE Prophylaxis:  Pharmacologic VTE prophylaxis orders are present.    CODE STATUS:   Code Status (Patient has no pulse and is not breathing): No CPR (Do Not Attempt to Resuscitate)  Medical Interventions (Patient has pulse or is breathing): Limited  Support  Medical Intervention Limits: No intubation (DNI)    Prior imaging reviewed from 2023 showing evidence of pulmonary fibrosis  Recent imaging reviewed  Case discussed with hospitalist    Electronically signed by Mars Calloway DO, 05/16/25, 5:13 PM EDT.

## 2025-05-16 NOTE — PROGRESS NOTES
[facility]   Progress Note    Patient Name: Roger D Snellen  : 1950  MRN: [id number]  Primary Care Physician: John Phelps Jr., MD  Date of admission: 2025    Subjective   Subjective     HPI:  Patient with persistent shortness of breath, according pulmonology note patient also has pulmonary fibrosis, possible multifocal pneumonia, history of congestive failure but I think now is better regarding fluid retention in his lungs patient was actively diuresed.      Review of Systems  Review of Systems   Constitutional: Negative.    HENT: Negative.     Eyes: Negative.    Respiratory:  Positive for shortness of breath.    Cardiovascular: Negative.    Gastrointestinal: Negative.    Endocrine: Negative.    Genitourinary: Negative.    Musculoskeletal: Negative.    Skin: Negative.    Neurological: Negative.    Psychiatric/Behavioral: Negative.         Objective   Objective     Vitals:  Temp:  [97.3 °F (36.3 °C)-97.7 °F (36.5 °C)] 97.6 °F (36.4 °C)  Heart Rate:  [70-73] 70  Resp:  [18-22] 22  BP: ()/(63-77) 114/64  Flow (L/min) (Oxygen Therapy):  [50-60] 60    Physical Exam:  Physical Exam    Result Review    Result Review:  I have personally reviewed the results from the time of this admission to 25 5:26 PM EDT and agree with these findings:  [x]  Laboratory  []  Microbiology  []  Radiology  [x]  EKG/Telemetry   []  Cardiology/Vascular   []  Pathology  []  Old records  []  Other:    Most notable findings include: 75-year-old gentleman with persistent shortness of breath and persistent infiltrates in the lung, has history of pulmonary fibrosis as per pulmonology, patient condition is serious.  His cardiac condition I think appears to be under control there is no evidence of ventricular tachycardia patient has paced rhythm, prior CT show improvement of the edema.  But persistent infiltrates possible multifocal pneumonia and apparently patient has pulmonary fibrosis according to pulmonology.  Patient  condition is serious    Assessment & Plan   Assessment / Plan     Active Hospital Problems:  Active Hospital Problems    Diagnosis     **Acute hypoxic respiratory failure        Plan:   Continue as per pulmonology, no significant arrhythmias noted, I have discontinued the amiodarone this gentleman for now.    DVT prophylaxis: Anticoagulated    CODE STATUS:    Code Status and Medical Interventions: No CPR (Do Not Attempt to Resuscitate); Limited Support; No intubation (DNI)   Ordered at: 05/05/25 1213     Code Status (Patient has no pulse and is not breathing):    No CPR (Do Not Attempt to Resuscitate)     Medical Interventions (Patient has pulse or is breathing):    Limited Support     Medical Intervention Limits:    No intubation (DNI)       Disposition:  I expect patient to be discharged patient is still very sick.    Electronically signed by Juliocesar Giang MD, 05/16/25, 5:26 PM EDT.

## 2025-05-17 NOTE — PLAN OF CARE
Goal Outcome Evaluation:              Outcome Evaluation: Pt A&O x3. Complaints of pain, PRN Oxycodone 5 mg. Pt on high-flow nasal cannula. CT showed small right pneumothorax & hospitalist aware. Per, patient wound care performed. Dressings remain dry, clean and intact. Safety measures maintained and pt is encouraged to call for help.

## 2025-05-17 NOTE — PLAN OF CARE
Goal Outcome Evaluation:      Patient progressing, care plan ongoing. A&Ox4, up with 1 assist to BSC or recliner. Patient currently maxed out on airvo and maintaining sats above 90%. Pt gets very SOB with any movement or ambulation. Wound care done. New order for Morphine 2mg q4 hours for pain. First dose given at 1555. Chest x ray showed brett infiltrates, R>L and a tiny right apical pneumothorax. Echo showed EF of 35%. Patient resting comfortably in recliner, no other complaints at this time, call light in reach. Vpaced on monitor.

## 2025-05-17 NOTE — PROGRESS NOTES
Pulmonary / Critical Care Progress Note      Patient Name: Roger D Snellen  : 1950  MRN: [id number]  Attending:  Ancelmo Jaramillo MD   Date of admission: 2025    Subjective   Subjective   Follow-up for respiratory failure, decompensated congestive heart failure    Over past 24 hours: Remains on Airvo, remains on Lasix 40 mg IV 3 times daily, continues on Solu-Medrol 40 mg every 6 hours, remains on nebs, continues on Zosyn, remains on Eliquis.    No acute events overnight.    This morning,  Currently on 8 L nasal cannula  Remains bedridden  Remains weak and fatigued  Reports feeling better after bronchoscopy  No fever or chills  Denies hemoptysis  Cough improving  Edema improving    Objective   Objective     Vitals:   Vital signs for last 24 hours:  Temp:  [97.5 °F (36.4 °C)-98.1 °F (36.7 °C)] 98.1 °F (36.7 °C)  Heart Rate:  [69-71] 71  Resp:  [18-24] 22  BP: ()/(61-72) 103/71    Physical Exam   Vital Signs Reviewed   General:  Alert, NAD. Lying in bed   HEENT:  PERRL, EOMI.  OP  Chest:  Clear to auscultation bilaterally, no work of breathing noted  CV: RRR, no MGR, pulses 2+, equal.  Abd:  Soft, NT, ND, + BS  EXT:  no clubbing, no cyanosis, no edema  Neuro:  A&Ox3, CN grossly intact, no focal deficits.  Skin: No rashes or lesions noted    Result Review    Result Review:  I have personally reviewed the results from the time of this admission to 2025 07:02 EDT and agree with these findings:  [x]  Laboratory  []  Microbiology  [x]  Radiology  [x]  EKG/Telemetry   [x]  Cardiology/Vascular   []  Pathology  []  Old records  []  Other:  Most notable findings include:    .      Lab 25  0459 25  0637 05/15/25  0419 25  0520 25  0340 25  0526 25  0505   WBC 18.66*  --   --   --   --  10.25  --    HEMOGLOBIN 14.1  --   --   --   --  12.0*  --    HEMATOCRIT 43.6  --   --   --   --  37.5  --    PLATELETS 232  --   --   --   --  239  --    SODIUM 138 139 135* 136 134* 134*  136   POTASSIUM 3.5 3.6 3.7 3.9 3.7 3.8 4.1   CHLORIDE 98 98 97* 99 97* 99 99   CO2 28.9 30.7* 27.0 27.3 28.2 26.4 27.5   BUN 35* 40* 31* 25* 23 23 20   CREATININE 0.66* 0.66* 0.66* 0.63* 0.61* 0.55* 0.72*   GLUCOSE 220* 209* 220* 151* 147* 117* 145*   CALCIUM 9.4 9.3 9.0 8.7 8.9 9.1 9.3   PHOSPHORUS 3.4 3.5 3.3 3.3 2.9  --  2.7   TOTAL PROTEIN 6.4  --   --   --   --   --  7.0   ALBUMIN 2.8* 2.9* 2.8* 2.8* 2.6*  --  2.8*   GLOBULIN 3.6  --   --   --   --   --  4.2   CT Chest Without Contrast Diagnostic  Result Date: 5/16/2025  CT CHEST WO CONTRAST DIAGNOSTIC Date of Exam: 5/16/2025 3:22 PM EDT Indication: persistent hypoxia. Comparison: Chest radiograph 5/14/2025. Chest CT 5/11/2025 Technique: Axial CT images were obtained of the chest without contrast administration.  Reconstructed coronal and sagittal images were also obtained. Automated exposure control and iterative construction methods were used. Findings: Thyroid and thoracic inlet: No significant abnormality. Lymph nodes: No pathologic appearing lymph nodes by imaging criteria. Cardiovascular: Extensive pneumomediastinum. Cardiomegaly.. Trace pericardial effusion. Left chest wall ICD with leads terminating in the right atrium, right ventricle, and in the region of the left cardiac vein. Aorta and main pulmonary artery diameters  are within normal range.. There are coronary artery calcifications. Aortic atherosclerosis. Esophagus: No significant abnormality. Lung parenchyma: Multifocal consolidative and groundglass opacities in the right greater than left lung. Peripheral reticular and interstitial opacities Critical Findings were verbally communicated with by Silver Ahumada MD on at . As well as honeycombing in the lower lobes compatible with UIP pattern of pulmonary fibrosis. Airways: Patent trachea and mainstem bronchi. Pleura: No visible pleural effusion. Small right pneumothorax. No visible left pneumothorax. Chest wall and osseous structures:  Degenerative changes of the imaged spine. No acute osseous abnormality. Subcutaneous emphysema extending along the right chest wall into the neck soft tissues. Included abdomen: Cholecystectomy.     Impression: Impression: Small right pneumothorax. Extensive pneumomediastinum and subcutaneous emphysema extending along the right chest wall into the neck soft tissues. Multifocal consolidative and groundglass opacities in the right greater than left lung suspicious for multifocal pneumonia and/or pulmonary edema, similar to prior. Background UIP pattern of pulmonary fibrosis. Critical Findings were verbally communicated with SULAIMAN Banks by Silver Ahumada MD on 5/16/2025 at 11:18 p.m. Electronically Signed: Silver Ahumada MD  5/16/2025 11:17 PM EDT  Workstation ID: [id number]    CT Chest Without Contrast Diagnostic  Result Date: 5/11/2025  CT CHEST WO CONTRAST DIAGNOSTIC Date of Exam: 5/11/2025 8:58 AM EDT Indication: SOB, pt on amio r/o pulm. fibrosis. Comparison: CXR 5/10/2025, CT chest 5/5/2025 Technique: Axial CT images were obtained of the chest without contrast administration.  Reconstructed coronal and sagittal images were also obtained. Automated exposure control and iterative construction methods were used. Findings: Lung window images reveal extensive bilateral groundglass pulmonary opacities. The pattern and distribution is unchanged, however, the opacities are less dense in comparison to 5/5/2025. Alveolar pulmonary edema is improved. Mediastinal windows reveal no mediastinal or axillary adenopathy. The elina are obscured. AICD remains in place. A few coronary artery calcifications are evident. The gallbladder is absent, surgical clips are seen in the gallbladder bed.     Impression: Impression: CT scan of the chest without IV contrast demonstrating extensive bilateral groundglass pulmonary opacities. The pattern and distribution is unchanged in comparison to 5/5/2025. Alveolar edema is improved. AICD in place.  Electronically Signed: Anders Hampton MD  5/11/2025 9:51 AM EDT  Workstation ID: [id number]    CT Angiogram Chest Pulmonary Embolism  Result Date: 5/5/2025  CT ANGIOGRAM CHEST PULMONARY EMBOLISM Date of Exam: 5/5/2025 11:58 AM EDT Indication: eval SOA r/o PE, ?pulm edema vs infiltrate. Comparison: Chest radiograph dated 5/5/2025 Technique: Axial CT images were obtained of the chest after the uneventful intravenous administration of iodinated contrast utilizing pulmonary embolism protocol.  Reconstructed coronal and sagittal images were also obtained. In addition, a 3-D volume rendered image was created for interpretation.  Automated exposure control and iterative construction methods were used. Findings: The visualized soft tissue structures at the base of the neck appear within normal limits. There is no lower cervical or axillary adenopathy. There is a left chest wall ICD with leads in expected position. There is cardiomegaly. There is reflux of contrast into the hepatic veins and IVC. The ascending thoracic aorta is dilated measuring up to 4.4 cm of the main pulmonary artery bifurcation. The main pulmonary artery is mildly dilated. There are no filling defects within the pulmonary arterial system to suggest presence of underlying pulmonary embolism. There is no mediastinal or hilar lymphadenopathy by size criteria. The tracheobronchial tree is patent. There are patchy groundglass opacities throughout both lungs. There are trace bilateral pleural effusions. There is either paraseptal emphysema or honeycombing within the bilateral lower lobes dependently. There is smooth interlobular septal thickening. The esophagus is normal in course and caliber. Visualized portions of the upper abdomen demonstrate no acute findings. There is a large colonic stool burden. There is degenerative disc disease of the thoracic spine.     Impression: Impression: 1.No evidence of pulmonary embolism. 2.Cardiomegaly with reflux of contrast  into the hepatic veins and IVC which can be seen with right heart dysfunction. 3.Patchy groundglass opacities throughout both lungs with smooth interlobular septal thickening and trace bilateral pleural effusions. Findings are favored to represent interstitial and alveolar pulmonary edema over bilateral pneumonia. 4.Dilated ascending thoracic aorta measuring up to 4.4 cm. Mild dilatation of the main pulmonary artery. Electronically Signed: Dayron Espana MD  5/5/2025 12:30 PM EDT  Workstation ID: [id number]    Assessment & Plan   Assessment / Plan     Active Hospital Problems:  Active Hospital Problems    Diagnosis     **Acute hypoxic respiratory failure      Impression:  Acute hypoxic respiratory failure  Multifocal pneumonia from unknown organism  Acute cardiogenic pulmonary edema  Decompensated congestive heart failure with reduced EF  Volume overload  Recent septic arthritis of the left lower extremity on daptomycin/ceftriaxone as outpatient  Pneumomediastinum and pneumothorax     Plan:    Currently on Airvo 55 L 100% FiO2.  Continue to wean supplemental oxygen to maintain SpO2 greater than 90%  Repeat CT chest with small right pneumothorax, extensive pneumomediastinum with subcutaneous emphysema along right chest wall, multifocal consolidative groundglass opacities concerning for multifocal pneumonia/pulmonary edema with underlying pulmonary fibrotic changes  Continue Lasix 40 mg IV 3 times daily   Trend renal panel electrolytes.  Replace electrolytes as needed.  Continue Solu-Medrol 40 mg every 6 hours  2D echo did show EF with 38%  Continue advanced heart failure medications  Continue Eliquis  Continue Zosyn for pneumonia  Continue H2 blocker for GI prophylaxis  DVT prophylaxis, on Eliquis  S/p bronchoscopy, pneumonia panel negative, fungal culture positive for moderate growth Candida  Start fluconazole 200 mg IV x 7 days     Pharmacologic VTE prophylaxis orders are present.    CODE STATUS:   Code Status  (Patient has no pulse and is not breathing): No CPR (Do Not Attempt to Resuscitate)  Medical Interventions (Patient has pulse or is breathing): Limited Support  Medical Intervention Limits: No intubation (DNI)    I have reviewed labs, imaging, pertinent clinical data and provider notes.   I have discussed with bedside nurse and primary service.     Patient is critically ill with ARDS, pneumomediastinum, pneumothorax, ILD, and acute pneumonitis, CHF/volume overload, persistent and worsening respiratory failure.  Has very poor prognosis.  Likely will succumb to current illness.  Critical care time spent 33 minutes excluding any procedures.    Electronically signed by Tae Mosley MD, 05/17/25, 4:13 PM EDT.

## 2025-05-17 NOTE — PROGRESS NOTES
RT EQUIPMENT DEVICE RELATED - SKIN ASSESSMENT    RT Medical Equipment/Device:     High Flow Nasal Cannula:Airvo    Skin Assessment:      Cheek:  Intact  Nose:  Intact    Device Skin Pressure Protection:  Pressure points protected    Nurse Notification:  Nessa Eaton, RRT

## 2025-05-17 NOTE — PROGRESS NOTES
[facility]   Hospitalist Progress Note  Date: 2025  Patient Name: Roger D Snellen  : 1950  MRN: [id number]  Date of admission: 2025      Subjective   Subjective     Chief Complaint: Shortness of air    Summary:   Roger D Snellen is a 75 y.o. male A-fib on anticoagulation, heart failure with preserved ejection fraction, diabetes, COPD, hypothyroidism, GERD, hyperlipidemia, hypertension.  Patient most recently discharged from [facility].  Patient presented with complaints of left foot pain swelling and erythema.  Met criteria for severe sepsis due to infected left foot.  Also with issues of right knee pain.  Concern for septic right knee arthrocentesis performed, moderate amount of WBCs, few GPC's on cell count and Gram stain, cultures no growth.  Patient discharged on IV antibiotics 500 mg IV daptomycin, 1 g IV ceftriaxone daily with a stop date of 5/15/2025.  Patient was seen by podiatry for left foot wound, did have bedside debridement performed.  Patient was [facility] to [facility] rehab.  Since being at [facility] patient endorses slowly worsening shortness of breath.  On morning of presentation, 2025, patient states he felt smothered.  Denies any associated fever chills or night sweats.  Patient presented to the emergency department hypoxic 81% on room air.  Patient with a heart rate of 70, respiratory rate of 28 and a blood pressure 122/72 initially in the emergency department.  Quickly titrated up on high flow nasal cannula eventually transitioned over to BiPAP, however due to intolerance patient transition to Airvo 55 L 60% FiO2 to maintain O2 saturations.  Patient's initial ABG shows a pH of 7.5, PCO2 of 27.5, PO2 of 65.8.  proBNP elevated at 6386.  Troponin 32 then 31, no complaints of chest pain.  On labs patient with a bicarb of 19.4, anion gap of 15.6, creatinine 0.73.  Patient has AST ALT elevations of 114/96 respectively.  Procalcitonin elevated 0.41.  Lactate negative x  2.  White count of 12.8, hemoglobin 11.3 and platelets of 333.  Chest x-ray showed extensive new opacities in right lung and possible new opacities in left perihilar and left basilar region representing multifocal pneumonia or edema.  Patient has a right arm PICC line in place.  CT scan with contrast obtained to rule out pulmonary embolus.  No evidence of PE.  Cardiomegaly, seen in right heart failure.  Patchy groundglass opacities throughout both lungs with smooth interlobular septal thickening and trace bilateral pleural effusions.  Representing pulmonary edema over bilateral pneumonia.  Given patient's work of breathing and oxygen demand, admitted to the ICU.  Overnight patient did well and has been weaned down to 7 L high flow nasal cannula.  Transferred out of the unit on May 6.  Patient seen by pulmonary and cardiology.  Patient had bronchoscopy May 8 with suctioning of mucous plugs and purulent secretions.  Cultures negative to date.  Rheumatoid factor positive but rest of autoimmune panel including anti-CCP negative.  Patient follows with Dr. Cole arriola as an outpatient for pulmonary fibrosis with a UIP pattern.  Patient finished Rocephin May 14.  BAL showed Candida     Interval Followup:   BP better, V-paced rhythm  Remains on Airvo, down to 80% % FiO2 with 55 L/min   Denies any hemoptysis  Does have shortness of air with minimal exertion  CT chest shows evidence of small right pneumothorax  Good urine output    Review of Systems   All systems were reviewed and negative except for: Summary and interval follow-up    Objective   Objective     Vitals:   Temp:  [97.3 °F (36.3 °C)-98.1 °F (36.7 °C)] 97.5 °F (36.4 °C)  Heart Rate:  [69-74] 72  Resp:  [20-24] 22  BP: (103-119)/(61-73) 113/73  Flow (L/min) (Oxygen Therapy):  [30-60] 55  Physical Exam      Constitutional: Awake, alert, increased work of breathing, mild respiratory distress, HFNC              Eyes: Pupils equal, sclerae anicteric, no  conjunctival injection              HENT: NCAT, mucous membranes moist              Neck: Supple, no thyromegaly, no lymphadenopathy, trachea midline              Respiratory: Crackles heard mostly bilateral base and midlung area, minimal expiratory wheezing, prolonged expiratory phase              Cardiovascular: RRR, no murmurs, rubs, or gallops, palpable pedal pulses bilaterally.  +1bilateral extremity LYNN              Gastrointestinal: Positive bowel sounds, soft, nontender, nondistended              Musculoskeletal: Right knee swollen, good range of motion and no tenderness on palpation              Neurologic: Oriented x 3, strength symmetric in all extremities, Cranial Nerves grossly intact to confrontation, speech clear              Skin: Wounds to the left foot is dressed but healing, chronic discoloration lower extremities.      Result Review    Result Review:  I have personally reviewed the results for the past 24 hours and agree with these findings:  [x]  Laboratory  []  Microbiology  [x]  Radiology  []  EKG/Telemetry   []  Cardiology/Vascular   []  Pathology  [x]  Old records  [x]  Other: Medications    Assessment & Plan   Assessment / Plan     Assessment:  Acute hypoxemic respiratory failure. No home oxygen use.  Worsening  Likely acute flare of pulmonary fibrosis with UIP pattern.  Mucous plugs.  Status post bronchoscopy May 8.  BAL with Candida likely colonization.  Acute on chronic systolic CHF EF of 38%.  Moderate TR.  COPD exacerbation.  History of A-fib on Eliquis.  Status post PPM/AICD.  Recent right knee septic arthritis and left foot infection on IV daptomycin/Rocephin via RUE PICC line until May 15.  Transaminitis.  Likely hepatic congestion from CHF.  Improving  Rheumatoid factor positive.  Other autoimmune panel negative no symptoms.  Dilated ascending thoracic aorta 4.4 cm.  Left bundle branch block.  Small right pneumothora  SubCutaneous emphysema         Plan:  Continue supplemental  oxygen to keep sats more than 90%.  NIPPV in room if needed  Low-salt diet, fluid restriction, strict input output and daily weights.  Noted repeat CT chest by cardiology 5/11/25 noted unchanged bilateral GGO, it does show improvement in pulm edema  Scheduled IV Lasix as per pulmonary.  Dose increased because of worsening hypoxemia.  BNP trending down  2D echo noted. Repeat ordered to evaluate PFO  Noted ultrasound right upper quadrant.  Negative   acute hepatitis panel.  Negative   Goal-directed medical therapy including Entresto and Aldactone.  Appreciate cardiology input.  Continue home Eliquis.  Completed Vancomycin.  finished Rocephin for right knee septic arthritis  IV steroids 40mg every 6 hours because of worsening hypoxemia  Sliding-scale insulin  Nebulizer treatment.  Bronchopulmonary hygiene protocol.  Incentive spirometry, flutter valve, hypertonic saline and MetaNeb  Wound nurse consulted.  Appreciate input  Repeat CT chest 5/16/25 shows right-sided pneumothorax extensive pneumomediastinum and subcutaneous emphysema, multifocal consolidative opacities R > L  Resumed back on Zosyn 5/17/2025  PT OT.  Recommending SNF.  Patient wants to go home from here.  Refusing to go to rehab. Patient may benefit from LTAC  Digoxin level therapeutic  Appreciate pulmonary input.  BAL cytology negative.  Started on midodrine.  Afrin and saline nasal spray for sinus congestion. U/S negative for any pleural effusions  Negative anti-CCP.  VISH/kong 1 negative.  Continue telemetry  Discussed plan with RN and .  Patient was about to be discharged from [facility] to go home.  Discharge home when oxygen level is stable.  Will need walking oximetry.    VTE Prophylaxis:  Pharmacologic VTE prophylaxis orders are present.  Eliquis        CODE STATUS:   Code Status (Patient has no pulse and is not breathing): No CPR (Do Not Attempt to Resuscitate)  Medical Interventions (Patient has pulse or is breathing): Limited  Support  Medical Intervention Limits: No intubation (DNI)      Part of this note may be an electronic transcription/translation of spoken language to printed text using the Dragon Dictation System.        normal... Well appearing, well nourished, awake, alert, oriented to person, place, time/situation and in no apparent distress.

## 2025-05-17 NOTE — PROGRESS NOTES
RT EQUIPMENT DEVICE RELATED - SKIN ASSESSMENT    RT Medical Equipment/Device:     High Flow Nasal Cannula:Airvo    Skin Assessment:      Cheek:  Intact  Ears:  Intact  Nares:  Intact  Nose:  Intact    Device Skin Pressure Protection:  Skin-to-device areas padded:  None Required    Nurse Notification:  Nessa Baker, RRT

## 2025-05-17 NOTE — PROGRESS NOTES
[facility]   Progress Note    Patient Name: Roger D Snellen  : 1950  MRN: [id number]  Primary Care Physician: John Phelps Jr., MD  Date of admission: 2025    Subjective   Subjective     HPI:  Patient reports shortness of breath last night, feels better today, patient CT scan showed pulmonary fibrosis, the main reason of patient's shortness of breath.    Review of Systems  Review of Systems   Constitutional:  Positive for fatigue.   HENT: Negative.     Eyes: Negative.    Respiratory:  Positive for shortness of breath.    Cardiovascular:  Negative for leg swelling.   Gastrointestinal: Negative.    Genitourinary: Negative.    Musculoskeletal: Negative.    Skin:  Negative for pallor.   Neurological: Negative.    Hematological: Negative.    Psychiatric/Behavioral: Negative.         Objective   Objective     Vitals:  Temp:  [97.3 °F (36.3 °C)-98.1 °F (36.7 °C)] 97.7 °F (36.5 °C)  Heart Rate:  [69-74] 70  Resp:  [20-24] 20  BP: (103-119)/(61-73) 110/73  Flow (L/min) (Oxygen Therapy):  [30-60] 55    Physical Exam:  Physical Exam    Result Review    Result Review:  I have personally reviewed the results from the time of this admission to 25 5:17 PM EDT and agree with these findings:  [x]  Laboratory  []  Microbiology  []  Radiology  [x]  EKG/Telemetry   []  Cardiology/Vascular   []  Pathology  []  Old records  []  Other:    Most notable findings include: 75-year-old gentleman history of pulmonary fibrosis, also history of underlying myopathic process he underwent radiofrequency ablation because of ventricular tachycardia, patient has an AICD, because of the pulmonary fibrosis the amiodarone was discontinued completely, patient is currently follow-up with pulmonology, echocardiogram was negative for AV shunt and patient LV function looks better in this study.    Assessment & Plan   Assessment / Plan     Active Hospital Problems:  Active Hospital Problems    Diagnosis     **Acute hypoxic respiratory  failure        Plan:   As per pulmonology, patient will blood cell counts elevated, he is on antibiotics there is a possibility also of multifocal pneumonia.    DVT prophylaxis: Patient is anticoagulated    CODE STATUS:    Code Status and Medical Interventions: No CPR (Do Not Attempt to Resuscitate); Limited Support; No intubation (DNI)   Ordered at: 05/05/25 1213     Code Status (Patient has no pulse and is not breathing):    No CPR (Do Not Attempt to Resuscitate)     Medical Interventions (Patient has pulse or is breathing):    Limited Support     Medical Intervention Limits:    No intubation (DNI)       Disposition:  I expect patient to be discharged patient condition is serious.    Electronically signed by Juliocesar Giang MD, 05/17/25, 5:17 PM EDT.

## 2025-05-17 NOTE — PROGRESS NOTES
RT EQUIPMENT DEVICE RELATED - SKIN ASSESSMENT    RT Medical Equipment/Device:     High Flow Nasal Cannula:Airvo    Skin Assessment:      Cheek:  Intact  Nares:  Intact    Device Skin Pressure Protection:  Positioning supports utilized and Skin-to-device areas padded:  None Required    Nurse Notification:  Nessa Higginbotham, RRT

## 2025-05-18 NOTE — PROGRESS NOTES
Pulmonary / Critical Care Progress Note      Patient Name: Roger D Snellen  : 1950  MRN: [id number]  Attending:  Ancelmo Jaramillo MD   Date of admission: 2025    Subjective   Subjective   Follow-up for respiratory failure, decompensated congestive heart failure    Over past 24 hours: Remains on Airvo, remains on Lasix 40 mg IV 3 times daily, continues on Solu-Medrol 40 mg every 6 hours, remains on nebs, continues on Zosyn, remains on Eliquis.    No acute events overnight.    This morning,  Currently on Airvo 60/80  Remains bedridden  Is weak and fatigued  Edema slowly improving  Dyspnea slowly improving  No fever or chills  Diuresing well  -5.1 L urine output    Objective   Objective     Vitals:   Vital signs for last 24 hours:  Temp:  [97.3 °F (36.3 °C)-97.7 °F (36.5 °C)] 97.5 °F (36.4 °C)  Heart Rate:  [70-85] 70  Resp:  [16-22] 16  BP: (104-120)/(66-82) 104/75    Physical Exam   Vital Signs Reviewed   General:  Alert, NAD. Lying in bed   HEENT:  PERRL, EOMI.  OP  Chest:  Clear to auscultation bilaterally, no work of breathing noted  CV: RRR, no MGR, pulses 2+, equal.  Abd:  Soft, NT, ND, + BS  EXT:  no clubbing, no cyanosis, no edema  Neuro:  A&Ox3, CN grossly intact, no focal deficits.  Skin: No rashes or lesions noted    Result Review    Result Review:  I have personally reviewed the results from the time of this admission to 2025 06:46 EDT and agree with these findings:  [x]  Laboratory  []  Microbiology  [x]  Radiology  [x]  EKG/Telemetry   [x]  Cardiology/Vascular   []  Pathology  []  Old records  []  Other:  Most notable findings include:    .      Lab 25  0519 25  1650 25  0459 25  0637 05/15/25  0419 25  0520 25  0340 25  0526   WBC 17.93*  --  18.66*  --   --   --   --  10.25   HEMOGLOBIN 15.2  --  14.1  --   --   --   --  12.0*   HEMATOCRIT 46.7  --  43.6  --   --   --   --  37.5   PLATELETS 222  --  232  --   --   --   --  239   SODIUM 140  --   138 139 135* 136 134* 134*   POTASSIUM 3.4* 4.1 3.5 3.6 3.7 3.9 3.7 3.8   CHLORIDE 99  --  98 98 97* 99 97* 99   CO2 31.6*  --  28.9 30.7* 27.0 27.3 28.2 26.4   BUN 38*  --  35* 40* 31* 25* 23 23   CREATININE 0.80  --  0.66* 0.66* 0.66* 0.63* 0.61* 0.55*   GLUCOSE 236*  --  220* 209* 220* 151* 147* 117*   CALCIUM 9.3  --  9.4 9.3 9.0 8.7 8.9 9.1   PHOSPHORUS  --   --  3.4 3.5 3.3 3.3 2.9  --    TOTAL PROTEIN 6.7  --  6.4  --   --   --   --   --    ALBUMIN 3.0*  --  2.8* 2.9* 2.8* 2.8* 2.6*  --    GLOBULIN 3.7  --  3.6  --   --   --   --   --    CT Chest Without Contrast Diagnostic  Result Date: 5/16/2025  CT CHEST WO CONTRAST DIAGNOSTIC Date of Exam: 5/16/2025 3:22 PM EDT Indication: persistent hypoxia. Comparison: Chest radiograph 5/14/2025. Chest CT 5/11/2025 Technique: Axial CT images were obtained of the chest without contrast administration.  Reconstructed coronal and sagittal images were also obtained. Automated exposure control and iterative construction methods were used. Findings: Thyroid and thoracic inlet: No significant abnormality. Lymph nodes: No pathologic appearing lymph nodes by imaging criteria. Cardiovascular: Extensive pneumomediastinum. Cardiomegaly.. Trace pericardial effusion. Left chest wall ICD with leads terminating in the right atrium, right ventricle, and in the region of the left cardiac vein. Aorta and main pulmonary artery diameters  are within normal range.. There are coronary artery calcifications. Aortic atherosclerosis. Esophagus: No significant abnormality. Lung parenchyma: Multifocal consolidative and groundglass opacities in the right greater than left lung. Peripheral reticular and interstitial opacities Critical Findings were verbally communicated with by Silver Ahumada MD on at . As well as honeycombing in the lower lobes compatible with UIP pattern of pulmonary fibrosis. Airways: Patent trachea and mainstem bronchi. Pleura: No visible pleural effusion. Small right  pneumothorax. No visible left pneumothorax. Chest wall and osseous structures: Degenerative changes of the imaged spine. No acute osseous abnormality. Subcutaneous emphysema extending along the right chest wall into the neck soft tissues. Included abdomen: Cholecystectomy.     Impression: Impression: Small right pneumothorax. Extensive pneumomediastinum and subcutaneous emphysema extending along the right chest wall into the neck soft tissues. Multifocal consolidative and groundglass opacities in the right greater than left lung suspicious for multifocal pneumonia and/or pulmonary edema, similar to prior. Background UIP pattern of pulmonary fibrosis. Critical Findings were verbally communicated with SULAIMAN Banks by Silver Ahumada MD on 5/16/2025 at 11:18 p.m. Electronically Signed: Silver Ahumada MD  5/16/2025 11:17 PM EDT  Workstation ID: [id number]    CT Chest Without Contrast Diagnostic  Result Date: 5/11/2025  CT CHEST WO CONTRAST DIAGNOSTIC Date of Exam: 5/11/2025 8:58 AM EDT Indication: SOB, pt on amio r/o pulm. fibrosis. Comparison: CXR 5/10/2025, CT chest 5/5/2025 Technique: Axial CT images were obtained of the chest without contrast administration.  Reconstructed coronal and sagittal images were also obtained. Automated exposure control and iterative construction methods were used. Findings: Lung window images reveal extensive bilateral groundglass pulmonary opacities. The pattern and distribution is unchanged, however, the opacities are less dense in comparison to 5/5/2025. Alveolar pulmonary edema is improved. Mediastinal windows reveal no mediastinal or axillary adenopathy. The elina are obscured. AICD remains in place. A few coronary artery calcifications are evident. The gallbladder is absent, surgical clips are seen in the gallbladder bed.     Impression: Impression: CT scan of the chest without IV contrast demonstrating extensive bilateral groundglass pulmonary opacities. The pattern and distribution is  unchanged in comparison to 5/5/2025. Alveolar edema is improved. AICD in place. Electronically Signed: Anders Hampton MD  5/11/2025 9:51 AM EDT  Workstation ID: [id number]    CT Angiogram Chest Pulmonary Embolism  Result Date: 5/5/2025  CT ANGIOGRAM CHEST PULMONARY EMBOLISM Date of Exam: 5/5/2025 11:58 AM EDT Indication: eval SOA r/o PE, ?pulm edema vs infiltrate. Comparison: Chest radiograph dated 5/5/2025 Technique: Axial CT images were obtained of the chest after the uneventful intravenous administration of iodinated contrast utilizing pulmonary embolism protocol.  Reconstructed coronal and sagittal images were also obtained. In addition, a 3-D volume rendered image was created for interpretation.  Automated exposure control and iterative construction methods were used. Findings: The visualized soft tissue structures at the base of the neck appear within normal limits. There is no lower cervical or axillary adenopathy. There is a left chest wall ICD with leads in expected position. There is cardiomegaly. There is reflux of contrast into the hepatic veins and IVC. The ascending thoracic aorta is dilated measuring up to 4.4 cm of the main pulmonary artery bifurcation. The main pulmonary artery is mildly dilated. There are no filling defects within the pulmonary arterial system to suggest presence of underlying pulmonary embolism. There is no mediastinal or hilar lymphadenopathy by size criteria. The tracheobronchial tree is patent. There are patchy groundglass opacities throughout both lungs. There are trace bilateral pleural effusions. There is either paraseptal emphysema or honeycombing within the bilateral lower lobes dependently. There is smooth interlobular septal thickening. The esophagus is normal in course and caliber. Visualized portions of the upper abdomen demonstrate no acute findings. There is a large colonic stool burden. There is degenerative disc disease of the thoracic spine.     Impression:  Impression: 1.No evidence of pulmonary embolism. 2.Cardiomegaly with reflux of contrast into the hepatic veins and IVC which can be seen with right heart dysfunction. 3.Patchy groundglass opacities throughout both lungs with smooth interlobular septal thickening and trace bilateral pleural effusions. Findings are favored to represent interstitial and alveolar pulmonary edema over bilateral pneumonia. 4.Dilated ascending thoracic aorta measuring up to 4.4 cm. Mild dilatation of the main pulmonary artery. Electronically Signed: Dayron Espana MD  5/5/2025 12:30 PM EDT  Workstation ID: [id number]    Assessment & Plan   Assessment / Plan     Active Hospital Problems:  Active Hospital Problems    Diagnosis     **Acute hypoxic respiratory failure      Impression:  Acute hypoxic respiratory failure  Multifocal pneumonia from unknown organism  Acute cardiogenic pulmonary edema  Decompensated congestive heart failure with reduced EF  Volume overload  Recent septic arthritis of the left lower extremity on daptomycin/ceftriaxone as outpatient  Pneumomediastinum and pneumothorax     Plan:    Currently on Airvo 55 L 100% FiO2.  Continue to wean supplemental oxygen to maintain SpO2 greater than 90%  Repeat CXR today  Continue Lasix 40 mg IV 3 times daily and Aldactone 12.5 mg oral daily  Trend renal panel electrolytes.  Replace electrolytes as needed.  Continue Solu-Medrol 40 mg every 6 hours  2D echo did show EF with 38%  Continue advanced heart failure medications  Continue Eliquis  Continue Zosyn for pneumonia  Continue H2 blocker for GI prophylaxis  DVT prophylaxis, on Eliquis  S/p bronchoscopy, pneumonia panel negative, fungal culture positive for moderate growth Candida  Continue fluconazole 200 mg IV x 7 days     Pharmacologic VTE prophylaxis orders are present.    CODE STATUS:   Code Status (Patient has no pulse and is not breathing): No CPR (Do Not Attempt to Resuscitate)  Medical Interventions (Patient has pulse or is  breathing): Limited Support  Medical Intervention Limits: No intubation (DNI)    I have reviewed labs, imaging, pertinent clinical data and provider notes.   I have discussed with bedside nurse and primary service.       I, LEONELA Chau, spent 10 minutes critical care time in accordance to split shared billing.  Electronically signed by LEONELA Anderson, 05/18/25, 11:48 AM EDT.  Electronically signed by Tae Mosley MD, 05/18/25, 6:46 AM EDT.    Patient is critically ill with ARDS, pneumomediastinum, pneumothorax, ILD, and acute pneumonitis, CHF/volume overload, persistent and worsening respiratory failure.  Has very poor prognosis.  Likely will succumb to current illness.  Critical care time spent 34 minutes excluding any procedures. I, Dr Mosley, spent 24 mins of critical care time according to split shared critical care billing guidelines.     Electronically signed by Tae Mosley MD, 05/18/25, 1:53 PM EDT.

## 2025-05-18 NOTE — PROGRESS NOTES
[facility]   Hospitalist Progress Note  Date: 2025  Patient Name: Roger D Snellen  : 1950  MRN: [id number]  Date of admission: 2025      Subjective   Subjective     Chief Complaint: Shortness of air    Summary:   Roger D Snellen is a 75 y.o. male A-fib on anticoagulation, heart failure with preserved ejection fraction, diabetes, COPD, hypothyroidism, GERD, hyperlipidemia, hypertension.  Patient most recently discharged from [facility].  Patient presented with complaints of left foot pain swelling and erythema.  Met criteria for severe sepsis due to infected left foot.  Also with issues of right knee pain.  Concern for septic right knee arthrocentesis performed, moderate amount of WBCs, few GPC's on cell count and Gram stain, cultures no growth.  Patient discharged on IV antibiotics 500 mg IV daptomycin, 1 g IV ceftriaxone daily with a stop date of 5/15/2025.  Patient was seen by podiatry for left foot wound, did have bedside debridement performed.  Patient was discharged to [facility] rehab.  Since being at [facility] patient endorses slowly worsening shortness of breath.  On morning of presentation, 2025, patient states he felt smothered.  Denies any associated fever chills or night sweats.  Patient presented to the emergency department hypoxic 81% on room air.  Patient with a heart rate of 70, respiratory rate of 28 and a blood pressure 122/72 initially in the emergency department.  Quickly titrated up on high flow nasal cannula eventually transitioned over to BiPAP, however due to intolerance patient transition to Airvo 55 L 60% FiO2 to maintain O2 saturations.  Patient's initial ABG shows a pH of 7.5, PCO2 of 27.5, PO2 of 65.8.  proBNP elevated at 6386.  Troponin 32 then 31, no complaints of chest pain.  On labs patient with a bicarb of 19.4, anion gap of 15.6, creatinine 0.73.  Patient has AST ALT elevations of 114/96 respectively.  Procalcitonin elevated 0.41.  Lactate negative x  2.  White count of 12.8, hemoglobin 11.3 and platelets of 333.  Chest x-ray showed extensive new opacities in right lung and possible new opacities in left perihilar and left basilar region representing multifocal pneumonia or edema.  Patient has a right arm PICC line in place.  CT scan with contrast obtained to rule out pulmonary embolus.  No evidence of PE.  Cardiomegaly, seen in right heart failure.  Patchy groundglass opacities throughout both lungs with smooth interlobular septal thickening and trace bilateral pleural effusions.  Representing pulmonary edema over bilateral pneumonia.  Given patient's work of breathing and oxygen demand, admitted to the ICU.  Overnight patient did well and has been weaned down to 7 L high flow nasal cannula.  Transferred out of the unit on May 6.  Patient seen by pulmonary and cardiology.  Patient had bronchoscopy May 8 with suctioning of mucous plugs and purulent secretions.  Cultures negative to date.  Rheumatoid factor positive but rest of autoimmune panel including anti-CCP negative.  Patient follows with Dr. Cole arriola as an outpatient for pulmonary fibrosis with a UIP pattern.  Patient finished Rocephin May 14.  BAL showed Candida     Interval Followup:   BP better, V-paced rhythm  Remains on Airvo, 75% % FiO2 with 55 L/min  Refyses BiPAP   Denies any hemoptysis  Does have shortness of air with minimal exertion  Fatigued, generalized pain    Review of Systems   All systems were reviewed and negative except for: Summary and interval follow-up    Objective   Objective     Vitals:   Temp:  [97.3 °F (36.3 °C)-97.7 °F (36.5 °C)] 97.3 °F (36.3 °C)  Heart Rate:  [70-85] 75  Resp:  [16-24] 24  BP: (104-120)/(66-82) 113/77  Flow (L/min) (Oxygen Therapy):  [55-60] 60  Physical Exam      Constitutional: Awake, alert, increased work of breathing, mild respiratory distress, HFNC              Eyes: Pupils equal, sclerae anicteric, no conjunctival injection              HENT:  NCAT, mucous membranes moist              Neck: Supple, no thyromegaly, no lymphadenopathy, trachea midline              Respiratory: Crackles heard mostly bilateral base and midlung area, minimal expiratory wheezing, prolonged expiratory phase              Cardiovascular: RRR, no murmurs, rubs, or gallops, palpable pedal pulses bilaterally.  +1bilateral extremity LYNN              Gastrointestinal: Positive bowel sounds, soft, nontender, nondistended              Musculoskeletal: Right knee swollen, good range of motion and no tenderness on palpation              Neurologic: Oriented x 3, strength symmetric in all extremities, Cranial Nerves grossly intact to confrontation, speech clear              Skin: Wounds to the left foot is dressed but healing, chronic discoloration lower extremities.      Result Review    Result Review:  I have personally reviewed the results for the past 24 hours and agree with these findings:  [x]  Laboratory  []  Microbiology  [x]  Radiology  []  EKG/Telemetry   []  Cardiology/Vascular   []  Pathology  [x]  Old records  [x]  Other: Medications    Assessment & Plan   Assessment / Plan     Assessment:  Acute hypoxemic respiratory failure. No home oxygen use.  Worsening  ARDS  Likely acute flare of pulmonary fibrosis with UIP pattern.  Mucous plugs.  Status post bronchoscopy May 8.  BAL with Candida likely colonization.  Acute on chronic systolic CHF EF of 38%.  Moderate TR.  COPD exacerbation.  History of A-fib on Eliquis.  Status post PPM/AICD.  Recent right knee septic arthritis and left foot infection on IV daptomycin/Rocephin via RUE PICC line until May 15.  Transaminitis.  Likely hepatic congestion from CHF.  Improving  Rheumatoid factor positive.  Other autoimmune panel negative no symptoms.  Dilated ascending thoracic aorta 4.4 cm.  Left bundle branch block.  Small right pneumothorax  SubCutaneous emphysema         Plan:  Continue supplemental oxygen to keep sats more than  90%.  NIPPV in room if needed  Low-salt diet, fluid restriction, strict input output and daily weights.  Noted repeat CT chest by cardiology 5/11/25 noted unchanged bilateral GGO, it does show improvement in pulm edema  Scheduled IV Lasix as per pulmonary.  Dose increased because of worsening hypoxemia.  BNP trending down  2D echo noted. Repeat ordered to evaluate PFO  Noted ultrasound right upper quadrant.  Negative   acute hepatitis panel.  Negative   Goal-directed medical therapy including Entresto and Aldactone.  Appreciate cardiology input.  Continue home Eliquis.  Completed Vancomycin.  finished Rocephin for right knee septic arthritis  IV steroids 40mg every 6 hours because of worsening hypoxemia  Sliding-scale insulin  Nebulizer treatment.  Bronchopulmonary hygiene protocol.  Incentive spirometry, flutter valve, hypertonic saline and MetaNeb  Wound nurse consulted.  Appreciate input  Repeat CT chest 5/16/25 shows right-sided pneumothorax extensive pneumomediastinum and subcutaneous emphysema, multifocal consolidative opacities R > L  Resumed back on Zosyn 5/17/2025  PT OT.  Recommending SNF.  Patient wants to go home from here.  Refusing to go to rehab. Patient may benefit from LTAC  Digoxin level therapeutic  Appreciate pulmonary input.  BAL cytology negative.  Started on midodrine.  Afrin and saline nasal spray for sinus congestion. U/S negative for any pleural effusions  Negative anti-CCP.  VISH/kong 1 negative.  Continue telemetry  Discussed plan with RN and .  Patient was about to be discharged from [facility] to go home.  Discharge home when oxygen level is stable.  Will need walking oximetry.  Guarded prognosis    VTE Prophylaxis:  Pharmacologic VTE prophylaxis orders are present.  Eliquis        CODE STATUS:   Code Status (Patient has no pulse and is not breathing): No CPR (Do Not Attempt to Resuscitate)  Medical Interventions (Patient has pulse or is breathing): Limited Support  Medical  Intervention Limits: No intubation (DNI)      Part of this note may be an electronic transcription/translation of spoken language to printed text using the Dragon Dictation System.

## 2025-05-18 NOTE — PLAN OF CARE
Goal Outcome Evaluation:           Progress: no change  Outcome Evaluation: Pt on AIRVO 55L/75%. Pt refusing BiPAP & [place]. Pt taking txs in-line with AIRVO, will continue to wean FiO2 as tolerated.

## 2025-05-18 NOTE — THERAPY EVALUATION
RT EQUIPMENT DEVICE RELATED - SKIN ASSESSMENT    RT Medical Equipment/Device:     High Flow Nasal Cannula:Airvo    Skin Assessment:      Cheek:  Intact  Ears:  Intact  Nares:  Intact    Device Skin Pressure Protection:  Pressure points protected and Skin-to-device areas padded:  Other:  Guaze pads between ears and NC straps    Nurse Notification:  Nessa Hartman, RRT

## 2025-05-18 NOTE — PLAN OF CARE
Goal Outcome Evaluation:  Plan of Care Reviewed With: patient           Outcome Evaluation: A&O x 3. Complaints of pain, PRN Oxycodone 5 mg given. Safety precautions maintained. Call light within reach and patient is encouraged to call for help. Endorsed patient in stable condition.

## 2025-05-18 NOTE — PLAN OF CARE
Goal Outcome Evaluation:   Patient progressing, care plan ongoing. A&Ox4, up with 1 assist to BSC or recliner. Patient currently on Airvo, very SOB and de-sating in the low 80s with any movement or ambulation. Wound care done. PO potassium given. Oxycodone given at 1113 for generalized pain. New order for Boost with meals. Chest x ray done today showed improving pulmonary opacities from fibrosis. No visualized pneumothorax. No other complaints at this time, call light in reach.

## 2025-05-18 NOTE — PROGRESS NOTES
[facility]   Progress Note    Patient Name: Roger D Snellen  : 1950  MRN: [id number]  Primary Care Physician: John Phelps Jr., MD  Date of admission: 2025    Subjective   Subjective     HPI:  Patient with persistent shortness of breath requiring significant amount of oxygen, patient has pulmonary fibrosis currently is receiving steroids also diuretics.    Review of Systems  Review of Systems   Constitutional:  Positive for fatigue.   HENT: Negative.     Respiratory:  Positive for shortness of breath.    Cardiovascular:  Negative for leg swelling.   Gastrointestinal: Negative.    Endocrine: Negative.    Genitourinary: Negative.    Musculoskeletal: Negative.    Skin: Negative.    Neurological: Negative.    Hematological: Negative.    Psychiatric/Behavioral: Negative.         Objective   Objective     Vitals:  Temp:  [97.3 °F (36.3 °C)-98.3 °F (36.8 °C)] 98.3 °F (36.8 °C)  Heart Rate:  [70-85] 71  Resp:  [16-24] 24  BP: (104-120)/(66-82) 113/77  Flow (L/min) (Oxygen Therapy):  [55-60] 60    Physical Exam:  Physical Exam  Constitutional:       Appearance: Normal appearance.   HENT:      Head: Normocephalic.      Nose: Nose normal.   Eyes:      Extraocular Movements: Extraocular movements intact.   Cardiovascular:      Rate and Rhythm: Regular rhythm.      Heart sounds: No murmur heard.  Pulmonary:      Breath sounds: Rhonchi present.   Abdominal:      Palpations: Abdomen is soft.   Musculoskeletal:      Right lower leg: No edema.      Left lower leg: No edema.   Skin:     General: Skin is warm.   Neurological:      General: No focal deficit present.      Mental Status: He is alert.   Psychiatric:      Comments: Patient chronically ill         Result Review    Result Review:  I have personally reviewed the results from the time of this admission to 25 4:24 PM EDT and agree with these findings:  [x]  Laboratory  []  Microbiology  []  Radiology  [x]  EKG/Telemetry   []  Cardiology/Vascular   []   Pathology  []  Old records  []  Other:    Most notable findings include: 75-year-old gentleman with history of pulmonary fibrosis, diagnosed now ARDS probably related to an acute flare of pulmonary fibrosis UIP pattern, as per pulmonology, patient also has cardiomyopathy history of ventricular tachycardia patient underwent radiofrequency ablation patient was in the past on amiodarone this was discontinued, patient also has history of COPD and atrial fibrillation, recently diagnosed to have septic arthritis, patient has paced rhythm.    Assessment & Plan   Assessment / Plan     Active Hospital Problems:  Active Hospital Problems    Diagnosis     **Acute hypoxic respiratory failure        Plan:   We have a 75-year-old gentleman with worsening of his pulmonary condition, at present time I do not think this is cardiac at all, hopefully he will improve I discussed with him not to take amiodarone.    DVT prophylaxis: Anticoagulated    CODE STATUS:    Code Status and Medical Interventions: No CPR (Do Not Attempt to Resuscitate); Limited Support; No intubation (DNI)   Ordered at: 05/05/25 1213     Code Status (Patient has no pulse and is not breathing):    No CPR (Do Not Attempt to Resuscitate)     Medical Interventions (Patient has pulse or is breathing):    Limited Support     Medical Intervention Limits:    No intubation (DNI)       Disposition:  I expect patient to be discharged patient condition is serious.    Electronically signed by Juliocesar Giang MD, 05/18/25, 4:24 PM EDT.

## 2025-05-19 NOTE — THERAPY TREATMENT NOTE
Acute Care - Physical Therapy Treatment Note   [facility]     Patient Name: Roger D Snellen  : 1950  MRN: [id number]  Today's Date: 2025      Visit Dx:     ICD-10-CM ICD-9-CM   1. Acute hypoxemic respiratory failure  J96.01 518.81   2. Acute on chronic systolic congestive heart failure  I50.23 428.23     428.0   3. Acute pulmonary edema  J81.0 518.4   4. Difficulty walking  R26.2 719.7   5. Impaired mobility and ADLs  Z74.09 V49.89    Z78.9    6. Acute hypoxic respiratory failure  J96. 518.81     Patient Active Problem List   Diagnosis    Non-ischemic cardiomyopathy    Acute hypoxic respiratory failure     Past Medical History:   Diagnosis Date    Arrhythmia     ATRIAL FIB, PACE TERMINATED VT    Atrial fibrillation     BBB (bundle branch block)     L BBB    CHF (congestive heart failure)     Diabetes mellitus     Disease of thyroid gland     GERD (gastroesophageal reflux disease)     Hyperlipidemia     Hypertension     Non-ischemic cardiomyopathy     DILATED CM EF 20-25% 2017    Osteoarthritis      Past Surgical History:   Procedure Laterality Date    BRONCHOSCOPY N/A 2025    Procedure: BRONCHOSCOPY WITH BRONCHOALVEOLAR LAVAGE, WASHING, AIRWAY INSPECTION: insertion of lighted instrument to view inside the lung;  Surgeon: Tae Mosley MD;  Location: [facility] MAIN OR;  Service: Pulmonary;  Laterality: N/A;    CARDIAC ELECTROPHYSIOLOGY PROCEDURE N/A 2017    Procedure: Device Upgrade  ICD TO A BIV ICD   medtronic;  Surgeon: Chris Phillips MD;  Location: [facility] INVASIVE LOCATION;  Service:     CHOLECYSTECTOMY      EYE SURGERY Left 2025    Retina Repair    INSERT / REPLACE / REMOVE PACEMAKER      INTERNAL CARDIAC DEFIBRILLATOR INSERTION      MEDTRONIC    SHOULDER ARTHROSCOPY      TOTAL KNEE ARTHROPLASTY       PT Assessment (Last 12 Hours)       PT Evaluation and Treatment       Row Name 25 1508          Physical Therapy Time and Intention    Subjective Information complains  of;weakness  -RH     Document Type therapy note (daily note)  -     Mode of Treatment physical therapy;individual therapy  -RH     Patient Effort good  -RH     Comment Pt SOA at rest on Airvo  -RH       Row Name 05/19/25 1508          Pain Scale: FACES Pre/Post-Treatment    Pain: FACES Scale, Pretreatment 0-->no hurt  -RH     Posttreatment Pain Rating 0-->no hurt  -RH       Row Name 05/19/25 1508          Motor Skills    Therapeutic Exercise hip;knee;ankle  -       Row Name 05/19/25 1508          Hip (Therapeutic Exercise)    Hip (Therapeutic Exercise) AROM (active range of motion);isometric exercises  -     Hip AROM (Therapeutic Exercise) bilateral;flexion;extension;aBduction;aDduction;20 repititions  Recumbent sitting  -     Hip Isometrics (Therapeutic Exercise) bilateral;supine;10 repetitions;2 sets  -       Row Name 05/19/25 1508          Knee (Therapeutic Exercise)    Knee (Therapeutic Exercise) isometric exercises;strengthening exercise  -     Knee AROM (Therapeutic Exercise) SAQ (short arc quad);bilateral;10 repetitions;2 sets  Recumbent sitting  -RH     Knee Isometrics (Therapeutic Exercise) quad sets;bilateral;10 repetitions;2 sets  Recumbent sitting  -RH     Knee Strengthening (Therapeutic Exercise) bilateral;SLR (straight leg raise);10 repetitions  Recumbent sitting  -       Row Name 05/19/25 1508          Ankle (Therapeutic Exercise)    Ankle (Therapeutic Exercise) AROM (active range of motion)  -     Ankle AROM (Therapeutic Exercise) plantarflexion;dorsiflexion;bilateral;10 repetitions;2 sets  Recumbent sitting  -       Row Name             Wound 05/05/25 1500 medial coccyx Pressure Injury    Wound - Properties Group Placement Date: 05/05/25  -JR Placement Time: 1500  -JR Present on Original Admission: Y  -JR Orientation: medial  -JR Location: coccyx  -JR Primary Wound Type: Pressure Inj  -JR    Retired Wound - Properties Group Placement Date: 05/05/25  -JR Placement Time: 1500  -JR  Present on Original Admission: Y  -JR Orientation: medial  -JR Location: coccyx  -JR    Retired Wound - Properties Group Placement Date: 05/05/25  -JR Placement Time: 1500  -JR Present on Original Admission: Y  -JR Orientation: medial  -JR Location: coccyx  -JR    Retired Wound - Properties Group Date first assessed: 05/05/25  -JR Time first assessed: 1500  -JR Present on Original Admission: Y  -JR Location: coccyx  -JR      Row Name             Wound 05/06/25 1058 Left medial ankle    Wound - Properties Group Placement Date: 05/06/25  -NH Placement Time: 1058  -NH Present on Original Admission: Y  -NH Side: Left  -NH Orientation: medial  -NH Location: ankle  -NH    Retired Wound - Properties Group Placement Date: 05/06/25  -NH Placement Time: 1058  -NH Present on Original Admission: Y  -NH Side: Left  -NH Orientation: medial  -NH Location: ankle  -NH    Retired Wound - Properties Group Placement Date: 05/06/25  -NH Placement Time: 1058  -NH Present on Original Admission: Y  -NH Side: Left  -NH Orientation: medial  -NH Location: ankle  -NH    Retired Wound - Properties Group Date first assessed: 05/06/25  -NH Time first assessed: 1058  -NH Present on Original Admission: Y  -NH Side: Left  -NH Location: ankle  -NH      Row Name             Wound 05/13/25 0800 Left posterior ear Pressure Injury    Wound - Properties Group Placement Date: 05/13/25  -RB Placement Time: 0800  -RB Side: Left  -RB Orientation: posterior  -RB Location: ear  -RB Primary Wound Type: Pressure Inj  -RB    Retired Wound - Properties Group Placement Date: 05/13/25  -RB Placement Time: 0800  -RB Side: Left  -RB Orientation: posterior  -RB Location: ear  -RB    Retired Wound - Properties Group Placement Date: 05/13/25  -RB Placement Time: 0800  -RB Side: Left  -RB Orientation: posterior  -RB Location: ear  -RB    Retired Wound - Properties Group Date first assessed: 05/13/25  -RB Time first assessed: 0800  -RB Side: Left  -RB Location: ear  -RB       Row Name             Wound 05/13/25 0800 Right posterior ear Pressure Injury    Wound - Properties Group Placement Date: 05/13/25  -RB Placement Time: 0800  -RB Side: Right  -RB Orientation: posterior  -RB Location: ear  -RB Primary Wound Type: Pressure Inj  -RB    Retired Wound - Properties Group Placement Date: 05/13/25  -RB Placement Time: 0800  -RB Side: Right  -RB Orientation: posterior  -RB Location: ear  -RB    Retired Wound - Properties Group Placement Date: 05/13/25  -RB Placement Time: 0800  -RB Side: Right  -RB Orientation: posterior  -RB Location: ear  -RB    Retired Wound - Properties Group Date first assessed: 05/13/25  -RB Time first assessed: 0800  -RB Side: Right  -RB Location: ear  -RB      Row Name 05/19/25 1508          Vital Signs    O2 Delivery Intra Treatment --  Pt on Airvo  -RH     Post SpO2 (%) 88  -RH       Row Name 05/19/25 1508          Progress Summary (PT)    Progress Toward Functional Goals (PT) progress toward functional goals is gradual  -RH               User Key  (r) = Recorded By, (t) = Taken By, (c) = Cosigned By      Initials Name Provider Type    NH Jocelyne Lockhart, RN Registered Nurse    RB Arelis Guerra, RN Registered Nurse    RH Mick Woods PTA Physical Therapist Assistant    Peterson Daigle, RN Registered Nurse                    Physical Therapy Education       Title: PT OT SLP Therapies (In Progress)       Topic: Physical Therapy (In Progress)       Point: Mobility training (Done)       Learning Progress Summary            Patient Acceptance, E,TB, VU by AV at 5/6/2025 1336                      Point: Home exercise program (Not Started)       Learner Progress:  Not documented in this visit.              Point: Body mechanics (Done)       Learning Progress Summary            Patient Acceptance, E,TB, VU by AV at 5/6/2025 1336                      Point: Precautions (Done)       Learning Progress Summary            Patient Acceptance, E,TB, VU by AV at 5/6/2025  1336                                      User Key       Initials Effective Dates Name Provider Type Discipline    AV 06/11/21 -  Samson Cordoba, PT Physical Therapist PT                  PT Recommendation and Plan     Progress Summary (PT)  Progress Toward Functional Goals (PT): progress toward functional goals is gradual   Outcome Measures       Row Name 05/19/25 1500             How much help from another person do you currently need...    Turning from your back to your side while in flat bed without using bedrails? 4  -RH      Moving from lying on back to sitting on the side of a flat bed without bedrails? 4  -RH      Moving to and from a bed to a chair (including a wheelchair)? 3  -RH      Standing up from a chair using your arms (e.g., wheelchair, bedside chair)? 3  -RH      Climbing 3-5 steps with a railing? 2  -RH      To walk in hospital room? 3  -RH      AM-PAC 6 Clicks Score (PT) 19  -RH                User Key  (r) = Recorded By, (t) = Taken By, (c) = Cosigned By      Initials Name Provider Type     Mick Woods PTA Physical Therapist Assistant                     Time Calculation:    PT Charges       Row Name 05/19/25 1508             Time Calculation    PT Received On 05/19/25  -RH         Timed Charges    36783 - PT Therapeutic Exercise Minutes 14  -RH         Total Minutes    Timed Charges Total Minutes 14  -RH       Total Minutes 14  -RH                User Key  (r) = Recorded By, (t) = Taken By, (c) = Cosigned By      Initials Name Provider Type     Mick Woods PTA Physical Therapist Assistant                  Therapy Charges for Today       Code Description Service Date Service Provider Modifiers Qty    23871483203 HC PT THER PROC EA 15 MIN 5/19/2025 Mick Woods PTA GP 1            PT G-Codes  Outcome Measure Options: AM-PAC 6 Clicks Daily Activity (OT), Optimal Instrument  AM-PAC 6 Clicks Score (PT): 19  AM-PAC 6 Clicks Score (OT): 21    Mick Woods PTA  5/19/2025

## 2025-05-19 NOTE — PROGRESS NOTES
[facility]   Progress Note    Patient Name: Roger D Snellen  : 1950  MRN: [id number]  Primary Care Physician: John Phelps Jr., MD  Date of admission: 2025    Subjective   Subjective     HPI:  Patient remains short of breath now hypotensive patient does not look that he is retaining fluid patient does not have any JVD, there is no evidence of edema, patient has prerenal azotemia, I will recommend to decrease the Lasix on him I do not think that is helping.  Patient appeared to have pulmonary fibrosis and inflammation in his lungs.  White blood cell count is elevated with elevated neutrophil, this also could be related to pneumonia.    Review of Systems  Review of Systems   Constitutional:  Positive for fatigue.   HENT: Negative.     Eyes: Negative.    Respiratory:  Positive for shortness of breath.    Cardiovascular:  Negative for leg swelling.   Gastrointestinal: Negative.    Endocrine: Negative.    Genitourinary: Negative.    Musculoskeletal: Negative.    Skin: Negative.    Neurological: Negative.    Hematological: Negative.    Psychiatric/Behavioral: Negative.         Objective   Objective     Vitals:  Temp:  [97.3 °F (36.3 °C)-97.5 °F (36.4 °C)] 97.3 °F (36.3 °C)  Heart Rate:  [70-76] 70  Resp:  [20-30] 24  BP: ()/(57-79) 81/61  Flow (L/min) (Oxygen Therapy):  [60] 60    Physical Exam:  Physical Exam  Constitutional:       Appearance: He is obese.   HENT:      Head: Normocephalic.      Nose: Nose normal.      Mouth/Throat:      Mouth: Mucous membranes are dry.   Eyes:      Extraocular Movements: Extraocular movements intact.   Cardiovascular:      Rate and Rhythm: Regular rhythm.      Heart sounds: No murmur heard.  Pulmonary:      Breath sounds: Rhonchi present.   Abdominal:      General: Bowel sounds are normal.   Musculoskeletal:      Right lower leg: No edema.      Left lower leg: No edema.   Skin:     General: Skin is warm.   Neurological:      General: No focal deficit present.       Mental Status: He is alert.   Psychiatric:      Comments: Patient is chronically ill.  Depressed         Result Review    Result Review:  I have personally reviewed the results from the time of this admission to 05/19/25 6:05 PM EDT and agree with these findings:  [x]  Laboratory  []  Microbiology  []  Radiology  [x]  EKG/Telemetry   []  Cardiology/Vascular   []  Pathology  []  Old records  []  Other:    Most notable findings include: 75-year-old gentleman with persistent bilateral pulmonary infiltrates, despite diuresis and antibiotic therapy, patient was diagnosed pulmonary fibrosis, patient appeared to have has an inflammatory problem in the lungs and he is getting hypotensive with a diuresis      Assessment & Plan   Assessment / Plan     Active Hospital Problems:  Active Hospital Problems    Diagnosis     **Acute hypoxic respiratory failure        Plan:   I will recommend to decrease diuretics if pulmonollogy agrees    DVT prophylaxis: Anticoagulated with Eliquis    CODE STATUS:    Code Status and Medical Interventions: No CPR (Do Not Attempt to Resuscitate); Limited Support; No intubation (DNI)   Ordered at: 05/05/25 1213     Code Status (Patient has no pulse and is not breathing):    No CPR (Do Not Attempt to Resuscitate)     Medical Interventions (Patient has pulse or is breathing):    Limited Support     Medical Intervention Limits:    No intubation (DNI)       Disposition:  I expect patient to be discharged patient condition is serious and he may need to be intubated.    Electronically signed by Juliocesar Giang MD, 05/19/25, 6:05 PM EDT.

## 2025-05-19 NOTE — PROGRESS NOTES
Pulmonary / Critical Care Progress Note      Patient Name: Roger D Snellen  : 1950  MRN: [id number]  Attending:  Ancelmo Jaramillo MD   Date of admission: 2025    Subjective   Subjective   Follow-up for respiratory failure, decompensated congestive heart failure    Over past 24 hours: Remains on Airvo, remains on Lasix 40 mg IV 3 times daily, continues on Solu-Medrol 40 mg every 6 hours, remains on nebs, continues on Zosyn, remains on Eliquis.    No acute events overnight.    This morning,  Currently on Airvo 60/80  Remains bedridden  Is weak and fatigued  Continues to be dyspneic  Objective   Objective     Vitals:   Vital signs for last 24 hours:  Temp:  [97.3 °F (36.3 °C)-98.3 °F (36.8 °C)] 97.3 °F (36.3 °C)  Heart Rate:  [70-76] 71  Resp:  [20-30] 20  BP: ()/(57-79) 92/57    Physical Exam   Vital Signs Reviewed   General:  Alert, NAD. Lying in bed   HEENT:  PERRL, EOMI.  OP  Chest:  Clear to auscultation bilaterally, no work of breathing noted  CV: RRR, no MGR, pulses 2+, equal.  Abd:  Soft, NT, ND, + BS  EXT:  no clubbing, no cyanosis, no edema  Neuro:  A&Ox3, CN grossly intact, no focal deficits.  Skin: No rashes or lesions noted    Result Review    Result Review:  I have personally reviewed the results from the time of this admission to 2025 06:43 EDT and agree with these findings:  [x]  Laboratory  []  Microbiology  [x]  Radiology  [x]  EKG/Telemetry   [x]  Cardiology/Vascular   []  Pathology  []  Old records  []  Other:  Most notable findings include:    .      Lab 25  0607 25  1621 25  0519 25  1650 25  0459 25  0637 05/15/25  0419 25  0520 25  0340   WBC 17.35*  --  17.93*  --  18.66*  --   --   --   --    HEMOGLOBIN 14.6  --  15.2  --  14.1  --   --   --   --    HEMATOCRIT 45.2  --  46.7  --  43.6  --   --   --   --    PLATELETS 206  --  222  --  232  --   --   --   --    SODIUM  --   --  140  --  138 139 135* 136 134*   POTASSIUM  --  4.8  3.4* 4.1 3.5 3.6 3.7 3.9 3.7   CHLORIDE  --   --  99  --  98 98 97* 99 97*   CO2  --   --  31.6*  --  28.9 30.7* 27.0 27.3 28.2   BUN  --   --  38*  --  35* 40* 31* 25* 23   CREATININE  --   --  0.80  --  0.66* 0.66* 0.66* 0.63* 0.61*   GLUCOSE  --   --  236*  --  220* 209* 220* 151* 147*   CALCIUM  --   --  9.3  --  9.4 9.3 9.0 8.7 8.9   PHOSPHORUS  --   --   --   --  3.4 3.5 3.3 3.3 2.9   TOTAL PROTEIN  --   --  6.7  --  6.4  --   --   --   --    ALBUMIN  --   --  3.0*  --  2.8* 2.9* 2.8* 2.8* 2.6*   GLOBULIN  --   --  3.7  --  3.6  --   --   --   --    CT Chest Without Contrast Diagnostic  Result Date: 5/16/2025  CT CHEST WO CONTRAST DIAGNOSTIC Date of Exam: 5/16/2025 3:22 PM EDT Indication: persistent hypoxia. Comparison: Chest radiograph 5/14/2025. Chest CT 5/11/2025 Technique: Axial CT images were obtained of the chest without contrast administration.  Reconstructed coronal and sagittal images were also obtained. Automated exposure control and iterative construction methods were used. Findings: Thyroid and thoracic inlet: No significant abnormality. Lymph nodes: No pathologic appearing lymph nodes by imaging criteria. Cardiovascular: Extensive pneumomediastinum. Cardiomegaly.. Trace pericardial effusion. Left chest wall ICD with leads terminating in the right atrium, right ventricle, and in the region of the left cardiac vein. Aorta and main pulmonary artery diameters  are within normal range.. There are coronary artery calcifications. Aortic atherosclerosis. Esophagus: No significant abnormality. Lung parenchyma: Multifocal consolidative and groundglass opacities in the right greater than left lung. Peripheral reticular and interstitial opacities Critical Findings were verbally communicated with by Silver Ahumada MD on at . As well as honeycombing in the lower lobes compatible with UIP pattern of pulmonary fibrosis. Airways: Patent trachea and mainstem bronchi. Pleura: No visible pleural effusion. Small right  pneumothorax. No visible left pneumothorax. Chest wall and osseous structures: Degenerative changes of the imaged spine. No acute osseous abnormality. Subcutaneous emphysema extending along the right chest wall into the neck soft tissues. Included abdomen: Cholecystectomy.     Impression: Impression: Small right pneumothorax. Extensive pneumomediastinum and subcutaneous emphysema extending along the right chest wall into the neck soft tissues. Multifocal consolidative and groundglass opacities in the right greater than left lung suspicious for multifocal pneumonia and/or pulmonary edema, similar to prior. Background UIP pattern of pulmonary fibrosis. Critical Findings were verbally communicated with SULAIMAN Banks by Silver Ahumada MD on 5/16/2025 at 11:18 p.m. Electronically Signed: Silver Ahumada MD  5/16/2025 11:17 PM EDT  Workstation ID: [id number]    CT Chest Without Contrast Diagnostic  Result Date: 5/11/2025  CT CHEST WO CONTRAST DIAGNOSTIC Date of Exam: 5/11/2025 8:58 AM EDT Indication: SOB, pt on amio r/o pulm. fibrosis. Comparison: CXR 5/10/2025, CT chest 5/5/2025 Technique: Axial CT images were obtained of the chest without contrast administration.  Reconstructed coronal and sagittal images were also obtained. Automated exposure control and iterative construction methods were used. Findings: Lung window images reveal extensive bilateral groundglass pulmonary opacities. The pattern and distribution is unchanged, however, the opacities are less dense in comparison to 5/5/2025. Alveolar pulmonary edema is improved. Mediastinal windows reveal no mediastinal or axillary adenopathy. The elina are obscured. AICD remains in place. A few coronary artery calcifications are evident. The gallbladder is absent, surgical clips are seen in the gallbladder bed.     Impression: Impression: CT scan of the chest without IV contrast demonstrating extensive bilateral groundglass pulmonary opacities. The pattern and distribution is  unchanged in comparison to 5/5/2025. Alveolar edema is improved. AICD in place. Electronically Signed: Anders Hampton MD  5/11/2025 9:51 AM EDT  Workstation ID: [id number]    CT Angiogram Chest Pulmonary Embolism  Result Date: 5/5/2025  CT ANGIOGRAM CHEST PULMONARY EMBOLISM Date of Exam: 5/5/2025 11:58 AM EDT Indication: eval SOA r/o PE, ?pulm edema vs infiltrate. Comparison: Chest radiograph dated 5/5/2025 Technique: Axial CT images were obtained of the chest after the uneventful intravenous administration of iodinated contrast utilizing pulmonary embolism protocol.  Reconstructed coronal and sagittal images were also obtained. In addition, a 3-D volume rendered image was created for interpretation.  Automated exposure control and iterative construction methods were used. Findings: The visualized soft tissue structures at the base of the neck appear within normal limits. There is no lower cervical or axillary adenopathy. There is a left chest wall ICD with leads in expected position. There is cardiomegaly. There is reflux of contrast into the hepatic veins and IVC. The ascending thoracic aorta is dilated measuring up to 4.4 cm of the main pulmonary artery bifurcation. The main pulmonary artery is mildly dilated. There are no filling defects within the pulmonary arterial system to suggest presence of underlying pulmonary embolism. There is no mediastinal or hilar lymphadenopathy by size criteria. The tracheobronchial tree is patent. There are patchy groundglass opacities throughout both lungs. There are trace bilateral pleural effusions. There is either paraseptal emphysema or honeycombing within the bilateral lower lobes dependently. There is smooth interlobular septal thickening. The esophagus is normal in course and caliber. Visualized portions of the upper abdomen demonstrate no acute findings. There is a large colonic stool burden. There is degenerative disc disease of the thoracic spine.     Impression:  Impression: 1.No evidence of pulmonary embolism. 2.Cardiomegaly with reflux of contrast into the hepatic veins and IVC which can be seen with right heart dysfunction. 3.Patchy groundglass opacities throughout both lungs with smooth interlobular septal thickening and trace bilateral pleural effusions. Findings are favored to represent interstitial and alveolar pulmonary edema over bilateral pneumonia. 4.Dilated ascending thoracic aorta measuring up to 4.4 cm. Mild dilatation of the main pulmonary artery. Electronically Signed: Dayron Espana MD  5/5/2025 12:30 PM EDT  Workstation ID: [id number]    Assessment & Plan   Assessment / Plan     Active Hospital Problems:  Active Hospital Problems    Diagnosis     **Acute hypoxic respiratory failure      Impression:  Acute hypoxic respiratory failure  Multifocal pneumonia from unknown organism  Acute cardiogenic pulmonary edema  Decompensated congestive heart failure with reduced EF  Volume overload  Recent septic arthritis of the left lower extremity on daptomycin/ceftriaxone as outpatient  Pneumomediastinum and pneumothorax     Plan:    Currently on Airvo 55 L 100% FiO2.  Continue to wean supplemental oxygen to maintain SpO2 greater than 90%  Repeat CXR shows some improvement  Continue Lasix 40 mg IV 3 times daily and Aldactone 12.5 mg oral daily  Trend renal panel electrolytes.  Replace electrolytes as needed.  Continue Solu-Medrol   2D echo did show EF with 38%  Continue advanced heart failure medications  Continue Eliquis  Continue Zosyn for pneumonia  DVT prophylaxis, on Eliquis  S/p bronchoscopy, pneumonia panel negative, fungal culture positive for moderate growth Candida  Continue fluconazole 200 mg IV x 7 days     Pharmacologic VTE prophylaxis orders are present.    CODE STATUS:   Code Status (Patient has no pulse and is not breathing): No CPR (Do Not Attempt to Resuscitate)  Medical Interventions (Patient has pulse or is breathing): Limited Support  Medical  Intervention Limits: No intubation (DNI)    I have reviewed labs, imaging, pertinent clinical data and provider notes.   I have discussed with bedside nurse and primary service.   Electronically signed by Danny Galvan MD, 05/19/25, 10:58 AM EDT.

## 2025-05-19 NOTE — PLAN OF CARE
Goal Outcome Evaluation:      Patient used the BiPAP therapy for about an hour this past evening. The patient was on Airvo at 60L and 80% with saturations in the low 90's. When BiPAP was placed on patient, settings were 14/7 and 70%, while maintaining similar saturations. Patient stated that he is very uncomfortable with the BiPAP and requested to remove the mask.

## 2025-05-19 NOTE — PROGRESS NOTES
[facility]   Hospitalist Progress Note  Date: 2025  Patient Name: Roger D Snellen  : 1950  MRN: [id number]  Date of admission: 2025      Subjective   Subjective     Chief Complaint: Shortness of air    Summary:   Roger D Snellen is a 75 y.o. male A-fib on anticoagulation, heart failure with preserved ejection fraction, diabetes, COPD, hypothyroidism, GERD, hyperlipidemia, hypertension.  Patient most recently discharged from [facility].  Patient presented with complaints of left foot pain swelling and erythema.  Met criteria for severe sepsis due to infected left foot.  Also with issues of right knee pain.  Concern for septic right knee arthrocentesis performed, moderate amount of WBCs, few GPC's on cell count and Gram stain, cultures no growth.  Patient discharged on IV antibiotics 500 mg IV daptomycin, 1 g IV ceftriaxone daily with a stop date of 5/15/2025.  Patient was seen by podiatry for left foot wound, did have bedside debridement performed.  Patient was discharged to [facility] rehab.  Since being at [facility] patient endorses slowly worsening shortness of breath.  On morning of presentation, 2025, patient states he felt smothered.  Denies any associated fever chills or night sweats.  Patient presented to the emergency department hypoxic 81% on room air.  Patient with a heart rate of 70, respiratory rate of 28 and a blood pressure 122/72 initially in the emergency department.  Quickly titrated up on high flow nasal cannula eventually transitioned over to BiPAP, however due to intolerance patient transition to Airvo 55 L 60% FiO2 to maintain O2 saturations.  Patient's initial ABG shows a pH of 7.5, PCO2 of 27.5, PO2 of 65.8.  proBNP elevated at 6386.  Troponin 32 then 31, no complaints of chest pain.  On labs patient with a bicarb of 19.4, anion gap of 15.6, creatinine 0.73.  Patient has AST ALT elevations of 114/96 respectively.  Procalcitonin elevated 0.41.  Lactate negative x  2.  White count of 12.8, hemoglobin 11.3 and platelets of 333.  Chest x-ray showed extensive new opacities in right lung and possible new opacities in left perihilar and left basilar region representing multifocal pneumonia or edema.  Patient has a right arm PICC line in place.  CT scan with contrast obtained to rule out pulmonary embolus.  No evidence of PE.  Cardiomegaly, seen in right heart failure.  Patchy groundglass opacities throughout both lungs with smooth interlobular septal thickening and trace bilateral pleural effusions.  Representing pulmonary edema over bilateral pneumonia.  Given patient's work of breathing and oxygen demand, admitted to the ICU.  Overnight patient did well and has been weaned down to 7 L high flow nasal cannula.  Transferred out of the unit on May 6.  Patient seen by pulmonary and cardiology.  Patient had bronchoscopy May 8 with suctioning of mucous plugs and purulent secretions.  Cultures negative to date.  Rheumatoid factor positive but rest of autoimmune panel including anti-CCP negative.  Patient follows with Dr. Cole arriola as an outpatient for pulmonary fibrosis with a UIP pattern.  Patient finished Rocephin May 14.  BAL showed Candida     Interval Followup:   BP better, V-paced rhythm  Remains on Airvo, 80% % FiO2 with 60 L/min  Refuses BiPAP   Denies any hemoptysis  Does have shortness of air with minimal exertion  Very Fatigued, generalized pain, fatigued easy    Review of Systems   All systems were reviewed and negative except for: Summary and interval follow-up    Objective   Objective     Vitals:   Temp:  [97.3 °F (36.3 °C)-98.3 °F (36.8 °C)] 97.3 °F (36.3 °C)  Heart Rate:  [70-76] 74  Resp:  [20-30] 24  BP: ()/(57-79) 91/67  Flow (L/min) (Oxygen Therapy):  [60] 60  Physical Exam      Constitutional: Awake, alert, increased work of breathing, mild respiratory distress, HFNC, chronically ill appearing              Eyes: Pupils equal, sclerae anicteric, no  conjunctival injection              HENT: NCAT, mucous membranes moist              Neck: Supple, no thyromegaly, no lymphadenopathy, trachea midline              Respiratory: Crackles heard mostly bilateral base and midlung area, minimal expiratory wheezing, prolonged expiratory phase              Cardiovascular: RRR, no murmurs, rubs, or gallops, palpable pedal pulses bilaterally.  +1bilateral extremity LYNN              Gastrointestinal: Positive bowel sounds, soft, nontender, nondistended              Musculoskeletal: Right knee swollen, good range of motion and no tenderness on palpation              Neurologic: Oriented x 3, strength symmetric in all extremities, Cranial Nerves grossly intact to confrontation, speech clear              Skin: Wounds to the left foot is dressed but healing, chronic discoloration lower extremities.      Result Review    Result Review:  I have personally reviewed the results for the past 24 hours and agree with these findings:  [x]  Laboratory  []  Microbiology  [x]  Radiology  []  EKG/Telemetry   []  Cardiology/Vascular   []  Pathology  [x]  Old records  [x]  Other: Medications    Assessment & Plan   Assessment / Plan     Assessment:  Acute hypoxemic respiratory failure. No home oxygen use.  Worsening  ARDS  Likely acute flare of pulmonary fibrosis with UIP pattern.  Mucous plugs.  Status post bronchoscopy May 8.  BAL with Candida likely colonization.  Acute on chronic systolic CHF EF of 38%.  Moderate TR.  COPD exacerbation.  History of A-fib on Eliquis.  Status post PPM/AICD.  Recent right knee septic arthritis and left foot infection on IV daptomycin/Rocephin via RUE PICC line until May 15.  Transaminitis.  Likely hepatic congestion from CHF.  Improving  Rheumatoid factor positive.  Other autoimmune panel negative no symptoms.  Dilated ascending thoracic aorta 4.4 cm.  Left bundle branch block.  Small right pneumothorax  SubCutaneous emphysema         Plan:  Continue  supplemental oxygen to keep sats more than 90%.  NIPPV in room if needed  Low-salt diet, fluid restriction, strict input output and daily weights.  Noted repeat CT chest by cardiology 5/11/25 noted unchanged bilateral GGO, it does show improvement in pulm edema  Scheduled IV Lasix as per pulmonary.  Dose increased because of worsening hypoxemia.  BNP trending down  2D echo noted. Repeat ordered to evaluate PFO  Noted ultrasound right upper quadrant.  Negative   acute hepatitis panel.  Negative   Goal-directed medical therapy including Entresto and Aldactone.  Appreciate cardiology input.  Continue home Eliquis.  Completed Vancomycin.  finished Rocephin for right knee septic arthritis  S/p bronchoscopy, pneumonia panel negative, fungal culture positive for moderate growth Candida  Continue fluconazole 200 mg IV x 7 days  IV steroids 40mg every 6 hours because of worsening hypoxemia  Sliding-scale insulin  Nebulizer treatment.  Bronchopulmonary hygiene protocol.  Incentive spirometry, flutter valve, hypertonic saline and MetaNeb  Wound nurse consulted.  Appreciate input  Repeat CT chest 5/16/25 shows right-sided pneumothorax extensive pneumomediastinum and subcutaneous emphysema, multifocal consolidative opacities R > L  Resumed back on Zosyn 5/17/2025  PT OT.  Recommending SNF.  Patient wants to go home from here.  Refusing to go to rehab. Patient may benefit from LTAC  Digoxin level therapeutic  Appreciate pulmonary input.  Started on midodrine.  Afrin and saline nasal spray for sinus congestion. U/S negative for any pleural effusions  Negative anti-CCP.  VISH/kong 1 negative.  Continue telemetry  Discussed plan with RN and .  Patient was about to be discharged from [facility] to go home.  Discharge home when oxygen level is stable.  Will need walking oximetry.  Guarded prognosis    VTE Prophylaxis:  Pharmacologic VTE prophylaxis orders are present.  Eliquis        CODE STATUS:   Code Status (Patient has no  pulse and is not breathing): No CPR (Do Not Attempt to Resuscitate)  Medical Interventions (Patient has pulse or is breathing): Limited Support  Medical Intervention Limits: No intubation (DNI)      Part of this note may be an electronic transcription/translation of spoken language to printed text using the Dragon Dictation System.

## 2025-05-19 NOTE — THERAPY EVALUATION
RT EQUIPMENT DEVICE RELATED - SKIN ASSESSMENT    RT Medical Equipment/Device:     High Flow Nasal Cannula:Airvo    Skin Assessment:      Cheek:  Intact  Ears:  Intact  Nares:  Intact    Device Skin Pressure Protection:  Pressure points protected and Skin-to-device areas padded:  Other:  Gauze pads between ears and NC straps    Nurse Notification:  Nessa Hartman, RRT

## 2025-05-19 NOTE — PLAN OF CARE
Problem: Adult Inpatient Plan of Care  Goal: Plan of Care Review  Outcome: Progressing  Flowsheets (Taken 5/19/2025 1524)  Progress: no change  Outcome Evaluation: Patient alert and oriented, VSS, on Airvo, no s/s of distress, pain controlled with current regimen, PICC with no blood return, handing off to day shift to notify IV Resource team to give cath carmine, able to sleep in between care, call light within reach, care plan ongoing  Plan of Care Reviewed With: patient   Goal Outcome Evaluation:  Plan of Care Reviewed With: patient        Progress: no change

## 2025-05-20 NOTE — PLAN OF CARE
Goal Outcome Evaluation:         Patient alert and oriented x4 -- soft BP throughout shift, midodrine administered -- upon ambulation to chair patient O2 decreased to 82%, nonrebreather mask at 15L applied and O2 increased WNL -- specialty bed quartet placed in room -- IV antibiotics administered -- patient up in chair throughout majority of shift, no verbal reports of pain -- airvo titrated down to 55L/80% -- wound care completed by wound care RN. Patient safety ensured, call light in reach.

## 2025-05-20 NOTE — PROGRESS NOTES
[facility]   Hospitalist Progress Note  Date: 2025  Patient Name: Roger D Snellen  : 1950  MRN: [id number]  Date of admission: 2025      Subjective   Subjective     Chief Complaint: Follow-up shortness of breath  Summary:Roger D Snellen is a 75 y.o. male history of A-fib on anticoagulation, heart failure with preserved ejection fraction, diabetes, COPD, hypothyroidism, GERD, hyperlipidemia, hypertension endorses slowly worsening shortness of breath. Patient presented to the emergency department hypoxic 81% on room air. Patient with a heart rate of 70, respiratory rate of 28 and a blood pressure 122/72 initially in the emergency department. Quickly titrated up on high flow nasal cannula eventually transitioned over to BiPAP, however due to intolerance patient transition to Airvo to maintain O2 saturations. Patient's initial ABG showed a pH of 7.5, PCO2 of 27.5, PO2 of 65.8. proBNP elevated at 6386. Troponin 32 then 31, no complaints of chest pain. On labs patient with a bicarb of 19.4, anion gap of 15.6, creatinine 0.73. Patient had AST ALT elevations of 114/96 respectively. Procalcitonin elevated 0.41. Lactate wnl x 2. White count of 12.8, hemoglobin 11.3 and platelets of 333.  CT scan with contrast obtained to rule out pulmonary embolus. No evidence of PE. Cardiomegaly, seen in right heart failure. Patchy groundglass opacities throughout both lungs with smooth interlobular septal thickening and trace bilateral pleural effusions. Representing pulmonary edema over bilateral pneumonia. Given patient's work of breathing and oxygen demand, admitted to the ICU. Overnight patient did well and has been weaned down to 7 L high flow nasal cannula. Transferred out of the unit on May 6. Patient seen by pulmonary and cardiology. Patient had bronchoscopy May 8 with suctioning of mucous plugs and purulent secretions. Cultures negative to date. Rheumatoid factor positive but rest of autoimmune panel including  anti-CCP negative. Patient follows with Dr. Cole arriola as an outpatient for pulmonary fibrosis with a UIP pattern. Patient finished Rocephin May 14. BAL showed Candida.  Started on fluconazole.  Respiratory function began to decline requiring AirVo.  Started on IV diuretics continue systemic steroids.  Found to have small right pneumothorax with extensive pneumomediastinum and subcutaneous emphysema as well as bilateral infiltrate on repeat CT 5/16/2025.  Continued on systemic steroids and empiric antibiotics.  Repeat chest x-ray is improving.  Patient was at [facility] prior to admission but is planning to return home with home health on discharge.    Interval Followup: Sitting up in bedside chair appears to be resting fairly comfortably.  Patient endorses shortness of breath with exertion though improved with rest.  Denies productive cough.  Afebrile overnight.  V-paced 70s to 90s with PVCs on telemetry review.  Blood pressure low normal limits.  Have been able to wean down AirVo to 55 L 80% FiO2.  ABG this morning demonstrating some mild metabolic alkalosis.  Blood sugars trending up.  Leukocytosis improved.  Chest x-ray shows continued small right apical pneumothorax and subcutaneous emphysema with pneumomediastinum as well as bilateral infiltrates.    Review of Systems  Constitutional: Positive for fatigue and negative fever.   HENT: Negative for sore throat and trouble swallowing.    Eyes: Negative for pain and discharge.   Respiratory: Negative for cough and positive shortness of breath.    Cardiovascular: Negative for chest pain and palpitations.   Gastrointestinal: Negative for abdominal pain, nausea and vomiting.   Endocrine: Negative for cold intolerance and heat intolerance.   Genitourinary: Negative for difficulty urinating and dysuria.   Musculoskeletal: Negative for back pain and neck stiffness.   Skin: Negative for color change and rash.   Neurological: Negative for syncope and headaches.    Hematological: Negative for adenopathy.   Psychiatric/Behavioral: Negative for confusion and hallucinations.    Objective   Objective     Vitals:   Temp:  [97.3 °F (36.3 °C)-97.5 °F (36.4 °C)] 97.3 °F (36.3 °C)  Heart Rate:  [70-78] 70  Resp:  [20-32] 20  BP: ()/(64-74) 105/64  Flow (L/min) (Oxygen Therapy):  [55-60] 55  Physical Exam   General: well-developed appearing stated age in no acute distress  HEENT: Normocephalic atraumatic moist membranes pupils equal round reactive light, no scleral icterus no conjunctival injection  Cardiovascular: regular trace lower extremity edema appreciated  Pulmonary: Crackles bilateral bases no wheezes or rhonchi symmetric chest expansion, unlabored, no conversational dyspnea appreciated  Gastrointestinal: Soft nontender nondistended positive bowel sounds all 4 quadrants no rebound or guarding  Musculoskeletal: No clubbing cyanosis, warm and well-perfused, calves soft symmetric nontender bilaterally  Skin: Clean dry without rashes  Neuro: Cranial nerves II through XII intact grossly no sensorimotor deficits appreciated bilateral upper and lower extremities  Psych: Patient is calm cooperative and appropriate with exam not responding to internal stimuli  : No Oates catheter no bladder distention no suprapubic tenderness    Result Review    Result Review:  I have personally reviewed these results and agree with these findings:  [x]  Laboratory  LAB RESULTS:      Lab 05/20/25  0551 05/19/25  0607 05/18/25  0519 05/17/25  0459 05/14/25  0428   WBC 15.66* 17.35* 17.93* 18.66*  --    HEMOGLOBIN 14.8 14.6 15.2 14.1  --    HEMATOCRIT 44.7 45.2 46.7 43.6  --    PLATELETS 187 206 222 232  --    NEUTROS ABS 14.46* 15.98* 16.46* 17.09*  --    IMMATURE GRANS (ABS) 0.19* 0.16* 0.17* 0.17*  --    LYMPHS ABS 0.62* 0.73 0.74 0.78  --    MONOS ABS 0.38 0.45 0.52 0.59  --    EOS ABS 0.00 0.00 0.00 0.00  --    MCV 95.3 95.0 95.1 95.6  --    CRP  --   --   --  1.08*  --    LACTATE  --   --    --   --  0.9         Lab 05/20/25  0551 05/19/25  1616 05/19/25  0607 05/18/25  1621 05/18/25 0519 05/17/25  1650 05/17/25 0459 05/16/25  0637 05/15/25  0419 05/14/25  0520   SODIUM 136  --  138  --  140  --  138 139 135* 136   POTASSIUM 3.9 4.4 3.3* 4.8 3.4*   < > 3.5 3.6 3.7 3.9   CHLORIDE 99  --  98  --  99  --  98 98 97* 99   CO2 28.7  --  28.9  --  31.6*  --  28.9 30.7* 27.0 27.3   ANION GAP 8.3  --  11.1  --  9.4  --  11.1 10.3 11.0 9.7   BUN 43*  --  45*  --  38*  --  35* 40* 31* 25*   CREATININE 0.75*  --  0.77  --  0.80  --  0.66* 0.66* 0.66* 0.63*   EGFR 93.5  --  92.8  --  91.7  --  97.2 97.2 97.2 98.6   GLUCOSE 245*  --  262*  --  236*  --  220* 209* 220* 151*   CALCIUM 8.8  --  9.0  --  9.3  --  9.4 9.3 9.0 8.7   MAGNESIUM  --   --   --   --   --   --   --  2.1  --   --    PHOSPHORUS  --   --   --   --   --   --  3.4 3.5 3.3 3.3    < > = values in this interval not displayed.         Lab 05/20/25  0551 05/19/25  0607 05/18/25 0519 05/17/25 0459 05/16/25 0637   TOTAL PROTEIN 6.3 6.3 6.7 6.4  --    ALBUMIN 3.0* 2.9* 3.0* 2.8* 2.9*   GLOBULIN 3.3 3.4 3.7 3.6  --    ALT (SGPT) 89* 86* 93* 97*  --    AST (SGOT) 36 31 33 32  --    BILIRUBIN 0.8 0.7 0.7 0.6  --    ALK PHOS 67 68 78 75  --          Lab 05/14/25  0520   PROBNP 1,974.0*                 Lab 05/20/25  0908 05/14/25  0428   PH, ARTERIAL 7.488* 7.487*   PCO2, ARTERIAL 42.1 41.3   PO2 .7* 66.5*   O2 SATURATION ART 98.7 94.2*   FIO2 100 100   HCO3 ART 31.9* 31.2*   BASE EXCESS ART 7.6* 7.1*     Brief Urine Lab Results  (Last result in the past 365 days)        Color   Clarity   Blood   Leuk Est   Nitrite   Protein   CREAT   Urine HCG        05/05/25 1451 Yellow   Clear   Negative   Negative   Negative   Negative                 Microbiology Results (last 10 days)       ** No results found for the last 240 hours. **            []  Microbiology  [x]  Radiology  XR Chest 1 View  Result Date: 5/20/2025  Impression: 1.Small right apical  pneumothorax is similar compared to 5/17/2025 chest x-ray and was not visualized on 5/18/2025 chest x-ray. Subcutaneous emphysema appears increased compared to 5/18/2025 chest x-ray. Similar pneumomediastinum.. 2.Stable appearance of diffuse right lung and left lower lung interstitial and airspace opacities. Electronically Signed: Jacqueline Dong MD  5/20/2025 11:55 AM EDT  Workstation ID: [id number]    XR Chest 1 View  Result Date: 5/18/2025  Impression: Improving pulmonary opacities superimposed on known sequelae of chronic interstitial lung disease/fibrosis. No significant visualized pneumothorax. Subcutaneous emphysema is decreasing from prior exam. Electronically Signed: Rickey Markham MD  5/18/2025 2:28 PM EDT  Workstation ID: [id number]    XR Chest 1 View  Result Date: 5/17/2025  1.  Bilateral infiltrates persist, right greater than left. The findings may represent infectious multifocal pneumonia. Please correlate clinically. 2.  There is a tiny right apical pneumothorax. Pneumomediastinum is noted. No definite left pneumothorax is seen on this single AP (anteroposterior) upright portable chest radiograph. 3.  Mild subcutaneous emphysema is seen. 4.  Please see above comments for further detail.   Portions of this note were completed with a voice recognition program.  5/17/2025 5:30 AM by Romeo Winters MD on Workstation: [id number]      CT Chest Without Contrast Diagnostic  Result Date: 5/16/2025  Impression: Small right pneumothorax. Extensive pneumomediastinum and subcutaneous emphysema extending along the right chest wall into the neck soft tissues. Multifocal consolidative and groundglass opacities in the right greater than left lung suspicious for multifocal pneumonia and/or pulmonary edema, similar to prior. Background UIP pattern of pulmonary fibrosis. Critical Findings were verbally communicated with SULAIMAN Banks by Silver Ahumada MD on 5/16/2025 at 11:18 p.m. Electronically Signed: Silver Ahumada MD   5/16/2025 11:17 PM EDT  Workstation ID: [id number]    XR Chest 1 View  Result Date: 5/14/2025  Impression: 1. Previously seen air lucency adjacent to the right hilum is not evident on the current study. No evidence of pneumothorax or pneumomediastinum. 2. Widespread bilateral airspace opacities, right greater than left, with slight interval worsening of the right upper lung. 3. Cardiomegaly. Electronically Signed: Sourav Nair MD  5/14/2025 11:16 AM EDT  Workstation ID: [id number]    XR Chest 1 View  Result Date: 5/14/2025  1.Bilateral fairly diffuse infiltrates, right worse than left. These are not significantly changed. 2.Lucency projecting near the right hilum. This could potentially reflect pneumomediastinum or small pneumothorax, although this would be an unusual appearance. [place] follow-up repeat chest x-ray recommended. Electronically Signed: Too Pasotr MD  5/14/2025 5:40 AM EDT  Workstation ID: [id number]      [x]  EKG/Telemetry   [x]  Cardiology/Vascular  Transthoracic echocardiogram 5/16/2025    There is moderate calcification of the aortic valve.     The left ventricle is normal in size and show significant hypokinesis of the distal septum and apex the proximal septum, lateral wall and inferior wall contract well the estimation fraction is 35%.  The bubble study showed no evidence of AV shunt.    []  Pathology  []  Old records  [x]  Other:  Scheduled Meds:alteplase (CATHFLO/ACTIVASE) 2 mg in Sodium Chloride (PF) 0.9 % 5 mL syringe, 2 mg, Intraventricular, Once  apixaban, 5 mg, Oral, Q12H  arformoterol, 15 mcg, Nebulization, BID - RT  budesonide, 0.5 mg, Nebulization, BID - RT  cholecalciferol, 2,000 Units, Oral, Daily  empagliflozin, 10 mg, Oral, Daily  fluconazole, 200 mg, Intravenous, Q24H  furosemide, 40 mg, Intravenous, Q8H  insulin glargine, 8 Units, Subcutaneous, Daily  insulin lispro, 2-7 Units, Subcutaneous, 4x Daily AC & at Bedtime  levothyroxine, 100 mcg, Oral, Q AM  magnesium oxide, 400  mg, Oral, BID  methylPREDNISolone sodium succinate, 40 mg, Intravenous, Q6H  midodrine, 15 mg, Oral, TID AC  mineral oil-hydrophilic petrolatum, 1 Application, Topical, Daily  pantoprazole, 40 mg, Oral, QAM  piperacillin-tazobactam, 3.375 g, Intravenous, Q8H  potassium chloride, 10 mEq, Oral, TID With Meals  sacubitril-valsartan, 1 tablet, Oral, Q12H  sodium chloride, 10 mL, Intravenous, Q12H  sodium chloride, 4 mL, Nebulization, BID - RT  spironolactone, 12.5 mg, Oral, Daily      Continuous Infusions:   PRN Meds:.  acetaminophen    senna-docusate sodium **AND** polyethylene glycol **AND** bisacodyl **AND** bisacodyl    calcium carbonate    Calcium Replacement - Follow Nurse / BPA Driven Protocol    dextrose    dextrose    glucagon (human recombinant)    ipratropium-albuterol    Magnesium Cardiology Dose Replacement - Follow Nurse / BPA Driven Protocol    Morphine    nitroglycerin    [Held by provider] ondansetron    [Held by provider] ondansetron ODT    oxyCODONE    Phosphorus Replacement - Follow Nurse / BPA Driven Protocol    Potassium Replacement - Follow Nurse / BPA Driven Protocol    sodium chloride    sodium chloride    sodium chloride    sodium chloride      Assessment & Plan   Assessment / Plan     Assessment/Plan:  Acute hypoxemic respiratory failure.   ARDS  Likely acute flare of pulmonary fibrosis with UIP pattern.  Bilateral pneumonia  Small right pneumothorax  SubCutaneous emphysema  Pneumomediastinum  Mucous plugs.  Status post bronchoscopy May 8.  BAL with Candida likely colonization.  Acute on chronic systolic CHF EF of 38%.  Cardiogenic pulmonary edema  Moderate TR.  COPD exacerbation   History of A-fib on Eliquis.  Status post PPM/AICD.  Recent right knee septic arthritis and left foot infection on IV daptomycin/Rocephin via RUE PICC line until May 15.  Transaminitis.  Likely hepatic congestion from CHF.  Improving  Rheumatoid factor positive.  Other autoimmune panel negative no symptoms.  Dilated  ascending thoracic aorta 4.4 cm.  Left bundle branch block.  Hyperglycemia        Patient admitted for further evaluation and treatment  Pulmonology and cardiology consulted thank you for assistance  Continue AirVo titrate to keep sats between 90 and 96%  Change Lasix 40 mg to 3 times daily instead of every 8 hours so patient can get some rest overnight  Continue to monitor renal function electrolytes closely during diuresis  Replace electrolytes as needed  Continue midodrine 3 times daily and titrate as needed  Continue Solu-Medrol and taper per pulmonology  Continue piperacillin/tazobactam per pulmonology  Continue Brovana, Pulmicort scheduled with as needed DuoNebs  Continue bronchopulmonary hygiene protocol  Continue fluconazole per pulmonology  Continue spironolactone, Entresto, Jardiance  Would benefit from beta-blocker once stable if blood pressure can tolerate  Continue Eliquis  Completed course of antibiotics for septic arthritis  Transaminases stable.  Patient to follow-up with cardiology for monitoring of dilation of ascending thoracic aorta  Get A1c  Continue sliding scale insulin ACHS  Start Lantus daily and titrate as needed  Further inpatient orders recommendations pending clinical course            Discussed plan with bedside RN as well as pulmonology team.    Disposition: Patient wishing to return home with home health once respiratory function improves.    VTE Prophylaxis:  Pharmacologic VTE prophylaxis orders are present.        CODE STATUS:   Code Status (Patient has no pulse and is not breathing): No CPR (Do Not Attempt to Resuscitate)  Medical Interventions (Patient has pulse or is breathing): Limited Support  Medical Intervention Limits: No intubation (DNI)

## 2025-05-20 NOTE — PLAN OF CARE
Goal Outcome Evaluation:  Plan of Care Reviewed With: patient        Progress: no change  Outcome Evaluation: Patient Aox4 on 60 Liters 72% high flow, desats when ambulating. PICC line assessed, blood still hard to draw. PICC line dressing completed. Up in chair majority of shift. No complaints at this time. Call light in reach.

## 2025-05-20 NOTE — SIGNIFICANT NOTE
Wound Eval / Progress Noted     Cara     Patient Name: Roger D Snellen  : 1950  MRN: [id number]  Today's Date: 2025                 Admit Date: 2025    Visit Dx:    ICD-10-CM ICD-9-CM   1. Acute hypoxemic respiratory failure  J96.01 518.81   2. Acute on chronic systolic congestive heart failure  I50.23 428.23     428.0   3. Acute pulmonary edema  J81.0 518.4   4. Difficulty walking  R26.2 719.7   5. Impaired mobility and ADLs  Z74.09 V49.89    Z78.9    6. Acute hypoxic respiratory failure  J96.01 518.81         Acute hypoxic respiratory failure        Past Medical History:   Diagnosis Date    Arrhythmia     ATRIAL FIB, PACE TERMINATED VT    Atrial fibrillation     BBB (bundle branch block)     L BBB    CHF (congestive heart failure)     Diabetes mellitus     Disease of thyroid gland     GERD (gastroesophageal reflux disease)     Hyperlipidemia     Hypertension     Non-ischemic cardiomyopathy     DILATED CM EF 20-25% 2017    Osteoarthritis         Past Surgical History:   Procedure Laterality Date    BRONCHOSCOPY N/A 2025    Procedure: BRONCHOSCOPY WITH BRONCHOALVEOLAR LAVAGE, WASHING, AIRWAY INSPECTION: insertion of lighted instrument to view inside the lung;  Surgeon: Tae Mosley MD;  Location: [facility] MAIN OR;  Service: Pulmonary;  Laterality: N/A;    CARDIAC ELECTROPHYSIOLOGY PROCEDURE N/A 2017    Procedure: Device Upgrade  ICD TO A BIV ICD   medtronic;  Surgeon: Chris Phillips MD;  Location: [facility] INVASIVE LOCATION;  Service:     CHOLECYSTECTOMY      EYE SURGERY Left 2025    Retina Repair    INSERT / REPLACE / REMOVE PACEMAKER      INTERNAL CARDIAC DEFIBRILLATOR INSERTION      MEDTRONIC    SHOULDER ARTHROSCOPY      TOTAL KNEE ARTHROPLASTY           Physical Assessment:  Wound 25 1500 medial coccyx Pressure Injury (Active)   Wound Image   25 0805   Pressure Injury Stage U 25 0805   Dressing Appearance dressing loose 25 0805   Dressing  Removed Type silver impregnated;hydrofiber 05/20/25 0805   Confirmed Empty Wound Bed Yes, visual inspection of wound bed 05/20/25 0805   Closure None 05/20/25 0805   Base moist;red;slough;yellow;nonblanchable 05/20/25 0805   Yellow (%), Wound Tissue Color 100 05/20/25 0805   Periwound intact;dry;redness;maroon/purple;other (see comments) 05/20/25 0805   Periwound Temperature warm 05/20/25 0805   Periwound Skin Turgor soft 05/20/25 0805   Edges open 05/20/25 0805   Drainage Characteristics/Odor serosanguineous 05/20/25 0805   Drainage Amount small 05/20/25 0805   Care, Wound cleansed with;sterile normal saline 05/20/25 0805   Dressing Care dressing applied;silver impregnated;hydrofiber;silicone border foam 05/20/25 0805   Periwound Care absorptive dressing applied 05/20/25 0805       Wound 05/13/25 0800 Left posterior ear Pressure Injury (Active)   Wound Image   05/20/25 0805   Pressure Injury Stage 1 05/20/25 0805   Dressing Appearance open to air 05/20/25 0805   Closure None 05/20/25 0805   Base dry;red;nonblanchable 05/20/25 0805   Periwound dry;intact 05/20/25 0805   Periwound Temperature warm 05/20/25 0805   Periwound Skin Turgor soft 05/20/25 0805   Edges rolled/closed 05/20/25 0805   Drainage Amount none 05/20/25 0805   Care, Wound cleansed with;sterile normal saline 05/20/25 0805   Dressing Care gauze, dry 05/20/25 0805       Wound 05/13/25 0800 Right posterior ear Pressure Injury (Active)   Wound Image   05/20/25 0805   Pressure Injury Stage 1 05/20/25 0805   Dressing Appearance open to air 05/20/25 0805   Closure None 05/20/25 0805   Base dry;red;nonblanchable 05/20/25 0805   Periwound dry;intact 05/20/25 0805   Periwound Temperature warm 05/20/25 0805   Periwound Skin Turgor soft 05/20/25 0805   Edges rolled/closed 05/20/25 0805   Drainage Amount none 05/20/25 0805   Care, Wound cleansed with;sterile normal saline 05/20/25 0805   Dressing Care gauze, dry 05/20/25 0805       Wound 05/13/25 1246 Right  posterior heel Pressure Injury (Active)   Wound Image   05/20/25 0805   Pressure Injury Stage U 05/20/25 0805   Dressing Appearance open to air 05/20/25 0805   Confirmed Empty Wound Bed Yes, visual inspection of wound bed 05/20/25 0805   Closure None 05/20/25 0805   Base black;maroon/purple;dry;nonblanchable 05/20/25 0805   Periwound dry;intact;redness 05/20/25 0805   Periwound Temperature warm 05/20/25 0805   Periwound Skin Turgor soft 05/20/25 0805   Edges rolled/closed 05/20/25 0805   Drainage Amount none 05/20/25 0805   Care, Wound cleansed with;sterile normal saline 05/20/25 0805   Dressing Care dressing applied;non-adherent;petroleum-based;gauze;silicone border foam 05/20/25 0805   Periwound Care absorptive dressing applied 05/20/25 0805       Wound 05/20/25 0845 Left posterior plantar Traumatic (Active)   Wound Image   05/20/25 0805   Dressing Appearance intact;dry 05/20/25 0805   Dressing Removed Type silver impregnated;hydrofiber 05/20/25 0805   Confirmed Empty Wound Bed Yes, visual inspection of wound bed 05/20/25 0805   Closure None 05/20/25 0805   Base red;moist 05/20/25 0805   Red (%), Wound Tissue Color 100 05/20/25 0805   Periwound intact;dry;pink 05/20/25 0805   Periwound Temperature warm 05/20/25 0805   Periwound Skin Turgor soft 05/20/25 0805   Edges open 05/20/25 0805   Drainage Characteristics/Odor serosanguineous 05/20/25 0805   Drainage Amount small 05/20/25 0805   Care, Wound cleansed with;sterile normal saline 05/20/25 0805   Dressing Care dressing applied;silver impregnated;hydrofiber;silicone border foam 05/20/25 0805   Periwound Care absorptive dressing applied 05/20/25 0805      Wound Check / Follow-up:  Patient was seen today for a wound check and dressing changes. Patient is awake, alert and oriented at the time of the assessment; patient was agreeable to the visit.    Sacral wounds now presenting as an unstageable pressure injury with a majority of the wound bases now covered with  yellow non-blanchable tissue. Small amounts of red moist tissue present to the wound edges. Periwound is maroon / purple tissue, non-blanchable. Continue with Q2H turns and offload at all times; will order a specialty mattress.     Right heel remains unstageable with maroon to black necrotic tissue; tissue is firm. Periwound is reddened and does remains blanchable. Recommending to cover with non-adherent petroleum gauze, elevate heels at all times.     Stage I pressure injuries remain present to the ears with reddened non-blanchable intact tissue. Recommending apply blue top moisture barrier TID and cover with a mini-silicone border dressing.     Left plantar mid foot open wound, wound base is red and moist; wound base is shallow and patient does report some discomfort when cleansed. Recommending to cover the open tissue with silver impregnated hydrofiber.    Impression: unstageable pressure injuries to the right heel and sacrum; stage 1 pressure injuries to the bilateral ears; open wound to the left plantar foot    Short term goals:  regain skin integrity, skin protection, topical treatment, pressure reduction, moisture prevention, daily dressing changes, quality skin care and hygiene.     Jill Martell RN    5/20/2025    08:50 EDT

## 2025-05-20 NOTE — PROGRESS NOTES
RT EQUIPMENT DEVICE RELATED - SKIN ASSESSMENT    RT Medical Equipment/Device:     High Flow Nasal Cannula:Airvo    Skin Assessment:      Cheek:  Intact  Nares:  Intact  Nose:  Intact    Device Skin Pressure Protection:  Pressure points protected    Nurse Notification:  Nessa De La Torre, RRT

## 2025-05-20 NOTE — THERAPY RE-EVALUATION
Patient Name: Roger D Snellen  : 1950    MRN: [id number]                              Today's Date: 2025       Admit Date: 2025    Visit Dx:     ICD-10-CM ICD-9-CM   1. Acute hypoxemic respiratory failure  J96.01 518.81   2. Acute on chronic systolic congestive heart failure  I50.23 428.23     428.0   3. Acute pulmonary edema  J81.0 518.4   4. Difficulty walking  R26.2 719.7   5. Impaired mobility and ADLs  Z74.09 V49.89    Z78.9    6. Acute hypoxic respiratory failure  J96. 518.81     Patient Active Problem List   Diagnosis    Non-ischemic cardiomyopathy    Acute hypoxic respiratory failure     Past Medical History:   Diagnosis Date    Arrhythmia     ATRIAL FIB, PACE TERMINATED VT    Atrial fibrillation     BBB (bundle branch block)     L BBB    CHF (congestive heart failure)     Diabetes mellitus     Disease of thyroid gland     GERD (gastroesophageal reflux disease)     Hyperlipidemia     Hypertension     Non-ischemic cardiomyopathy     DILATED CM EF 20-25% 2017    Osteoarthritis      Past Surgical History:   Procedure Laterality Date    BRONCHOSCOPY N/A 2025    Procedure: BRONCHOSCOPY WITH BRONCHOALVEOLAR LAVAGE, WASHING, AIRWAY INSPECTION: insertion of lighted instrument to view inside the lung;  Surgeon: Tae Mosley MD;  Location: [facility] MAIN OR;  Service: Pulmonary;  Laterality: N/A;    CARDIAC ELECTROPHYSIOLOGY PROCEDURE N/A 2017    Procedure: Device Upgrade  ICD TO A BIV ICD   medtronic;  Surgeon: Chris Phillips MD;  Location: [facility] INVASIVE LOCATION;  Service:     CHOLECYSTECTOMY      EYE SURGERY Left 2025    Retina Repair    INSERT / REPLACE / REMOVE PACEMAKER      INTERNAL CARDIAC DEFIBRILLATOR INSERTION      MEDTRONIC    SHOULDER ARTHROSCOPY      TOTAL KNEE ARTHROPLASTY        General Information       Row Name 25 1010          OT Time and Intention    Subjective Information complains of;weakness;dyspnea  -EG     Document Type therapy note (daily  note)  -EG     Mode of Treatment individual therapy;occupational therapy  -EG     Patient Effort adequate  -EG     Symptoms Noted During/After Treatment dizziness;fatigue;shortness of breath  -EG       Row Name 05/20/25 1010          General Information    Existing Precautions/Restrictions fall;oxygen therapy device and L/min  -EG       Row Name 05/20/25 1010          Occupational Profile    Reason for Services/Referral (Occupational Profile) Patient is a 75-year-old male admitted for the above diagnosis. OT services extending POC up for recertification of services due to continued need for training to promote ADL ind. and safety skills; Continue with POC and goals as modified.  -EG       Row Name 05/20/25 1010          Cognition    Orientation Status (Cognition) oriented x 4  -EG       Row Name 05/20/25 1010          Safety Issues/Impairments Affecting Functional Mobility    Impairments Affecting Function (Mobility) balance;endurance/activity tolerance;shortness of breath  -EG               User Key  (r) = Recorded By, (t) = Taken By, (c) = Cosigned By      Initials Name Provider Type    EG Chelsea Fountain, JULIENNE Occupational Therapist                     Mobility/ADL's       Row Name 05/20/25 1011          Bed Mobility    Comment, (Bed Mobility) Patient semi-fowlers in bed with sats running 90-92% on Airvo  -EG       Row Name 05/20/25 1011          Transfers    Comment, (Transfers) Declines to perform due to SOB and fatigue  -EG       Row Name 05/20/25 1011          Activities of Daily Living    BADL Assessment/Intervention --  set-up for UB dressing, UB bathing, self-feeding, Hyg/Grooming tasks; min/mod for Lb ADL's and toileting tasks due to fatigue and SOB  -EG               User Key  (r) = Recorded By, (t) = Taken By, (c) = Cosigned By      Initials Name Provider Type    EG Chelsea Fountain, OT Occupational Therapist                   Obj/Interventions       Row Name 05/20/25 1012          Sensory Assessment  (Somatosensory)    Sensory Assessment (Somatosensory) UE sensation intact  -EG       Row Name 05/20/25 1012          Vision Assessment/Intervention    Visual Impairment/Limitations WFL  -EG       Row Name 05/20/25 1012          Range of Motion Comprehensive    General Range of Motion bilateral upper extremity ROM WNL  -EG       Row Name 05/20/25 1012          Shoulder (Therapeutic Exercise)    Shoulder (Therapeutic Exercise) strengthening exercise  -EG     Shoulder Strengthening (Therapeutic Exercise) bilateral;flexion;extension;external rotation;internal rotation;horizontal aBduction/aDduction;10 repetitions;2 sets;2 lb free weight  -EG       Row Name 05/20/25 1012          Elbow/Forearm (Therapeutic Exercise)    Elbow/Forearm (Therapeutic Exercise) strengthening exercise  -EG     Elbow/Forearm Strengthening (Therapeutic Exercise) bilateral;flexion;extension;2 lb free weight;10 repetitions;sitting;supination;pronation;2 sets  -EG       [facility] Name 05/20/25 1012          Motor Skills    Motor Skills functional endurance  -EG     Functional Endurance Poor+ on Airvo high flow 60L; Sats dropping to 83% with semi-fowlers BUE exercise particpiation,  -EG     Therapeutic Exercise shoulder;elbow/forearm  -EG               User Key  (r) = Recorded By, (t) = Taken By, (c) = Cosigned By      Initials Name Provider Type    EG Chelsea Fountain OT Occupational Therapist                   Goals/Plan       Row Name 05/20/25 1016          Bed Mobility Goal 1 (OT)    Activity/Assistive Device (Bed Mobility Goal 1, OT) bed mobility activities, all  -EG     [place] Level/Cues Needed (Bed Mobility Goal 1, OT) modified independence  -EG     Time Frame (Bed Mobility Goal 1, OT) long term goal (LTG);10 days  -EG     Progress/Outcomes (Bed Mobility Goal 1, OT) continuing progress toward goal;progress slower than expected  -EG       [facility] Name 05/20/25 1016          Transfer Goal 1 (OT)    Activity/Assistive Device (Transfer Goal 1, OT)  transfers, all  -EG     [place] Level/Cues Needed (Transfer Goal 1, OT) modified independence  -EG     Time Frame (Transfer Goal 1, OT) long term goal (LTG);10 days  -EG     Progress/Outcome (Transfer Goal 1, OT) continuing progress toward goal;progress slower than expected  -EG       Row Name 05/20/25 1016          Bathing Goal 1 (OT)    Activity/Device (Bathing Goal 1, OT) bathing skills, all  -EG     [place]inn Level/Cues Needed (Bathing Goal 1, OT) modified independence  -EG     Time Frame (Bathing Goal 1, OT) long term goal (LTG);10 days  -EG     Progress/Outcomes (Bathing Goal 1, OT) continuing progress toward goal;progress slower than expected  -EG       Row Name 05/20/25 1016          Dressing Goal 1 (OT)    Activity/Device (Dressing Goal 1, OT) dressing skills, all  -EG     Winn/Cues Needed (Dressing Goal 1, OT) modified independence  -EG     Time Frame (Dressing Goal 1, OT) long term goal (LTG);10 days  -EG     Progress/Outcome (Dressing Goal 1, OT) continuing progress toward goal;progress slower than expected  -EG       Row Name 05/20/25 1016          Toileting Goal 1 (OT)    Activity/Device (Toileting Goal 1, OT) toileting skills, all  -EG     [place]inn Level/Cues Needed (Toileting Goal 1, OT) modified independence  -EG     Time Frame (Toileting Goal 1, OT) long term goal (LTG);10 days  -EG     Progress/Outcome (Toileting Goal 1, OT) continuing progress toward goal;progress slower than expected  -EG       Row Name 05/20/25 1016          Strength Goal 1 (OT)    Strength Goal 1 (OT) patient will demonstrate BUE strength of 5/5 for adls.  -EG     Time Frame (Strength Goal 1, OT) long term goal (LTG);10 days  -EG     Progress/Outcome (Strength Goal 1, OT) continuing progress toward goal;progress slower than expected  -EG       Row Name 05/20/25 1016          Problem Specific Goal 1 (OT)    Problem Specific Goal 1 (OT) Patient will improve endurance to good for adls.  -EG     Time Frame  (Problem Specific Goal 1, OT) long term goal (LTG);10 days  -EG     Progress/Outcome (Problem Specific Goal 1, OT) continuing progress toward goal;progress slower than expected  -EG       Row Name 05/20/25 1016          Therapy Assessment/Plan (OT)    Planned Therapy Interventions (OT) activity tolerance training;functional balance retraining;occupation/activity based interventions;adaptive equipment training;BADL retraining;neuromuscular control/coordination retraining;patient/caregiver education/training;transfer/mobility retraining;strengthening exercise  -EG               User Key  (r) = Recorded By, (t) = Taken By, (c) = Cosigned By      Initials Name Provider Type    EG Chelsea Fountain, OT Occupational Therapist                   Clinical Impression       Row Name 05/20/25 1013          Pain Assessment    Pretreatment Pain Rating 1/10  -EG     Posttreatment Pain Rating 1/10  -EG       Row Name 05/20/25 1013          Plan of Care Review    Plan of Care Reviewed With patient  -EG     Progress no change  -EG     Outcome Evaluation Patient reports no pain; Continues to be highly limited due to O2 sat levels with SOB, fatigue and generalized deconditioning on 60L high flow Airvo; Patient demonstrates continued need for skilled therapy services to improve OOB task tolerance, endurance, strength and balance skills to promote ADL ind.; OT will continue to treat and follow POC; Recert due this date to extend POC due to continued need demonstrated.  -EG       Row Name 05/20/25 1013          Therapy Assessment/Plan (OT)    Rehab Potential (OT) fair  -EG     Criteria for Skilled Therapeutic Interventions Met (OT) yes;meets criteria;skilled treatment is necessary  -EG     Therapy Frequency (OT) 5 times/wk  -EG       Row Name 05/20/25 1013          Therapy Plan Review/Discharge Plan (OT)    Anticipated Discharge Disposition (OT) inpatient rehabilitation facility  -EG       Row Name 05/20/25 1013          Vital Signs    Pre  SpO2 (%) 91  -EG     O2 Delivery Pre Treatment --  airvo 60L  -EG     Intra SpO2 (%) 82  -EG     O2 Delivery Intra Treatment --  Airvo 90 L high flow  -EG     Post SpO2 (%) 90  -EG     O2 Delivery Post Treatment --  Airvo 90L high flow  -EG       Row Name 05/20/25 1013          Positioning and Restraints    Pre-Treatment Position in bed  -EG     Post Treatment Position bed  -EG               User Key  (r) = Recorded By, (t) = Taken By, (c) = Cosigned By      Initials Name Provider Type    EG Chelsea Fountain, OT Occupational Therapist                   Outcome Measures       Row Name 05/20/25 1016          How much help from another is currently needed...    Putting on and taking off regular lower body clothing? 3  -EG     Bathing (including washing, rinsing, and drying) 3  -EG     Toileting (which includes using toilet bed pan or urinal) 3  -EG     Putting on and taking off regular upper body clothing 4  -EG     Taking care of personal grooming (such as brushing teeth) 4  -EG     Eating meals 4  -EG     AM-PAC 6 Clicks Score (OT) 21  -EG       Row Name 05/20/25 0913          How much help from another person do you currently need...    Turning from your back to your side while in flat bed without using bedrails? 4  -JM     Moving from lying on back to sitting on the side of a flat bed without bedrails? 4  -JM     Moving to and from a bed to a chair (including a wheelchair)? 3  -JM     Standing up from a chair using your arms (e.g., wheelchair, bedside chair)? 3  -JM     Climbing 3-5 steps with a railing? 2  -JM     To walk in hospital room? 3  -JM     AM-PAC 6 Clicks Score (PT) 19  -JM     Highest Level of Mobility Goal Walk 10 Steps or More-6  -JM       Row Name 05/20/25 1016          Functional Assessment    Outcome Measure Options AM-PAC 6 Clicks Daily Activity (OT);Optimal Instrument  -EG       Row Name 05/20/25 1016          Optimal Instrument    Bending/Stooping 2  -EG     Standing 2  -EG     Reaching 1  -EG                User Key  (r) = Recorded By, (t) = Taken By, (c) = Cosigned By      Initials Name Provider Type    EG Chelsea Fountain OT Occupational Therapist    Akanksha Tinoco RNA Registered Nurse                      OT Recommendation and Plan  Planned Therapy Interventions (OT): activity tolerance training, functional balance retraining, occupation/activity based interventions, adaptive equipment training, BADL retraining, neuromuscular control/coordination retraining, patient/caregiver education/training, transfer/mobility retraining, strengthening exercise  Therapy Frequency (OT): 5 times/wk  Plan of Care Review  Plan of Care Reviewed With: patient  Progress: no change  Outcome Evaluation: Patient reports no pain; Continues to be highly limited due to O2 sat levels with SOB, fatigue and generalized deconditioning on 60L high flow Airvo; Patient demonstrates continued need for skilled therapy services to improve OOB task tolerance, endurance, strength and balance skills to promote ADL ind.; OT will continue to treat and follow POC; Recert due this date to extend POC due to continued need demonstrated.     Time Calculation:         Time Calculation- OT       Row Name 05/20/25 1017             Time Calculation- OT    OT Received On 05/20/25  -EG      OT Goal Re-Cert Due Date 05/16/25  -EG         Untimed Charges    OT Eval/Re-eval Minutes 25  -EG         Total Minutes    Untimed Charges Total Minutes 25  -EG       Total Minutes 25  -EG                User Key  (r) = Recorded By, (t) = Taken By, (c) = Cosigned By      Initials Name Provider Type    EG Chelsea Fountain OT Occupational Therapist                  Therapy Charges for Today       Code Description Service Date Service Provider Modifiers Qty    [id number]  OT RE-EVAL 2 5/20/2025 Chelsea Fountain OT GO 1                 Chelsea Fountain OT  5/20/2025

## 2025-05-20 NOTE — PLAN OF CARE
Goal Outcome Evaluation:  Plan of Care Reviewed With: patient        Progress: no change  Outcome Evaluation: A/Ox4, airvo titrated per RT, desats with ambulation, IV abx and steroids continued. pt resting, no requests at this time

## 2025-05-20 NOTE — PROGRESS NOTES
[facility]   Progress Note    Patient Name: Roger D Snellen  : 1950  MRN: [id number]  Primary Care Physician: John Phelps Jr., MD  Date of admission: 2025    Subjective   Subjective     HPI:  Patient reports persistent shortness of breath with minimal activity he indicated that he is not getting better he is getting weaker.  ABGs look okay, CO2 is 42.1 pH 7.48PO2 is 110.7 patient receiving significant oxygen    Review of Systems  Review of Systems difficult to obtain but he indicated that his shortness of breath is about the same or worse, chest x-ray showed worsening of the subcutaneous emphysema.    Objective   Objective     Vitals:  Temp:  [97.3 °F (36.3 °C)-97.5 °F (36.4 °C)] 97.3 °F (36.3 °C)  Heart Rate:  [70-78] 70  Resp:  [20-32] 20  BP: ()/(64-74) 105/64  Flow (L/min) (Oxygen Therapy):  [55-60] 55    Physical Exam:  Physical Exam  Constitutional:       Appearance: Normal appearance.   HENT:      Head: Normocephalic.   Cardiovascular:      Rate and Rhythm: Regular rhythm.      Heart sounds: No murmur heard.  Pulmonary:      Breath sounds: Rhonchi present.   Abdominal:      Palpations: Abdomen is soft.   Musculoskeletal:      Right lower leg: No edema.      Left lower leg: No edema.   Skin:     General: Skin is warm.   Neurological:      General: No focal deficit present.      Mental Status: He is alert.   Psychiatric:      Comments: Patient looks anxious         Result Review    Result Review:  I have personally reviewed the results from the time of this admission to 25 7:00 PM EDT and agree with these findings:  [x]  Laboratory  []  Microbiology  []  Radiology  [x]  EKG/Telemetry   []  Cardiology/Vascular   []  Pathology  []  Old records  []  Other:    Most notable findings include: 75-year-old gentleman with persistent pulmonary infiltrates related to pulmonary fibrosis, ARDS type II syndrome, at present there is no evidence of congestive failure clinically, CT on 516 showed  evidence of possible multifocal pneumonia persistent pulmonary infiltrates.  Patient has prerenal azotemia.    Assessment & Plan   Assessment / Plan     Active Hospital Problems:  Active Hospital Problems    Diagnosis     **Acute hypoxic respiratory failure        Plan:   Continue current respiratory support as per pulmonology and antibiotic therapy    DVT prophylaxis: Anticoagulated    CODE STATUS:    Code Status and Medical Interventions: No CPR (Do Not Attempt to Resuscitate); Limited Support; No intubation (DNI)   Ordered at: 05/05/25 1213     Code Status (Patient has no pulse and is not breathing):    No CPR (Do Not Attempt to Resuscitate)     Medical Interventions (Patient has pulse or is breathing):    Limited Support     Medical Intervention Limits:    No intubation (DNI)       Disposition:  I expect patient to be discharged patient condition is serious.    Electronically signed by Juliocesar Giang MD, 05/20/25, 7:00 PM EDT.

## 2025-05-20 NOTE — PROGRESS NOTES
RT EQUIPMENT DEVICE RELATED - SKIN ASSESSMENT    RT Medical Equipment/Device:     High Flow Nasal Cannula:Airvo    Skin Assessment:      Cheek:  Intact  Ears:  Intact  Nares:  Intact    Device Skin Pressure Protection:  Positioning supports utilized, Pressure points protected, and Skin-to-device areas padded:  None Required    Nurse Notification:  Nessa Watson, RRT

## 2025-05-20 NOTE — THERAPY EVALUATION
RT EQUIPMENT DEVICE RELATED - SKIN ASSESSMENT    RT Medical Equipment/Device:     High Flow Nasal Cannula:Airvo    Skin Assessment:      Cheek:  Intact  Ears:  Intact  Neck:  Intact  Nose:  Intact    Device Skin Pressure Protection:  Positioning supports utilized    Nurse Notification:  Nessa Nair, RRT

## 2025-05-20 NOTE — PROGRESS NOTES
Pulmonary / Critical Care Progress Note      Patient Name: Roger D Snellen  : 1950  MRN: [id number]  Attending:  Kenneth Prather MD   Date of admission: 2025    Subjective   Subjective   Follow-up for respiratory failure, decompensated congestive heart failure    Over past 24 hours: Remains on Airvo,  Bumped up to FiO2 100% because of hypoxemia     This morning,  Currently on Airvo 60/100  Remains bedridden  Is weak and fatigued  Continues to be dyspneic  Objective   Objective     Vitals:   Vital signs for last 24 hours:  Temp:  [97.3 °F (36.3 °C)-97.5 °F (36.4 °C)] 97.5 °F (36.4 °C)  Heart Rate:  [70-78] 71  Resp:  [22-32] 22  BP: ()/(61-73) 100/70    Physical Exam   Vital Signs Reviewed   General:  Alert, NAD. Lying in bed   HEENT:  PERRL, EOMI.  OP  Chest:  Clear to auscultation bilaterally, no work of breathing noted  CV: RRR, no MGR, pulses 2+, equal.  Abd:  Soft, NT, ND, + BS  EXT:  no clubbing, no cyanosis, no edema  Neuro:  A&Ox3, CN grossly intact, no focal deficits.  Skin: No rashes or lesions noted    Result Review    Result Review:  I have personally reviewed the results from the time of this admission to 2025 07:09 EDT and agree with these findings:  [x]  Laboratory  []  Microbiology  [x]  Radiology  [x]  EKG/Telemetry   [x]  Cardiology/Vascular   []  Pathology  []  Old records  []  Other:  Most notable findings include:    .      Lab 25  0551 25  1616 25  0607 25  1621 25  0519 25  1650 25  0459 25  0637 05/15/25  0419 25  0520   WBC 15.66*  --  17.35*  --  17.93*  --  18.66*  --   --   --    HEMOGLOBIN 14.8  --  14.6  --  15.2  --  14.1  --   --   --    HEMATOCRIT 44.7  --  45.2  --  46.7  --  43.6  --   --   --    PLATELETS 187  --  206  --  222  --  232  --   --   --    SODIUM 136  --  138  --  140  --  138 139 135* 136   POTASSIUM 3.9 4.4 3.3* 4.8 3.4* 4.1 3.5 3.6 3.7 3.9   CHLORIDE 99  --  98  --  99  --  98 98 97* 99    CO2 28.7  --  28.9  --  31.6*  --  28.9 30.7* 27.0 27.3   BUN 43*  --  45*  --  38*  --  35* 40* 31* 25*   CREATININE 0.75*  --  0.77  --  0.80  --  0.66* 0.66* 0.66* 0.63*   GLUCOSE 245*  --  262*  --  236*  --  220* 209* 220* 151*   CALCIUM 8.8  --  9.0  --  9.3  --  9.4 9.3 9.0 8.7   PHOSPHORUS  --   --   --   --   --   --  3.4 3.5 3.3 3.3   TOTAL PROTEIN 6.3  --  6.3  --  6.7  --  6.4  --   --   --    ALBUMIN 3.0*  --  2.9*  --  3.0*  --  2.8* 2.9* 2.8* 2.8*   GLOBULIN 3.3  --  3.4  --  3.7  --  3.6  --   --   --    CT Chest Without Contrast Diagnostic  Result Date: 5/16/2025  CT CHEST WO CONTRAST DIAGNOSTIC Date of Exam: 5/16/2025 3:22 PM EDT Indication: persistent hypoxia. Comparison: Chest radiograph 5/14/2025. Chest CT 5/11/2025 Technique: Axial CT images were obtained of the chest without contrast administration.  Reconstructed coronal and sagittal images were also obtained. Automated exposure control and iterative construction methods were used. Findings: Thyroid and thoracic inlet: No significant abnormality. Lymph nodes: No pathologic appearing lymph nodes by imaging criteria. Cardiovascular: Extensive pneumomediastinum. Cardiomegaly.. Trace pericardial effusion. Left chest wall ICD with leads terminating in the right atrium, right ventricle, and in the region of the left cardiac vein. Aorta and main pulmonary artery diameters  are within normal range.. There are coronary artery calcifications. Aortic atherosclerosis. Esophagus: No significant abnormality. Lung parenchyma: Multifocal consolidative and groundglass opacities in the right greater than left lung. Peripheral reticular and interstitial opacities Critical Findings were verbally communicated with by Silver Ahumada MD on at . As well as honeycombing in the lower lobes compatible with UIP pattern of pulmonary fibrosis. Airways: Patent trachea and mainstem bronchi. Pleura: No visible pleural effusion. Small right pneumothorax. No visible left  pneumothorax. Chest wall and osseous structures: Degenerative changes of the imaged spine. No acute osseous abnormality. Subcutaneous emphysema extending along the right chest wall into the neck soft tissues. Included abdomen: Cholecystectomy.     Impression: Impression: Small right pneumothorax. Extensive pneumomediastinum and subcutaneous emphysema extending along the right chest wall into the neck soft tissues. Multifocal consolidative and groundglass opacities in the right greater than left lung suspicious for multifocal pneumonia and/or pulmonary edema, similar to prior. Background UIP pattern of pulmonary fibrosis. Critical Findings were verbally communicated with SULAIMAN Banks by Silver Ahumada MD on 5/16/2025 at 11:18 p.m. Electronically Signed: Silver Ahumada MD  5/16/2025 11:17 PM EDT  Workstation ID: [id number]    CT Chest Without Contrast Diagnostic  Result Date: 5/11/2025  CT CHEST WO CONTRAST DIAGNOSTIC Date of Exam: 5/11/2025 8:58 AM EDT Indication: SOB, pt on amio r/o pulm. fibrosis. Comparison: CXR 5/10/2025, CT chest 5/5/2025 Technique: Axial CT images were obtained of the chest without contrast administration.  Reconstructed coronal and sagittal images were also obtained. Automated exposure control and iterative construction methods were used. Findings: Lung window images reveal extensive bilateral groundglass pulmonary opacities. The pattern and distribution is unchanged, however, the opacities are less dense in comparison to 5/5/2025. Alveolar pulmonary edema is improved. Mediastinal windows reveal no mediastinal or axillary adenopathy. The elina are obscured. AICD remains in place. A few coronary artery calcifications are evident. The gallbladder is absent, surgical clips are seen in the gallbladder bed.     Impression: Impression: CT scan of the chest without IV contrast demonstrating extensive bilateral groundglass pulmonary opacities. The pattern and distribution is unchanged in comparison to  5/5/2025. Alveolar edema is improved. AICD in place. Electronically Signed: Anders Hampton MD  5/11/2025 9:51 AM EDT  Workstation ID: [id number]    CT Angiogram Chest Pulmonary Embolism  Result Date: 5/5/2025  CT ANGIOGRAM CHEST PULMONARY EMBOLISM Date of Exam: 5/5/2025 11:58 AM EDT Indication: eval SOA r/o PE, ?pulm edema vs infiltrate. Comparison: Chest radiograph dated 5/5/2025 Technique: Axial CT images were obtained of the chest after the uneventful intravenous administration of iodinated contrast utilizing pulmonary embolism protocol.  Reconstructed coronal and sagittal images were also obtained. In addition, a 3-D volume rendered image was created for interpretation.  Automated exposure control and iterative construction methods were used. Findings: The visualized soft tissue structures at the base of the neck appear within normal limits. There is no lower cervical or axillary adenopathy. There is a left chest wall ICD with leads in expected position. There is cardiomegaly. There is reflux of contrast into the hepatic veins and IVC. The ascending thoracic aorta is dilated measuring up to 4.4 cm of the main pulmonary artery bifurcation. The main pulmonary artery is mildly dilated. There are no filling defects within the pulmonary arterial system to suggest presence of underlying pulmonary embolism. There is no mediastinal or hilar lymphadenopathy by size criteria. The tracheobronchial tree is patent. There are patchy groundglass opacities throughout both lungs. There are trace bilateral pleural effusions. There is either paraseptal emphysema or honeycombing within the bilateral lower lobes dependently. There is smooth interlobular septal thickening. The esophagus is normal in course and caliber. Visualized portions of the upper abdomen demonstrate no acute findings. There is a large colonic stool burden. There is degenerative disc disease of the thoracic spine.     Impression: Impression: 1.No evidence of  pulmonary embolism. 2.Cardiomegaly with reflux of contrast into the hepatic veins and IVC which can be seen with right heart dysfunction. 3.Patchy groundglass opacities throughout both lungs with smooth interlobular septal thickening and trace bilateral pleural effusions. Findings are favored to represent interstitial and alveolar pulmonary edema over bilateral pneumonia. 4.Dilated ascending thoracic aorta measuring up to 4.4 cm. Mild dilatation of the main pulmonary artery. Electronically Signed: Dayron Espana MD  5/5/2025 12:30 PM EDT  Workstation ID: [id number]    Assessment & Plan   Assessment / Plan     Active Hospital Problems:  Active Hospital Problems    Diagnosis     **Acute hypoxic respiratory failure      Impression:  Acute hypoxic respiratory failure  Multifocal pneumonia from unknown organism  Acute cardiogenic pulmonary edema  Decompensated congestive heart failure with reduced EF  Volume overload  Recent septic arthritis of the left lower extremity on daptomycin/ceftriaxone as outpatient  Pneumomediastinum and pneumothorax     Plan:    Currently on Airvo 55 L 100% FiO2.  Continue to wean supplemental oxygen to maintain SpO2 greater than 90%  Blood gas reviewed with a pO2 of 110 on 100% Airvo  Will reduce FiO2 to 60%  Repeat chest x-ray  Continue Lasix 40 mg IV 3 times daily and Aldactone 12.5 mg oral daily  Trend renal panel electrolytes.  Replace electrolytes as needed.  Continue Solu-Medrol   2D echo did show EF with 38%  Continue advanced heart failure medications  Continue Eliquis  Continue Zosyn for pneumonia  DVT prophylaxis, on Eliquis  S/p bronchoscopy, pneumonia panel negative, fungal culture positive for moderate growth Candida  Continue fluconazole 200 mg IV x 7 days     Pharmacologic VTE prophylaxis orders are present.    CODE STATUS:   Code Status (Patient has no pulse and is not breathing): No CPR (Do Not Attempt to Resuscitate)  Medical Interventions (Patient has pulse or is  breathing): Limited Support  Medical Intervention Limits: No intubation (DNI)    I have reviewed labs, imaging, pertinent clinical data and provider notes.   I have discussed with bedside nurse and primary service.   Electronically signed by Danny Galvan MD, 05/20/25, 2:52 PM EDT.

## 2025-05-21 NOTE — THERAPY TREATMENT NOTE
Acute Care - Physical Therapy Treatment Note   [facility]     Patient Name: Roger D Snellen  : 1950  MRN: [id number]  Today's Date: 2025      Visit Dx:     ICD-10-CM ICD-9-CM   1. Acute hypoxemic respiratory failure  J96.01 518.81   2. Acute on chronic systolic congestive heart failure  I50.23 428.23     428.0   3. Acute pulmonary edema  J81.0 518.4   4. Difficulty walking  R26.2 719.7   5. Impaired mobility and ADLs  Z74.09 V49.89    Z78.9    6. Acute hypoxic respiratory failure  J96.01 518.81     Patient Active Problem List   Diagnosis    Non-ischemic cardiomyopathy    Acute hypoxic respiratory failure     Past Medical History:   Diagnosis Date    Arrhythmia     ATRIAL FIB, PACE TERMINATED VT    Atrial fibrillation     BBB (bundle branch block)     L BBB    CHF (congestive heart failure)     Diabetes mellitus     Disease of thyroid gland     GERD (gastroesophageal reflux disease)     Hyperlipidemia     Hypertension     Non-ischemic cardiomyopathy     DILATED CM EF 20-25% 2017    Osteoarthritis      Past Surgical History:   Procedure Laterality Date    BRONCHOSCOPY N/A 2025    Procedure: BRONCHOSCOPY WITH BRONCHOALVEOLAR LAVAGE, WASHING, AIRWAY INSPECTION: insertion of lighted instrument to view inside the lung;  Surgeon: Tae Mosley MD;  Location: [facility] MAIN OR;  Service: Pulmonary;  Laterality: N/A;    CARDIAC ELECTROPHYSIOLOGY PROCEDURE N/A 2017    Procedure: Device Upgrade  ICD TO A BIV ICD   medtronic;  Surgeon: Chris Phillips MD;  Location: [facility] INVASIVE LOCATION;  Service:     CHOLECYSTECTOMY      EYE SURGERY Left 2025    Retina Repair    INSERT / REPLACE / REMOVE PACEMAKER      INTERNAL CARDIAC DEFIBRILLATOR INSERTION      MEDTRONIC    SHOULDER ARTHROSCOPY      TOTAL KNEE ARTHROPLASTY       PT Assessment (Last 12 Hours)       PT Evaluation and Treatment       Row Name 25 1521          Physical Therapy Time and Intention    Subjective Information complains  of;fatigue;dyspnea  -SM     Document Type therapy note (daily note)  -SM     Mode of Treatment individual therapy;physical therapy  -SM     Patient Effort good  -SM     Symptoms Noted During/After Treatment fatigue;shortness of breath  -SM       Row Name 05/21/25 1521          Safety Issues/Impairments Affecting Functional Mobility    Impairments Affecting Function (Mobility) balance;endurance/activity tolerance;shortness of breath  -SM       Row Name 05/21/25 1521          Motor Skills    Therapeutic Exercise hip;knee;ankle  20x ankle pumps, LAQ's, Seated marches  -SM       Row Name             Wound 05/05/25 1500 medial coccyx Pressure Injury    Wound - Properties Group Placement Date: 05/05/25  -JR Placement Time: 1500  -JR Present on Original Admission: Y  -JR Orientation: medial  -JR Location: coccyx  -JR Primary Wound Type: Pressure Inj  -JR    Retired Wound - Properties Group Placement Date: 05/05/25  -JR Placement Time: 1500  -JR Present on Original Admission: Y  -JR Orientation: medial  -JR Location: coccyx  -JR    Retired Wound - Properties Group Placement Date: 05/05/25  -JR Placement Time: 1500  -JR Present on Original Admission: Y  -JR Orientation: medial  -JR Location: coccyx  -JR    Retired Wound - Properties Group Date first assessed: 05/05/25  -JR Time first assessed: 1500  -JR Present on Original Admission: Y  -JR Location: coccyx  -JR      Row Name             Wound 05/13/25 0800 Left posterior ear Pressure Injury    Wound - Properties Group Placement Date: 05/13/25  -RB Placement Time: 0800  -RB Side: Left  -RB Orientation: posterior  -RB Location: ear  -RB Primary Wound Type: Pressure Inj  -RB    Retired Wound - Properties Group Placement Date: 05/13/25  -RB Placement Time: 0800  -RB Side: Left  -RB Orientation: posterior  -RB Location: ear  -RB    Retired Wound - Properties Group Placement Date: 05/13/25  -RB Placement Time: 0800  -RB Side: Left  -RB Orientation: posterior  -RB Location: ear   -RB    Retired Wound - Properties Group Date first assessed: 05/13/25  -RB Time first assessed: 0800  -RB Side: Left  -RB Location: ear  -RB      Row Name             Wound 05/13/25 0800 Right posterior ear Pressure Injury    Wound - Properties Group Placement Date: 05/13/25  -RB Placement Time: 0800  -RB Side: Right  -RB Orientation: posterior  -RB Location: ear  -RB Primary Wound Type: Pressure Inj  -RB    Retired Wound - Properties Group Placement Date: 05/13/25  -RB Placement Time: 0800  -RB Side: Right  -RB Orientation: posterior  -RB Location: ear  -RB    Retired Wound - Properties Group Placement Date: 05/13/25  -RB Placement Time: 0800  -RB Side: Right  -RB Orientation: posterior  -RB Location: ear  -RB    Retired Wound - Properties Group Date first assessed: 05/13/25  -RB Time first assessed: 0800  -RB Side: Right  -RB Location: ear  -RB      Row Name             Wound 05/13/25 1246 Right posterior heel Pressure Injury    Wound - Properties Group Placement Date: 05/13/25  -NH Placement Time: 1246  -NH Side: Right  -NH Orientation: posterior  -NH Location: heel  -NH Primary Wound Type: Pressure Inj  -NH Wound Outcome: --  -WILLIAN Removal Date: --  -WILLIAN Removal Time: --  -IWLLIAN    Retired Wound - Properties Group Placement Date: 05/13/25  -NH Placement Time: 1246  -NH Side: Right  -NH Orientation: posterior  -NH Location: heel  -NH Wound Outcome: --  -WILLIAN Removal Date: --  -WILLIAN Removal Time: --  -WILLIAN    Retired Wound - Properties Group Placement Date: 05/13/25  -NH Placement Time: 1246  -NH Side: Right  -NH Orientation: posterior  -NH Location: heel  -NH Removal Date: --  -WILLIAN Removal Time: --  -WILLIAN Wound Outcome: --  -WILLIAN    Retired Wound - Properties Group Date first assessed: 05/13/25  -NH Time first assessed: 1246  -NH Side: Right  -NH Location: heel  -NH Resolution Date: --  -WILLIAN Resolution Time: --  -WILLIAN Wound Outcome: --  -WILLIAN      Row Name             Wound 05/20/25 0845 Left posterior plantar Traumatic    Wound -  Properties Group Placement Date: 05/20/25  -WILLIAN Placement Time: 0845 -JP Side: Left  -WILLIAN Orientation: posterior  -WILLIAN Location: plantar  -WILLIAN Primary Wound Type: Traumatic  -WILLIAN    Retired Wound - Properties Group Placement Date: 05/20/25  -WILLIAN Placement Time: 0845 -JP Side: Left  -WILLIAN Orientation: posterior  -WILLIAN Location: plantar  -WILLIAN    Retired Wound - Properties Group Placement Date: 05/20/25  -WILLIAN Placement Time: 0845 -WILLIAN Side: Left  -WILLIAN Orientation: posterior  -WILLIAN Location: plantar  -WILLIAN    Retired Wound - Properties Group Date first assessed: 05/20/25  -WILLIAN Time first assessed: 0845 -WILLIAN Side: Left  -WILLIAN Location: plantar  -WILLIAN      Row Name 05/21/25 1521          Vital Signs    Intra SpO2 (%) --  Ranged between 80-90 during exercises, approx 3 min each rest to recover to 90%  -     O2 Delivery Intra Treatment hi-flow  -       Row Name 05/21/25 1521          Positioning and Restraints    Pre-Treatment Position sitting in chair/recliner  -     Post Treatment Position chair  -SM     In Chair reclined;call light within reach;encouraged to call for assist;exit alarm on;legs elevated  -       Row Name 05/21/25 1521          Progress Summary (PT)    Progress Toward Functional Goals (PT) progress toward functional goals is fair  -SM               User Key  (r) = Recorded By, (t) = Taken By, (c) = Cosigned By      Initials Name Provider Type    Jill Allen RN Registered Nurse    Jocelyne Castellano RN Registered Nurse    Arelis Guzman RN Registered Nurse    Michelle Aceves PTA Physical Therapist Assistant    Peterson Daigle, RN Registered Nurse                    Physical Therapy Education       Title: PT OT SLP Therapies (In Progress)       Topic: Physical Therapy (In Progress)       Point: Mobility training (Done)       Learning Progress Summary            Patient Acceptance, E,TB, VU by AV at 5/6/2025 1336                      Point: Home exercise program (Not Started)       Learner Progress:   Not documented in this visit.              Point: Body mechanics (Done)       Learning Progress Summary            Patient Acceptance, E,TB, VU by AV at 5/6/2025 1336                      Point: Precautions (Done)       Learning Progress Summary            Patient Acceptance, E,TB, VU by AV at 5/6/2025 1336                                      User Key       Initials Effective Dates Name Provider Type Discipline     06/11/21 -  Samson Cordoba, PT Physical Therapist PT                  PT Recommendation and Plan     Progress Summary (PT)  Progress Toward Functional Goals (PT): progress toward functional goals is fair   Outcome Measures       Row Name 05/21/25 1523 05/19/25 1500          How much help from another person do you currently need...    Turning from your back to your side while in flat bed without using bedrails? 4  -SM 4  -RH     Moving from lying on back to sitting on the side of a flat bed without bedrails? 4  -SM 4  -RH     Moving to and from a bed to a chair (including a wheelchair)? 3  -SM 3  -RH     Standing up from a chair using your arms (e.g., wheelchair, bedside chair)? 3  -SM 3  -RH     Climbing 3-5 steps with a railing? 2  -SM 2  -RH     To walk in hospital room? 2  -SM 3  -RH     AM-PAC 6 Clicks Score (PT) 18  -SM 19  -RH               User Key  (r) = Recorded By, (t) = Taken By, (c) = Cosigned By      Initials Name Provider Type     Mick Woods PTA Physical Therapist Assistant    Michelle Aceves PTA Physical Therapist Assistant                     Time Calculation:    PT Charges       Row Name 05/21/25 1524             Time Calculation    PT Received On 05/21/25  -SM         Timed Charges    96029 - PT Therapeutic Exercise Minutes 15  -SM         Total Minutes    Timed Charges Total Minutes 15  -SM       Total Minutes 15  -SM                User Key  (r) = Recorded By, (t) = Taken By, (c) = Cosigned By      Initials Name Provider Type    Michelle Aceves PTA Physical Therapist  Assistant                      PT G-Codes  Outcome Measure Options: AM-PAC 6 Clicks Daily Activity (OT), Optimal Instrument  AM-PAC 6 Clicks Score (PT): 18  AM-PAC 6 Clicks Score (OT): 21    Michelle Murphy, PTA  5/21/2025

## 2025-05-21 NOTE — PLAN OF CARE
Goal Outcome Evaluation:  Plan of Care Reviewed With: patient        Progress: no change  Outcome Evaluation: ALERT AND ORIENTED, UP TO CHAIR TODAY, REFUSED SPECIALTY MATTRESS, PATIENT PLACED BACK ON STANDARD BED MATTRESS WOUND CARE NOTIFIED

## 2025-05-21 NOTE — PROGRESS NOTES
RT EQUIPMENT DEVICE RELATED - SKIN ASSESSMENT    RT Medical Equipment/Device:     High Flow Nasal Cannula:Airvo    Skin Assessment:      Cheek:  Intact  Ears:  Intact  Nares:  Intact    Device Skin Pressure Protection:  Positioning supports utilized and Pressure points protected    Nurse Notification:  Nessa Cobian, CRT

## 2025-05-21 NOTE — PROGRESS NOTES
[facility]   Hospitalist Progress Note  Date: 2025  Patient Name: Roger D Snellen  : 1950  MRN: [id number]  Date of admission: 2025      Subjective   Subjective     Chief Complaint: Follow-up shortness of breath  Summary:Roger D Snellen is a 75 y.o. male history of A-fib on anticoagulation, heart failure with preserved ejection fraction, diabetes, COPD, hypothyroidism, GERD, hyperlipidemia, hypertension endorses slowly worsening shortness of breath. Patient presented to the emergency department hypoxic 81% on room air. Patient with a heart rate of 70, respiratory rate of 28 and a blood pressure 122/72 initially in the emergency department. Quickly titrated up on high flow nasal cannula eventually transitioned over to BiPAP, however due to intolerance patient transition to Airvo to maintain O2 saturations. Patient's initial ABG showed a pH of 7.5, PCO2 of 27.5, PO2 of 65.8. proBNP elevated at 6386. Troponin 32 then 31, no complaints of chest pain. On labs patient with a bicarb of 19.4, anion gap of 15.6, creatinine 0.73. Patient had AST ALT elevations of 114/96 respectively. Procalcitonin elevated 0.41. Lactate wnl x 2. White count of 12.8, hemoglobin 11.3 and platelets of 333.  CT scan with contrast obtained to rule out pulmonary embolus. No evidence of PE. Cardiomegaly, seen in right heart failure. Patchy groundglass opacities throughout both lungs with smooth interlobular septal thickening and trace bilateral pleural effusions. Representing pulmonary edema over bilateral pneumonia. Given patient's work of breathing and oxygen demand, admitted to the ICU. Overnight patient did well and has been weaned down to 7 L high flow nasal cannula. Transferred out of the unit on May 6. Patient seen by pulmonary and cardiology. Patient had bronchoscopy May 8 with suctioning of mucous plugs and purulent secretions. Cultures negative to date. Rheumatoid factor positive but rest of autoimmune panel including  anti-CCP negative. Patient follows with Dr. Cole arriola as an outpatient for pulmonary fibrosis with a UIP pattern. Patient finished Rocephin May 14. BAL showed Candida.  Started on fluconazole.  Respiratory function began to decline requiring AirVo.  Started on IV diuretics continue systemic steroids.  Found to have small right pneumothorax with extensive pneumomediastinum and subcutaneous emphysema as well as bilateral infiltrate on repeat CT 5/16/2025.  Continued on systemic steroids and empiric antibiotics.  Repeat chest x-ray is improving.  Patient was at [facility] prior to admission but is planning to return home with home health on discharge.    Interval Followup: Sitting up in bed with visitors appears to be resting fairly comfortably.  Patient continues to endorse shortness of breath with any exertion.  Denies productive cough.  Remains afebrile overnight.  V-paced 70s to 80s with PVCs on telemetry review.  Blood pressure low normal limits.  Have been able to wean down AirVo to 55 L 60% FiO2.   Blood sugars still elevated above goal.  Leukocytosis slightly worse.  Pro-Jose Miguel pending    Review of Systems  Constitutional: Positive for fatigue and negative fever.   HENT: Negative for sore throat and trouble swallowing.    Eyes: Negative for pain and discharge.   Respiratory: Negative for cough and positive shortness of breath.    Cardiovascular: Negative for chest pain and palpitations.   Gastrointestinal: Negative for abdominal pain, nausea and vomiting.   Endocrine: Negative for cold intolerance and heat intolerance.   Genitourinary: Negative for difficulty urinating and dysuria.   Musculoskeletal: Negative for back pain and neck stiffness.   Skin: Negative for color change and rash.   Neurological: Negative for syncope and headaches.   Hematological: Negative for adenopathy.   Psychiatric/Behavioral: Negative for confusion and hallucinations.    Objective   Objective     Vitals:   Temp:  [97.3 °F (36.3  °C)-98.1 °F (36.7 °C)] 98.1 °F (36.7 °C)  Heart Rate:  [70-76] 70  Resp:  [16-20] 18  BP: ()/(60-72) 92/62  Flow (L/min) (Oxygen Therapy):  [55] 55  Physical Exam   General: well-developed appearing stated age in no acute distress  HEENT: Normocephalic atraumatic moist membranes pupils equal round reactive light, no scleral icterus no conjunctival injection  Cardiovascular: regular trace lower extremity edema appreciated  Pulmonary: Crackles bilateral bases no wheezes or rhonchi symmetric chest expansion, unlabored, no conversational dyspnea appreciated  Gastrointestinal: Soft nontender nondistended positive bowel sounds all 4 quadrants no rebound or guarding  Musculoskeletal: No clubbing cyanosis, warm and well-perfused, calves soft symmetric nontender bilaterally  Skin: Clean dry without rashes  Neuro: Cranial nerves II through XII intact grossly no sensorimotor deficits appreciated bilateral upper and lower extremities  Psych: Patient is calm cooperative and appropriate with exam not responding to internal stimuli  : No Oates catheter no bladder distention no suprapubic tenderness    Result Review    Result Review:  I have personally reviewed these results and agree with these findings:  [x]  Laboratory  LAB RESULTS:      Lab 05/21/25  0509 05/20/25  0551 05/19/25  0607 05/18/25  0519 05/17/25  0459   WBC 17.13* 15.66* 17.35* 17.93* 18.66*   HEMOGLOBIN 14.9 14.8 14.6 15.2 14.1   HEMATOCRIT 46.0 44.7 45.2 46.7 43.6   PLATELETS 174 187 206 222 232   NEUTROS ABS  --  14.46* 15.98* 16.46* 17.09*   IMMATURE GRANS (ABS)  --  0.19* 0.16* 0.17* 0.17*   LYMPHS ABS  --  0.62* 0.73 0.74 0.78   MONOS ABS  --  0.38 0.45 0.52 0.59   EOS ABS  --  0.00 0.00 0.00 0.00   MCV 95.0 95.3 95.0 95.1 95.6   CRP  --   --   --   --  1.08*         Lab 05/21/25  0509 05/20/25  0551 05/19/25  1616 05/19/25  0607 05/18/25  1621 05/18/25  0519 05/17/25  1650 05/17/25  0459 05/16/25  0637 05/15/25  0419   SODIUM 139 136  --  138  --   140  --  138 139 135*   POTASSIUM 4.0 3.9 4.4 3.3* 4.8 3.4*   < > 3.5 3.6 3.7   CHLORIDE 100 99  --  98  --  99  --  98 98 97*   CO2 28.5 28.7  --  28.9  --  31.6*  --  28.9 30.7* 27.0   ANION GAP 10.5 8.3  --  11.1  --  9.4  --  11.1 10.3 11.0   BUN 39* 43*  --  45*  --  38*  --  35* 40* 31*   CREATININE 0.68* 0.75*  --  0.77  --  0.80  --  0.66* 0.66* 0.66*   EGFR 96.3 93.5  --  92.8  --  91.7  --  97.2 97.2 97.2   GLUCOSE 188* 245*  --  262*  --  236*  --  220* 209* 220*   CALCIUM 9.2 8.8  --  9.0  --  9.3  --  9.4 9.3 9.0   MAGNESIUM  --   --   --   --   --   --   --   --  2.1  --    PHOSPHORUS 2.8  --   --   --   --   --   --  3.4 3.5 3.3   HEMOGLOBIN A1C  --  7.40*  --   --   --   --   --   --   --   --     < > = values in this interval not displayed.         Lab 05/21/25  0509 05/20/25  0551 05/19/25  0607 05/18/25  0519 05/17/25  0459   TOTAL PROTEIN  --  6.3 6.3 6.7 6.4   ALBUMIN 3.0* 3.0* 2.9* 3.0* 2.8*   GLOBULIN  --  3.3 3.4 3.7 3.6   ALT (SGPT)  --  89* 86* 93* 97*   AST (SGOT)  --  36 31 33 32   BILIRUBIN  --  0.8 0.7 0.7 0.6   ALK PHOS  --  67 68 78 75                       Lab 05/20/25  0908   PH, ARTERIAL 7.488*   PCO2, ARTERIAL 42.1   PO2 .7*   O2 SATURATION ART 98.7   FIO2 100   HCO3 ART 31.9*   BASE EXCESS ART 7.6*     Brief Urine Lab Results  (Last result in the past 365 days)        Color   Clarity   Blood   Leuk Est   Nitrite   Protein   CREAT   Urine HCG        05/05/25 1451 Yellow   Clear   Negative   Negative   Negative   Negative                 Microbiology Results (last 10 days)       ** No results found for the last 240 hours. **            []  Microbiology  [x]  Radiology  XR Chest 1 View  Result Date: 5/20/2025  Impression: 1.Small right apical pneumothorax is similar compared to 5/17/2025 chest x-ray and was not visualized on 5/18/2025 chest x-ray. Subcutaneous emphysema appears increased compared to 5/18/2025 chest x-ray. Similar pneumomediastinum.. 2.Stable appearance of  diffuse right lung and left lower lung interstitial and airspace opacities. Electronically Signed: Jacqueline Dong MD  5/20/2025 11:55 AM EDT  Workstation ID: [id number]    XR Chest 1 View  Result Date: 5/18/2025  Impression: Improving pulmonary opacities superimposed on known sequelae of chronic interstitial lung disease/fibrosis. No significant visualized pneumothorax. Subcutaneous emphysema is decreasing from prior exam. Electronically Signed: Rickey Markham MD  5/18/2025 2:28 PM EDT  Workstation ID: [id number]    XR Chest 1 View  Result Date: 5/17/2025  1.  Bilateral infiltrates persist, right greater than left. The findings may represent infectious multifocal pneumonia. Please correlate clinically. 2.  There is a tiny right apical pneumothorax. Pneumomediastinum is noted. No definite left pneumothorax is seen on this single AP (anteroposterior) upright portable chest radiograph. 3.  Mild subcutaneous emphysema is seen. 4.  Please see above comments for further detail.   Portions of this note were completed with a voice recognition program.  5/17/2025 5:30 AM by Romeo Winters MD on Workstation: [employer]      CT Chest Without Contrast Diagnostic  Result Date: 5/16/2025  Impression: Small right pneumothorax. Extensive pneumomediastinum and subcutaneous emphysema extending along the right chest wall into the neck soft tissues. Multifocal consolidative and groundglass opacities in the right greater than left lung suspicious for multifocal pneumonia and/or pulmonary edema, similar to prior. Background UIP pattern of pulmonary fibrosis. Critical Findings were verbally communicated with SULAIMAN Banks by Silver Ahumada MD on 5/16/2025 at 11:18 p.m. Electronically Signed: Silver Ahumada MD  5/16/2025 11:17 PM EDT  Workstation ID: [id number]    XR Chest 1 View  Result Date: 5/14/2025  Impression: 1. Previously seen air lucency adjacent to the right hilum is not evident on the current study. No evidence of pneumothorax or  pneumomediastinum. 2. Widespread bilateral airspace opacities, right greater than left, with slight interval worsening of the right upper lung. 3. Cardiomegaly. Electronically Signed: Sourav Nair MD  5/14/2025 11:16 AM EDT  Workstation ID: [id number]    XR Chest 1 View  Result Date: 5/14/2025  1.Bilateral fairly diffuse infiltrates, right worse than left. These are not significantly changed. 2.Lucency projecting near the right hilum. This could potentially reflect pneumomediastinum or small pneumothorax, although this would be an unusual appearance. [place] follow-up repeat chest x-ray recommended. Electronically Signed: Too Pastor MD  5/14/2025 5:40 AM EDT  Workstation ID: [id number]      [x]  EKG/Telemetry   [x]  Cardiology/Vascular  Transthoracic echocardiogram 5/16/2025    There is moderate calcification of the aortic valve.     The left ventricle is normal in size and show significant hypokinesis of the distal septum and apex the proximal septum, lateral wall and inferior wall contract well the estimation fraction is 35%.  The bubble study showed no evidence of AV shunt.    []  Pathology  []  Old records  [x]  Other:  Scheduled Meds:apixaban, 5 mg, Oral, Q12H  arformoterol, 15 mcg, Nebulization, BID - RT  budesonide, 0.5 mg, Nebulization, BID - RT  cholecalciferol, 2,000 Units, Oral, Daily  empagliflozin, 10 mg, Oral, Daily  fluconazole, 200 mg, Intravenous, Q24H  furosemide, 40 mg, Intravenous, TID  insulin glargine, 8 Units, Subcutaneous, Daily  insulin lispro, 2-7 Units, Subcutaneous, 4x Daily AC & at Bedtime  levothyroxine, 100 mcg, Oral, Q AM  magnesium oxide, 400 mg, Oral, BID  methylPREDNISolone sodium succinate, 40 mg, Intravenous, Q6H  midodrine, 15 mg, Oral, TID AC  mineral oil-hydrophilic petrolatum, 1 Application, Topical, Daily  pantoprazole, 40 mg, Oral, QAM  potassium chloride, 10 mEq, Oral, TID With Meals  sacubitril-valsartan, 1 tablet, Oral, Q12H  sodium chloride, 10 mL, Intravenous,  Q12H  sodium chloride, 4 mL, Nebulization, BID - RT  spironolactone, 12.5 mg, Oral, Daily      Continuous Infusions:   PRN Meds:.  acetaminophen    senna-docusate sodium **AND** polyethylene glycol **AND** bisacodyl **AND** bisacodyl    calcium carbonate    Calcium Replacement - Follow Nurse / BPA Driven Protocol    dextrose    dextrose    glucagon (human recombinant)    ipratropium-albuterol    Magnesium Cardiology Dose Replacement - Follow Nurse / BPA Driven Protocol    Morphine    nitroglycerin    [Held by provider] ondansetron    [Held by provider] ondansetron ODT    oxyCODONE    Phosphorus Replacement - Follow Nurse / BPA Driven Protocol    Potassium Replacement - Follow Nurse / BPA Driven Protocol    sodium chloride    sodium chloride    sodium chloride    sodium chloride      Assessment & Plan   Assessment / Plan     Assessment/Plan:  Acute hypoxemic respiratory failure.   ARDS  Likely acute flare of pulmonary fibrosis with UIP pattern.  Bilateral pneumonia  Small right pneumothorax  SubCutaneous emphysema  Pneumomediastinum  Mucous plugs.  Status post bronchoscopy May 8.  BAL with Candida likely colonization.  Acute on chronic systolic CHF EF of 38%.  Cardiogenic pulmonary edema  Moderate TR.  COPD exacerbation   History of A-fib on Eliquis.  Status post PPM/AICD.  Recent right knee septic arthritis and left foot infection on IV daptomycin/Rocephin via RUE PICC line until May 15.  Transaminitis.  Likely hepatic congestion from CHF.  Improving  Rheumatoid factor positive.  Other autoimmune panel negative no symptoms.  Dilated ascending thoracic aorta 4.4 cm.  Left bundle branch block.  Diabetes mellitus A1c 7.4        Patient admitted for further evaluation and treatment  Pulmonology and cardiology consulted thank you for assistance  Continue AirVo titrate to keep sats between 90 and 96%  Continue Lasix 40 mg to 3 times daily   Continue to monitor renal function electrolytes closely during diuresis  Continue  to replace electrolytes as needed  Continue midodrine 3 times daily and titrate as needed  Continue Solu-Medrol 40 mg IV every 6 and taper per pulmonology  Stop piperacillin/tazobactam as patient has completed of 5-day course  Follow-up repeat Pro-Jose Miguel  Continue Brovana, Pulmicort scheduled with as needed DuoNebs  Continue bronchopulmonary hygiene protocol  Continue fluconazole per pulmonology  Continue spironolactone, Entresto, Jardiance  Would benefit from beta-blocker if blood pressure can tolerate  Continue Eliquis  Completed course of antibiotics for septic arthritis  Transaminases stable.  Patient to follow-up with cardiology for monitoring of dilation of ascending thoracic aorta  A1c elevated 7.4  Continue sliding scale insulin ACHS  Increase Lantus to 14 units daily and titrate as needed  Further inpatient orders recommendations pending clinical course            Discussed plan with bedside RN as well as pulmonology team.    Disposition: Patient wishing to return home with home health once respiratory function improves.  Form copied  VTE Prophylaxis:  Pharmacologic VTE prophylaxis orders are present.        CODE STATUS:   Code Status (Patient has no pulse and is not breathing): No CPR (Do Not Attempt to Resuscitate)  Medical Interventions (Patient has pulse or is breathing): Limited Support  Medical Intervention Limits: No intubation (DNI)          I confirmed that all copied/forwarded documentation in this record has been carefully reviewed, updated as necessary and accurately reflects the patient's current status and plan of care.

## 2025-05-21 NOTE — PLAN OF CARE
Goal Outcome Evaluation:           Progress: no change  Outcome Evaluation: A&Ox4. VVS on Airvo.  Tele is V-paced.  PRN pain medication x 1 for back effective per patient. See MAR.  Continues on IV ATB with no adverse reactions.   with 3 units coverage. No acute distress noted. Care plan continues.  Call bellw within reach at all times for needs and safety.

## 2025-05-21 NOTE — PROGRESS NOTES
Pulmonary / Critical Care Progress Note      Patient Name: Roger D Snellen  : 1950  MRN: [id number]  Attending:  Kenneth Prather MD   Date of admission: 2025    Subjective   Subjective   Follow-up for respiratory failure, decompensated congestive heart failure    Over past 24 hours: Remains on Airvo,  Bumped up to FiO2 100% because of hypoxemia     This morning,  Currently on Airvo 55/100  Remains bedridden  Is weak and fatigued  Continues to be dyspneic  Objective   Objective     Vitals:   Vital signs for last 24 hours:  Temp:  [97.3 °F (36.3 °C)-97.7 °F (36.5 °C)] 97.3 °F (36.3 °C)  Heart Rate:  [70-76] 76  Resp:  [16-20] 18  BP: ()/(60-72) 94/60    Physical Exam   Vital Signs Reviewed   General:  Alert, NAD. Lying in bed   HEENT:  PERRL, EOMI.  OP  Chest:  Clear to auscultation bilaterally, occasional basal crackles   CV: RRR, no MGR, pulses 2+, equal.  Abd:  Soft, NT, ND, + BS  EXT:  no clubbing, no cyanosis, no edema  Neuro:  A&Ox3, CN grossly intact, no focal deficits.  Skin: No rashes or lesions noted    Result Review    Result Review:  I have personally reviewed the results from the time of this admission to 2025 07:58 EDT and agree with these findings:  [x]  Laboratory  []  Microbiology  [x]  Radiology  [x]  EKG/Telemetry   [x]  Cardiology/Vascular   []  Pathology  []  Old records  []  Other:  Most notable findings include:    .      Lab 25  0509 25  0551 25  1616 25  0607 25  1621 25  0519 25  1650 25  0459 25  0637 05/15/25  0419   WBC 17.13* 15.66*  --  17.35*  --  17.93*  --  18.66*  --   --    HEMOGLOBIN 14.9 14.8  --  14.6  --  15.2  --  14.1  --   --    HEMATOCRIT 46.0 44.7  --  45.2  --  46.7  --  43.6  --   --    PLATELETS 174 187  --  206  --  222  --  232  --   --    SODIUM 139 136  --  138  --  140  --  138 139 135*   POTASSIUM 4.0 3.9 4.4 3.3* 4.8 3.4* 4.1 3.5 3.6 3.7   CHLORIDE 100 99  --  98  --  99  --  98 98 97*    CO2 28.5 28.7  --  28.9  --  31.6*  --  28.9 30.7* 27.0   BUN 39* 43*  --  45*  --  38*  --  35* 40* 31*   CREATININE 0.68* 0.75*  --  0.77  --  0.80  --  0.66* 0.66* 0.66*   GLUCOSE 188* 245*  --  262*  --  236*  --  220* 209* 220*   CALCIUM 9.2 8.8  --  9.0  --  9.3  --  9.4 9.3 9.0   PHOSPHORUS 2.8  --   --   --   --   --   --  3.4 3.5 3.3   TOTAL PROTEIN  --  6.3  --  6.3  --  6.7  --  6.4  --   --    ALBUMIN 3.0* 3.0*  --  2.9*  --  3.0*  --  2.8* 2.9* 2.8*   GLOBULIN  --  3.3  --  3.4  --  3.7  --  3.6  --   --    CT Chest Without Contrast Diagnostic  Result Date: 5/16/2025  CT CHEST WO CONTRAST DIAGNOSTIC Date of Exam: 5/16/2025 3:22 PM EDT Indication: persistent hypoxia. Comparison: Chest radiograph 5/14/2025. Chest CT 5/11/2025 Technique: Axial CT images were obtained of the chest without contrast administration.  Reconstructed coronal and sagittal images were also obtained. Automated exposure control and iterative construction methods were used. Findings: Thyroid and thoracic inlet: No significant abnormality. Lymph nodes: No pathologic appearing lymph nodes by imaging criteria. Cardiovascular: Extensive pneumomediastinum. Cardiomegaly.. Trace pericardial effusion. Left chest wall ICD with leads terminating in the right atrium, right ventricle, and in the region of the left cardiac vein. Aorta and main pulmonary artery diameters  are within normal range.. There are coronary artery calcifications. Aortic atherosclerosis. Esophagus: No significant abnormality. Lung parenchyma: Multifocal consolidative and groundglass opacities in the right greater than left lung. Peripheral reticular and interstitial opacities Critical Findings were verbally communicated with by Silver Ahumada MD on at . As well as honeycombing in the lower lobes compatible with UIP pattern of pulmonary fibrosis. Airways: Patent trachea and mainstem bronchi. Pleura: No visible pleural effusion. Small right pneumothorax. No visible left  pneumothorax. Chest wall and osseous structures: Degenerative changes of the imaged spine. No acute osseous abnormality. Subcutaneous emphysema extending along the right chest wall into the neck soft tissues. Included abdomen: Cholecystectomy.     Impression: Impression: Small right pneumothorax. Extensive pneumomediastinum and subcutaneous emphysema extending along the right chest wall into the neck soft tissues. Multifocal consolidative and groundglass opacities in the right greater than left lung suspicious for multifocal pneumonia and/or pulmonary edema, similar to prior. Background UIP pattern of pulmonary fibrosis. Critical Findings were verbally communicated with SULAIMAN Banks by Silver Ahumada MD on 5/16/2025 at 11:18 p.m. Electronically Signed: Silver Ahumada MD  5/16/2025 11:17 PM EDT  Workstation ID: [id number]    CT Chest Without Contrast Diagnostic  Result Date: 5/11/2025  CT CHEST WO CONTRAST DIAGNOSTIC Date of Exam: 5/11/2025 8:58 AM EDT Indication: SOB, pt on amio r/o pulm. fibrosis. Comparison: CXR 5/10/2025, CT chest 5/5/2025 Technique: Axial CT images were obtained of the chest without contrast administration.  Reconstructed coronal and sagittal images were also obtained. Automated exposure control and iterative construction methods were used. Findings: Lung window images reveal extensive bilateral groundglass pulmonary opacities. The pattern and distribution is unchanged, however, the opacities are less dense in comparison to 5/5/2025. Alveolar pulmonary edema is improved. Mediastinal windows reveal no mediastinal or axillary adenopathy. The elina are obscured. AICD remains in place. A few coronary artery calcifications are evident. The gallbladder is absent, surgical clips are seen in the gallbladder bed.     Impression: Impression: CT scan of the chest without IV contrast demonstrating extensive bilateral groundglass pulmonary opacities. The pattern and distribution is unchanged in comparison to  5/5/2025. Alveolar edema is improved. AICD in place. Electronically Signed: Anders Hampton MD  5/11/2025 9:51 AM EDT  Workstation ID: [id number]    CT Angiogram Chest Pulmonary Embolism  Result Date: 5/5/2025  CT ANGIOGRAM CHEST PULMONARY EMBOLISM Date of Exam: 5/5/2025 11:58 AM EDT Indication: eval SOA r/o PE, ?pulm edema vs infiltrate. Comparison: Chest radiograph dated 5/5/2025 Technique: Axial CT images were obtained of the chest after the uneventful intravenous administration of iodinated contrast utilizing pulmonary embolism protocol.  Reconstructed coronal and sagittal images were also obtained. In addition, a 3-D volume rendered image was created for interpretation.  Automated exposure control and iterative construction methods were used. Findings: The visualized soft tissue structures at the base of the neck appear within normal limits. There is no lower cervical or axillary adenopathy. There is a left chest wall ICD with leads in expected position. There is cardiomegaly. There is reflux of contrast into the hepatic veins and IVC. The ascending thoracic aorta is dilated measuring up to 4.4 cm of the main pulmonary artery bifurcation. The main pulmonary artery is mildly dilated. There are no filling defects within the pulmonary arterial system to suggest presence of underlying pulmonary embolism. There is no mediastinal or hilar lymphadenopathy by size criteria. The tracheobronchial tree is patent. There are patchy groundglass opacities throughout both lungs. There are trace bilateral pleural effusions. There is either paraseptal emphysema or honeycombing within the bilateral lower lobes dependently. There is smooth interlobular septal thickening. The esophagus is normal in course and caliber. Visualized portions of the upper abdomen demonstrate no acute findings. There is a large colonic stool burden. There is degenerative disc disease of the thoracic spine.     Impression: Impression: 1.No evidence of  pulmonary embolism. 2.Cardiomegaly with reflux of contrast into the hepatic veins and IVC which can be seen with right heart dysfunction. 3.Patchy groundglass opacities throughout both lungs with smooth interlobular septal thickening and trace bilateral pleural effusions. Findings are favored to represent interstitial and alveolar pulmonary edema over bilateral pneumonia. 4.Dilated ascending thoracic aorta measuring up to 4.4 cm. Mild dilatation of the main pulmonary artery. Electronically Signed: Dayron Espana MD  5/5/2025 12:30 PM EDT  Workstation ID: [id number]    Assessment & Plan   Assessment / Plan     Active Hospital Problems:  Active Hospital Problems    Diagnosis     **Acute hypoxic respiratory failure      Impression:  Acute hypoxic respiratory failure  Interstitial pulmonary fibrosis  Multifocal pneumonia from unknown organism  Acute cardiogenic pulmonary edema  Decompensated congestive heart failure with reduced EF  Volume overload  Recent septic arthritis of the left lower extremity on daptomycin/ceftriaxone as outpatient  Pneumomediastinum and pneumothorax     Plan:  Repeat chest x-ray showing small pneumothorax similar to before  Currently on Airvo 55 L 100% FiO2.  Continue to wean supplemental oxygen to maintain SpO2 greater than 90%  Continue Lasix 40 mg IV 3 times daily and Aldactone 12.5 mg oral daily  Trend renal panel electrolytes.  Replace electrolytes as needed.  Continue Solu-Medrol every 12  2D echo did show EF with 38%  Continue advanced heart failure medications  Continue Eliquis  Continue Zosyn for pneumonia  DVT prophylaxis, on Eliquis  S/p bronchoscopy, pneumonia panel negative, fungal culture positive for moderate growth Candida  Continue fluconazole 200 mg IV x 7 days     Pharmacologic VTE prophylaxis orders are present.    CODE STATUS:   Code Status (Patient has no pulse and is not breathing): No CPR (Do Not Attempt to Resuscitate)  Medical Interventions (Patient has pulse or is  breathing): Limited Support  Medical Intervention Limits: No intubation (DNI)    I have reviewed labs, imaging, pertinent clinical data and provider notes.   I have discussed with bedside nurse and primary service. Electronically signed by Danny Galvan MD, 05/21/25, 2:07 PM EDT.

## 2025-05-22 NOTE — PROGRESS NOTES
RT EQUIPMENT DEVICE RELATED - SKIN ASSESSMENT    RT Medical Equipment/Device:     High Flow Nasal Cannula:Airvo    Skin Assessment:      Cheek:  Intact  Ears:  Intact  Nares:  Intact    Device Skin Pressure Protection:  Pressure points protected    Nurse Notification:  Nessa Turner, CRT

## 2025-05-22 NOTE — PLAN OF CARE
Goal Outcome Evaluation:           Progress: no change  Outcome Evaluation: A&Ox4. VSS on Airvo.  PRN Pericolace given with no BM this shift.  Tele is V-paced at 70.  Oxycodone PRN pain x 1 effective per patient, see MAR.  SOA with exertion.  BS was 209 with 3 units of coverage.  Pt states he feels better with the regular mattress instead of the air mattress that was switched out on dayshift yesterday.  No acute distress noted. Care plan continues.  Call bell within reach at all times for needs and safety.

## 2025-05-22 NOTE — CONSULTS
[facility] to meet with soon. Will update as needed.        Alea GRAHAM RN  Palliative Care

## 2025-05-22 NOTE — CONSULTS
Mr. Snellen was seen by Palliative care At the start of admission and services were not needed. However pt has had a decline and wishes to be comfort. Met with pt and family in room. Provided education answered questions and provided emotional support. MD aware and orders have been placed for comfort measures, comfort cart for family, hosparus consult, music therapy, and leopoldo consult. Palliative care provided contact information for spouse. Palliative care will continue to follow.         Alea GRAHAM RN  Palliative Care

## 2025-05-22 NOTE — NURSING NOTE
Pt complains of being SOB, Airvo currently at 50L and 55%. Respirations 52/min and O2 78%. RN contacted respiratory therapist and applied non-rebreather. MD contacted. RT came and maxed out Airvo at 60L, 100%. MD at bedside. Oxy just given for abdomen pain. BP at 93/61, too low to give Morphine right now. Pt is a DNR/DNI. RN verified that pt does want to be a DNI. MD discussed palliative care with pt and put in consult. RN calling family with update. Pt now at 90%, maxed out of Airvo. Suppository given per pt request as pt states that he hasn't had a BM in several days.

## 2025-05-22 NOTE — PROGRESS NOTES
Pulmonary / Critical Care Progress Note      Patient Name: Roger D Snellen  : 1950  MRN: [id number]  Attending:  Kenneth Prather MD   Date of admission: 2025    Subjective   Subjective   Follow-up for respiratory failure, decompensated congestive heart failure    Over past 24 hours: Remains on Airvo,    This morning,  Currently on Airvo  Remains bedridden  Denies any chest pain fever or hemoptysis   continues to be dyspneic  Objective   Objective     Vitals:   Vital signs for last 24 hours:  Temp:  [97.3 °F (36.3 °C)-98.1 °F (36.7 °C)] 97.8 °F (36.6 °C)  Heart Rate:  [69-72] 70  Resp:  [16-20] 16  BP: ()/(62-72) 103/64    Physical Exam   Vital Signs Reviewed   General:  Alert, NAD. Lying in bed   HEENT:  PERRL, EOMI.  OP  Chest:  Clear to auscultation bilaterally, occasional basal crackles   CV: RRR, no MGR, pulses 2+, equal.  Abd:  Soft, NT, ND, + BS  EXT:  no clubbing, no cyanosis, no edema  Neuro:  A&Ox3, CN grossly intact, no focal deficits.  Skin: No rashes or lesions noted    Result Review    Result Review:  I have personally reviewed the results from the time of this admission to 2025 07:22 EDT and agree with these findings:  [x]  Laboratory  []  Microbiology  [x]  Radiology  [x]  EKG/Telemetry   [x]  Cardiology/Vascular   []  Pathology  []  Old records  []  Other:  Most notable findings include:    .      Lab 25  0457 25  0509 25  0551 25  1616 25  0607 25  1621 25  0519 25  1650 25  0459 25  0637   WBC 15.09* 17.13* 15.66*  --  17.35*  --  17.93*  --  18.66*  --    HEMOGLOBIN 14.8 14.9 14.8  --  14.6  --  15.2  --  14.1  --    HEMATOCRIT 45.7 46.0 44.7  --  45.2  --  46.7  --  43.6  --    PLATELETS 158 174 187  --  206  --  222  --  232  --    SODIUM 137 139 136  --  138  --  140  --  138 139   POTASSIUM 3.9 4.0 3.9 4.4 3.3* 4.8 3.4*   < > 3.5 3.6   CHLORIDE 99 100 99  --  98  --  99  --  98 98   CO2 26.8 28.5 28.7  --  28.9   --  31.6*  --  28.9 30.7*   BUN 37* 39* 43*  --  45*  --  38*  --  35* 40*   CREATININE 0.76 0.68* 0.75*  --  0.77  --  0.80  --  0.66* 0.66*   GLUCOSE 204* 188* 245*  --  262*  --  236*  --  220* 209*   CALCIUM 8.7 9.2 8.8  --  9.0  --  9.3  --  9.4 9.3   PHOSPHORUS  --  2.8  --   --   --   --   --   --  3.4 3.5   TOTAL PROTEIN 5.9*  --  6.3  --  6.3  --  6.7  --  6.4  --    ALBUMIN 2.8* 3.0* 3.0*  --  2.9*  --  3.0*  --  2.8* 2.9*   GLOBULIN 3.1  --  3.3  --  3.4  --  3.7  --  3.6  --     < > = values in this interval not displayed.   CT Chest Without Contrast Diagnostic  Result Date: 5/16/2025  CT CHEST WO CONTRAST DIAGNOSTIC Date of Exam: 5/16/2025 3:22 PM EDT Indication: persistent hypoxia. Comparison: Chest radiograph 5/14/2025. Chest CT 5/11/2025 Technique: Axial CT images were obtained of the chest without contrast administration.  Reconstructed coronal and sagittal images were also obtained. Automated exposure control and iterative construction methods were used. Findings: Thyroid and thoracic inlet: No significant abnormality. Lymph nodes: No pathologic appearing lymph nodes by imaging criteria. Cardiovascular: Extensive pneumomediastinum. Cardiomegaly.. Trace pericardial effusion. Left chest wall ICD with leads terminating in the right atrium, right ventricle, and in the region of the left cardiac vein. Aorta and main pulmonary artery diameters  are within normal range.. There are coronary artery calcifications. Aortic atherosclerosis. Esophagus: No significant abnormality. Lung parenchyma: Multifocal consolidative and groundglass opacities in the right greater than left lung. Peripheral reticular and interstitial opacities Critical Findings were verbally communicated with by Silver Ahumada MD on at . As well as honeycombing in the lower lobes compatible with UIP pattern of pulmonary fibrosis. Airways: Patent trachea and mainstem bronchi. Pleura: No visible pleural effusion. Small right pneumothorax. No  visible left pneumothorax. Chest wall and osseous structures: Degenerative changes of the imaged spine. No acute osseous abnormality. Subcutaneous emphysema extending along the right chest wall into the neck soft tissues. Included abdomen: Cholecystectomy.     Impression: Impression: Small right pneumothorax. Extensive pneumomediastinum and subcutaneous emphysema extending along the right chest wall into the neck soft tissues. Multifocal consolidative and groundglass opacities in the right greater than left lung suspicious for multifocal pneumonia and/or pulmonary edema, similar to prior. Background UIP pattern of pulmonary fibrosis. Critical Findings were verbally communicated with SULAIMAN Banks by Silver Ahumada MD on 5/16/2025 at 11:18 p.m. Electronically Signed: Silver Ahumada MD  5/16/2025 11:17 PM EDT  Workstation ID: [id number]    CT Chest Without Contrast Diagnostic  Result Date: 5/11/2025  CT CHEST WO CONTRAST DIAGNOSTIC Date of Exam: 5/11/2025 8:58 AM EDT Indication: SOB, pt on amio r/o pulm. fibrosis. Comparison: CXR 5/10/2025, CT chest 5/5/2025 Technique: Axial CT images were obtained of the chest without contrast administration.  Reconstructed coronal and sagittal images were also obtained. Automated exposure control and iterative construction methods were used. Findings: Lung window images reveal extensive bilateral groundglass pulmonary opacities. The pattern and distribution is unchanged, however, the opacities are less dense in comparison to 5/5/2025. Alveolar pulmonary edema is improved. Mediastinal windows reveal no mediastinal or axillary adenopathy. The elina are obscured. AICD remains in place. A few coronary artery calcifications are evident. The gallbladder is absent, surgical clips are seen in the gallbladder bed.     Impression: Impression: CT scan of the chest without IV contrast demonstrating extensive bilateral groundglass pulmonary opacities. The pattern and distribution is unchanged in  comparison to 5/5/2025. Alveolar edema is improved. AICD in place. Electronically Signed: Anders Hampton MD  5/11/2025 9:51 AM EDT  Workstation ID: [id number]    CT Angiogram Chest Pulmonary Embolism  Result Date: 5/5/2025  CT ANGIOGRAM CHEST PULMONARY EMBOLISM Date of Exam: 5/5/2025 11:58 AM EDT Indication: eval SOA r/o PE, ?pulm edema vs infiltrate. Comparison: Chest radiograph dated 5/5/2025 Technique: Axial CT images were obtained of the chest after the uneventful intravenous administration of iodinated contrast utilizing pulmonary embolism protocol.  Reconstructed coronal and sagittal images were also obtained. In addition, a 3-D volume rendered image was created for interpretation.  Automated exposure control and iterative construction methods were used. Findings: The visualized soft tissue structures at the base of the neck appear within normal limits. There is no lower cervical or axillary adenopathy. There is a left chest wall ICD with leads in expected position. There is cardiomegaly. There is reflux of contrast into the hepatic veins and IVC. The ascending thoracic aorta is dilated measuring up to 4.4 cm of the main pulmonary artery bifurcation. The main pulmonary artery is mildly dilated. There are no filling defects within the pulmonary arterial system to suggest presence of underlying pulmonary embolism. There is no mediastinal or hilar lymphadenopathy by size criteria. The tracheobronchial tree is patent. There are patchy groundglass opacities throughout both lungs. There are trace bilateral pleural effusions. There is either paraseptal emphysema or honeycombing within the bilateral lower lobes dependently. There is smooth interlobular septal thickening. The esophagus is normal in course and caliber. Visualized portions of the upper abdomen demonstrate no acute findings. There is a large colonic stool burden. There is degenerative disc disease of the thoracic spine.     Impression: Impression: 1.No  evidence of pulmonary embolism. 2.Cardiomegaly with reflux of contrast into the hepatic veins and IVC which can be seen with right heart dysfunction. 3.Patchy groundglass opacities throughout both lungs with smooth interlobular septal thickening and trace bilateral pleural effusions. Findings are favored to represent interstitial and alveolar pulmonary edema over bilateral pneumonia. 4.Dilated ascending thoracic aorta measuring up to 4.4 cm. Mild dilatation of the main pulmonary artery. Electronically Signed: Dayron Espana MD  5/5/2025 12:30 PM EDT  Workstation ID: [id number]    Assessment & Plan   Assessment / Plan     Active Hospital Problems:  Active Hospital Problems    Diagnosis     **Acute hypoxic respiratory failure      Impression:  Acute hypoxic respiratory failure  Interstitial pulmonary fibrosis with flare  Multifocal pneumonia from unknown organism  Acute cardiogenic pulmonary edema  Decompensated congestive heart failure with reduced EF  Volume overload  Recent septic arthritis of the left lower extremity on daptomycin/ceftriaxone as outpatient  Pneumomediastinum and pneumothorax     Plan:  Repeat chest x-ray showing small pneumothorax similar to before not significant  Currently on Airvo and titrate oxygen to keep sats above 90%   Continue Lasix 40 mg IV 3 times daily and Aldactone 12.5 mg oral daily  Trend renal panel electrolytes.  Replace electrolytes as needed.  Continue Solu-Medrol every 12  2D echo did show EF with 38%  Continue advanced heart failure medications  Continue Eliquis  Continue Zosyn for pneumonia  DVT prophylaxis, on Eliquis  S/p bronchoscopy, pneumonia panel negative, fungal culture positive for moderate growth Candida  Continue fluconazole  Patient has poor prognosis currently DNR/DNI  Pharmacologic VTE prophylaxis orders are present.    CODE STATUS:   Code Status (Patient has no pulse and is not breathing): No CPR (Do Not Attempt to Resuscitate)  Medical Interventions (Patient  has pulse or is breathing): Limited Support  Medical Intervention Limits: No intubation (DNI)    I have reviewed labs, imaging, pertinent clinical data and provider notes.   I have discussed with bedside nurse and primary service. Electronically signed by Danny Galvan MD, 05/21/25, 2:07 PM EDT. Electronically signed by Danny Galvan MD, 05/22/25, 9:33 AM EDT. Electronically signed by Danny Galvan MD, 05/22/25, 11:47 AM EDT.

## 2025-05-22 NOTE — PROGRESS NOTES
[facility]   Hospitalist Progress Note  Date: 2025  Patient Name: Roger D Snellen  : 1950  MRN: [id number]  Date of admission: 2025      Subjective   Subjective     Chief Complaint: Follow-up shortness of breath  Summary:Roger D Snellen is a 75 y.o. male history of A-fib on anticoagulation, heart failure with preserved ejection fraction, diabetes, COPD, hypothyroidism, GERD, hyperlipidemia, hypertension endorses slowly worsening shortness of breath. Patient presented to the emergency department hypoxic 81% on room air. Patient with a heart rate of 70, respiratory rate of 28 and a blood pressure 122/72 initially in the emergency department. Quickly titrated up on high flow nasal cannula eventually transitioned over to BiPAP, however due to intolerance patient transition to Airvo to maintain O2 saturations. Patient's initial ABG showed a pH of 7.5, PCO2 of 27.5, PO2 of 65.8. proBNP elevated at 6386. Troponin 32 then 31, no complaints of chest pain. On labs patient with a bicarb of 19.4, anion gap of 15.6, creatinine 0.73. Patient had AST ALT elevations of 114/96 respectively. Procalcitonin elevated 0.41. Lactate wnl x 2. White count of 12.8, hemoglobin 11.3 and platelets of 333.  CT scan with contrast obtained to rule out pulmonary embolus. No evidence of PE. Cardiomegaly, seen in right heart failure. Patchy groundglass opacities throughout both lungs with smooth interlobular septal thickening and trace bilateral pleural effusions. Representing pulmonary edema over bilateral pneumonia. Given patient's work of breathing and oxygen demand, admitted to the ICU. Overnight patient did well and has been weaned down to 7 L high flow nasal cannula. Transferred out of the unit on May 6. Patient seen by pulmonary and cardiology. Patient had bronchoscopy May 8 with suctioning of mucous plugs and purulent secretions. Cultures negative to date. Rheumatoid factor positive but rest of autoimmune panel including  anti-CCP negative. Patient follows with Dr. Cole arriola as an outpatient for pulmonary fibrosis with a UIP pattern. Patient finished Rocephin May 14. BAL showed Candida.  Started on fluconazole.  Respiratory function began to decline requiring AirVo.  Started on IV diuretics continue systemic steroids.  Found to have small right pneumothorax with extensive pneumomediastinum and subcutaneous emphysema as well as bilateral infiltrate on repeat CT 5/16/2025.  Continued on systemic steroids and empiric antibiotics.  Shortness of breath very slow to improve still very short of breath with any activity basically bedbound due to shortness of breath requiring AirVo.  Repeat chest x-ray demonstrated increasing right thoracic and supraclavicular subcutaneous emphysema and persistent small right-sided pneumothorax.  Patient remains very short of breath and voiced that he just wanted to be kept comfortable.  Discussed goals of care with patient and wife and they were in agreement that they want to pursue comfort measures only at this time.  Palliative care consulted.  Comfort meds initiated.  Hospice consulted wanting to see how patient does with weaning off of AirVo before deciding on GIP bed eligibility.  Patient would like to return home in [facility] if symptoms can be managed with p.o. meds.    Interval Followup: Sitting up in bed appears to be in acute respiratory distress.  Patient got very short of breath trying to use the bedpan desaturating into the 70s requiring to be maxed out on AirVo still satting mid to low 80s and tachypneic.  Patient indicated that he was tired and just want to be kept comfortable.  I discussed goals of care with patient and pulmonology at the bedside as well as the patient's wife over the phone.  Given IV morphine with some improvement in symptoms.  Palliative care consulted.  Patient and family confirmed they want to transition to comfort measures only.  Comfort measures initiated.   Remained afebrile overnight.  V-paced 70s on telemetry review.  Blood pressure low normal limits.  Blood sugars still elevated above goal.  Leukocytosis slightly improved.  Pro-Jose Miguel within normal limits.  No other issues per nursing.    Review of Systems  Constitutional: Positive for fatigue and negative fever.   HENT: Negative for sore throat and trouble swallowing.    Eyes: Negative for pain and discharge.   Respiratory: Negative for cough and positive shortness of breath.    Cardiovascular: Negative for chest pain and palpitations.   Gastrointestinal: Negative for abdominal pain, nausea and vomiting.   Endocrine: Negative for cold intolerance and heat intolerance.   Genitourinary: Negative for difficulty urinating and dysuria.   Musculoskeletal: Negative for back pain and neck stiffness.   Skin: Negative for color change and rash.   Neurological: Negative for syncope and headaches.   Hematological: Negative for adenopathy.   Psychiatric/Behavioral: Negative for confusion and hallucinations.    Objective   Objective     Vitals:   Temp:  [97.7 °F (36.5 °C)-97.9 °F (36.6 °C)] 97.9 °F (36.6 °C)  Heart Rate:  [69-72] 70  Resp:  [16-24] 20  BP: ()/(61-70) 92/70  Flow (L/min) (Oxygen Therapy):  [50-60] 50  Physical Exam   General: well-developed appearing stated age in acute respiratory distress  HEENT: Normocephalic atraumatic moist membranes pupils equal round reactive light, no scleral icterus no conjunctival injection  Cardiovascular: regular trace lower extremity edema appreciated  Pulmonary: Crackles bilateral bases with subcutaneous crepitus over the right chest wall no wheezes or rhonchi, symmetric chest expansion, positive labored, conversational dyspnea appreciated  Gastrointestinal: Soft nontender nondistended positive bowel sounds all 4 quadrants no rebound or guarding  Musculoskeletal: No clubbing cyanosis, warm and well-perfused, calves soft symmetric nontender bilaterally  Skin: Clean dry without  johanne  Neuro: Cranial nerves II through XII intact grossly no sensorimotor deficits appreciated bilateral upper and lower extremities  Psych: Patient is anxious to the cooperative and appropriate with exam not responding to internal stimuli  : No Oates catheter no bladder distention no suprapubic tenderness    Result Review    Result Review:  I have personally reviewed these results and agree with these findings:  [x]  Laboratory  LAB RESULTS:      Lab 05/22/25 0457 05/21/25  0509 05/20/25  0551 05/19/25  0607 05/18/25  0519 05/17/25  0459   WBC 15.09* 17.13* 15.66* 17.35* 17.93* 18.66*   HEMOGLOBIN 14.8 14.9 14.8 14.6 15.2 14.1   HEMATOCRIT 45.7 46.0 44.7 45.2 46.7 43.6   PLATELETS 158 174 187 206 222 232   NEUTROS ABS  --   --  14.46* 15.98* 16.46* 17.09*   IMMATURE GRANS (ABS)  --   --  0.19* 0.16* 0.17* 0.17*   LYMPHS ABS  --   --  0.62* 0.73 0.74 0.78   MONOS ABS  --   --  0.38 0.45 0.52 0.59   EOS ABS  --   --  0.00 0.00 0.00 0.00   MCV 94.6 95.0 95.3 95.0 95.1 95.6   CRP  --   --   --   --   --  1.08*   PROCALCITONIN  --  0.06  --   --   --   --          Lab 05/22/25 0457 05/21/25  0509 05/20/25  0551 05/19/25  1616 05/19/25  0607 05/18/25  1621 05/18/25  0519 05/17/25  1650 05/17/25  0459 05/16/25  0637   SODIUM 137 139 136  --  138  --  140  --  138 139   POTASSIUM 3.9 4.0 3.9 4.4 3.3*   < > 3.4*   < > 3.5 3.6   CHLORIDE 99 100 99  --  98  --  99  --  98 98   CO2 26.8 28.5 28.7  --  28.9  --  31.6*  --  28.9 30.7*   ANION GAP 11.2 10.5 8.3  --  11.1  --  9.4  --  11.1 10.3   BUN 37* 39* 43*  --  45*  --  38*  --  35* 40*   CREATININE 0.76 0.68* 0.75*  --  0.77  --  0.80  --  0.66* 0.66*   EGFR 93.2 96.3 93.5  --  92.8  --  91.7  --  97.2 97.2   GLUCOSE 204* 188* 245*  --  262*  --  236*  --  220* 209*   CALCIUM 8.7 9.2 8.8  --  9.0  --  9.3  --  9.4 9.3   MAGNESIUM  --   --   --   --   --   --   --   --   --  2.1   PHOSPHORUS  --  2.8  --   --   --   --   --   --  3.4 3.5   HEMOGLOBIN A1C  --   --   7.40*  --   --   --   --   --   --   --     < > = values in this interval not displayed.         Lab 05/22/25  0457 05/21/25  0509 05/20/25  0551 05/19/25  0607 05/18/25  0519 05/17/25  0459   TOTAL PROTEIN 5.9*  --  6.3 6.3 6.7 6.4   ALBUMIN 2.8* 3.0* 3.0* 2.9* 3.0* 2.8*   GLOBULIN 3.1  --  3.3 3.4 3.7 3.6   ALT (SGPT) 77*  --  89* 86* 93* 97*   AST (SGOT) 30  --  36 31 33 32   BILIRUBIN 0.8  --  0.8 0.7 0.7 0.6   ALK PHOS 65  --  67 68 78 75                       Lab 05/20/25  0908   PH, ARTERIAL 7.488*   PCO2, ARTERIAL 42.1   PO2 .7*   O2 SATURATION ART 98.7   FIO2 100   HCO3 ART 31.9*   BASE EXCESS ART 7.6*     Brief Urine Lab Results  (Last result in the past 365 days)        Color   Clarity   Blood   Leuk Est   Nitrite   Protein   CREAT   Urine HCG        05/05/25 1451 Yellow   Clear   Negative   Negative   Negative   Negative                 Microbiology Results (last 10 days)       ** No results found for the last 240 hours. **            []  Microbiology  [x]  Radiology  XR Chest 1 View  Result Date: 5/22/2025  Impression: 1.Increasing right thoracic and supraclavicular subcutaneous emphysema limiting visualization of presumed relatively small right-sided pneumothorax. No significant pneumothorax progression identified. Other relatively stable findings. Electronically Signed: James Simmons MD  5/22/2025 10:53 AM EDT  Workstation ID: [id number]    XR Chest 1 View  Result Date: 5/20/2025  Impression: 1.Small right apical pneumothorax is similar compared to 5/17/2025 chest x-ray and was not visualized on 5/18/2025 chest x-ray. Subcutaneous emphysema appears increased compared to 5/18/2025 chest x-ray. Similar pneumomediastinum.. 2.Stable appearance of diffuse right lung and left lower lung interstitial and airspace opacities. Electronically Signed: Jacqueline Dong MD  5/20/2025 11:55 AM EDT  Workstation ID: [id number]    XR Chest 1 View  Result Date: 5/18/2025  Impression: Improving pulmonary opacities  superimposed on known sequelae of chronic interstitial lung disease/fibrosis. No significant visualized pneumothorax. Subcutaneous emphysema is decreasing from prior exam. Electronically Signed: Rickey Markham MD  5/18/2025 2:28 PM EDT  Workstation ID: [id number]    XR Chest 1 View  Result Date: 5/17/2025  1.  Bilateral infiltrates persist, right greater than left. The findings may represent infectious multifocal pneumonia. Please correlate clinically. 2.  There is a tiny right apical pneumothorax. Pneumomediastinum is noted. No definite left pneumothorax is seen on this single AP (anteroposterior) upright portable chest radiograph. 3.  Mild subcutaneous emphysema is seen. 4.  Please see above comments for further detail.   Portions of this note were completed with a voice recognition program.  5/17/2025 5:30 AM by Romeo Winters MD on Workstation: [facility]      CT Chest Without Contrast Diagnostic  Result Date: 5/16/2025  Impression: Small right pneumothorax. Extensive pneumomediastinum and subcutaneous emphysema extending along the right chest wall into the neck soft tissues. Multifocal consolidative and groundglass opacities in the right greater than left lung suspicious for multifocal pneumonia and/or pulmonary edema, similar to prior. Background UIP pattern of pulmonary fibrosis. Critical Findings were verbally communicated with SULAIMAN Banks by Silver Ahumada MD on 5/16/2025 at 11:18 p.m. Electronically Signed: Silver Ahumada MD  5/16/2025 11:17 PM EDT  Workstation ID: [id number]      [x]  EKG/Telemetry   [x]  Cardiology/Vascular  Transthoracic echocardiogram 5/16/2025    There is moderate calcification of the aortic valve.     The left ventricle is normal in size and show significant hypokinesis of the distal septum and apex the proximal septum, lateral wall and inferior wall contract well the estimation fraction is 35%.  The bubble study showed no evidence of AV shunt.    []  Pathology  []  Old records  [x]   Other:  Scheduled Meds:arformoterol, 15 mcg, Nebulization, BID - RT  budesonide, 0.5 mg, Nebulization, BID - RT  empagliflozin, 10 mg, Oral, Daily  furosemide, 40 mg, Intravenous, TID  lactulose, 30 g, Oral, Daily  levothyroxine, 100 mcg, Oral, Q AM  methylPREDNISolone sodium succinate, 40 mg, Intravenous, Q6H  midodrine, 15 mg, Oral, TID AC  mineral oil-hydrophilic petrolatum, 1 Application, Topical, Daily  pantoprazole, 40 mg, Oral, QAM  sodium chloride, 10 mL, Intravenous, Q12H  sodium chloride, 4 mL, Nebulization, BID - RT  spironolactone, 12.5 mg, Oral, Daily      Continuous Infusions:   PRN Meds:.  acetaminophen    albuterol    atropine    senna-docusate sodium **AND** polyethylene glycol **AND** bisacodyl **AND** bisacodyl    calcium carbonate    dextrose    dextrose    LORazepam **OR** diazePAM **OR** LORazepam    glucagon (human recombinant)    haloperidol **OR** haloperidol **OR** haloperidol lactate    ipratropium-albuterol    Morphine **OR** morphine    nitroglycerin    ondansetron    ondansetron ODT    oxyCODONE    Polyvinyl Alcohol-Povidone PF    sodium chloride    sodium chloride    sodium chloride    sodium chloride      Assessment & Plan   Assessment / Plan     Assessment/Plan:  End-of-life care  Comfort measures only  Acute hypoxemic respiratory failure.   ARDS  Likely acute flare of pulmonary fibrosis with UIP pattern.  Bilateral pneumonia  Small right pneumothorax  SubCutaneous emphysema  Pneumomediastinum  Mucous plugs.  Status post bronchoscopy May 8.  BAL with Candida likely colonization.  Acute on chronic systolic CHF EF of 38%.  Cardiogenic pulmonary edema  Moderate TR.  COPD exacerbation   History of A-fib on Eliquis.  Status post PPM/AICD.  Recent right knee septic arthritis and left foot infection on IV daptomycin/Rocephin via RUE PICC line until May 15.  Transaminitis.  Likely hepatic congestion from CHF.  Improving  Rheumatoid factor positive.  Other autoimmune panel negative no  symptoms.  Dilated ascending thoracic aorta 4.4 cm.  Left bundle branch block.  Diabetes mellitus A1c 7.4        Patient admitted for further evaluation and treatment  Pulmonology and cardiology consulted thank you for assistance  Started on morphine IV and p.o. as needed for pain and air hunger  Started on Ativan as needed for anxiety  Started on Haldol as needed for agitation  Started on atropine for secretion management as needed  Wean off of AirVo as able  Continue Lasix 40 mg to 3 times daily to keep patient dry as possible for patient comfort  Continue midodrine 3 times daily for patient comfort  Continue taper Solu-Medrol for patient comfort to avoid abrupt discontinuation   Continue as needed DuoNebs  Stop fluconazole as patient is pursuing comfort measures only  Stop blood sugar checks for patient comfort  Escalate bowel regimen for patient comfort by starting lactulose  Palliative care consulted thank for your assistance  Hospice consulted for transition to [facility] bed versus home with hospice  Further inpatient orders recommendations pending clinical course            Discussed plan with bedside RN as well as pulmonology team.    Disposition: Transitioning to comfort measures only.  Patient hoping to return home to [facility] with hospice if symptoms can be managed with p.o. medications.    Form copied  VTE Prophylaxis:  Mechanical VTE prophylaxis orders are present.        CODE STATUS:   Code Status (Patient has no pulse and is not breathing): No CPR (Do Not Attempt to Resuscitate)  Medical Interventions (Patient has pulse or is breathing): Comfort Measures          I confirmed that all copied/forwarded documentation in this record has been carefully reviewed, updated as necessary and accurately reflects the patient's current status and plan of care.

## 2025-05-22 NOTE — NURSING NOTE
Pt taken off of Airvo and put on high flow nasal cannula. Offered del valle catheter to pt per order. Pt refused at this time and said that he would let nursing know if he decided that he does want a catheter. Pt refused wound care at this time. Morphine given x2 this shift. Discussed the use of ativan also. Family at bedside and pt resting comfortably. Educated pt and family that pt will be moved to the towers once a room is available.

## 2025-05-22 NOTE — NURSING NOTE
[facility] reviewed at this time pt does not qualify for GIP status. MD and RN aware. Plan will be to wean pt off of airvo medicating appropriately and have [facility] reevaluate for GIP status.       Alea GRAHAM RN  Palliative Care

## 2025-05-22 NOTE — CONSULTS
05/22/25 1340   Spiritual Care   Spiritual Care Visit Type other (see comments)  (Comfort Measures Only)   Spiritual Care Source palliative care;physician referral   Receptivity to Spiritual Care visit welcomed   Spiritual Care Request family support;hospitality support;spiritual/moral support   Spiritual Care Interventions supportive conversation provided  (initial visit made with pt and family. Introductions made and role explained. No needs expressed by pt or family at this time. Educated on how to contact  if needed.)   Response to Spiritual Care thanks expressed;engaged in conversation   Use of Spiritual Resources non-[employer]al use of spiritual care   Spiritual Care Follow-Up other (see comments)  ( will follow as needed. family aware of how to contact .)

## 2025-05-22 NOTE — PLAN OF CARE
Goal Outcome Evaluation:  Plan of Care Reviewed With: patient        Progress: declining     Pt AAOx4, BP low and maxed out on airvo. MD at bedside. Pt had palliative consult and became comfort care. Morphine started and airvo weaning began.

## 2025-05-22 NOTE — PROGRESS NOTES
[facility]   Progress Note    Patient Name: Roger D Snellen  : 1950  MRN: [id number]  Primary Care Physician: John Phelps Jr., MD  Date of admission: 2025    Subjective   Subjective     HPI:  Patient looks elevated better today, he is moving more air appears not to be short of breath at rest.  Patient is afebrile.    Review of Systems  Review of Systems   HENT: Negative.     Eyes: Negative.    Respiratory:  Negative for chest tightness.    Cardiovascular:  Negative for chest pain, palpitations and leg swelling.   Gastrointestinal: Negative.    Genitourinary: Negative.    Musculoskeletal: Negative.    Skin: Negative.    Neurological:  Positive for weakness.   Psychiatric/Behavioral: Negative.         Objective   Objective     Vitals:  Temp:  [97.3 °F (36.3 °C)-98.1 °F (36.7 °C)] 98.1 °F (36.7 °C)  Heart Rate:  [70-76] 72  Resp:  [16-20] 16  BP: ()/(60-72) 92/62  Flow (L/min) (Oxygen Therapy):  [55] 55    Physical Exam:  Physical Exam  Constitutional:       Appearance: Normal appearance.   HENT:      Head: Normocephalic and atraumatic.   Eyes:      Extraocular Movements: Extraocular movements intact.   Cardiovascular:      Rate and Rhythm: Regular rhythm.      Heart sounds: Murmur heard.   Pulmonary:      Breath sounds: No rales.   Musculoskeletal:      Right lower leg: No edema.      Left lower leg: No edema.   Skin:     General: Skin is warm.   Neurological:      General: No focal deficit present.      Mental Status: He is alert.   Psychiatric:      Comments: Patient is tired of being in the hospital.  Hopefully his condition will improve         Result Review    Result Review:  I have personally reviewed the results from the time of this admission to 25 8:12 PM EDT and agree with these findings:  []  Laboratory  []  Microbiology  []  Radiology  []  EKG/Telemetry   []  Cardiology/Vascular   []  Pathology  []  Old records  []  Other:    Most notable findings include: 75-year-old  gentleman admitted to the hospital because of shortness of breath and hypoxia, the patient has bilateral pulmonary infiltrates, related to pulmonary fibrosis also possible pneumonia patient was diuresed 17 that he is on congestive failure, clinically appears to be a little bit better today.    Assessment & Plan   Assessment / Plan     Active Hospital Problems:  Active Hospital Problems    Diagnosis     **Acute hypoxic respiratory failure        Plan:   I will recommend to repeat the CT without contrast on him to evaluate the infiltrates better.    DVT prophylaxis: Anticoagulate    CODE STATUS:    Code Status and Medical Interventions: No CPR (Do Not Attempt to Resuscitate); Limited Support; No intubation (DNI)   Ordered at: 05/05/25 1213     Code Status (Patient has no pulse and is not breathing):    No CPR (Do Not Attempt to Resuscitate)     Medical Interventions (Patient has pulse or is breathing):    Limited Support     Medical Intervention Limits:    No intubation (DNI)       Disposition:  I expect patient to be discharged patient is very ill.    Electronically signed by Juliocesar Giang MD, 05/21/25, 8:12 PM EDT.

## 2025-05-23 NOTE — PLAN OF CARE
Goal Outcome Evaluation:  Plan of Care Reviewed With: patient        Progress: no change  Outcome Evaluation: Patient alert and oriented, requesting pain and anxiety med often, patient family at bedside, dressing to left foot changed and right heal dressing applied. call light within reach, continous pulse ox on patient now on 7L High flow NC, sats 94%.

## 2025-05-23 NOTE — NURSING NOTE
Have talked with Primary RN, CM, SW, US, and . Magnet obtained and placed over AICD and secured with foam tape. Tape left on bedside. Talked with family this morning who reported pt had okay night but didn't sleep much. Pt has been requesting morphine to help with his breathing. Valium has also been given to help with pts restlessness which was noted during visit Primary RN to medicate. Pt declined morning routine meds and family requested we stop the meds. MD notified. Provided emotional support to family and answered questions accordingly. Primary RN aware. Palliative care will continue to follow.         Alea GRAHAM RN  Palliative Care

## 2025-05-23 NOTE — NURSING NOTE
Indwelling del valle was inserted by Tamika Schmitz LPN and witnessed by Alea DAVIS RN on 05/23/25 @3449.

## 2025-05-23 NOTE — PROGRESS NOTES
[facility]   Hospitalist Progress Note  Date: 2025  Patient Name: Roger D Snellen  : 1950  MRN: [id number]  Date of admission: 2025      Subjective   Subjective     Chief Complaint: Follow-up shortness of breath  Summary:Roger D Snellen is a 75 y.o. male history of A-fib on anticoagulation, heart failure with preserved ejection fraction, diabetes, COPD, hypothyroidism, GERD, hyperlipidemia, hypertension endorses slowly worsening shortness of breath. Patient presented to the emergency department hypoxic 81% on room air. Patient with a heart rate of 70, respiratory rate of 28 and a blood pressure 122/72 initially in the emergency department. Quickly titrated up on high flow nasal cannula eventually transitioned over to BiPAP, however due to intolerance patient transition to Airvo to maintain O2 saturations. Patient's initial ABG showed a pH of 7.5, PCO2 of 27.5, PO2 of 65.8. proBNP elevated at 6386. Troponin 32 then 31, no complaints of chest pain. On labs patient with a bicarb of 19.4, anion gap of 15.6, creatinine 0.73. Patient had AST ALT elevations of 114/96 respectively. Procalcitonin elevated 0.41. Lactate wnl x 2. White count of 12.8, hemoglobin 11.3 and platelets of 333.  CT scan with contrast obtained to rule out pulmonary embolus. No evidence of PE. Cardiomegaly, seen in right heart failure. Patchy groundglass opacities throughout both lungs with smooth interlobular septal thickening and trace bilateral pleural effusions. Representing pulmonary edema over bilateral pneumonia. Given patient's work of breathing and oxygen demand, admitted to the ICU. Overnight patient did well and has been weaned down to 7 L high flow nasal cannula. Transferred out of the unit on May 6. Patient seen by pulmonary and cardiology. Patient had bronchoscopy May 8 with suctioning of mucous plugs and purulent secretions. Cultures negative to date. Rheumatoid factor positive but rest of autoimmune panel including  anti-CCP negative. Patient follows with Dr. Cole arriola as an outpatient for pulmonary fibrosis with a UIP pattern. Patient finished Rocephin May 14. BAL showed Candida.  Started on fluconazole.  Respiratory function began to decline requiring AirVo.  Started on IV diuretics continue systemic steroids.  Found to have small right pneumothorax with extensive pneumomediastinum and subcutaneous emphysema as well as bilateral infiltrate on repeat CT 5/16/2025.  Continued on systemic steroids and empiric antibiotics.  Shortness of breath very slow to improve still very short of breath with any activity basically bedbound due to shortness of breath requiring AirVo.  Repeat chest x-ray demonstrated increasing right thoracic and supraclavicular subcutaneous emphysema and persistent small right-sided pneumothorax.  Patient remains very short of breath and voiced that he just wanted to be kept comfortable.  Discussed goals of care with patient and wife and they were in agreement that they want to pursue comfort measures only at this time.  Palliative care consulted.  Comfort meds initiated.  Hospice consulted. Not currently GIP eligible as he does not have a discharge plan in place. Patient requiring IV morphine and valium for symptom control. Patient is uncomfortable with the idea of going home and that patient would like to remain in hospital until EOL     Interval Followup: Sitting up in bed appears to be resting comfortably with family at the bedside.  Patient complaining of difficulty urinating.  Oates catheter placed.  Patient and family requesting to stop midodrine, Lasix, levothyroxine.  Discussed tapering down off Solu-Medrol.  Patient indicates he does not feel short of breath today.  Requiring IV morphine and Valium overnight for symptom control.  Not GIP bed eligible per hospice due to no discharge plan.  Remained afebrile overnight.  Heart rate within normal limits.  Blood pressure within normal limits.   Patient fairly comfortable on 8 L high flow nasal cannula.  No other issues per nursing.    Review of Systems  Constitutional: Positive for fatigue and negative fever.   HENT: Negative for sore throat and trouble swallowing.    Eyes: Negative for pain and discharge.   Respiratory: Negative for cough and positive shortness of breath.    Cardiovascular: Negative for chest pain and palpitations.   Gastrointestinal: Negative for abdominal pain, nausea and vomiting.   Endocrine: Negative for cold intolerance and heat intolerance.   Genitourinary: Negative for difficulty urinating and dysuria.   Musculoskeletal: Negative for back pain and neck stiffness.   Skin: Negative for color change and rash.   Neurological: Negative for syncope and headaches.   Hematological: Negative for adenopathy.   Psychiatric/Behavioral: Negative for confusion and hallucinations.    Objective   Objective     Vitals:   Temp:  [97.5 °F (36.4 °C)-98.3 °F (36.8 °C)] 97.5 °F (36.4 °C)  Heart Rate:  [69-72] 70  Resp:  [18-20] 20  BP: (111-124)/(73-75) 113/73  Flow (L/min) (Oxygen Therapy):  [8-50] 8  Physical Exam   General: well-developed appearing stated age in no acute distress  HEENT: Normocephalic atraumatic moist membranes pupils equal round reactive light, no scleral icterus no conjunctival injection  Cardiovascular: regular trace lower extremity edema appreciated  Pulmonary: Crackles bilateral bases with subcutaneous crepitus over the right chest wall no wheezes or rhonchi, symmetric chest expansion, positive labored, conversational dyspnea appreciated  Gastrointestinal: Soft nontender nondistended positive bowel sounds all 4 quadrants no rebound or guarding  Musculoskeletal: No clubbing cyanosis, warm and well-perfused, calves soft symmetric nontender bilaterally  Skin: Clean dry without rashes  Neuro: Cranial nerves II through XII intact grossly no sensorimotor deficits appreciated bilateral upper and lower extremities  Psych: Patient is  calm cooperative and appropriate with exam not responding to internal stimuli  : No Oates catheter positive bladder distention positive suprapubic tenderness    Result Review    Result Review:  I have personally reviewed these results and agree with these findings:  [x]  Laboratory  LAB RESULTS:      Lab 05/22/25 0457 05/21/25  0509 05/20/25  0551 05/19/25  0607 05/18/25  0519 05/17/25  0459   WBC 15.09* 17.13* 15.66* 17.35* 17.93* 18.66*   HEMOGLOBIN 14.8 14.9 14.8 14.6 15.2 14.1   HEMATOCRIT 45.7 46.0 44.7 45.2 46.7 43.6   PLATELETS 158 174 187 206 222 232   NEUTROS ABS  --   --  14.46* 15.98* 16.46* 17.09*   IMMATURE GRANS (ABS)  --   --  0.19* 0.16* 0.17* 0.17*   LYMPHS ABS  --   --  0.62* 0.73 0.74 0.78   MONOS ABS  --   --  0.38 0.45 0.52 0.59   EOS ABS  --   --  0.00 0.00 0.00 0.00   MCV 94.6 95.0 95.3 95.0 95.1 95.6   CRP  --   --   --   --   --  1.08*   PROCALCITONIN  --  0.06  --   --   --   --          Lab 05/22/25  0457 05/21/25  0509 05/20/25  0551 05/19/25  1616 05/19/25  0607 05/18/25  1621 05/18/25  0519 05/17/25  1650 05/17/25  0459   SODIUM 137 139 136  --  138  --  140  --  138   POTASSIUM 3.9 4.0 3.9 4.4 3.3*   < > 3.4*   < > 3.5   CHLORIDE 99 100 99  --  98  --  99  --  98   CO2 26.8 28.5 28.7  --  28.9  --  31.6*  --  28.9   ANION GAP 11.2 10.5 8.3  --  11.1  --  9.4  --  11.1   BUN 37* 39* 43*  --  45*  --  38*  --  35*   CREATININE 0.76 0.68* 0.75*  --  0.77  --  0.80  --  0.66*   EGFR 93.2 96.3 93.5  --  92.8  --  91.7  --  97.2   GLUCOSE 204* 188* 245*  --  262*  --  236*  --  220*   CALCIUM 8.7 9.2 8.8  --  9.0  --  9.3  --  9.4   PHOSPHORUS  --  2.8  --   --   --   --   --   --  3.4   HEMOGLOBIN A1C  --   --  7.40*  --   --   --   --   --   --     < > = values in this interval not displayed.         Lab 05/22/25  0457 05/21/25  0509 05/20/25  0551 05/19/25  0607 05/18/25  0519 05/17/25  0459   TOTAL PROTEIN 5.9*  --  6.3 6.3 6.7 6.4   ALBUMIN 2.8* 3.0* 3.0* 2.9* 3.0* 2.8*   GLOBULIN  3.1  --  3.3 3.4 3.7 3.6   ALT (SGPT) 77*  --  89* 86* 93* 97*   AST (SGOT) 30  --  36 31 33 32   BILIRUBIN 0.8  --  0.8 0.7 0.7 0.6   ALK PHOS 65  --  67 68 78 75                       Lab 05/20/25  0908   PH, ARTERIAL 7.488*   PCO2, ARTERIAL 42.1   PO2 .7*   O2 SATURATION ART 98.7   FIO2 100   HCO3 ART 31.9*   BASE EXCESS ART 7.6*     Brief Urine Lab Results  (Last result in the past 365 days)        Color   Clarity   Blood   Leuk Est   Nitrite   Protein   CREAT   Urine HCG        05/05/25 1451 Yellow   Clear   Negative   Negative   Negative   Negative                 Microbiology Results (last 10 days)       ** No results found for the last 240 hours. **            []  Microbiology  [x]  Radiology  XR Chest 1 View  Result Date: 5/22/2025  Impression: 1.Increasing right thoracic and supraclavicular subcutaneous emphysema limiting visualization of presumed relatively small right-sided pneumothorax. No significant pneumothorax progression identified. Other relatively stable findings. Electronically Signed: James Simmons MD  5/22/2025 10:53 AM EDT  Workstation ID: [id number]    XR Chest 1 View  Result Date: 5/20/2025  Impression: 1.Small right apical pneumothorax is similar compared to 5/17/2025 chest x-ray and was not visualized on 5/18/2025 chest x-ray. Subcutaneous emphysema appears increased compared to 5/18/2025 chest x-ray. Similar pneumomediastinum.. 2.Stable appearance of diffuse right lung and left lower lung interstitial and airspace opacities. Electronically Signed: Jacqueline Dong MD  5/20/2025 11:55 AM EDT  Workstation ID: [id number]    XR Chest 1 View  Result Date: 5/18/2025  Impression: Improving pulmonary opacities superimposed on known sequelae of chronic interstitial lung disease/fibrosis. No significant visualized pneumothorax. Subcutaneous emphysema is decreasing from prior exam. Electronically Signed: Rickey Markham MD  5/18/2025 2:28 PM EDT  Workstation ID: [id number]    XR Chest 1 View  Result  Date: 5/17/2025  1.  Bilateral infiltrates persist, right greater than left. The findings may represent infectious multifocal pneumonia. Please correlate clinically. 2.  There is a tiny right apical pneumothorax. Pneumomediastinum is noted. No definite left pneumothorax is seen on this single AP (anteroposterior) upright portable chest radiograph. 3.  Mild subcutaneous emphysema is seen. 4.  Please see above comments for further detail.   Portions of this note were completed with a voice recognition program.  5/17/2025 5:30 AM by Romeo Winters MD on Workstation: [id number]      CT Chest Without Contrast Diagnostic  Result Date: 5/16/2025  Impression: Small right pneumothorax. Extensive pneumomediastinum and subcutaneous emphysema extending along the right chest wall into the neck soft tissues. Multifocal consolidative and groundglass opacities in the right greater than left lung suspicious for multifocal pneumonia and/or pulmonary edema, similar to prior. Background UIP pattern of pulmonary fibrosis. Critical Findings were verbally communicated with SULAIMAN Banks by Silver Ahumada MD on 5/16/2025 at 11:18 p.m. Electronically Signed: Silver Ahumada MD  5/16/2025 11:17 PM EDT  Workstation ID: [id number]      [x]  EKG/Telemetry   [x]  Cardiology/Vascular  Transthoracic echocardiogram 5/16/2025    There is moderate calcification of the aortic valve.     The left ventricle is normal in size and show significant hypokinesis of the distal septum and apex the proximal septum, lateral wall and inferior wall contract well the estimation fraction is 35%.  The bubble study showed no evidence of AV shunt.    []  Pathology  []  Old records  [x]  Other:  Scheduled Meds:lactulose, 30 g, Oral, Daily  methylPREDNISolone sodium succinate, 40 mg, Intravenous, Q8H  mineral oil-hydrophilic petrolatum, 1 Application, Topical, Daily  sodium chloride, 10 mL, Intravenous, Q12H  sodium chloride, 4 mL, Nebulization, BID - RT  tamsulosin, 0.8 mg,  Oral, Daily      Continuous Infusions:   PRN Meds:.  acetaminophen    albuterol    atropine    senna-docusate sodium **AND** polyethylene glycol **AND** bisacodyl **AND** bisacodyl    calcium carbonate    dextrose    dextrose    LORazepam **OR** diazePAM **OR** LORazepam    glucagon (human recombinant)    haloperidol **OR** haloperidol **OR** haloperidol lactate    ipratropium-albuterol    Morphine **OR** morphine    nitroglycerin    ondansetron    ondansetron ODT    oxyCODONE    Polyvinyl Alcohol-Povidone PF    simethicone    sodium chloride    sodium chloride    sodium chloride    sodium chloride      Assessment & Plan   Assessment / Plan     Assessment/Plan:  End-of-life care  Comfort measures only  Acute hypoxemic respiratory failure.   ARDS  Likely acute flare of pulmonary fibrosis with UIP pattern.  Bilateral pneumonia  Small right pneumothorax  SubCutaneous emphysema  Pneumomediastinum  Mucous plugs.  Status post bronchoscopy May 8.  BAL with Candida likely colonization.  Acute on chronic systolic CHF EF of 38%.  Cardiogenic pulmonary edema  Moderate TR.  COPD exacerbation   History of A-fib on Eliquis.  Status post PPM/AICD.  Recent right knee septic arthritis and left foot infection on IV daptomycin/Rocephin via RUE PICC line until May 15.  Transaminitis.  Likely hepatic congestion from CHF.  Improving  Rheumatoid factor positive.  Other autoimmune panel negative no symptoms.  Dilated ascending thoracic aorta 4.4 cm.  Left bundle branch block.  Diabetes mellitus A1c 7.4        Patient admitted for further evaluation and treatment  Pulmonology and cardiology consulted thank you for assistance  Continue on morphine IV and p.o. as needed for pain and air hunger  Continue on Ativan as needed for anxiety  Continue on Haldol as needed for agitation  Continue on atropine for secretion management as needed  Continue supplemental oxygen as needed for patient comfort  Stop Lasix and midodrine per patient and family  request  Continue to taper Solu-Medrol for patient comfort to avoid abrupt discontinuation   Continue as needed DuoNebs  Continue bowel regimen for patient comfort  Place Oates catheter for patient comfort  Palliative care consulted thank for your assistance  Hospice consulted for transition to GIP bed versus home with hospice.  Patient still requiring IV morphine and Valium for symptom control.  Further inpatient orders recommendations pending clinical course            Discussed plan with bedside RN as well as palliative care.    Disposition: Transitioned to comfort measures only.  Hospice consulted for GIP bed eligibility.  Patient does not feel comfortable returning home due to concerns about symptom control.  Patient still requiring IV meds and 8 L high flow nasal cannula for symptom control.    Form copied  VTE Prophylaxis:  Mechanical VTE prophylaxis orders are present.        CODE STATUS:   Code Status (Patient has no pulse and is not breathing): No CPR (Do Not Attempt to Resuscitate)  Medical Interventions (Patient has pulse or is breathing): Comfort Measures          I confirmed that all copied/forwarded documentation in this record has been carefully reviewed, updated as necessary and accurately reflects the patient's current status and plan of care.

## 2025-05-23 NOTE — PROGRESS NOTES
Pulmonary / Critical Care Progress Note      Patient Name: Roger D Snellen  : 1950  MRN: [id number]  Attending:  Kenneth Prather MD   Date of admission: 2025    Subjective   Subjective   Follow-up for respiratory failure, decompensated congestive heart failure    Over past 24 hours: Remains on Airvo,    This morning,  Currently on Airvo  Remains bedridden  Denies any chest pain fever or hemoptysis   continues to be dyspneic  Objective   Objective     Vitals:   Vital signs for last 24 hours:  Temp:  [97.5 °F (36.4 °C)-98.3 °F (36.8 °C)] 97.5 °F (36.4 °C)  Heart Rate:  [69-72] 69  Resp:  [18-24] 18  BP: ()/(61-75) 111/75    Physical Exam   Vital Signs Reviewed   General:  Alert, NAD. Lying in bed   HEENT:  PERRL, EOMI.  OP  Chest:  Clear to auscultation bilaterally, occasional basal crackles   CV: RRR, no MGR, pulses 2+, equal.  Abd:  Soft, NT, ND, + BS  EXT:  no clubbing, no cyanosis, no edema  Neuro:  A&Ox3, CN grossly intact, no focal deficits.  Skin: No rashes or lesions noted    Result Review    Result Review:  I have personally reviewed the results from the time of this admission to 2025 06:11 EDT and agree with these findings:  [x]  Laboratory  []  Microbiology  [x]  Radiology  [x]  EKG/Telemetry   [x]  Cardiology/Vascular   []  Pathology  []  Old records  []  Other:  Most notable findings include:    .      Lab 25  0457 25  0509 25  0551 25  1616 25  0607 25  1621 25  0519 25  1650 25  0459 25  0637   WBC 15.09* 17.13* 15.66*  --  17.35*  --  17.93*  --  18.66*  --    HEMOGLOBIN 14.8 14.9 14.8  --  14.6  --  15.2  --  14.1  --    HEMATOCRIT 45.7 46.0 44.7  --  45.2  --  46.7  --  43.6  --    PLATELETS 158 174 187  --  206  --  222  --  232  --    SODIUM 137 139 136  --  138  --  140  --  138 139   POTASSIUM 3.9 4.0 3.9 4.4 3.3* 4.8 3.4*   < > 3.5 3.6   CHLORIDE 99 100 99  --  98  --  99  --  98 98   CO2 26.8 28.5 28.7  --  28.9   --  31.6*  --  28.9 30.7*   BUN 37* 39* 43*  --  45*  --  38*  --  35* 40*   CREATININE 0.76 0.68* 0.75*  --  0.77  --  0.80  --  0.66* 0.66*   GLUCOSE 204* 188* 245*  --  262*  --  236*  --  220* 209*   CALCIUM 8.7 9.2 8.8  --  9.0  --  9.3  --  9.4 9.3   PHOSPHORUS  --  2.8  --   --   --   --   --   --  3.4 3.5   TOTAL PROTEIN 5.9*  --  6.3  --  6.3  --  6.7  --  6.4  --    ALBUMIN 2.8* 3.0* 3.0*  --  2.9*  --  3.0*  --  2.8* 2.9*   GLOBULIN 3.1  --  3.3  --  3.4  --  3.7  --  3.6  --     < > = values in this interval not displayed.   CT Chest Without Contrast Diagnostic  Result Date: 5/16/2025  CT CHEST WO CONTRAST DIAGNOSTIC Date of Exam: 5/16/2025 3:22 PM EDT Indication: persistent hypoxia. Comparison: Chest radiograph 5/14/2025. Chest CT 5/11/2025 Technique: Axial CT images were obtained of the chest without contrast administration.  Reconstructed coronal and sagittal images were also obtained. Automated exposure control and iterative construction methods were used. Findings: Thyroid and thoracic inlet: No significant abnormality. Lymph nodes: No pathologic appearing lymph nodes by imaging criteria. Cardiovascular: Extensive pneumomediastinum. Cardiomegaly.. Trace pericardial effusion. Left chest wall ICD with leads terminating in the right atrium, right ventricle, and in the region of the left cardiac vein. Aorta and main pulmonary artery diameters  are within normal range.. There are coronary artery calcifications. Aortic atherosclerosis. Esophagus: No significant abnormality. Lung parenchyma: Multifocal consolidative and groundglass opacities in the right greater than left lung. Peripheral reticular and interstitial opacities Critical Findings were verbally communicated with by Silver Ahumada MD on at . As well as honeycombing in the lower lobes compatible with UIP pattern of pulmonary fibrosis. Airways: Patent trachea and mainstem bronchi. Pleura: No visible pleural effusion. Small right pneumothorax. No  visible left pneumothorax. Chest wall and osseous structures: Degenerative changes of the imaged spine. No acute osseous abnormality. Subcutaneous emphysema extending along the right chest wall into the neck soft tissues. Included abdomen: Cholecystectomy.     Impression: Impression: Small right pneumothorax. Extensive pneumomediastinum and subcutaneous emphysema extending along the right chest wall into the neck soft tissues. Multifocal consolidative and groundglass opacities in the right greater than left lung suspicious for multifocal pneumonia and/or pulmonary edema, similar to prior. Background UIP pattern of pulmonary fibrosis. Critical Findings were verbally communicated with SULAIMAN Banks by Silver Ahumada MD on 5/16/2025 at 11:18 p.m. Electronically Signed: Silver Ahumada MD  5/16/2025 11:17 PM EDT  Workstation ID: [id number]    CT Chest Without Contrast Diagnostic  Result Date: 5/11/2025  CT CHEST WO CONTRAST DIAGNOSTIC Date of Exam: 5/11/2025 8:58 AM EDT Indication: SOB, pt on amio r/o pulm. fibrosis. Comparison: CXR 5/10/2025, CT chest 5/5/2025 Technique: Axial CT images were obtained of the chest without contrast administration.  Reconstructed coronal and sagittal images were also obtained. Automated exposure control and iterative construction methods were used. Findings: Lung window images reveal extensive bilateral groundglass pulmonary opacities. The pattern and distribution is unchanged, however, the opacities are less dense in comparison to 5/5/2025. Alveolar pulmonary edema is improved. Mediastinal windows reveal no mediastinal or axillary adenopathy. The elina are obscured. AICD remains in place. A few coronary artery calcifications are evident. The gallbladder is absent, surgical clips are seen in the gallbladder bed.     Impression: Impression: CT scan of the chest without IV contrast demonstrating extensive bilateral groundglass pulmonary opacities. The pattern and distribution is unchanged in  comparison to 5/5/2025. Alveolar edema is improved. AICD in place. Electronically Signed: Anders Hampton MD  5/11/2025 9:51 AM EDT  Workstation ID: [id number]    CT Angiogram Chest Pulmonary Embolism  Result Date: 5/5/2025  CT ANGIOGRAM CHEST PULMONARY EMBOLISM Date of Exam: 5/5/2025 11:58 AM EDT Indication: eval SOA r/o PE, ?pulm edema vs infiltrate. Comparison: Chest radiograph dated 5/5/2025 Technique: Axial CT images were obtained of the chest after the uneventful intravenous administration of iodinated contrast utilizing pulmonary embolism protocol.  Reconstructed coronal and sagittal images were also obtained. In addition, a 3-D volume rendered image was created for interpretation.  Automated exposure control and iterative construction methods were used. Findings: The visualized soft tissue structures at the base of the neck appear within normal limits. There is no lower cervical or axillary adenopathy. There is a left chest wall ICD with leads in expected position. There is cardiomegaly. There is reflux of contrast into the hepatic veins and IVC. The ascending thoracic aorta is dilated measuring up to 4.4 cm of the main pulmonary artery bifurcation. The main pulmonary artery is mildly dilated. There are no filling defects within the pulmonary arterial system to suggest presence of underlying pulmonary embolism. There is no mediastinal or hilar lymphadenopathy by size criteria. The tracheobronchial tree is patent. There are patchy groundglass opacities throughout both lungs. There are trace bilateral pleural effusions. There is either paraseptal emphysema or honeycombing within the bilateral lower lobes dependently. There is smooth interlobular septal thickening. The esophagus is normal in course and caliber. Visualized portions of the upper abdomen demonstrate no acute findings. There is a large colonic stool burden. There is degenerative disc disease of the thoracic spine.     Impression: Impression: 1.No  evidence of pulmonary embolism. 2.Cardiomegaly with reflux of contrast into the hepatic veins and IVC which can be seen with right heart dysfunction. 3.Patchy groundglass opacities throughout both lungs with smooth interlobular septal thickening and trace bilateral pleural effusions. Findings are favored to represent interstitial and alveolar pulmonary edema over bilateral pneumonia. 4.Dilated ascending thoracic aorta measuring up to 4.4 cm. Mild dilatation of the main pulmonary artery. Electronically Signed: Dayron Espana MD  5/5/2025 12:30 PM EDT  Workstation ID: [id number]    Assessment & Plan   Assessment / Plan     Active Hospital Problems:  Active Hospital Problems    Diagnosis     **Acute hypoxic respiratory failure      Impression:  Acute hypoxic respiratory failure  Interstitial pulmonary fibrosis with flare  Multifocal pneumonia from unknown organism  Acute cardiogenic pulmonary edema  Decompensated congestive heart failure with reduced EF  Volume overload  Recent septic arthritis of the left lower extremity on daptomycin/ceftriaxone as outpatient  Pneumomediastinum and pneumothorax  Subcutaneous emphysema     Plan:  Continue oxygen via nasal cannula   Comfort measures continue Lasix 40 mg IV 3 times daily and Aldactone 12.5 mg oral daily  Trend renal panel electrolytes.  Replace electrolytes as needed.  Continue Solu-Medrol every 12  2D echo did show EF with 38%  Continue advanced heart failure medications  Continue Eliquis  Continue Lasix   DVT prophylaxis, on Eliquis  S/p bronchoscopy, pneumonia panel negative, fungal culture positive for moderate growth Candida  Patient has poor prognosis currently DNR/DNI  Mechanical VTE prophylaxis orders are present.    CODE STATUS:   Code Status (Patient has no pulse and is not breathing): No CPR (Do Not Attempt to Resuscitate)  Medical Interventions (Patient has pulse or is breathing): Comfort Measures    I have reviewed labs, imaging, pertinent clinical data and  provider notes.   I have discussed with bedside nurse and primary service. Electronically signed by Danny Galvan MD, 05/21/25, 2:07 PM EDT. Electronically signed by Danny Galvan MD, 05/22/25, 9:33 AM EDT. Electronically signed by Danny Galvan MD, 05/22/25, 11:47 AM EDT. Electronically signed by Danny Galvan MD, 05/23/25, 9:32 AM EDT.

## 2025-05-23 NOTE — CONSULTS
[facility] Music Therapy    General:    Referral Source: Palliative Care    Reason for Referral: Other: EOL comfort    Length of Session: MT-BC waited outside of Pt's room while nursing staff completed care with Pt for approx. 15 minutes. MT session was 35 minutes.    Session Type: Inpatient individual    Previous Music Therapy Contact: No    Patient Music History: Listener    Meeting Location: Bedside    Music Preferences: Country    Participant(s): Patient, Children, Family, and Spouse/Significant Other/Partner    Others Present: several family members present during MT session and healthcare workers intermittently present at the beginning of MT session.    Assessment:     Patient Positioning Prior to Session: In Bed and Supine    Initial Patient Behavior State: Alert and Other: Pt was experiencing pain at the beginning of MT session with O2 saturations in the low 80s.    Initial Family/Caregiver Behavior State: Accepting, Agreeable, Alert, Calm, Engaged, Pleasant, and Positive    Treatment Objectives: Anxiety management, Pain management, and Promote relaxation/sleep    Co-Treatment: no    Interventions: Rapport building, Receptive music, and Downward Iso-principle    Post Session Assessment:     Ending Patient Behavior State: Calm and Other: Pt was more relaxed and intermittently falling asleep toward the end of MT session. Pt made much fewer statements of pain and O2 saturations had improved to the low 90s.     Ending Family/Caregiver Behavior State: Appreciative, Calm, Engaged, Pleasant, and Positive     Tolerance to Treatment:  Tolerated treatment well     Patient engagement: Music Listening, Expression of thoughts and feelings, Increased facial expression, Signs of Relaxation, Eye contact, and Increased autonomic stability (see other)  - Pt monitor showed increased O2 saturations from low 80s at the beginning of MT session to low 90s at the end of MT session.     Session Progress: Exhibited  "stabilized/improved emotional state, Exhibited stabilized/improved coping, Exhibited stabilized/improved autonomic stability, Transitioned to a state of relaxation and sleep, and Family/Caregiver exhibited improved coping    Intervention Plan: Continue to follow up    MT-BC utilized downward iso-principle by matching Pt presentation and gradually altering musical elements (volume, tempo, key, intensity, and guitar accompaniment pattern) while maintaining Pt-preferred music being provided to increase relaxation and comfort for Pt. Pt responded very positively to MT interventions AEB gradually making fewer statements of experiencing pain and discomfort during session, gradually displaying increased oxygen saturations (from low 80s at the beginning of MT session to low 90s at the end of MT session) and decreased heart rate (from mid to high 70s(bpm) to high 60s(bpm) at the end of MT session, and eventually displaying soft snore with closed eyes while engaging in MT interventions. Several family members were present in Pt's room at the end of MT session and engaged in brief conversation and listening. Pt and family expressed gratitude for MT session at the end of session and Pt reported he \"enjoyed that more than [he] expected\". No unmet needs were reported at this time.      "

## 2025-05-23 NOTE — NURSING NOTE
Have spoke with the primary RN to place a magnet over the pts ICD along with reaching out to [employer] to get pt's ICD turned off. Waiting for [employer] representative to return call with further details/instructions. Will update staff accordingly.         Alea GRAHAM RN  Palliative Care

## 2025-05-23 NOTE — NURSING NOTE
Met with patient and family at bedside this afternoon. They state patient is still uncomfortable with the idea of going home and that patient would like to remain in hospital until EOL. Comfort and education provided during RN visit. Patient was complaining of severe pain in bladder despite morphine that was given 20 minutes ago. He states he is unable to urinate and that he is ok with his RN placing a catheter as he just desires to have relief. This RN informed floor RN of situation and she stated she will place catheter. [facility] MD contacted regarding GIP eligibility today. He states patient is still not eligible as he still does not have a discharge plan in place. We will continue to follow along and reassess should that change. This RN spoke with floor RN and Alea with the palliative team and notified them of patient being ineligible at this time. Please let us know if you have any questions.    Natalia Padilla RN  [facility]

## 2025-05-23 NOTE — NURSING NOTE
Report called to 3NT.  VSS. Family at bedside.  No acute distress noted. No c/o pain verbalized.  Transport notified.

## 2025-05-24 NOTE — NURSING NOTE
Met with patient and family at bedside this morning to reassess need for possible GIP services. It is understood that patient wants to pass away here at the hospital and is adamant about not going home. [facility] at this time. We will plan to reassess situation on Tuesday given that it is highly likely that patient will continue to have the desire to remain in the hospital. Hospice RN did notify floor RN regarding patient status as he was uncomfortable during Hospice RN visit and requesting medications. Comfort and support given to family. Thank you for allowing us to see this patient. Please let us know if you have any questions.    Natalia Padilla, SULAIMAN  [facility]

## 2025-05-24 NOTE — PROGRESS NOTES
[facility]   Hospitalist Progress Note  Date: 2025  Patient Name: Roger D Snellen  : 1950  MRN: [id number]  Date of admission: 2025      Subjective   Subjective     Chief Complaint: Follow-up shortness of breath  Summary:Roger D Snellen is a 75 y.o. male history of A-fib on anticoagulation, heart failure with preserved ejection fraction, diabetes, COPD, hypothyroidism, GERD, hyperlipidemia, hypertension endorses slowly worsening shortness of breath. Patient presented to the emergency department hypoxic 81% on room air. Patient with a heart rate of 70, respiratory rate of 28 and a blood pressure 122/72 initially in the emergency department. Quickly titrated up on high flow nasal cannula eventually transitioned over to BiPAP, however due to intolerance patient transition to Airvo to maintain O2 saturations. Patient's initial ABG showed a pH of 7.5, PCO2 of 27.5, PO2 of 65.8. proBNP elevated at 6386. Troponin 32 then 31, no complaints of chest pain. On labs patient with a bicarb of 19.4, anion gap of 15.6, creatinine 0.73. Patient had AST ALT elevations of 114/96 respectively. Procalcitonin elevated 0.41. Lactate wnl x 2. White count of 12.8, hemoglobin 11.3 and platelets of 333.  CT scan with contrast obtained to rule out pulmonary embolus. No evidence of PE. Cardiomegaly, seen in right heart failure. Patchy groundglass opacities throughout both lungs with smooth interlobular septal thickening and trace bilateral pleural effusions. Representing pulmonary edema over bilateral pneumonia. Given patient's work of breathing and oxygen demand, admitted to the ICU. Overnight patient did well and has been weaned down to 7 L high flow nasal cannula. Transferred out of the unit on May 6. Patient seen by pulmonary and cardiology. Patient had bronchoscopy May 8 with suctioning of mucous plugs and purulent secretions. Cultures negative to date. Rheumatoid factor positive but rest of autoimmune panel including  anti-CCP negative. Patient follows with Dr. Cole arriola as an outpatient for pulmonary fibrosis with a UIP pattern. Patient finished Rocephin May 14. BAL showed Candida.  Started on fluconazole.  Respiratory function began to decline requiring AirVo.  Started on IV diuretics continue systemic steroids.  Found to have small right pneumothorax with extensive pneumomediastinum and subcutaneous emphysema as well as bilateral infiltrate on repeat CT 5/16/2025.  Continued on systemic steroids and empiric antibiotics.  Shortness of breath very slow to improve still very short of breath with any activity basically bedbound due to shortness of breath requiring AirVo.  Repeat chest x-ray demonstrated increasing right thoracic and supraclavicular subcutaneous emphysema and persistent small right-sided pneumothorax.  Patient remains very short of breath and voiced that he just wanted to be kept comfortable.  Discussed goals of care with patient and wife and they were in agreement that they want to pursue comfort measures only at this time.  Palliative care consulted.  Comfort meds initiated.  Hospice consulted.  Per hospice MD not currently GIP eligible as he does not have a discharge plan in place. Patient requiring frequent IV morphine and valium for symptom control. Patient is uncomfortable with the idea of going home and that patient would like to remain in hospital until EOL     Interval Followup: Sitting up in bed appears to be resting comfortably with family at the bedside.  Patient complaining of abdominal discomfort and bloating.  Patient had 1 small bowel movement this morning.  Oates catheter in place.  Getting daily lactulose.  Requiring frequent IV morphine and Valium for symptom control.  Still not GIP bed eligible per hospice due to no discharge plan.  Remained afebrile overnight.  Heart rate within normal limits.  Blood pressure within normal limits.  Patient on room air with signs of some increased work  of breathing.  Nursing informed and medicating for patient comfort.  No other issues per nursing.    Review of Systems  Constitutional: Positive for fatigue and negative fever.   HENT: Negative for sore throat and trouble swallowing.    Eyes: Negative for pain and discharge.   Respiratory: Negative for cough and positive shortness of breath.    Cardiovascular: Negative for chest pain and palpitations.   Gastrointestinal: Positive for abdominal pain, negative nausea and vomiting.   Endocrine: Negative for cold intolerance and heat intolerance.   Genitourinary: Negative for difficulty urinating and dysuria.   Musculoskeletal: Negative for back pain and neck stiffness.   Skin: Negative for color change and rash.   Neurological: Negative for syncope and headaches.   Hematological: Negative for adenopathy.   Psychiatric/Behavioral: Negative for confusion and hallucinations.    Objective   Objective     Vitals:   Heart Rate:  [68] 68  Resp:  [20-22] 22  BP: (104)/(70) 104/70  Flow (L/min) (Oxygen Therapy):  [0-4] 0  Physical Exam   General: well-developed appearing stated age in no acute distress  HEENT: Normocephalic atraumatic moist membranes pupils equal round reactive light, no scleral icterus no conjunctival injection  Cardiovascular: regular trace lower extremity edema appreciated  Pulmonary: Crackles bilateral bases with subcutaneous crepitus over the right chest wall no wheezes or rhonchi, symmetric chest expansion, positive labored, conversational dyspnea appreciated  Gastrointestinal: Soft slightly distended diminished bowel sounds all 4 quadrants no rebound or guarding  Musculoskeletal: No clubbing cyanosis, warm and well-perfused, calves soft symmetric nontender bilaterally  Skin: Clean dry without rashes  Neuro: Cranial nerves II through XII intact grossly no sensorimotor deficits appreciated bilateral upper and lower extremities  Psych: Patient is calm cooperative and appropriate with exam not responding to  internal stimuli  : Aotes catheter positive bladder distention positive suprapubic tenderness    Result Review    Result Review:  I have personally reviewed these results and agree with these findings:  [x]  Laboratory  LAB RESULTS:      Lab 05/22/25 0457 05/21/25  0509 05/20/25  0551 05/19/25  0607 05/18/25  0519   WBC 15.09* 17.13* 15.66* 17.35* 17.93*   HEMOGLOBIN 14.8 14.9 14.8 14.6 15.2   HEMATOCRIT 45.7 46.0 44.7 45.2 46.7   PLATELETS 158 174 187 206 222   NEUTROS ABS  --   --  14.46* 15.98* 16.46*   IMMATURE GRANS (ABS)  --   --  0.19* 0.16* 0.17*   LYMPHS ABS  --   --  0.62* 0.73 0.74   MONOS ABS  --   --  0.38 0.45 0.52   EOS ABS  --   --  0.00 0.00 0.00   MCV 94.6 95.0 95.3 95.0 95.1   PROCALCITONIN  --  0.06  --   --   --          Lab 05/22/25  0457 05/21/25  0509 05/20/25  0551 05/19/25  1616 05/19/25  0607 05/18/25  1621 05/18/25  0519   SODIUM 137 139 136  --  138  --  140   POTASSIUM 3.9 4.0 3.9 4.4 3.3*   < > 3.4*   CHLORIDE 99 100 99  --  98  --  99   CO2 26.8 28.5 28.7  --  28.9  --  31.6*   ANION GAP 11.2 10.5 8.3  --  11.1  --  9.4   BUN 37* 39* 43*  --  45*  --  38*   CREATININE 0.76 0.68* 0.75*  --  0.77  --  0.80   EGFR 93.2 96.3 93.5  --  92.8  --  91.7   GLUCOSE 204* 188* 245*  --  262*  --  236*   CALCIUM 8.7 9.2 8.8  --  9.0  --  9.3   PHOSPHORUS  --  2.8  --   --   --   --   --    HEMOGLOBIN A1C  --   --  7.40*  --   --   --   --     < > = values in this interval not displayed.         Lab 05/22/25  0457 05/21/25  0509 05/20/25  0551 05/19/25  0607 05/18/25  0519   TOTAL PROTEIN 5.9*  --  6.3 6.3 6.7   ALBUMIN 2.8* 3.0* 3.0* 2.9* 3.0*   GLOBULIN 3.1  --  3.3 3.4 3.7   ALT (SGPT) 77*  --  89* 86* 93*   AST (SGOT) 30  --  36 31 33   BILIRUBIN 0.8  --  0.8 0.7 0.7   ALK PHOS 65  --  67 68 78                       Lab 05/20/25  0908   PH, ARTERIAL 7.488*   PCO2, ARTERIAL 42.1   PO2 .7*   O2 SATURATION ART 98.7   FIO2 100   HCO3 ART 31.9*   BASE EXCESS ART 7.6*     Brief Urine Lab  Results  (Last result in the past 365 days)        Color   Clarity   Blood   Leuk Est   Nitrite   Protein   CREAT   Urine HCG        05/05/25 1451 Yellow   Clear   Negative   Negative   Negative   Negative                 Microbiology Results (last 10 days)       ** No results found for the last 240 hours. **            []  Microbiology  [x]  Radiology  XR Chest 1 View  Result Date: 5/22/2025  Impression: 1.Increasing right thoracic and supraclavicular subcutaneous emphysema limiting visualization of presumed relatively small right-sided pneumothorax. No significant pneumothorax progression identified. Other relatively stable findings. Electronically Signed: James Simmons MD  5/22/2025 10:53 AM EDT  Workstation ID: [id number]    XR Chest 1 View  Result Date: 5/20/2025  Impression: 1.Small right apical pneumothorax is similar compared to 5/17/2025 chest x-ray and was not visualized on 5/18/2025 chest x-ray. Subcutaneous emphysema appears increased compared to 5/18/2025 chest x-ray. Similar pneumomediastinum.. 2.Stable appearance of diffuse right lung and left lower lung interstitial and airspace opacities. Electronically Signed: Jacqueline Dong MD  5/20/2025 11:55 AM EDT  Workstation ID: [id number]    XR Chest 1 View  Result Date: 5/18/2025  Impression: Improving pulmonary opacities superimposed on known sequelae of chronic interstitial lung disease/fibrosis. No significant visualized pneumothorax. Subcutaneous emphysema is decreasing from prior exam. Electronically Signed: Rickey Markham MD  5/18/2025 2:28 PM EDT  Workstation ID: [id number]    XR Chest 1 View  Result Date: 5/17/2025  1.  Bilateral infiltrates persist, right greater than left. The findings may represent infectious multifocal pneumonia. Please correlate clinically. 2.  There is a tiny right apical pneumothorax. Pneumomediastinum is noted. No definite left pneumothorax is seen on this single AP (anteroposterior) upright portable chest radiograph. 3.  Mild  subcutaneous emphysema is seen. 4.  Please see above comments for further detail.   Portions of this note were completed with a voice recognition program.  5/17/2025 5:30 AM by Romeo Winters MD on Workstation: [id number]        [x]  EKG/Telemetry   [x]  Cardiology/Vascular  Transthoracic echocardiogram 5/16/2025    There is moderate calcification of the aortic valve.     The left ventricle is normal in size and show significant hypokinesis of the distal septum and apex the proximal septum, lateral wall and inferior wall contract well the estimation fraction is 35%.  The bubble study showed no evidence of AV shunt.    []  Pathology  []  Old records  [x]  Other:  Scheduled Meds:lactulose, 30 g, Oral, Daily  methylPREDNISolone sodium succinate, 40 mg, Intravenous, Q12H  mineral oil-hydrophilic petrolatum, 1 Application, Topical, Daily  sodium chloride, 10 mL, Intravenous, Q12H  sodium chloride, 4 mL, Nebulization, BID - RT      Continuous Infusions:   PRN Meds:.  acetaminophen    albuterol    atropine    senna-docusate sodium **AND** polyethylene glycol **AND** bisacodyl **AND** bisacodyl    calcium carbonate    dextrose    dextrose    LORazepam **OR** diazePAM **OR** LORazepam    glucagon (human recombinant)    haloperidol **OR** haloperidol **OR** haloperidol lactate    ipratropium-albuterol    Morphine **OR** morphine    nitroglycerin    ondansetron    ondansetron ODT    oxyCODONE    Polyvinyl Alcohol-Povidone PF    simethicone    sodium chloride    sodium chloride    sodium chloride    sodium chloride      Assessment & Plan   Assessment / Plan     Assessment/Plan:  End-of-life care  Comfort measures only  Acute hypoxemic respiratory failure.   ARDS  Likely acute flare of pulmonary fibrosis with UIP pattern.  Bilateral pneumonia  Small right pneumothorax  SubCutaneous emphysema  Pneumomediastinum  Mucous plugs.  Status post bronchoscopy May 8.  BAL with Candida likely colonization.  Acute on chronic systolic CHF EF of  38%.  Cardiogenic pulmonary edema  Moderate TR.  COPD exacerbation   History of A-fib on Eliquis.  Status post PPM/AICD.  Recent right knee septic arthritis and left foot infection on IV daptomycin/Rocephin via RUE PICC line until May 15.  Transaminitis.  Likely hepatic congestion from CHF.  Improving  Rheumatoid factor positive.  Other autoimmune panel negative no symptoms.  Dilated ascending thoracic aorta 4.4 cm.  Left bundle branch block.  Diabetes mellitus A1c 7.4        Patient admitted for further evaluation and treatment  Pulmonology and cardiology consulted thank you for assistance  Continue on morphine IV and p.o. as needed for pain and air hunger  Continue on Ativan as needed for anxiety  Continue on Haldol as needed for agitation  Continue on atropine for secretion management as needed  Continue supplemental oxygen as needed for patient comfort  Continue to taper Solu-Medrol for patient comfort to avoid abrupt discontinuation   Continue as needed DuoNebs  Continue bowel regimen for patient comfort  Continue Oates catheter for patient comfort with routine catheter care per protocol  Palliative care consulted thank for your assistance  Hospice consulted for transition to GIP bed versus home with hospice.  Patient still requiring IV morphine and Valium for symptom control.  Further inpatient orders recommendations pending clinical course            Discussed plan with bedside RN as well as palliative care.    Disposition: Has transitioned to comfort measures only.  Hospice consulted for GIP bed eligibility.  Patient does not feel comfortable returning home due to concerns about symptom control.  Patient still requiring frequent IV meds for symptom control.    Form copied  VTE Prophylaxis:  Mechanical VTE prophylaxis orders are present.        CODE STATUS:   Code Status (Patient has no pulse and is not breathing): No CPR (Do Not Attempt to Resuscitate)  Medical Interventions (Patient has pulse or is  breathing): Comfort Measures          I confirmed that all copied/forwarded documentation in this record has been carefully reviewed, updated as necessary and accurately reflects the patient's current status and plan of care.

## 2025-05-24 NOTE — PLAN OF CARE
Goal Outcome Evaluation:  Plan of Care Reviewed With: patient        Progress: declining  Outcome Evaluation: Patient resting at this time, pain med and valium given every2-3 hours as requested, family at bedside, patient still on 3L NC, family was concerned about decreasing oxygen , patient is awake and oriented, call light within reach.

## 2025-05-24 NOTE — PROGRESS NOTES
Pulmonary / Critical Care Progress Note      Patient Name: Roger D Snellen  : 1950  MRN: [id number]  Attending:  Kenneth Prather MD   Date of admission: 2025    Subjective   Subjective   Follow-up for respiratory failure, decompensated congestive heart failure    Over past 24 hours: Patient is not keeping his oxygen on  Presently on comfort measures    Objective   Objective     Vitals:   Vital signs for last 24 hours:  Heart Rate:  [68] 68  Resp:  [20-22] 22  BP: (104)/(70) 104/70    Physical Exam   Vital Signs Reviewed   General:  Alert, NAD. Lying in bed   HEENT:  PERRL, EOMI.  OP  Chest:  Clear to auscultation bilaterally, occasional basal crackles   CV: RRR, no MGR, pulses 2+, equal.  Abd:  Soft, NT, ND, + BS  EXT:  no clubbing, no cyanosis, no edema  Neuro:  A&Ox3, CN grossly intact, no focal deficits.  Skin: No rashes or lesions noted    Result Review    Result Review:  I have personally reviewed the results from the time of this admission to 2025 10:55 EDT and agree with these findings:  [x]  Laboratory  []  Microbiology  [x]  Radiology  [x]  EKG/Telemetry   [x]  Cardiology/Vascular   []  Pathology  []  Old records  []  Other:  Most notable findings include:    .      Lab 25  0457 25  0509 25  0551 25  1616 25  0607 25  1621 25  0519   WBC 15.09* 17.13* 15.66*  --  17.35*  --  17.93*   HEMOGLOBIN 14.8 14.9 14.8  --  14.6  --  15.2   HEMATOCRIT 45.7 46.0 44.7  --  45.2  --  46.7   PLATELETS 158 174 187  --  206  --  222   SODIUM 137 139 136  --  138  --  140   POTASSIUM 3.9 4.0 3.9 4.4 3.3* 4.8 3.4*   CHLORIDE 99 100 99  --  98  --  99   CO2 26.8 28.5 28.7  --  28.9  --  31.6*   BUN 37* 39* 43*  --  45*  --  38*   CREATININE 0.76 0.68* 0.75*  --  0.77  --  0.80   GLUCOSE 204* 188* 245*  --  262*  --  236*   CALCIUM 8.7 9.2 8.8  --  9.0  --  9.3   PHOSPHORUS  --  2.8  --   --   --   --   --    TOTAL PROTEIN 5.9*  --  6.3  --  6.3  --  6.7   ALBUMIN 2.8*  3.0* 3.0*  --  2.9*  --  3.0*   GLOBULIN 3.1  --  3.3  --  3.4  --  3.7   CT Chest Without Contrast Diagnostic  Result Date: 5/16/2025  CT CHEST WO CONTRAST DIAGNOSTIC Date of Exam: 5/16/2025 3:22 PM EDT Indication: persistent hypoxia. Comparison: Chest radiograph 5/14/2025. Chest CT 5/11/2025 Technique: Axial CT images were obtained of the chest without contrast administration.  Reconstructed coronal and sagittal images were also obtained. Automated exposure control and iterative construction methods were used. Findings: Thyroid and thoracic inlet: No significant abnormality. Lymph nodes: No pathologic appearing lymph nodes by imaging criteria. Cardiovascular: Extensive pneumomediastinum. Cardiomegaly.. Trace pericardial effusion. Left chest wall ICD with leads terminating in the right atrium, right ventricle, and in the region of the left cardiac vein. Aorta and main pulmonary artery diameters  are within normal range.. There are coronary artery calcifications. Aortic atherosclerosis. Esophagus: No significant abnormality. Lung parenchyma: Multifocal consolidative and groundglass opacities in the right greater than left lung. Peripheral reticular and interstitial opacities Critical Findings were verbally communicated with by Silver Ahumada MD on at . As well as honeycombing in the lower lobes compatible with UIP pattern of pulmonary fibrosis. Airways: Patent trachea and mainstem bronchi. Pleura: No visible pleural effusion. Small right pneumothorax. No visible left pneumothorax. Chest wall and osseous structures: Degenerative changes of the imaged spine. No acute osseous abnormality. Subcutaneous emphysema extending along the right chest wall into the neck soft tissues. Included abdomen: Cholecystectomy.     Impression: Impression: Small right pneumothorax. Extensive pneumomediastinum and subcutaneous emphysema extending along the right chest wall into the neck soft tissues. Multifocal consolidative and  groundglass opacities in the right greater than left lung suspicious for multifocal pneumonia and/or pulmonary edema, similar to prior. Background UIP pattern of pulmonary fibrosis. Critical Findings were verbally communicated with SULAIMAN Banks by Silver Ahumada MD on 5/16/2025 at 11:18 p.m. Electronically Signed: Silver Ahumada MD  5/16/2025 11:17 PM EDT  Workstation ID: [id number]    CT Chest Without Contrast Diagnostic  Result Date: 5/11/2025  CT CHEST WO CONTRAST DIAGNOSTIC Date of Exam: 5/11/2025 8:58 AM EDT Indication: SOB, pt on amio r/o pulm. fibrosis. Comparison: CXR 5/10/2025, CT chest 5/5/2025 Technique: Axial CT images were obtained of the chest without contrast administration.  Reconstructed coronal and sagittal images were also obtained. Automated exposure control and iterative construction methods were used. Findings: Lung window images reveal extensive bilateral groundglass pulmonary opacities. The pattern and distribution is unchanged, however, the opacities are less dense in comparison to 5/5/2025. Alveolar pulmonary edema is improved. Mediastinal windows reveal no mediastinal or axillary adenopathy. The elina are obscured. AICD remains in place. A few coronary artery calcifications are evident. The gallbladder is absent, surgical clips are seen in the gallbladder bed.     Impression: Impression: CT scan of the chest without IV contrast demonstrating extensive bilateral groundglass pulmonary opacities. The pattern and distribution is unchanged in comparison to 5/5/2025. Alveolar edema is improved. AICD in place. Electronically Signed: Anders Hampton MD  5/11/2025 9:51 AM EDT  Workstation ID: [id number]    CT Angiogram Chest Pulmonary Embolism  Result Date: 5/5/2025  CT ANGIOGRAM CHEST PULMONARY EMBOLISM Date of Exam: 5/5/2025 11:58 AM EDT Indication: eval SOA r/o PE, ?pulm edema vs infiltrate. Comparison: Chest radiograph dated 5/5/2025 Technique: Axial CT images were obtained of the chest after the  uneventful intravenous administration of iodinated contrast utilizing pulmonary embolism protocol.  Reconstructed coronal and sagittal images were also obtained. In addition, a 3-D volume rendered image was created for interpretation.  Automated exposure control and iterative construction methods were used. Findings: The visualized soft tissue structures at the base of the neck appear within normal limits. There is no lower cervical or axillary adenopathy. There is a left chest wall ICD with leads in expected position. There is cardiomegaly. There is reflux of contrast into the hepatic veins and IVC. The ascending thoracic aorta is dilated measuring up to 4.4 cm of the main pulmonary artery bifurcation. The main pulmonary artery is mildly dilated. There are no filling defects within the pulmonary arterial system to suggest presence of underlying pulmonary embolism. There is no mediastinal or hilar lymphadenopathy by size criteria. The tracheobronchial tree is patent. There are patchy groundglass opacities throughout both lungs. There are trace bilateral pleural effusions. There is either paraseptal emphysema or honeycombing within the bilateral lower lobes dependently. There is smooth interlobular septal thickening. The esophagus is normal in course and caliber. Visualized portions of the upper abdomen demonstrate no acute findings. There is a large colonic stool burden. There is degenerative disc disease of the thoracic spine.     Impression: Impression: 1.No evidence of pulmonary embolism. 2.Cardiomegaly with reflux of contrast into the hepatic veins and IVC which can be seen with right heart dysfunction. 3.Patchy groundglass opacities throughout both lungs with smooth interlobular septal thickening and trace bilateral pleural effusions. Findings are favored to represent interstitial and alveolar pulmonary edema over bilateral pneumonia. 4.Dilated ascending thoracic aorta measuring up to 4.4 cm. Mild dilatation of  the main pulmonary artery. Electronically Signed: Dayron Espana MD  5/5/2025 12:30 PM EDT  Workstation ID: [id number]    Assessment & Plan   Assessment / Plan     Active Hospital Problems:  Active Hospital Problems    Diagnosis     **Acute hypoxic respiratory failure      Impression:  Acute hypoxic respiratory failure  Interstitial pulmonary fibrosis with flare  Multifocal pneumonia from unknown organism  Acute cardiogenic pulmonary edema  Decompensated congestive heart failure with reduced EF  Volume overload  Recent septic arthritis of the left lower extremity on daptomycin/ceftriaxone as outpatient  Pneumomediastinum and pneumothorax  Subcutaneous emphysema     Plan:  Continue oxygen via nasal cannula   Comfort measures Continue Solu-Medrol every 12    Continue advanced heart failure medications  Continue Eliquis  Continue Lasix   DVT prophylaxis, on Eliquis  S/p bronchoscopy, pneumonia panel negative, fungal culture positive for moderate growth Candida  Patient has poor prognosis currently DNR/DNI  Mechanical VTE prophylaxis orders are present.    CODE STATUS:   Code Status (Patient has no pulse and is not breathing): No CPR (Do Not Attempt to Resuscitate)  Medical Interventions (Patient has pulse or is breathing): Comfort Measures  Electronically signed by Danny Galvan MD, 05/24/25, 10:56 AM EDT.

## 2025-05-24 NOTE — PLAN OF CARE
Goal Outcome Evaluation:                                  Patient is end of life care. He is currently still able to make his needs known. Education was provided on the need to turn him q 2 hours since he has started being more still in the bed. Dressing changes completed this shift. Family is supportive and remains at bedside. Vital signs indicate he is progressing in the end of life process.

## 2025-05-25 NOTE — DISCHARGE SUMMARY
[facility]         HOSPITALIST  DISCHARGE/DEATH SUMMARY    Patient Name: Roger D Snellen  : 1950  MRN: [id number]    Date of Admission: 2025  Date of Discharge/death: 2025  Primary Care Physician: John Phelps Jr., MD    Consults       Date and Time Order Name Status Description    2025  2:39 PM Inpatient Pulmonology Consult Completed     2025  2:39 PM Inpatient Cardiology Consult      2025 11:20 AM Hospitalist (on-call MD unless specified)      2025 10:36 AM Cardiology (on-call MD unless specified)              Active and Resolved Hospital Problems:  Acute hypoxemic respiratory failure.   ARDS  Likely acute flare of pulmonary fibrosis with UIP pattern.  Bilateral pneumonia  Small right pneumothorax  SubCutaneous emphysema  Pneumomediastinum  Mucous plugs.  Status post bronchoscopy May 8.  BAL with Candida likely colonization.  Acute on chronic systolic CHF EF of 38%.  Cardiogenic pulmonary edema  Moderate TR.  COPD exacerbation   History of A-fib on Eliquis.  Status post PPM/AICD.  Recent right knee septic arthritis and left foot infection on IV daptomycin/Rocephin via RUE PICC line until May 15.  Transaminitis.  Likely hepatic congestion from CHF.  Improving  Rheumatoid factor positive.  Other autoimmune panel negative no symptoms.  Dilated ascending thoracic aorta 4.4 cm.  Left bundle branch block.  Diabetes mellitus A1c 7.4  End-of-life care  Comfort measures only  Active Hospital Problems    Diagnosis POA    **Acute hypoxic respiratory failure [J96.01] Yes      Resolved Hospital Problems   No resolved problems to display.       Hospital Course     Hospital Course:  Roger D Snellen is a 75 y.o. male  history of A-fib on anticoagulation, heart failure with preserved ejection fraction, diabetes, COPD, hypothyroidism, GERD, hyperlipidemia, hypertension endorses slowly worsening shortness of breath. Patient presented to the emergency department hypoxic 81% on room  air. Patient with a heart rate of 70, respiratory rate of 28 and a blood pressure 122/72 initially in the emergency department. Quickly titrated up on high flow nasal cannula eventually transitioned over to BiPAP, however due to intolerance patient transition to Airvo to maintain O2 saturations. Patient's initial ABG showed a pH of 7.5, PCO2 of 27.5, PO2 of 65.8. proBNP elevated at 6386. Troponin 32 then 31, no complaints of chest pain. On labs patient with a bicarb of 19.4, anion gap of 15.6, creatinine 0.73. Patient had AST ALT elevations of 114/96 respectively. Procalcitonin elevated 0.41. Lactate wnl x 2. White count of 12.8, hemoglobin 11.3 and platelets of 333.  CT scan with contrast obtained to rule out pulmonary embolus. No evidence of PE. Cardiomegaly, seen in right heart failure. Patchy groundglass opacities throughout both lungs with smooth interlobular septal thickening and trace bilateral pleural effusions. Representing pulmonary edema over bilateral pneumonia. Given patient's work of breathing and oxygen demand, admitted to the ICU. Overnight patient did well and has been weaned down to 7 L high flow nasal cannula. Transferred out of the unit on May 6. Patient seen by pulmonary and cardiology. Patient had bronchoscopy May 8 with suctioning of mucous plugs and purulent secretions. Cultures negative to date. Rheumatoid factor positive but rest of autoimmune panel including anti-CCP negative. Patient follows with Dr. Cole arriola as an outpatient for pulmonary fibrosis with a UIP pattern. Patient finished Rocephin May 14. BAL showed Candida.  Started on fluconazole.  Respiratory function began to decline requiring AirVo.  Started on IV diuretics continue systemic steroids.  Found to have small right pneumothorax with extensive pneumomediastinum and subcutaneous emphysema as well as bilateral infiltrate on repeat CT 5/16/2025.  Continued on systemic steroids and empiric antibiotics.  Shortness of  breath very slow to improve still very short of breath with any activity basically bedbound due to shortness of breath requiring AirVo.  Repeat chest x-ray demonstrated increasing right thoracic and supraclavicular subcutaneous emphysema and persistent small right-sided pneumothorax.  Patient remains very short of breath and voiced that he just wanted to be kept comfortable.  Discussed goals of care with patient and wife and they were in agreement that they want to pursue comfort measures only at this time.  Palliative care consulted.  Comfort meds initiated.  Hospice consulted.  Per hospice MD not currently GIP eligible as he does not have a discharge plan in place. Patient required frequent administration and titration of IV morphine and valium for symptom control.  Patient ultimately  earlier on the afternoon of 2025 due to pulmonary fibrosis with acute respiratory distress syndrome acute hypoxic respiratory failure.              Discharge Details        Discharge Medications        ASK your doctor about these medications        Instructions Start Date   albuterol sulfate  (90 Base) MCG/ACT inhaler  Commonly known as: PROVENTIL HFA;VENTOLIN HFA;PROAIR HFA   USE 2 INHALATIONS EVERY 6 HOURS AS NEEDED FOR SHORTNESS OF AIR OR WHEEZING      amiodarone 200 MG tablet  Commonly known as: PACERONE   200 mg, Oral, 2 Times Daily      apixaban 5 MG tablet tablet  Commonly known as: ELIQUIS   5 mg, Oral, 2 Times Daily      CEFTRIAXONE SODIUM IV   Intravenous, Ceftriax IVPB + Sodium Chloride 0.9% intravenous solution      DAPTOmycin 500 MG injection  Commonly known as: CUBICIN  Ask about: Should I take this medication?   500 mg, Intravenous, Daily      digoxin 125 MCG tablet  Commonly known as: LANOXIN   1 tablet, Oral, Every Other Day      Fluticasone-Umeclidin-Vilant 200-62.5-25 MCG/ACT inhaler  Commonly known as: TRELEGY ELLIPTA   1 puff, Inhalation, Daily - RT      HYDROcodone-acetaminophen 7.5-325 MG per  tablet  Commonly known as: NORCO  Ask about: Which instructions should I use?   1 tablet, Oral, Every 6 Hours PRN      Insulin Lispro 100 UNIT/ML injection  Commonly known as: humaLOG   Subcutaneous, 3 Times Daily Before Meals, Sliding scale      Jardiance 10 MG tablet tablet  Generic drug: empagliflozin   10 mg, Oral, Daily      levothyroxine 100 MCG tablet  Commonly known as: SYNTHROID, LEVOTHROID  Ask about: Which instructions should I use?   100 mcg, Oral, Daily      magnesium oxide 400 MG tablet  Commonly known as: MAG-OX   400 mg, Oral, 2 Times Daily      metFORMIN 1000 MG tablet  Commonly known as: GLUCOPHAGE   1 tablet, Oral, 2 Times Daily With Meals      metoprolol succinate XL 25 MG 24 hr tablet  Commonly known as: TOPROL-XL   25 mg, Oral, Daily      omeprazole 40 MG capsule  Commonly known as: priLOSEC  Ask about: Which instructions should I use?   No dose, route, or frequency recorded.      pantoprazole 40 MG EC tablet  Commonly known as: PROTONIX   40 mg, Oral, Daily      polyethylene glycol 17 GM/SCOOP powder  Commonly known as: MIRALAX   17 g, Oral, Daily      spironolactone 25 MG tablet  Commonly known as: ALDACTONE   25 mg, Oral, Daily      Vitamin D3 50 MCG (2000 UT) tablet   1 tablet, Oral, Daily               Allergies   Allergen Reactions    Celecoxib Itching    Lisinopril Cough    Rofecoxib Unknown - Low Severity    Valdecoxib Unknown - Low Severity    Xarelto [Rivaroxaban]        Discharge Disposition:      Diet:  Hospital:No active diet order      Discharge Activity:       CODE STATUS:  Code Status and Medical Interventions: No CPR (Do Not Attempt to Resuscitate); Comfort Measures   Ordered at: 05/22/25 1323     Code Status (Patient has no pulse and is not breathing):    No CPR (Do Not Attempt to Resuscitate)     Medical Interventions (Patient has pulse or is breathing):    Comfort Measures         Future Appointments   Date Time Provider Department Center   6/17/2025 12:30 PM [facility] PULM LAB  ROOM 1 [facility] PFT [facility]   8/15/2025 10:30 AM Corine Arora APRN [facility]           Pertinent  and/or Most Recent Results     PROCEDURES:         Tae Mosley MD   Physician  Pulmonology     Op Note      Signed     Date of Service: 05/08/25 1149  Creation Time: 05/08/25 1204  Case Time: Procedures: Surgeons:   05/08/25 1149 BRONCHOSCOPY WITH BRONCHOALVEOLAR LAVAGE, WASHING, AIRWAY INSPECTION: insertion of lighted instrument to view inside the lung    Tae Mosley MD               Signed         Bronchoscopy Procedure Note     Procedure:  Bronchoscopy with mucous plug removal  Bronchoscopy with bronchoalveolar lavage right lower lobe  Bronchoscopy with bronchial washings tracheobronchial tree  Airway inspection     Pre-Operative Diagnosis: Pneumonia, respiratory failure  Post-Operative Diagnosis: Mild mucoid secretions bilateral lower lobes, indurated airway     Indication:  Pneumonia, respiratory failure     Anesthesia: MAC anesthesia     Procedure Details: Patient was consented for the procedure with all risks and benefits of the procedure explained in detail. Patient was given the opportunity to ask questions and all concerns were answered.     The bronchoscope was inserted into the main airway via the mouth. An anatomical survey was done of the main airways and the subsegmental bronchus. The findings are reported below.  Bilateral significant mucus secretions was seen in lower lobes, suctioned out in several attempts.  A bronchoalveolar lavage was performed using 60 mL aliquots of normal saline x 2 instilled into the airways then aspirated back from right lower lobe, lateral basal segment of lung with 45 mL serosanguineous fluid in return.  Bronchial washing was obtained from entire tracheobronchial tree.     Findings:  Bilateral significant mucus secretions was seen in lower lobes, suctioned out in several attempts.   Indurated airway  Friable mucosa, easy bleeding with suction  Scattered purulent  secretions     Estimated Blood Loss: Insignificant     Specimens:  BAL right lower lobe  Bronchial washings tracheobronchial tree     Complications: None; patient tolerated the procedure well.     Disposition: To [facility], once stable in recovery.     Patient tolerated the procedure well.        Electronically signed by Tae Mosley MD, 5/8/2025, 12:04 EDT.                   LAB RESULTS:      Lab 05/22/25 0457 05/21/25  0509 05/20/25  0551 05/19/25  0607   WBC 15.09* 17.13* 15.66* 17.35*   HEMOGLOBIN 14.8 14.9 14.8 14.6   HEMATOCRIT 45.7 46.0 44.7 45.2   PLATELETS 158 174 187 206   NEUTROS ABS  --   --  14.46* 15.98*   IMMATURE GRANS (ABS)  --   --  0.19* 0.16*   LYMPHS ABS  --   --  0.62* 0.73   MONOS ABS  --   --  0.38 0.45   EOS ABS  --   --  0.00 0.00   MCV 94.6 95.0 95.3 95.0   PROCALCITONIN  --  0.06  --   --          Lab 05/22/25 0457 05/21/25  0509 05/20/25  0551 05/19/25  1616 05/19/25  0607   SODIUM 137 139 136  --  138   POTASSIUM 3.9 4.0 3.9 4.4 3.3*   CHLORIDE 99 100 99  --  98   CO2 26.8 28.5 28.7  --  28.9   ANION GAP 11.2 10.5 8.3  --  11.1   BUN 37* 39* 43*  --  45*   CREATININE 0.76 0.68* 0.75*  --  0.77   EGFR 93.2 96.3 93.5  --  92.8   GLUCOSE 204* 188* 245*  --  262*   CALCIUM 8.7 9.2 8.8  --  9.0   PHOSPHORUS  --  2.8  --   --   --    HEMOGLOBIN A1C  --   --  7.40*  --   --          Lab 05/22/25 0457 05/21/25  0509 05/20/25  0551 05/19/25  0607   TOTAL PROTEIN 5.9*  --  6.3 6.3   ALBUMIN 2.8* 3.0* 3.0* 2.9*   GLOBULIN 3.1  --  3.3 3.4   ALT (SGPT) 77*  --  89* 86*   AST (SGOT) 30  --  36 31   BILIRUBIN 0.8  --  0.8 0.7   ALK PHOS 65  --  67 68                     Lab 05/20/25  0908   PH, ARTERIAL 7.488*   PCO2, ARTERIAL 42.1   PO2 .7*   O2 SATURATION ART 98.7   FIO2 100   HCO3 ART 31.9*   BASE EXCESS ART 7.6*     Brief Urine Lab Results  (Last result in the past 365 days)        Color   Clarity   Blood   Leuk Est   Nitrite   Protein   CREAT   Urine HCG        05/05/25 1828  Yellow   Clear   Negative   Negative   Negative   Negative                 Microbiology Results (last 10 days)       ** No results found for the last 240 hours. **            XR Chest 1 View  Result Date: 5/22/2025  Impression: Impression: 1.Increasing right thoracic and supraclavicular subcutaneous emphysema limiting visualization of presumed relatively small right-sided pneumothorax. No significant pneumothorax progression identified. Other relatively stable findings. Electronically Signed: James Simmons MD  5/22/2025 10:53 AM EDT  Workstation ID: [id number]    XR Chest 1 View  Result Date: 5/20/2025  Impression: Impression: 1.Small right apical pneumothorax is similar compared to 5/17/2025 chest x-ray and was not visualized on 5/18/2025 chest x-ray. Subcutaneous emphysema appears increased compared to 5/18/2025 chest x-ray. Similar pneumomediastinum.. 2.Stable appearance of diffuse right lung and left lower lung interstitial and airspace opacities. Electronically Signed: Jacqueline Dong MD  5/20/2025 11:55 AM EDT  Workstation ID: [id number]    XR Chest 1 View  Result Date: 5/18/2025  Impression: Impression: Improving pulmonary opacities superimposed on known sequelae of chronic interstitial lung disease/fibrosis. No significant visualized pneumothorax. Subcutaneous emphysema is decreasing from prior exam. Electronically Signed: Rickey Markham MD  5/18/2025 2:28 PM EDT  Workstation ID: [id number]    XR Chest 1 View  Result Date: 5/17/2025  Impression: 1.  Bilateral infiltrates persist, right greater than left. The findings may represent infectious multifocal pneumonia. Please correlate clinically. 2.  There is a tiny right apical pneumothorax. Pneumomediastinum is noted. No definite left pneumothorax is seen on this single AP (anteroposterior) upright portable chest radiograph. 3.  Mild subcutaneous emphysema is seen. 4.  Please see above comments for further detail.   Portions of this note were completed with a voice  recognition program.  5/17/2025 5:30 AM by Romeo Winters MD on Workstation: [id number]      CT Chest Without Contrast Diagnostic  Result Date: 5/16/2025  Impression: Impression: Small right pneumothorax. Extensive pneumomediastinum and subcutaneous emphysema extending along the right chest wall into the neck soft tissues. Multifocal consolidative and groundglass opacities in the right greater than left lung suspicious for multifocal pneumonia and/or pulmonary edema, similar to prior. Background UIP pattern of pulmonary fibrosis. Critical Findings were verbally communicated with SULAIMAN Banks by Silver Ahumada MD on 5/16/2025 at 11:18 p.m. Electronically Signed: Silver Ahumada MD  5/16/2025 11:17 PM EDT  Workstation ID: [id number]    XR Chest 1 View  Result Date: 5/14/2025  Impression: Impression: 1. Previously seen air lucency adjacent to the right hilum is not evident on the current study. No evidence of pneumothorax or pneumomediastinum. 2. Widespread bilateral airspace opacities, right greater than left, with slight interval worsening of the right upper lung. 3. Cardiomegaly. Electronically Signed: Sourav Nair MD  5/14/2025 11:16 AM EDT  Workstation ID: [id number]    XR Chest 1 View  Result Date: 5/14/2025  Impression: 1.Bilateral fairly diffuse infiltrates, right worse than left. These are not significantly changed. 2.Lucency projecting near the right hilum. This could potentially reflect pneumomediastinum or small pneumothorax, although this would be an unusual appearance. [place] follow-up repeat chest x-ray recommended. Electronically Signed: Too Pastor MD  5/14/2025 5:40 AM EDT  Workstation ID: [id number]    CT Chest Without Contrast Diagnostic  Result Date: 5/11/2025  Impression: Impression: CT scan of the chest without IV contrast demonstrating extensive bilateral groundglass pulmonary opacities. The pattern and distribution is unchanged in comparison to 5/5/2025. Alveolar edema is improved. AICD in place.  Electronically Signed: Anders Hampton MD  5/11/2025 9:51 AM EDT  Workstation ID: [id number]    XR Chest 1 View  Result Date: 5/10/2025  Impression: Impression: 1.Extensive bilateral airspace opacities, right greater than left, similar to the most recent prior exam. Findings may represent edema or pneumonia as seen on prior chest CT. 2.Cardiomegaly. 3.Right arm PICC with tip projecting in the right atrium. Electronically Signed: Jacob Fordcarlene  5/10/2025 8:39 AM EDT  Workstation ID: [id number]    US Liver  Result Date: 5/7/2025  Impression: Impression: 1.Visualized portions of the liver have a normal sonographic appearance. Nonvisualization of the left hepatic lobes and pancreas due to bowel gas. 2.Status post cholecystectomy. No evidence of biliary ductal dilatation. 3.Increased pulsatility of the hepatic venous waveforms suggests elevated right heart pressures. Electronically Signed: Sourav Nair MD  5/7/2025 9:47 AM EDT  Workstation ID: [id number]    CT Angiogram Chest Pulmonary Embolism  Result Date: 5/5/2025  Impression: Impression: 1.No evidence of pulmonary embolism. 2.Cardiomegaly with reflux of contrast into the hepatic veins and IVC which can be seen with right heart dysfunction. 3.Patchy groundglass opacities throughout both lungs with smooth interlobular septal thickening and trace bilateral pleural effusions. Findings are favored to represent interstitial and alveolar pulmonary edema over bilateral pneumonia. 4.Dilated ascending thoracic aorta measuring up to 4.4 cm. Mild dilatation of the main pulmonary artery. Electronically Signed: Dayron Espana MD  5/5/2025 12:30 PM EDT  Workstation ID: [id number]    XR Chest 1 View  Result Date: 5/5/2025  Impression: 1.Extensive new opacities in the right lung and possible new opacities in the left perihilar and left basilar regions. Findings could represent multifocal pneumonia or edema. 2.Stable cardiomegaly. 3.Right arm catheter with tip projecting in the area of the  cavoatrial junction. Electronically Signed: Jacob Portillo  5/5/2025 9:31 AM EDT  Workstation ID: [id number]              Results for orders placed during the hospital encounter of 05/05/25    Adult Transthoracic Echo Limited W/ Cont if Necessary Per Protocol    Interpretation Summary    There is moderate calcification of the aortic valve.    The left ventricle is normal in size and show significant hypokinesis of the distal septum and apex the proximal septum, lateral wall and inferior wall contract well the estimation fraction is 35%.  The bubble study showed no evidence of AV shunt.      Labs Pending at Discharge:  Pending Labs       Order Current Status    AFB Culture - Lavage, Lung, Right Lower Lobe Preliminary result    AFB Culture - Wash, Bronchus Preliminary result    Fungus Culture - Lavage, Lung, Right Lower Lobe Preliminary result    Fungus Culture - Wash, Bronchus Preliminary result                Electronically signed by Kenneth Prather MD, 05/25/25, 12:41 PM EDT.

## 2025-05-25 NOTE — PLAN OF CARE
Goal Outcome Evaluation:  Plan of Care Reviewed With: patient        Progress: declining  Outcome Evaluation: Patient has more agitation this shift, required pain med and valium every 1-1 1/2 hours, patient still on room air, o2 sats 67 this morning, family present.

## 2025-05-25 NOTE — PLAN OF CARE
Goal Outcome Evaluation:         Time of death 1226 this shift, 2 nurse witness by Brenna CEVALLOS and MD estelle Cevallos case number [id number]                                   Problem: Adult Inpatient Plan of Care  Goal: Plan of Care Review  Outcome: Unable to Meet  Goal: Patient-Specific Goal (Individualized)  Outcome: Unable to Meet  Goal: Absence of Hospital-Acquired Illness or Injury  Outcome: Unable to Meet  Goal: Optimal Comfort and Wellbeing  Outcome: Unable to Meet  Goal: Readiness for Transition of Care  Outcome: Unable to Meet     Problem: Fall Injury Risk  Goal: Absence of Fall and Fall-Related Injury  Outcome: Unable to Meet     Problem: Gas Exchange Impaired  Goal: Optimal Gas Exchange  Outcome: Unable to Meet     Problem: Skin Injury Risk Increased  Goal: Skin Health and Integrity  Outcome: Unable to Meet     Problem: Comorbidity Management  Goal: Blood Glucose Level Within Target Range  Outcome: Unable to Meet  Goal: Maintenance of Heart Failure Symptom Control  Outcome: Unable to Meet  Goal: Blood Pressure in Desired Range  Outcome: Unable to Meet  Goal: Maintenance of Osteoarthritis Symptom Control  Outcome: Unable to Meet     Problem: Sepsis/Septic Shock  Goal: Optimal Coping  Outcome: Unable to Meet  Goal: Absence of Bleeding  Outcome: Unable to Meet  Goal: Blood Glucose Level Within Target Range  Outcome: Unable to Meet  Goal: Absence of Infection Signs and Symptoms  Outcome: Unable to Meet  Goal: Optimal Nutrition Delivery  Outcome: Unable to Meet     Problem: Palliative Care  Goal: Enhanced Quality of Life  Outcome: Unable to Meet  Goal: Enhanced Quality of Life  Outcome: Unable to Meet     Problem: Pain Acute  Goal: Optimal Pain Control and Function  Outcome: Unable to Meet     Problem: Noninvasive Ventilation Acute  Goal: Effective Unassisted Ventilation and Oxygenation  Outcome: Unable to Meet

## 2025-05-29 LAB
MYCOBACTERIUM SPEC CULT: NORMAL
MYCOBACTERIUM SPEC CULT: NORMAL
NIGHT BLUE STAIN TISS: NORMAL

## 2025-06-03 LAB
FUNGUS WND CULT: ABNORMAL
FUNGUS WND CULT: ABNORMAL

## 2025-06-05 LAB
MYCOBACTERIUM SPEC CULT: NORMAL
MYCOBACTERIUM SPEC CULT: NORMAL
NIGHT BLUE STAIN TISS: NORMAL

## 2025-06-12 LAB
MYCOBACTERIUM SPEC CULT: NORMAL
MYCOBACTERIUM SPEC CULT: NORMAL
NIGHT BLUE STAIN TISS: NORMAL

## 2025-06-19 LAB
MYCOBACTERIUM SPEC CULT: NORMAL
MYCOBACTERIUM SPEC CULT: NORMAL
NIGHT BLUE STAIN TISS: NORMAL

## (undated) DEVICE — 3M™ STERI-STRIP™ ANTIMICROBIAL SKIN CLOSURES 1/2 INCH X 4 INCHES 50/CARTON 4 CARTONS/CASE A1847: Brand: 3M™ STERI-STRIP™

## (undated) DEVICE — ELECTRD ECG CARBON/SNP RL FM A/ 5PK

## (undated) DEVICE — SOL IRR NACL 0.9PCT BT 1000ML

## (undated) DEVICE — Device

## (undated) DEVICE — Device: Brand: WEBSTER

## (undated) DEVICE — ST INF 2NDARY 20DRP VNT/NOVNT 30IN

## (undated) DEVICE — SINGLE USE BIOPSY VALVE MAJ-210: Brand: SINGLE USE BIOPSY VALVE (STERILE)

## (undated) DEVICE — BLCK/BITE BLOX WO/DENTL/RIM W/STRAP 54F

## (undated) DEVICE — DECANT BG O JET

## (undated) DEVICE — SOLIDIFIER LIQLOC PLS 1500CC BT

## (undated) DEVICE — CONTAINER,SPEC,PNEUM TUBE,3OZ,STRL PATH: Brand: MEDLINE

## (undated) DEVICE — SYR SLP TP 10ML DISP

## (undated) DEVICE — CATH GUIDE ATTAIN COMMND SURVLV EH9F50CM

## (undated) DEVICE — DRSNG TELFA PAD NONADH STR 1S 3X8IN

## (undated) DEVICE — BALN CORNRY SINUS 6F 1X80CM

## (undated) DEVICE — LIMB HOLDER, WRIST/ANKLE: Brand: DEROYAL

## (undated) DEVICE — INTRO SHEATH PRELUDE SNAP .038 9F 13CM W/SDPRT BLK

## (undated) DEVICE — INTRO TEAR AWAY/LVD W/SD PRT 7F 13CM

## (undated) DEVICE — WRENCH KT PM MEDTRONIC

## (undated) DEVICE — AIRLIFE™ NASAL OXYGEN CANNULA CURVED, NONFLARED TIP WITH 21 FOOT (6.4 M) CRUSH-RESISTANT TUBING, OVER-THE-EAR STYLE: Brand: AIRLIFE™

## (undated) DEVICE — DEV ATOMIZATION MUCOSAL/NASALTRACH

## (undated) DEVICE — ADAPT SWVL FIBROPTIC BRONCH

## (undated) DEVICE — SOL NS 500ML

## (undated) DEVICE — SLITTER CATH GUIDE ATTAIN ADJ

## (undated) DEVICE — KT COMPLIANCE ENDO PREV INFCT

## (undated) DEVICE — SINGLE USE SUCTION VALVE MAJ-209: Brand: SINGLE USE SUCTION VALVE (STERILE)

## (undated) DEVICE — SOL IRR NACL 0.9PCT BO 500ML

## (undated) DEVICE — SYR LUER SLPTP 50ML

## (undated) DEVICE — LINER SURG CANSTR SXN S/RIGD 1500CC

## (undated) DEVICE — NDL FLTR BLNT 18G 1 1/2IN

## (undated) DEVICE — LOU PACE DEFIB: Brand: MEDLINE INDUSTRIES, INC.

## (undated) DEVICE — TRAP,MUCUS SPECIMEN,40CC: Brand: MEDLINE

## (undated) DEVICE — GUIDE WIRE WITH HYDROPHILIC COATING: Brand: ACUITY WHISPER VIEW™

## (undated) DEVICE — DRSNG SURESITE WNDW 4X4.5